# Patient Record
Sex: MALE | Race: BLACK OR AFRICAN AMERICAN | Employment: OTHER | URBAN - METROPOLITAN AREA
[De-identification: names, ages, dates, MRNs, and addresses within clinical notes are randomized per-mention and may not be internally consistent; named-entity substitution may affect disease eponyms.]

---

## 2017-01-12 ENCOUNTER — OFFICE VISIT - HEALTHEAST (OUTPATIENT)
Dept: CARDIOLOGY | Facility: CLINIC | Age: 67
End: 2017-01-12

## 2017-01-12 DIAGNOSIS — I50.22 CHRONIC SYSTOLIC CHF (CONGESTIVE HEART FAILURE), NYHA CLASS 2 (H): ICD-10-CM

## 2017-01-12 DIAGNOSIS — I10 ESSENTIAL HYPERTENSION WITH GOAL BLOOD PRESSURE LESS THAN 140/90: ICD-10-CM

## 2017-01-12 DIAGNOSIS — E78.5 DYSLIPIDEMIA, GOAL LDL BELOW 70: ICD-10-CM

## 2017-01-12 DIAGNOSIS — I25.810 ATHEROSCLEROSIS OF CORONARY ARTERY BYPASS GRAFT OF NATIVE HEART WITHOUT ANGINA PECTORIS: ICD-10-CM

## 2017-01-12 DIAGNOSIS — I25.5 ISCHEMIC CARDIOMYOPATHY: ICD-10-CM

## 2017-01-12 ASSESSMENT — MIFFLIN-ST. JEOR: SCORE: 1789.12

## 2017-01-19 ENCOUNTER — HOSPITAL ENCOUNTER (OUTPATIENT)
Dept: MRI IMAGING | Facility: HOSPITAL | Age: 67
Discharge: HOME | End: 2017-01-19
Attending: INTERNAL MEDICINE

## 2017-01-19 DIAGNOSIS — I25.5 ISCHEMIC CARDIOMYOPATHY: ICD-10-CM

## 2017-02-02 ENCOUNTER — OFFICE VISIT - HEALTHEAST (OUTPATIENT)
Dept: FAMILY MEDICINE | Facility: CLINIC | Age: 67
End: 2017-02-02

## 2017-02-02 DIAGNOSIS — I25.118 CORONARY ARTERY DISEASE OF NATIVE ARTERY OF NATIVE HEART WITH STABLE ANGINA PECTORIS (H): ICD-10-CM

## 2017-02-02 DIAGNOSIS — I25.5 ISCHEMIC CARDIOMYOPATHY: ICD-10-CM

## 2017-02-02 DIAGNOSIS — E66.3 OVERWEIGHT: ICD-10-CM

## 2017-02-02 DIAGNOSIS — E11.9 DIABETES (H): ICD-10-CM

## 2017-02-02 DIAGNOSIS — M25.562 LEFT KNEE PAIN: ICD-10-CM

## 2017-02-02 DIAGNOSIS — I15.0 RENOVASCULAR HYPERTENSION WITH GOAL BLOOD PRESSURE LESS THAN 130/85: ICD-10-CM

## 2017-02-02 DIAGNOSIS — I73.9 PERIPHERAL VASCULAR DISEASE, UNSPECIFIED (H): ICD-10-CM

## 2017-02-02 DIAGNOSIS — I25.10 CORONARY ATHEROSCLEROSIS: ICD-10-CM

## 2017-02-02 DIAGNOSIS — I10 ESSENTIAL HYPERTENSION: ICD-10-CM

## 2017-02-02 DIAGNOSIS — F17.200 TOBACCO DEPENDENCE: ICD-10-CM

## 2017-02-02 DIAGNOSIS — E78.5 HYPERLIPIDEMIA: ICD-10-CM

## 2017-02-13 ENCOUNTER — COMMUNICATION - HEALTHEAST (OUTPATIENT)
Dept: FAMILY MEDICINE | Facility: CLINIC | Age: 67
End: 2017-02-13

## 2017-02-17 ENCOUNTER — OFFICE VISIT - HEALTHEAST (OUTPATIENT)
Dept: FAMILY MEDICINE | Facility: CLINIC | Age: 67
End: 2017-02-17

## 2017-02-17 DIAGNOSIS — R05.9 COUGH: ICD-10-CM

## 2017-02-17 ASSESSMENT — MIFFLIN-ST. JEOR: SCORE: 1816.33

## 2017-02-24 ENCOUNTER — COMMUNICATION - HEALTHEAST (OUTPATIENT)
Dept: FAMILY MEDICINE | Facility: CLINIC | Age: 67
End: 2017-02-24

## 2017-02-28 ENCOUNTER — COMMUNICATION - HEALTHEAST (OUTPATIENT)
Dept: FAMILY MEDICINE | Facility: CLINIC | Age: 67
End: 2017-02-28

## 2017-03-03 ENCOUNTER — OFFICE VISIT - HEALTHEAST (OUTPATIENT)
Dept: FAMILY MEDICINE | Facility: CLINIC | Age: 67
End: 2017-03-03

## 2017-03-03 DIAGNOSIS — I25.5 ISCHEMIC CARDIOMYOPATHY: ICD-10-CM

## 2017-03-03 DIAGNOSIS — I50.9 ACUTE EXACERBATION OF CHF (CONGESTIVE HEART FAILURE) (H): ICD-10-CM

## 2017-03-03 DIAGNOSIS — I50.9 ACUTE ON CHRONIC CONGESTIVE HEART FAILURE, UNSPECIFIED CONGESTIVE HEART FAILURE TYPE: ICD-10-CM

## 2017-03-03 DIAGNOSIS — I10 ESSENTIAL HYPERTENSION: ICD-10-CM

## 2017-03-08 ENCOUNTER — AMBULATORY - HEALTHEAST (OUTPATIENT)
Dept: PULMONOLOGY | Facility: OTHER | Age: 67
End: 2017-03-08

## 2017-03-08 DIAGNOSIS — R06.02 SOB (SHORTNESS OF BREATH): ICD-10-CM

## 2017-03-13 ENCOUNTER — AMBULATORY - HEALTHEAST (OUTPATIENT)
Dept: CARDIOLOGY | Facility: CLINIC | Age: 67
End: 2017-03-13

## 2017-03-13 ENCOUNTER — OFFICE VISIT - HEALTHEAST (OUTPATIENT)
Dept: CARDIOLOGY | Facility: CLINIC | Age: 67
End: 2017-03-13

## 2017-03-13 DIAGNOSIS — I25.5 ISCHEMIC CARDIOMYOPATHY: ICD-10-CM

## 2017-03-13 ASSESSMENT — MIFFLIN-ST. JEOR: SCORE: 1780.04

## 2017-03-14 ENCOUNTER — OFFICE VISIT - HEALTHEAST (OUTPATIENT)
Dept: PULMONOLOGY | Facility: OTHER | Age: 67
End: 2017-03-14

## 2017-03-14 DIAGNOSIS — J20.8 ACUTE BRONCHITIS, VIRAL: ICD-10-CM

## 2017-03-14 ASSESSMENT — MIFFLIN-ST. JEOR: SCORE: 1770.97

## 2017-03-23 ENCOUNTER — COMMUNICATION - HEALTHEAST (OUTPATIENT)
Dept: FAMILY MEDICINE | Facility: CLINIC | Age: 67
End: 2017-03-23

## 2017-03-27 ENCOUNTER — AMBULATORY - HEALTHEAST (OUTPATIENT)
Dept: CARE COORDINATION | Facility: CLINIC | Age: 67
End: 2017-03-27

## 2017-03-29 ENCOUNTER — OFFICE VISIT - HEALTHEAST (OUTPATIENT)
Dept: FAMILY MEDICINE | Facility: CLINIC | Age: 67
End: 2017-03-29

## 2017-03-29 DIAGNOSIS — I25.10 CORONARY ATHEROSCLEROSIS: ICD-10-CM

## 2017-03-29 DIAGNOSIS — E78.5 DYSLIPIDEMIA, GOAL LDL BELOW 70: ICD-10-CM

## 2017-03-29 DIAGNOSIS — F17.200 TOBACCO DEPENDENCE: ICD-10-CM

## 2017-03-29 DIAGNOSIS — I10 ESSENTIAL HYPERTENSION: ICD-10-CM

## 2017-04-03 ENCOUNTER — OFFICE VISIT - HEALTHEAST (OUTPATIENT)
Dept: CARDIOLOGY | Facility: CLINIC | Age: 67
End: 2017-04-03

## 2017-04-03 DIAGNOSIS — I25.5 ISCHEMIC CARDIOMYOPATHY: ICD-10-CM

## 2017-04-03 DIAGNOSIS — I50.9 ACUTE ON CHRONIC CONGESTIVE HEART FAILURE, UNSPECIFIED CONGESTIVE HEART FAILURE TYPE: ICD-10-CM

## 2017-04-03 DIAGNOSIS — E78.5 HYPERLIPIDEMIA, UNSPECIFIED HYPERLIPIDEMIA TYPE: ICD-10-CM

## 2017-04-03 ASSESSMENT — MIFFLIN-ST. JEOR: SCORE: 1793.65

## 2017-05-01 ENCOUNTER — OFFICE VISIT - HEALTHEAST (OUTPATIENT)
Dept: CARDIOLOGY | Facility: CLINIC | Age: 67
End: 2017-05-01

## 2017-05-01 DIAGNOSIS — I25.5 ISCHEMIC CARDIOMYOPATHY: ICD-10-CM

## 2017-05-01 ASSESSMENT — MIFFLIN-ST. JEOR: SCORE: 1775.51

## 2017-05-30 ENCOUNTER — OFFICE VISIT - HEALTHEAST (OUTPATIENT)
Dept: CARDIOLOGY | Facility: CLINIC | Age: 67
End: 2017-05-30

## 2017-05-30 DIAGNOSIS — I10 ESSENTIAL HYPERTENSION WITH GOAL BLOOD PRESSURE LESS THAN 140/90: ICD-10-CM

## 2017-05-30 DIAGNOSIS — E78.5 HYPERLIPIDEMIA WITH TARGET LDL LESS THAN 70: ICD-10-CM

## 2017-05-30 DIAGNOSIS — I50.22 CHRONIC SYSTOLIC CHF (CONGESTIVE HEART FAILURE), NYHA CLASS 2 (H): ICD-10-CM

## 2017-05-30 DIAGNOSIS — I25.810 CORONARY ARTERY DISEASE INVOLVING CORONARY BYPASS GRAFT OF NATIVE HEART WITHOUT ANGINA PECTORIS: ICD-10-CM

## 2017-05-30 DIAGNOSIS — I25.5 ISCHEMIC CARDIOMYOPATHY: ICD-10-CM

## 2017-05-30 ASSESSMENT — MIFFLIN-ST. JEOR: SCORE: 1780.04

## 2017-06-12 ENCOUNTER — COMMUNICATION - HEALTHEAST (OUTPATIENT)
Dept: CARDIOLOGY | Facility: CLINIC | Age: 67
End: 2017-06-12

## 2017-06-12 DIAGNOSIS — I25.118 CORONARY ARTERY DISEASE OF NATIVE ARTERY OF NATIVE HEART WITH STABLE ANGINA PECTORIS (H): ICD-10-CM

## 2017-06-14 ENCOUNTER — COMMUNICATION - HEALTHEAST (OUTPATIENT)
Dept: FAMILY MEDICINE | Facility: CLINIC | Age: 67
End: 2017-06-14

## 2017-06-14 ENCOUNTER — AMBULATORY - HEALTHEAST (OUTPATIENT)
Dept: FAMILY MEDICINE | Facility: CLINIC | Age: 67
End: 2017-06-14

## 2017-06-14 DIAGNOSIS — E78.5 HYPERLIPIDEMIA WITH TARGET LDL LESS THAN 70: ICD-10-CM

## 2017-06-21 ENCOUNTER — COMMUNICATION - HEALTHEAST (OUTPATIENT)
Dept: ADMINISTRATIVE | Facility: CLINIC | Age: 67
End: 2017-06-21

## 2017-06-29 ENCOUNTER — RECORDS - HEALTHEAST (OUTPATIENT)
Dept: ADMINISTRATIVE | Facility: OTHER | Age: 67
End: 2017-06-29

## 2017-07-19 ENCOUNTER — COMMUNICATION - HEALTHEAST (OUTPATIENT)
Dept: ADMINISTRATIVE | Facility: CLINIC | Age: 67
End: 2017-07-19

## 2017-08-16 ENCOUNTER — OFFICE VISIT - HEALTHEAST (OUTPATIENT)
Dept: CARDIOLOGY | Facility: CLINIC | Age: 67
End: 2017-08-16

## 2017-08-16 DIAGNOSIS — E78.5 DYSLIPIDEMIA, GOAL LDL BELOW 70: ICD-10-CM

## 2017-08-16 DIAGNOSIS — I25.810 CORONARY ARTERY DISEASE INVOLVING CORONARY BYPASS GRAFT OF NATIVE HEART WITHOUT ANGINA PECTORIS: ICD-10-CM

## 2017-08-16 DIAGNOSIS — I50.22 CHRONIC SYSTOLIC CHF (CONGESTIVE HEART FAILURE), NYHA CLASS 2 (H): ICD-10-CM

## 2017-08-16 DIAGNOSIS — I10 ESSENTIAL HYPERTENSION WITH GOAL BLOOD PRESSURE LESS THAN 140/90: ICD-10-CM

## 2017-08-16 DIAGNOSIS — I25.5 ISCHEMIC CARDIOMYOPATHY: ICD-10-CM

## 2017-08-16 LAB
CHOLEST SERPL-MCNC: 103 MG/DL
FASTING STATUS PATIENT QL REPORTED: YES
HDLC SERPL-MCNC: 31 MG/DL
LDLC SERPL CALC-MCNC: 52 MG/DL
TRIGL SERPL-MCNC: 99 MG/DL

## 2017-08-16 ASSESSMENT — MIFFLIN-ST. JEOR: SCORE: 1789.12

## 2017-09-08 ENCOUNTER — COMMUNICATION - HEALTHEAST (OUTPATIENT)
Dept: FAMILY MEDICINE | Facility: CLINIC | Age: 67
End: 2017-09-08

## 2017-09-13 ENCOUNTER — OFFICE VISIT - HEALTHEAST (OUTPATIENT)
Dept: FAMILY MEDICINE | Facility: CLINIC | Age: 67
End: 2017-09-13

## 2017-09-13 DIAGNOSIS — B07.9 WART: ICD-10-CM

## 2017-09-13 DIAGNOSIS — E78.5 DYSLIPIDEMIA, GOAL LDL BELOW 70: ICD-10-CM

## 2017-09-13 DIAGNOSIS — Z23 ENCOUNTER TO VACCINATE PATIENT: ICD-10-CM

## 2017-09-13 DIAGNOSIS — I25.5 ISCHEMIC CARDIOMYOPATHY: ICD-10-CM

## 2017-09-13 ASSESSMENT — MIFFLIN-ST. JEOR: SCORE: 1789.39

## 2017-10-16 ENCOUNTER — COMMUNICATION - HEALTHEAST (OUTPATIENT)
Dept: FAMILY MEDICINE | Facility: CLINIC | Age: 67
End: 2017-10-16

## 2017-10-16 DIAGNOSIS — E11.9 DIABETES (H): ICD-10-CM

## 2017-11-13 ENCOUNTER — COMMUNICATION - HEALTHEAST (OUTPATIENT)
Dept: FAMILY MEDICINE | Facility: CLINIC | Age: 67
End: 2017-11-13

## 2017-11-13 DIAGNOSIS — E78.5 HYPERLIPIDEMIA WITH TARGET LDL LESS THAN 70: ICD-10-CM

## 2017-11-13 DIAGNOSIS — I25.810 ATHEROSCLEROSIS OF CORONARY ARTERY BYPASS GRAFT OF NATIVE HEART WITHOUT ANGINA PECTORIS: ICD-10-CM

## 2017-11-13 DIAGNOSIS — F17.200 TOBACCO DEPENDENCE: ICD-10-CM

## 2017-12-19 ENCOUNTER — COMMUNICATION - HEALTHEAST (OUTPATIENT)
Dept: FAMILY MEDICINE | Facility: CLINIC | Age: 67
End: 2017-12-19

## 2017-12-19 DIAGNOSIS — F17.200 NICOTINE DEPENDENCE: ICD-10-CM

## 2018-01-09 ENCOUNTER — RECORDS - HEALTHEAST (OUTPATIENT)
Dept: ADMINISTRATIVE | Facility: OTHER | Age: 68
End: 2018-01-09

## 2018-01-18 ENCOUNTER — OFFICE VISIT - HEALTHEAST (OUTPATIENT)
Dept: FAMILY MEDICINE | Facility: CLINIC | Age: 68
End: 2018-01-18

## 2018-01-18 DIAGNOSIS — R06.81 APNEA: ICD-10-CM

## 2018-01-18 DIAGNOSIS — H26.9 CATARACT: ICD-10-CM

## 2018-01-18 DIAGNOSIS — I10 ESSENTIAL HYPERTENSION: ICD-10-CM

## 2018-01-23 ENCOUNTER — RECORDS - HEALTHEAST (OUTPATIENT)
Dept: ADMINISTRATIVE | Facility: OTHER | Age: 68
End: 2018-01-23

## 2018-01-24 ENCOUNTER — RECORDS - HEALTHEAST (OUTPATIENT)
Dept: ADMINISTRATIVE | Facility: OTHER | Age: 68
End: 2018-01-24

## 2018-01-25 ENCOUNTER — COMMUNICATION - HEALTHEAST (OUTPATIENT)
Dept: ADMINISTRATIVE | Facility: CLINIC | Age: 68
End: 2018-01-25

## 2018-02-09 ENCOUNTER — COMMUNICATION - HEALTHEAST (OUTPATIENT)
Dept: FAMILY MEDICINE | Facility: CLINIC | Age: 68
End: 2018-02-09

## 2018-02-09 ENCOUNTER — COMMUNICATION - HEALTHEAST (OUTPATIENT)
Dept: CARDIOLOGY | Facility: CLINIC | Age: 68
End: 2018-02-09

## 2018-02-09 DIAGNOSIS — I25.5 ISCHEMIC CARDIOMYOPATHY: ICD-10-CM

## 2018-02-09 DIAGNOSIS — E78.5 HYPERLIPIDEMIA, UNSPECIFIED HYPERLIPIDEMIA TYPE: ICD-10-CM

## 2018-02-12 ENCOUNTER — COMMUNICATION - HEALTHEAST (OUTPATIENT)
Dept: FAMILY MEDICINE | Facility: CLINIC | Age: 68
End: 2018-02-12

## 2018-02-12 ENCOUNTER — COMMUNICATION - HEALTHEAST (OUTPATIENT)
Dept: CARDIOLOGY | Facility: CLINIC | Age: 68
End: 2018-02-12

## 2018-02-12 DIAGNOSIS — I25.810 ATHEROSCLEROSIS OF CORONARY ARTERY BYPASS GRAFT OF NATIVE HEART WITHOUT ANGINA PECTORIS: ICD-10-CM

## 2018-02-12 DIAGNOSIS — E11.9 DIABETES (H): ICD-10-CM

## 2018-02-12 DIAGNOSIS — F17.200 TOBACCO DEPENDENCE: ICD-10-CM

## 2018-02-12 DIAGNOSIS — I50.9 ACUTE ON CHRONIC CONGESTIVE HEART FAILURE, UNSPECIFIED CONGESTIVE HEART FAILURE TYPE: ICD-10-CM

## 2018-02-12 DIAGNOSIS — I15.0 RENOVASCULAR HYPERTENSION WITH GOAL BLOOD PRESSURE LESS THAN 130/85: ICD-10-CM

## 2018-02-12 DIAGNOSIS — E08.22 DIABETES MELLITUS DUE TO UNDERLYING CONDITION WITH DIABETIC CHRONIC KIDNEY DISEASE, UNSPECIFIED CKD STAGE, UNSPECIFIED LONG TERM INSULIN USE STATUS: ICD-10-CM

## 2018-02-13 ENCOUNTER — COMMUNICATION - HEALTHEAST (OUTPATIENT)
Dept: FAMILY MEDICINE | Facility: CLINIC | Age: 68
End: 2018-02-13

## 2018-02-13 DIAGNOSIS — E08.22 DIABETES MELLITUS DUE TO UNDERLYING CONDITION WITH DIABETIC CHRONIC KIDNEY DISEASE, UNSPECIFIED CKD STAGE, UNSPECIFIED LONG TERM INSULIN USE STATUS: ICD-10-CM

## 2018-02-16 ENCOUNTER — COMMUNICATION - HEALTHEAST (OUTPATIENT)
Dept: ADMINISTRATIVE | Facility: CLINIC | Age: 68
End: 2018-02-16

## 2018-02-27 ENCOUNTER — OFFICE VISIT - HEALTHEAST (OUTPATIENT)
Dept: FAMILY MEDICINE | Facility: CLINIC | Age: 68
End: 2018-02-27

## 2018-02-27 DIAGNOSIS — Z01.818 PREOP TESTING: ICD-10-CM

## 2018-02-27 DIAGNOSIS — H26.9 RIGHT CATARACT: ICD-10-CM

## 2018-02-27 DIAGNOSIS — I25.10 CORONARY ATHEROSCLEROSIS: ICD-10-CM

## 2018-02-27 DIAGNOSIS — E08.22 DIABETES MELLITUS DUE TO UNDERLYING CONDITION WITH DIABETIC CHRONIC KIDNEY DISEASE, UNSPECIFIED CKD STAGE, UNSPECIFIED LONG TERM INSULIN USE STATUS: ICD-10-CM

## 2018-02-27 LAB
ANION GAP SERPL CALCULATED.3IONS-SCNC: 12 MMOL/L (ref 5–18)
BASOPHILS # BLD AUTO: 0 THOU/UL (ref 0–0.2)
BASOPHILS NFR BLD AUTO: 0 % (ref 0–2)
BUN SERPL-MCNC: 28 MG/DL (ref 8–22)
CALCIUM SERPL-MCNC: 9.9 MG/DL (ref 8.5–10.5)
CHLORIDE BLD-SCNC: 109 MMOL/L (ref 98–107)
CO2 SERPL-SCNC: 21 MMOL/L (ref 22–31)
CREAT SERPL-MCNC: 1.32 MG/DL (ref 0.7–1.3)
EOSINOPHIL # BLD AUTO: 0.4 THOU/UL (ref 0–0.4)
EOSINOPHIL NFR BLD AUTO: 7 % (ref 0–6)
ERYTHROCYTE [DISTWIDTH] IN BLOOD BY AUTOMATED COUNT: 16.2 % (ref 11–14.5)
GFR SERPL CREATININE-BSD FRML MDRD: 54 ML/MIN/1.73M2
GLUCOSE BLD-MCNC: 145 MG/DL (ref 70–125)
HCT VFR BLD AUTO: 38.5 % (ref 40–54)
HGB BLD-MCNC: 12.8 G/DL (ref 14–18)
LYMPHOCYTES # BLD AUTO: 1.9 THOU/UL (ref 0.8–4.4)
LYMPHOCYTES NFR BLD AUTO: 34 % (ref 20–40)
MCH RBC QN AUTO: 27.1 PG (ref 27–34)
MCHC RBC AUTO-ENTMCNC: 33.2 G/DL (ref 32–36)
MCV RBC AUTO: 81 FL (ref 80–100)
MONOCYTES # BLD AUTO: 0.5 THOU/UL (ref 0–0.9)
MONOCYTES NFR BLD AUTO: 9 % (ref 2–10)
NEUTROPHILS # BLD AUTO: 2.8 THOU/UL (ref 2–7.7)
NEUTROPHILS NFR BLD AUTO: 50 % (ref 50–70)
PLATELET # BLD AUTO: 247 THOU/UL (ref 140–440)
PMV BLD AUTO: 7.4 FL (ref 7–10)
POTASSIUM BLD-SCNC: 4.2 MMOL/L (ref 3.5–5)
RBC # BLD AUTO: 4.72 MILL/UL (ref 4.4–6.2)
SODIUM SERPL-SCNC: 142 MMOL/L (ref 136–145)
WBC: 5.5 THOU/UL (ref 4–11)

## 2018-02-27 ASSESSMENT — MIFFLIN-ST. JEOR: SCORE: 1773.52

## 2018-03-06 ENCOUNTER — OFFICE VISIT - HEALTHEAST (OUTPATIENT)
Dept: SLEEP MEDICINE | Facility: CLINIC | Age: 68
End: 2018-03-06

## 2018-03-06 DIAGNOSIS — R06.83 SNORING: ICD-10-CM

## 2018-03-06 DIAGNOSIS — G47.8 SLEEP DYSFUNCTION WITH SLEEP STAGE DISTURBANCE: ICD-10-CM

## 2018-03-06 DIAGNOSIS — G47.10 HYPERSOMNIA: ICD-10-CM

## 2018-03-06 DIAGNOSIS — R29.818 SUSPECTED SLEEP APNEA: ICD-10-CM

## 2018-03-06 ASSESSMENT — MIFFLIN-ST. JEOR: SCORE: 1791.38

## 2018-03-08 ENCOUNTER — OFFICE VISIT - HEALTHEAST (OUTPATIENT)
Dept: CARDIOLOGY | Facility: CLINIC | Age: 68
End: 2018-03-08

## 2018-03-08 DIAGNOSIS — I25.82 CORONARY ARTERY CHRONIC TOTAL OCCLUSION: ICD-10-CM

## 2018-03-08 DIAGNOSIS — I25.5 ISCHEMIC CARDIOMYOPATHY: ICD-10-CM

## 2018-03-08 DIAGNOSIS — E78.5 DYSLIPIDEMIA, GOAL LDL BELOW 70: ICD-10-CM

## 2018-03-08 DIAGNOSIS — I10 ESSENTIAL HYPERTENSION: ICD-10-CM

## 2018-03-08 DIAGNOSIS — I50.22 CHRONIC SYSTOLIC CHF (CONGESTIVE HEART FAILURE), NYHA CLASS 2 (H): ICD-10-CM

## 2018-03-08 ASSESSMENT — MIFFLIN-ST. JEOR: SCORE: 1800.22

## 2018-03-25 ENCOUNTER — RECORDS - HEALTHEAST (OUTPATIENT)
Dept: SLEEP MEDICINE | Facility: CLINIC | Age: 68
End: 2018-03-25

## 2018-03-25 DIAGNOSIS — G47.8 OTHER SLEEP DISORDERS: ICD-10-CM

## 2018-03-25 DIAGNOSIS — R06.83 SNORING: ICD-10-CM

## 2018-03-25 DIAGNOSIS — G47.30 SLEEP APNEA, UNSPECIFIED: ICD-10-CM

## 2018-03-25 DIAGNOSIS — G47.10 HYPERSOMNIA, UNSPECIFIED: ICD-10-CM

## 2018-04-02 ENCOUNTER — COMMUNICATION - HEALTHEAST (OUTPATIENT)
Dept: SLEEP MEDICINE | Facility: CLINIC | Age: 68
End: 2018-04-02

## 2018-04-17 ENCOUNTER — COMMUNICATION - HEALTHEAST (OUTPATIENT)
Dept: FAMILY MEDICINE | Facility: CLINIC | Age: 68
End: 2018-04-17

## 2018-04-17 DIAGNOSIS — E11.9 DIABETES (H): ICD-10-CM

## 2018-04-19 ENCOUNTER — OFFICE VISIT - HEALTHEAST (OUTPATIENT)
Dept: SLEEP MEDICINE | Facility: CLINIC | Age: 68
End: 2018-04-19

## 2018-04-19 ENCOUNTER — AMBULATORY - HEALTHEAST (OUTPATIENT)
Dept: MULTI SPECIALTY CLINIC | Facility: CLINIC | Age: 68
End: 2018-04-19

## 2018-04-19 ENCOUNTER — AMBULATORY - HEALTHEAST (OUTPATIENT)
Dept: SLEEP MEDICINE | Facility: CLINIC | Age: 68
End: 2018-04-19

## 2018-04-19 DIAGNOSIS — G47.8 SLEEP DYSFUNCTION WITH SLEEP STAGE DISTURBANCE: ICD-10-CM

## 2018-04-19 DIAGNOSIS — G47.33 OBSTRUCTIVE SLEEP APNEA: ICD-10-CM

## 2018-04-19 DIAGNOSIS — G47.69 SLEEP-RELATED MOVEMENT DISORDER: ICD-10-CM

## 2018-04-19 ASSESSMENT — MIFFLIN-ST. JEOR: SCORE: 1810.2

## 2018-04-27 ENCOUNTER — AMBULATORY - HEALTHEAST (OUTPATIENT)
Dept: SLEEP MEDICINE | Facility: CLINIC | Age: 68
End: 2018-04-27

## 2018-05-14 ENCOUNTER — COMMUNICATION - HEALTHEAST (OUTPATIENT)
Dept: CARDIOLOGY | Facility: CLINIC | Age: 68
End: 2018-05-14

## 2018-05-14 DIAGNOSIS — I50.9 ACUTE ON CHRONIC CONGESTIVE HEART FAILURE, UNSPECIFIED CONGESTIVE HEART FAILURE TYPE: ICD-10-CM

## 2018-05-14 DIAGNOSIS — E78.5 HYPERLIPIDEMIA, UNSPECIFIED HYPERLIPIDEMIA TYPE: ICD-10-CM

## 2018-05-14 DIAGNOSIS — I25.5 ISCHEMIC CARDIOMYOPATHY: ICD-10-CM

## 2018-06-08 ENCOUNTER — COMMUNICATION - HEALTHEAST (OUTPATIENT)
Dept: SLEEP MEDICINE | Facility: CLINIC | Age: 68
End: 2018-06-08

## 2018-06-13 ENCOUNTER — OFFICE VISIT - HEALTHEAST (OUTPATIENT)
Dept: SLEEP MEDICINE | Facility: CLINIC | Age: 68
End: 2018-06-13

## 2018-06-13 DIAGNOSIS — G47.33 OSA ON CPAP: ICD-10-CM

## 2018-06-13 DIAGNOSIS — G47.8 SLEEP DYSFUNCTION WITH SLEEP STAGE DISTURBANCE: ICD-10-CM

## 2018-06-13 DIAGNOSIS — R03.0 ELEVATED BLOOD PRESSURE READING: ICD-10-CM

## 2018-06-13 ASSESSMENT — MIFFLIN-ST. JEOR: SCORE: 1797.5

## 2018-06-29 ENCOUNTER — COMMUNICATION - HEALTHEAST (OUTPATIENT)
Dept: ADMINISTRATIVE | Facility: CLINIC | Age: 68
End: 2018-06-29

## 2018-07-16 ENCOUNTER — COMMUNICATION - HEALTHEAST (OUTPATIENT)
Dept: FAMILY MEDICINE | Facility: CLINIC | Age: 68
End: 2018-07-16

## 2018-07-16 ENCOUNTER — COMMUNICATION - HEALTHEAST (OUTPATIENT)
Dept: CARDIOLOGY | Facility: CLINIC | Age: 68
End: 2018-07-16

## 2018-07-16 ENCOUNTER — AMBULATORY - HEALTHEAST (OUTPATIENT)
Dept: SLEEP MEDICINE | Facility: CLINIC | Age: 68
End: 2018-07-16

## 2018-07-16 DIAGNOSIS — I25.118 CORONARY ARTERY DISEASE OF NATIVE ARTERY OF NATIVE HEART WITH STABLE ANGINA PECTORIS (H): ICD-10-CM

## 2018-07-16 DIAGNOSIS — E11.9 DIABETES (H): ICD-10-CM

## 2018-08-13 ENCOUNTER — COMMUNICATION - HEALTHEAST (OUTPATIENT)
Dept: FAMILY MEDICINE | Facility: CLINIC | Age: 68
End: 2018-08-13

## 2018-08-13 DIAGNOSIS — I73.9 PERIPHERAL VASCULAR DISEASE (H): ICD-10-CM

## 2018-08-13 DIAGNOSIS — I25.10 CORONARY ATHEROSCLEROSIS: ICD-10-CM

## 2018-08-13 DIAGNOSIS — E78.5 HYPERLIPIDEMIA WITH TARGET LDL LESS THAN 70: ICD-10-CM

## 2018-08-28 ENCOUNTER — OFFICE VISIT - HEALTHEAST (OUTPATIENT)
Dept: FAMILY MEDICINE | Facility: CLINIC | Age: 68
End: 2018-08-28

## 2018-08-28 DIAGNOSIS — G47.33 OSA (OBSTRUCTIVE SLEEP APNEA): ICD-10-CM

## 2018-08-28 DIAGNOSIS — I25.709 CORONARY ARTERY DISEASE INVOLVING CORONARY BYPASS GRAFT OF NATIVE HEART WITH ANGINA PECTORIS (H): ICD-10-CM

## 2018-08-28 DIAGNOSIS — R20.2 PARESTHESIA OF BOTH FEET: ICD-10-CM

## 2018-08-28 DIAGNOSIS — E11.9 DIABETES MELLITUS (H): ICD-10-CM

## 2018-08-28 DIAGNOSIS — I10 ESSENTIAL HYPERTENSION WITH GOAL BLOOD PRESSURE LESS THAN 140/90: ICD-10-CM

## 2018-08-28 DIAGNOSIS — Z23 INFLUENZA VACCINATION GIVEN: ICD-10-CM

## 2018-08-28 LAB
ANION GAP SERPL CALCULATED.3IONS-SCNC: 10 MMOL/L (ref 5–18)
BUN SERPL-MCNC: 20 MG/DL (ref 8–22)
CALCIUM SERPL-MCNC: 10.5 MG/DL (ref 8.5–10.5)
CHLORIDE BLD-SCNC: 109 MMOL/L (ref 98–107)
CHOLEST SERPL-MCNC: 109 MG/DL
CO2 SERPL-SCNC: 26 MMOL/L (ref 22–31)
CREAT SERPL-MCNC: 1.2 MG/DL (ref 0.7–1.3)
FASTING STATUS PATIENT QL REPORTED: YES
GFR SERPL CREATININE-BSD FRML MDRD: 60 ML/MIN/1.73M2
GLUCOSE BLD-MCNC: 137 MG/DL (ref 70–125)
HBA1C MFR BLD: 7 % (ref 3.5–6)
HDLC SERPL-MCNC: 39 MG/DL
LDLC SERPL CALC-MCNC: 53 MG/DL
POTASSIUM BLD-SCNC: 4.3 MMOL/L (ref 3.5–5)
SODIUM SERPL-SCNC: 145 MMOL/L (ref 136–145)
TRIGL SERPL-MCNC: 87 MG/DL

## 2018-08-28 ASSESSMENT — MIFFLIN-ST. JEOR: SCORE: 1804.14

## 2018-09-13 ENCOUNTER — OFFICE VISIT - HEALTHEAST (OUTPATIENT)
Dept: CARDIOLOGY | Facility: CLINIC | Age: 68
End: 2018-09-13

## 2018-09-13 DIAGNOSIS — I25.5 ISCHEMIC CARDIOMYOPATHY: ICD-10-CM

## 2018-09-13 DIAGNOSIS — I10 ESSENTIAL HYPERTENSION WITH GOAL BLOOD PRESSURE LESS THAN 140/90: ICD-10-CM

## 2018-09-13 DIAGNOSIS — I25.810 CORONARY ARTERY DISEASE INVOLVING CORONARY BYPASS GRAFT OF NATIVE HEART WITHOUT ANGINA PECTORIS: ICD-10-CM

## 2018-09-13 DIAGNOSIS — E78.5 HYPERLIPIDEMIA WITH TARGET LDL LESS THAN 70: ICD-10-CM

## 2018-09-13 ASSESSMENT — MIFFLIN-ST. JEOR: SCORE: 1799.32

## 2018-09-17 ENCOUNTER — COMMUNICATION - HEALTHEAST (OUTPATIENT)
Dept: FAMILY MEDICINE | Facility: CLINIC | Age: 68
End: 2018-09-17

## 2018-09-17 DIAGNOSIS — I25.10 CORONARY ATHEROSCLEROSIS: ICD-10-CM

## 2018-09-17 DIAGNOSIS — I15.0 RENOVASCULAR HYPERTENSION WITH GOAL BLOOD PRESSURE LESS THAN 130/85: ICD-10-CM

## 2018-09-17 DIAGNOSIS — I25.810 ATHEROSCLEROSIS OF CORONARY ARTERY BYPASS GRAFT OF NATIVE HEART WITHOUT ANGINA PECTORIS: ICD-10-CM

## 2018-09-17 DIAGNOSIS — I73.9 PERIPHERAL VASCULAR DISEASE (H): ICD-10-CM

## 2018-09-17 DIAGNOSIS — E11.9 DIABETES (H): ICD-10-CM

## 2018-10-15 ENCOUNTER — COMMUNICATION - HEALTHEAST (OUTPATIENT)
Dept: FAMILY MEDICINE | Facility: CLINIC | Age: 68
End: 2018-10-15

## 2018-10-15 DIAGNOSIS — E11.9 DIABETES (H): ICD-10-CM

## 2018-11-11 ENCOUNTER — OFFICE VISIT - HEALTHEAST (OUTPATIENT)
Dept: FAMILY MEDICINE | Facility: CLINIC | Age: 68
End: 2018-11-11

## 2018-11-11 DIAGNOSIS — J18.9 PNEUMONIA OF RIGHT LOWER LOBE DUE TO INFECTIOUS ORGANISM: ICD-10-CM

## 2018-12-05 ENCOUNTER — OFFICE VISIT - HEALTHEAST (OUTPATIENT)
Dept: FAMILY MEDICINE | Facility: CLINIC | Age: 68
End: 2018-12-05

## 2018-12-05 DIAGNOSIS — J18.9 PNEUMONIA OF RIGHT MIDDLE LOBE DUE TO INFECTIOUS ORGANISM: ICD-10-CM

## 2018-12-05 DIAGNOSIS — R05.9 COUGH: ICD-10-CM

## 2018-12-05 ASSESSMENT — MIFFLIN-ST. JEOR: SCORE: 1766.55

## 2018-12-14 ENCOUNTER — COMMUNICATION - HEALTHEAST (OUTPATIENT)
Dept: FAMILY MEDICINE | Facility: CLINIC | Age: 68
End: 2018-12-14

## 2018-12-23 ENCOUNTER — COMMUNICATION - HEALTHEAST (OUTPATIENT)
Dept: SCHEDULING | Facility: CLINIC | Age: 68
End: 2018-12-23

## 2018-12-23 ENCOUNTER — OFFICE VISIT - HEALTHEAST (OUTPATIENT)
Dept: FAMILY MEDICINE | Facility: CLINIC | Age: 68
End: 2018-12-23

## 2018-12-23 DIAGNOSIS — R91.8 LUNG FIELD ABNORMAL FINDING ON EXAMINATION: ICD-10-CM

## 2018-12-23 DIAGNOSIS — M62.830 SPASM OF BACK MUSCLES: ICD-10-CM

## 2018-12-26 ENCOUNTER — OFFICE VISIT - HEALTHEAST (OUTPATIENT)
Dept: FAMILY MEDICINE | Facility: CLINIC | Age: 68
End: 2018-12-26

## 2018-12-26 DIAGNOSIS — I10 ESSENTIAL HYPERTENSION: ICD-10-CM

## 2018-12-26 DIAGNOSIS — F51.01 PRIMARY INSOMNIA: ICD-10-CM

## 2018-12-26 DIAGNOSIS — J40 BRONCHITIS: ICD-10-CM

## 2018-12-26 DIAGNOSIS — I25.5 ISCHEMIC CARDIOMYOPATHY: ICD-10-CM

## 2018-12-26 ASSESSMENT — MIFFLIN-ST. JEOR: SCORE: 1750.11

## 2019-01-01 ENCOUNTER — COMMUNICATION - HEALTHEAST (OUTPATIENT)
Dept: ANTICOAGULATION | Facility: CLINIC | Age: 69
End: 2019-01-01

## 2019-01-01 ENCOUNTER — RECORDS - HEALTHEAST (OUTPATIENT)
Dept: ADMINISTRATIVE | Facility: OTHER | Age: 69
End: 2019-01-01

## 2019-01-01 ENCOUNTER — AMBULATORY - HEALTHEAST (OUTPATIENT)
Dept: CARDIOLOGY | Facility: CLINIC | Age: 69
End: 2019-01-01

## 2019-01-01 ENCOUNTER — COMMUNICATION - HEALTHEAST (OUTPATIENT)
Dept: INTERNAL MEDICINE | Facility: CLINIC | Age: 69
End: 2019-01-01

## 2019-01-01 ENCOUNTER — COMMUNICATION - HEALTHEAST (OUTPATIENT)
Dept: FAMILY MEDICINE | Facility: CLINIC | Age: 69
End: 2019-01-01

## 2019-01-01 ENCOUNTER — OFFICE VISIT - HEALTHEAST (OUTPATIENT)
Dept: INTERNAL MEDICINE | Facility: CLINIC | Age: 69
End: 2019-01-01

## 2019-01-01 ENCOUNTER — COMMUNICATION - HEALTHEAST (OUTPATIENT)
Dept: CARDIOLOGY | Facility: CLINIC | Age: 69
End: 2019-01-01

## 2019-01-01 ENCOUNTER — ANESTHESIA - HEALTHEAST (OUTPATIENT)
Dept: SURGERY | Facility: CLINIC | Age: 69
End: 2019-01-01

## 2019-01-01 ENCOUNTER — AMBULATORY - HEALTHEAST (OUTPATIENT)
Dept: LAB | Facility: CLINIC | Age: 69
End: 2019-01-01

## 2019-01-01 ENCOUNTER — RECORDS - HEALTHEAST (OUTPATIENT)
Dept: SCHEDULING | Facility: CLINIC | Age: 69
End: 2019-01-01

## 2019-01-01 ENCOUNTER — SURGERY - HEALTHEAST (OUTPATIENT)
Dept: CARDIOLOGY | Facility: CLINIC | Age: 69
End: 2019-01-01

## 2019-01-01 ENCOUNTER — AMBULATORY - HEALTHEAST (OUTPATIENT)
Dept: PHARMACY | Facility: CLINIC | Age: 69
End: 2019-01-01

## 2019-01-01 ENCOUNTER — OFFICE VISIT - HEALTHEAST (OUTPATIENT)
Dept: CARDIOLOGY | Facility: CLINIC | Age: 69
End: 2019-01-01

## 2019-01-01 ENCOUNTER — SURGERY - HEALTHEAST (OUTPATIENT)
Dept: SURGERY | Facility: CLINIC | Age: 69
End: 2019-01-01

## 2019-01-01 ENCOUNTER — ANESTHESIA - HEALTHEAST (OUTPATIENT)
Dept: CARDIOLOGY | Facility: CLINIC | Age: 69
End: 2019-01-01

## 2019-01-01 DIAGNOSIS — I10 ESSENTIAL HYPERTENSION: ICD-10-CM

## 2019-01-01 DIAGNOSIS — I48.4 ATYPICAL ATRIAL FLUTTER (H): ICD-10-CM

## 2019-01-01 DIAGNOSIS — Z79.01 CHRONIC ANTICOAGULATION: ICD-10-CM

## 2019-01-01 DIAGNOSIS — L30.8 ASTEATOTIC ECZEMA: ICD-10-CM

## 2019-01-01 DIAGNOSIS — Z95.810 ICD (IMPLANTABLE CARDIOVERTER-DEFIBRILLATOR) IN PLACE: ICD-10-CM

## 2019-01-01 DIAGNOSIS — G47.9 SLEEP DISORDER: ICD-10-CM

## 2019-01-01 DIAGNOSIS — R00.1 BRADYCARDIA: ICD-10-CM

## 2019-01-01 DIAGNOSIS — I49.5 SINUS NODE DYSFUNCTION (H): ICD-10-CM

## 2019-01-01 DIAGNOSIS — I50.9 ACUTE ON CHRONIC CONGESTIVE HEART FAILURE (H): ICD-10-CM

## 2019-01-01 DIAGNOSIS — I50.20 HEART FAILURE WITH REDUCED EJECTION FRACTION, NYHA CLASS I (H): ICD-10-CM

## 2019-01-01 DIAGNOSIS — Z95.0 CARDIAC PACEMAKER IN SITU: ICD-10-CM

## 2019-01-01 DIAGNOSIS — E78.5 DYSLIPIDEMIA, GOAL LDL BELOW 70: ICD-10-CM

## 2019-01-01 DIAGNOSIS — I25.810 ATHEROSCLEROSIS OF CORONARY ARTERY BYPASS GRAFT OF NATIVE HEART WITHOUT ANGINA PECTORIS: ICD-10-CM

## 2019-01-01 DIAGNOSIS — E11.69 TYPE 2 DIABETES MELLITUS WITH OTHER SPECIFIED COMPLICATION, WITHOUT LONG-TERM CURRENT USE OF INSULIN (H): ICD-10-CM

## 2019-01-01 DIAGNOSIS — I73.9 PERIPHERAL VASCULAR DISEASE (H): ICD-10-CM

## 2019-01-01 DIAGNOSIS — I50.20 HEART FAILURE WITH REDUCED EJECTION FRACTION, NYHA CLASS II (H): ICD-10-CM

## 2019-01-01 DIAGNOSIS — I15.0 RENOVASCULAR HYPERTENSION WITH GOAL BLOOD PRESSURE LESS THAN 130/85: ICD-10-CM

## 2019-01-01 DIAGNOSIS — I25.5 ISCHEMIC CARDIOMYOPATHY: ICD-10-CM

## 2019-01-01 DIAGNOSIS — R42 DIZZINESS: ICD-10-CM

## 2019-01-01 DIAGNOSIS — N18.30 CHRONIC KIDNEY DISEASE, STAGE III (MODERATE) (H): ICD-10-CM

## 2019-01-01 DIAGNOSIS — I48.20 CHRONIC ATRIAL FIBRILLATION (H): ICD-10-CM

## 2019-01-01 DIAGNOSIS — Z01.818 PREOPERATIVE EXAMINATION: ICD-10-CM

## 2019-01-01 DIAGNOSIS — I73.9 PERIPHERAL VASCULAR DISEASE, UNSPECIFIED (H): ICD-10-CM

## 2019-01-01 DIAGNOSIS — N18.30 CHRONIC RENAL FAILURE, STAGE 3 (MODERATE) (H): ICD-10-CM

## 2019-01-01 DIAGNOSIS — J20.8 ACUTE BRONCHITIS DUE TO OTHER SPECIFIED ORGANISMS: ICD-10-CM

## 2019-01-01 DIAGNOSIS — J40 BRONCHITIS: ICD-10-CM

## 2019-01-01 DIAGNOSIS — I73.9 PVD (PERIPHERAL VASCULAR DISEASE) (H): ICD-10-CM

## 2019-01-01 DIAGNOSIS — I25.118 CORONARY ARTERY DISEASE OF NATIVE ARTERY OF NATIVE HEART WITH STABLE ANGINA PECTORIS (H): ICD-10-CM

## 2019-01-01 DIAGNOSIS — R06.02 SHORTNESS OF BREATH: ICD-10-CM

## 2019-01-01 LAB
ANION GAP SERPL CALCULATED.3IONS-SCNC: 10 MMOL/L (ref 5–18)
ANION GAP SERPL CALCULATED.3IONS-SCNC: 8 MMOL/L (ref 5–18)
ATRIAL RATE - MUSE: 60 BPM
ATRIAL RATE - MUSE: 72 BPM
BASOPHILS # BLD AUTO: 0 THOU/UL (ref 0–0.2)
BASOPHILS NFR BLD AUTO: 0 % (ref 0–2)
BUN SERPL-MCNC: 34 MG/DL (ref 8–22)
BUN SERPL-MCNC: 44 MG/DL (ref 8–22)
CALCIUM SERPL-MCNC: 10 MG/DL (ref 8.5–10.5)
CALCIUM SERPL-MCNC: 10.2 MG/DL (ref 8.5–10.5)
CHLORIDE BLD-SCNC: 107 MMOL/L (ref 98–107)
CHLORIDE BLD-SCNC: 108 MMOL/L (ref 98–107)
CO2 SERPL-SCNC: 26 MMOL/L (ref 22–31)
CO2 SERPL-SCNC: 28 MMOL/L (ref 22–31)
CREAT SERPL-MCNC: 1.8 MG/DL (ref 0.7–1.3)
CREAT SERPL-MCNC: 1.86 MG/DL (ref 0.7–1.3)
DIASTOLIC BLOOD PRESSURE - MUSE: NORMAL
DIASTOLIC BLOOD PRESSURE - MUSE: NORMAL
EOSINOPHIL # BLD AUTO: 0.4 THOU/UL (ref 0–0.4)
EOSINOPHIL NFR BLD AUTO: 6 % (ref 0–6)
ERYTHROCYTE [DISTWIDTH] IN BLOOD BY AUTOMATED COUNT: 16.9 % (ref 11–14.5)
GFR SERPL CREATININE-BSD FRML MDRD: 36 ML/MIN/1.73M2
GFR SERPL CREATININE-BSD FRML MDRD: 38 ML/MIN/1.73M2
GLUCOSE BLD-MCNC: 123 MG/DL (ref 70–125)
GLUCOSE BLD-MCNC: 98 MG/DL (ref 70–125)
HBA1C MFR BLD: 6.8 % (ref 3.5–6)
HCC DEVICE COMMENTS: NORMAL
HCC DEVICE IMPLANTING PROVIDER: NORMAL
HCC DEVICE MANUFACTURE: NORMAL
HCC DEVICE MODEL: NORMAL
HCC DEVICE SERIAL NUMBER: NORMAL
HCC DEVICE TYPE: NORMAL
HCT VFR BLD AUTO: 35.8 % (ref 40–54)
HGB BLD-MCNC: 11.4 G/DL (ref 14–18)
INR PPP: 1.6 (ref 0.9–1.1)
INR PPP: 2.4 (ref 0.9–1.1)
INR PPP: 2.5 (ref 0.9–1.1)
INR PPP: 2.8 (ref 0.9–1.1)
INR PPP: 3 (ref 0.9–1.1)
INR PPP: 3.4 (ref 0.9–1.1)
INR PPP: 3.7 (ref 0.9–1.1)
INR PPP: 4.3 (ref 0.9–1.1)
INTERPRETATION ECG - MUSE: NORMAL
INTERPRETATION ECG - MUSE: NORMAL
LYMPHOCYTES # BLD AUTO: 1.4 THOU/UL (ref 0.8–4.4)
LYMPHOCYTES NFR BLD AUTO: 20 % (ref 20–40)
MCH RBC QN AUTO: 25.1 PG (ref 27–34)
MCHC RBC AUTO-ENTMCNC: 32 G/DL (ref 32–36)
MCV RBC AUTO: 79 FL (ref 80–100)
MONOCYTES # BLD AUTO: 0.6 THOU/UL (ref 0–0.9)
MONOCYTES NFR BLD AUTO: 9 % (ref 2–10)
NEUTROPHILS # BLD AUTO: 4.5 THOU/UL (ref 2–7.7)
NEUTROPHILS NFR BLD AUTO: 65 % (ref 50–70)
P AXIS - MUSE: 22 DEGREES
P AXIS - MUSE: 28 DEGREES
PLATELET # BLD AUTO: 265 THOU/UL (ref 140–440)
PMV BLD AUTO: 8.3 FL (ref 7–10)
POTASSIUM BLD-SCNC: 3.8 MMOL/L (ref 3.5–5)
POTASSIUM BLD-SCNC: 4 MMOL/L (ref 3.5–5)
PR INTERVAL - MUSE: 256 MS
PR INTERVAL - MUSE: 288 MS
QRS DURATION - MUSE: 106 MS
QRS DURATION - MUSE: 110 MS
QT - MUSE: 464 MS
QT - MUSE: 482 MS
QTC - MUSE: 482 MS
QTC - MUSE: 508 MS
R AXIS - MUSE: 100 DEGREES
R AXIS - MUSE: 101 DEGREES
RBC # BLD AUTO: 4.55 MILL/UL (ref 4.4–6.2)
SODIUM SERPL-SCNC: 143 MMOL/L (ref 136–145)
SODIUM SERPL-SCNC: 144 MMOL/L (ref 136–145)
SYSTOLIC BLOOD PRESSURE - MUSE: NORMAL
SYSTOLIC BLOOD PRESSURE - MUSE: NORMAL
T AXIS - MUSE: 105 DEGREES
T AXIS - MUSE: 150 DEGREES
VENTRICULAR RATE- MUSE: 60 BPM
VENTRICULAR RATE- MUSE: 72 BPM
WBC: 6.9 THOU/UL (ref 4–11)

## 2019-01-01 ASSESSMENT — MIFFLIN-ST. JEOR
SCORE: 1743.76
SCORE: 1705.65
SCORE: 1730.15
SCORE: 1754.19
SCORE: 1722.49
SCORE: 1713.36
SCORE: 1729.69
SCORE: 1754.19
SCORE: 1743.44
SCORE: 1752.83
SCORE: 1761.9
SCORE: 1757.82
SCORE: 1730.15
SCORE: 1749.85
SCORE: 1723.34
SCORE: 1736.67

## 2019-01-14 ENCOUNTER — COMMUNICATION - HEALTHEAST (OUTPATIENT)
Dept: FAMILY MEDICINE | Facility: CLINIC | Age: 69
End: 2019-01-14

## 2019-01-14 DIAGNOSIS — E11.9 DIABETES (H): ICD-10-CM

## 2019-01-15 ENCOUNTER — OFFICE VISIT - HEALTHEAST (OUTPATIENT)
Dept: FAMILY MEDICINE | Facility: CLINIC | Age: 69
End: 2019-01-15

## 2019-01-15 ENCOUNTER — AMBULATORY - HEALTHEAST (OUTPATIENT)
Dept: PULMONOLOGY | Facility: OTHER | Age: 69
End: 2019-01-15

## 2019-01-15 ENCOUNTER — COMMUNICATION - HEALTHEAST (OUTPATIENT)
Dept: ADMINISTRATIVE | Facility: CLINIC | Age: 69
End: 2019-01-15

## 2019-01-15 ENCOUNTER — COMMUNICATION - HEALTHEAST (OUTPATIENT)
Dept: CARDIOLOGY | Facility: CLINIC | Age: 69
End: 2019-01-15

## 2019-01-15 DIAGNOSIS — R05.9 COUGH: ICD-10-CM

## 2019-01-15 DIAGNOSIS — I25.118 CORONARY ARTERY DISEASE OF NATIVE ARTERY OF NATIVE HEART WITH STABLE ANGINA PECTORIS (H): ICD-10-CM

## 2019-01-15 LAB
BASOPHILS # BLD AUTO: 0 THOU/UL (ref 0–0.2)
BASOPHILS NFR BLD AUTO: 0 % (ref 0–2)
EOSINOPHIL # BLD AUTO: 0.2 THOU/UL (ref 0–0.4)
EOSINOPHIL NFR BLD AUTO: 2 % (ref 0–6)
ERYTHROCYTE [DISTWIDTH] IN BLOOD BY AUTOMATED COUNT: 15.5 % (ref 11–14.5)
HCT VFR BLD AUTO: 32.8 % (ref 40–54)
HGB BLD-MCNC: 10.8 G/DL (ref 14–18)
LYMPHOCYTES # BLD AUTO: 1.5 THOU/UL (ref 0.8–4.4)
LYMPHOCYTES NFR BLD AUTO: 22 % (ref 20–40)
MCH RBC QN AUTO: 26.2 PG (ref 27–34)
MCHC RBC AUTO-ENTMCNC: 32.9 G/DL (ref 32–36)
MCV RBC AUTO: 80 FL (ref 80–100)
MONOCYTES # BLD AUTO: 0.8 THOU/UL (ref 0–0.9)
MONOCYTES NFR BLD AUTO: 11 % (ref 2–10)
NEUTROPHILS # BLD AUTO: 4.5 THOU/UL (ref 2–7.7)
NEUTROPHILS NFR BLD AUTO: 64 % (ref 50–70)
PLATELET # BLD AUTO: 243 THOU/UL (ref 140–440)
PMV BLD AUTO: 7.6 FL (ref 7–10)
RBC # BLD AUTO: 4.12 MILL/UL (ref 4.4–6.2)
WBC: 7 THOU/UL (ref 4–11)

## 2019-01-15 ASSESSMENT — MIFFLIN-ST. JEOR: SCORE: 1771.82

## 2019-01-22 ENCOUNTER — HOSPITAL ENCOUNTER (OUTPATIENT)
Dept: CT IMAGING | Facility: HOSPITAL | Age: 69
Discharge: HOME | End: 2019-01-22
Attending: FAMILY MEDICINE

## 2019-01-22 ENCOUNTER — RECORDS - HEALTHEAST (OUTPATIENT)
Dept: ADMINISTRATIVE | Facility: OTHER | Age: 69
End: 2019-01-22

## 2019-01-22 DIAGNOSIS — R05.9 COUGH: ICD-10-CM

## 2019-01-22 LAB
CREAT BLD-MCNC: 1.6 MG/DL
POC GFR AMER AF HE - HISTORICAL: 53 ML/MIN/1.73M2
POC GFR NON AMER AF HE - HISTORICAL: 43 ML/MIN/1.73M2

## 2019-01-30 ENCOUNTER — ANESTHESIA - HEALTHEAST (OUTPATIENT)
Dept: CARDIOLOGY | Facility: CLINIC | Age: 69
End: 2019-01-30

## 2019-02-01 ENCOUNTER — COMMUNICATION - HEALTHEAST (OUTPATIENT)
Dept: FAMILY MEDICINE | Facility: CLINIC | Age: 69
End: 2019-02-01

## 2019-02-04 ENCOUNTER — COMMUNICATION - HEALTHEAST (OUTPATIENT)
Dept: TELEHEALTH | Facility: CLINIC | Age: 69
End: 2019-02-04

## 2019-02-04 ENCOUNTER — COMMUNICATION - HEALTHEAST (OUTPATIENT)
Dept: CARE COORDINATION | Facility: CLINIC | Age: 69
End: 2019-02-04

## 2019-02-05 ENCOUNTER — OFFICE VISIT - HEALTHEAST (OUTPATIENT)
Dept: CARDIOLOGY | Facility: CLINIC | Age: 69
End: 2019-02-05

## 2019-02-05 ENCOUNTER — AMBULATORY - HEALTHEAST (OUTPATIENT)
Dept: CARDIOLOGY | Facility: CLINIC | Age: 69
End: 2019-02-05

## 2019-02-05 DIAGNOSIS — I50.23 ACUTE ON CHRONIC SYSTOLIC CONGESTIVE HEART FAILURE (H): ICD-10-CM

## 2019-02-05 DIAGNOSIS — I48.4 ATYPICAL ATRIAL FLUTTER (H): ICD-10-CM

## 2019-02-05 DIAGNOSIS — G47.33 OSA (OBSTRUCTIVE SLEEP APNEA): ICD-10-CM

## 2019-02-05 DIAGNOSIS — I50.9 CHF (CONGESTIVE HEART FAILURE) (H): ICD-10-CM

## 2019-02-05 DIAGNOSIS — I42.9 CARDIOMYOPATHY, UNSPECIFIED TYPE (H): ICD-10-CM

## 2019-02-05 LAB
ANION GAP SERPL CALCULATED.3IONS-SCNC: 8 MMOL/L (ref 5–18)
BUN SERPL-MCNC: 31 MG/DL (ref 8–22)
CALCIUM SERPL-MCNC: 8.9 MG/DL (ref 8.5–10.5)
CHLORIDE BLD-SCNC: 109 MMOL/L (ref 98–107)
CO2 SERPL-SCNC: 24 MMOL/L (ref 22–31)
CREAT SERPL-MCNC: 1.26 MG/DL (ref 0.7–1.3)
GFR SERPL CREATININE-BSD FRML MDRD: 57 ML/MIN/1.73M2
GLUCOSE BLD-MCNC: 261 MG/DL (ref 70–125)
POC INR - HE - HISTORICAL: 2.3 (ref 0.9–1.1)
POTASSIUM BLD-SCNC: 4.6 MMOL/L (ref 3.5–5)
SODIUM SERPL-SCNC: 141 MMOL/L (ref 136–145)

## 2019-02-05 ASSESSMENT — MIFFLIN-ST. JEOR: SCORE: 1770.97

## 2019-02-06 ENCOUNTER — AMBULATORY - HEALTHEAST (OUTPATIENT)
Dept: CARDIOLOGY | Facility: CLINIC | Age: 69
End: 2019-02-06

## 2019-02-06 ENCOUNTER — OFFICE VISIT - HEALTHEAST (OUTPATIENT)
Dept: FAMILY MEDICINE | Facility: CLINIC | Age: 69
End: 2019-02-06

## 2019-02-06 DIAGNOSIS — I48.4 ATYPICAL ATRIAL FLUTTER (H): ICD-10-CM

## 2019-02-06 DIAGNOSIS — I42.9 CARDIOMYOPATHY, UNSPECIFIED TYPE (H): ICD-10-CM

## 2019-02-06 DIAGNOSIS — J44.1 COPD EXACERBATION (H): ICD-10-CM

## 2019-02-06 DIAGNOSIS — E11.9 TYPE 2 DIABETES MELLITUS WITHOUT COMPLICATION, WITHOUT LONG-TERM CURRENT USE OF INSULIN (H): ICD-10-CM

## 2019-02-06 DIAGNOSIS — I50.23 ACUTE ON CHRONIC SYSTOLIC CONGESTIVE HEART FAILURE (H): ICD-10-CM

## 2019-02-06 DIAGNOSIS — I25.810 ATHEROSCLEROSIS OF CORONARY ARTERY BYPASS GRAFT OF NATIVE HEART WITHOUT ANGINA PECTORIS: ICD-10-CM

## 2019-02-06 DIAGNOSIS — N28.9 ACUTE RENAL INSUFFICIENCY: ICD-10-CM

## 2019-02-06 DIAGNOSIS — G47.01 INSOMNIA DUE TO MEDICAL CONDITION: ICD-10-CM

## 2019-02-06 DIAGNOSIS — I10 ESSENTIAL HYPERTENSION WITH GOAL BLOOD PRESSURE LESS THAN 140/90: ICD-10-CM

## 2019-02-06 ASSESSMENT — MIFFLIN-ST. JEOR: SCORE: 1785.71

## 2019-02-12 ENCOUNTER — COMMUNICATION - HEALTHEAST (OUTPATIENT)
Dept: FAMILY MEDICINE | Facility: CLINIC | Age: 69
End: 2019-02-12

## 2019-02-12 ENCOUNTER — COMMUNICATION - HEALTHEAST (OUTPATIENT)
Dept: CARDIOLOGY | Facility: CLINIC | Age: 69
End: 2019-02-12

## 2019-02-12 DIAGNOSIS — E78.5 HYPERLIPIDEMIA WITH TARGET LDL LESS THAN 70: ICD-10-CM

## 2019-02-12 DIAGNOSIS — I48.92 ATRIAL FLUTTER, UNSPECIFIED TYPE (H): ICD-10-CM

## 2019-02-13 ENCOUNTER — AMBULATORY - HEALTHEAST (OUTPATIENT)
Dept: FAMILY MEDICINE | Facility: CLINIC | Age: 69
End: 2019-02-13

## 2019-02-13 ENCOUNTER — OFFICE VISIT - HEALTHEAST (OUTPATIENT)
Dept: FAMILY MEDICINE | Facility: CLINIC | Age: 69
End: 2019-02-13

## 2019-02-13 DIAGNOSIS — I25.810 ATHEROSCLEROSIS OF CORONARY ARTERY BYPASS GRAFT OF NATIVE HEART WITHOUT ANGINA PECTORIS: ICD-10-CM

## 2019-02-13 DIAGNOSIS — I50.23 ACUTE ON CHRONIC SYSTOLIC CONGESTIVE HEART FAILURE (H): ICD-10-CM

## 2019-02-13 DIAGNOSIS — J41.0 SIMPLE CHRONIC BRONCHITIS (H): ICD-10-CM

## 2019-02-13 DIAGNOSIS — I10 ESSENTIAL HYPERTENSION WITH GOAL BLOOD PRESSURE LESS THAN 140/90: ICD-10-CM

## 2019-02-13 LAB
ANION GAP SERPL CALCULATED.3IONS-SCNC: 9 MMOL/L (ref 5–18)
BUN SERPL-MCNC: 21 MG/DL (ref 8–22)
CALCIUM SERPL-MCNC: 10 MG/DL (ref 8.5–10.5)
CHLORIDE BLD-SCNC: 105 MMOL/L (ref 98–107)
CO2 SERPL-SCNC: 30 MMOL/L (ref 22–31)
CREAT SERPL-MCNC: 1.4 MG/DL (ref 0.7–1.3)
GFR SERPL CREATININE-BSD FRML MDRD: 51 ML/MIN/1.73M2
GLUCOSE BLD-MCNC: 152 MG/DL (ref 70–125)
POTASSIUM BLD-SCNC: 4.2 MMOL/L (ref 3.5–5)
SODIUM SERPL-SCNC: 144 MMOL/L (ref 136–145)

## 2019-02-13 ASSESSMENT — MIFFLIN-ST. JEOR: SCORE: 1696.13

## 2019-02-15 ENCOUNTER — COMMUNICATION - HEALTHEAST (OUTPATIENT)
Dept: CARDIOLOGY | Facility: CLINIC | Age: 69
End: 2019-02-15

## 2019-02-15 ENCOUNTER — COMMUNICATION - HEALTHEAST (OUTPATIENT)
Dept: FAMILY MEDICINE | Facility: CLINIC | Age: 69
End: 2019-02-15

## 2019-02-15 DIAGNOSIS — E11.9 DIABETES (H): ICD-10-CM

## 2019-02-15 DIAGNOSIS — E78.5 HYPERLIPIDEMIA, UNSPECIFIED HYPERLIPIDEMIA TYPE: ICD-10-CM

## 2019-02-15 DIAGNOSIS — I25.5 ISCHEMIC CARDIOMYOPATHY: ICD-10-CM

## 2019-02-15 DIAGNOSIS — I50.9 ACUTE ON CHRONIC CONGESTIVE HEART FAILURE (H): ICD-10-CM

## 2019-02-18 ENCOUNTER — AMBULATORY - HEALTHEAST (OUTPATIENT)
Dept: CARDIOLOGY | Facility: CLINIC | Age: 69
End: 2019-02-18

## 2019-02-18 ENCOUNTER — COMMUNICATION - HEALTHEAST (OUTPATIENT)
Dept: CARDIOLOGY | Facility: CLINIC | Age: 69
End: 2019-02-18

## 2019-02-18 DIAGNOSIS — I48.4 ATYPICAL ATRIAL FLUTTER (H): ICD-10-CM

## 2019-02-18 DIAGNOSIS — I48.91 A-FIB (H): ICD-10-CM

## 2019-02-18 LAB — INTERNATIONAL NORMALIZATION RATIO, POC - HISTORICAL: 1.2

## 2019-02-20 ENCOUNTER — RECORDS - HEALTHEAST (OUTPATIENT)
Dept: ADMINISTRATIVE | Facility: OTHER | Age: 69
End: 2019-02-20

## 2019-02-20 ENCOUNTER — RECORDS - HEALTHEAST (OUTPATIENT)
Dept: PULMONOLOGY | Facility: OTHER | Age: 69
End: 2019-02-20

## 2019-02-20 ENCOUNTER — OFFICE VISIT - HEALTHEAST (OUTPATIENT)
Dept: PULMONOLOGY | Facility: OTHER | Age: 69
End: 2019-02-20

## 2019-02-20 DIAGNOSIS — R93.89 ABNORMAL CT OF THE CHEST: ICD-10-CM

## 2019-02-20 DIAGNOSIS — J98.4 RESTRICTIVE LUNG DISEASE: ICD-10-CM

## 2019-02-20 DIAGNOSIS — R05.9 COUGH: ICD-10-CM

## 2019-02-21 ENCOUNTER — OFFICE VISIT - HEALTHEAST (OUTPATIENT)
Dept: INTERNAL MEDICINE | Facility: CLINIC | Age: 69
End: 2019-02-21

## 2019-02-21 DIAGNOSIS — E08.22 DIABETES MELLITUS DUE TO UNDERLYING CONDITION WITH CHRONIC KIDNEY DISEASE, WITHOUT LONG-TERM CURRENT USE OF INSULIN, UNSPECIFIED CKD STAGE (H): ICD-10-CM

## 2019-02-21 DIAGNOSIS — I48.4 ATYPICAL ATRIAL FLUTTER (H): ICD-10-CM

## 2019-02-21 DIAGNOSIS — Z79.01 CHRONIC ANTICOAGULATION: ICD-10-CM

## 2019-02-21 DIAGNOSIS — I25.709 CORONARY ARTERY DISEASE INVOLVING CORONARY BYPASS GRAFT OF NATIVE HEART WITH ANGINA PECTORIS (H): ICD-10-CM

## 2019-02-21 ASSESSMENT — MIFFLIN-ST. JEOR: SCORE: 1705.2

## 2019-02-25 ENCOUNTER — AMBULATORY - HEALTHEAST (OUTPATIENT)
Dept: CARDIOLOGY | Facility: CLINIC | Age: 69
End: 2019-02-25

## 2019-02-25 DIAGNOSIS — I48.4 ATYPICAL ATRIAL FLUTTER (H): ICD-10-CM

## 2019-02-25 LAB — INTERNATIONAL NORMALIZATION RATIO, POC - HISTORICAL: 1.6

## 2019-03-05 ENCOUNTER — AMBULATORY - HEALTHEAST (OUTPATIENT)
Dept: CARDIOLOGY | Facility: CLINIC | Age: 69
End: 2019-03-05

## 2019-03-05 ENCOUNTER — OFFICE VISIT - HEALTHEAST (OUTPATIENT)
Dept: CARDIOLOGY | Facility: CLINIC | Age: 69
End: 2019-03-05

## 2019-03-05 DIAGNOSIS — Z79.01 LONG TERM CURRENT USE OF ANTICOAGULANT THERAPY: ICD-10-CM

## 2019-03-05 DIAGNOSIS — I42.9 CARDIOMYOPATHY, UNSPECIFIED TYPE (H): ICD-10-CM

## 2019-03-05 DIAGNOSIS — I48.4 ATYPICAL ATRIAL FLUTTER (H): ICD-10-CM

## 2019-03-05 DIAGNOSIS — I50.20 HEART FAILURE WITH REDUCED EJECTION FRACTION, NYHA CLASS I (H): ICD-10-CM

## 2019-03-05 LAB — POC INR - HE - HISTORICAL: 2.4 (ref 0.9–1.1)

## 2019-03-05 ASSESSMENT — MIFFLIN-ST. JEOR: SCORE: 1698.4

## 2019-03-18 ENCOUNTER — AMBULATORY - HEALTHEAST (OUTPATIENT)
Dept: CARDIOLOGY | Facility: CLINIC | Age: 69
End: 2019-03-18

## 2019-03-18 DIAGNOSIS — I48.4 ATYPICAL ATRIAL FLUTTER (H): ICD-10-CM

## 2019-03-18 LAB — INTERNATIONAL NORMALIZATION RATIO, POC - HISTORICAL: 2.7

## 2019-04-03 ENCOUNTER — OFFICE VISIT - HEALTHEAST (OUTPATIENT)
Dept: CARDIOLOGY | Facility: CLINIC | Age: 69
End: 2019-04-03

## 2019-04-03 ENCOUNTER — AMBULATORY - HEALTHEAST (OUTPATIENT)
Dept: CARDIOLOGY | Facility: CLINIC | Age: 69
End: 2019-04-03

## 2019-04-03 DIAGNOSIS — I25.810 CORONARY ARTERY DISEASE INVOLVING CORONARY BYPASS GRAFT OF NATIVE HEART WITHOUT ANGINA PECTORIS: ICD-10-CM

## 2019-04-03 DIAGNOSIS — E78.5 HYPERLIPIDEMIA WITH TARGET LDL LESS THAN 70: ICD-10-CM

## 2019-04-03 DIAGNOSIS — I48.4 ATYPICAL ATRIAL FLUTTER (H): ICD-10-CM

## 2019-04-03 DIAGNOSIS — I10 ESSENTIAL HYPERTENSION: ICD-10-CM

## 2019-04-03 DIAGNOSIS — I25.5 ISCHEMIC CARDIOMYOPATHY: ICD-10-CM

## 2019-04-03 DIAGNOSIS — I50.22 CHRONIC SYSTOLIC CHF (CONGESTIVE HEART FAILURE), NYHA CLASS 2 (H): ICD-10-CM

## 2019-04-03 LAB
ANION GAP SERPL CALCULATED.3IONS-SCNC: 11 MMOL/L (ref 5–18)
BUN SERPL-MCNC: 24 MG/DL (ref 8–22)
CALCIUM SERPL-MCNC: 10.7 MG/DL (ref 8.5–10.5)
CHLORIDE BLD-SCNC: 107 MMOL/L (ref 98–107)
CO2 SERPL-SCNC: 26 MMOL/L (ref 22–31)
CREAT SERPL-MCNC: 1.41 MG/DL (ref 0.7–1.3)
ERYTHROCYTE [DISTWIDTH] IN BLOOD BY AUTOMATED COUNT: 18.1 % (ref 11–14.5)
GFR SERPL CREATININE-BSD FRML MDRD: 50 ML/MIN/1.73M2
GLUCOSE BLD-MCNC: 125 MG/DL (ref 70–125)
HCT VFR BLD AUTO: 38.6 % (ref 40–54)
HGB BLD-MCNC: 11.7 G/DL (ref 14–18)
INTERNATIONAL NORMALIZATION RATIO, POC - HISTORICAL: 2.6
MCH RBC QN AUTO: 25.1 PG (ref 27–34)
MCHC RBC AUTO-ENTMCNC: 30.3 G/DL (ref 32–36)
MCV RBC AUTO: 83 FL (ref 80–100)
PLATELET # BLD AUTO: 273 THOU/UL (ref 140–440)
PMV BLD AUTO: 10.6 FL (ref 8.5–12.5)
POTASSIUM BLD-SCNC: 4.2 MMOL/L (ref 3.5–5)
RBC # BLD AUTO: 4.66 MILL/UL (ref 4.4–6.2)
SODIUM SERPL-SCNC: 144 MMOL/L (ref 136–145)
WBC: 6.1 THOU/UL (ref 4–11)

## 2019-04-03 ASSESSMENT — MIFFLIN-ST. JEOR: SCORE: 1721.08

## 2019-04-09 ENCOUNTER — COMMUNICATION - HEALTHEAST (OUTPATIENT)
Dept: FAMILY MEDICINE | Facility: CLINIC | Age: 69
End: 2019-04-09

## 2019-04-09 DIAGNOSIS — E11.9 DIABETES (H): ICD-10-CM

## 2019-04-12 ENCOUNTER — HOSPITAL ENCOUNTER (OUTPATIENT)
Dept: CT IMAGING | Facility: HOSPITAL | Age: 69
Discharge: HOME | End: 2019-04-12
Attending: INTERNAL MEDICINE

## 2019-04-12 DIAGNOSIS — R93.89 ABNORMAL CT OF THE CHEST: ICD-10-CM

## 2019-04-15 ENCOUNTER — RECORDS - HEALTHEAST (OUTPATIENT)
Dept: ADMINISTRATIVE | Facility: OTHER | Age: 69
End: 2019-04-15

## 2019-04-15 ENCOUNTER — RECORDS - HEALTHEAST (OUTPATIENT)
Dept: PULMONOLOGY | Facility: OTHER | Age: 69
End: 2019-04-15

## 2019-04-15 ENCOUNTER — OFFICE VISIT - HEALTHEAST (OUTPATIENT)
Dept: PULMONOLOGY | Facility: OTHER | Age: 69
End: 2019-04-15

## 2019-04-15 DIAGNOSIS — J98.4 RESTRICTIVE LUNG DISEASE: ICD-10-CM

## 2019-04-15 DIAGNOSIS — J98.4 OTHER DISORDERS OF LUNG: ICD-10-CM

## 2019-04-15 ASSESSMENT — MIFFLIN-ST. JEOR: SCORE: 1721.08

## 2019-05-06 ENCOUNTER — AMBULATORY - HEALTHEAST (OUTPATIENT)
Dept: CARDIOLOGY | Facility: CLINIC | Age: 69
End: 2019-05-06

## 2019-05-06 ENCOUNTER — HOSPITAL ENCOUNTER (OUTPATIENT)
Dept: CARDIOLOGY | Facility: CLINIC | Age: 69
Discharge: HOME | End: 2019-05-06
Attending: INTERNAL MEDICINE

## 2019-05-06 DIAGNOSIS — I25.5 ISCHEMIC CARDIOMYOPATHY: ICD-10-CM

## 2019-05-06 DIAGNOSIS — I48.4 ATYPICAL ATRIAL FLUTTER (H): ICD-10-CM

## 2019-05-06 DIAGNOSIS — I50.22 CHRONIC SYSTOLIC CHF (CONGESTIVE HEART FAILURE), NYHA CLASS 2 (H): ICD-10-CM

## 2019-05-06 LAB
AORTIC ROOT: 3.4 CM
AORTIC VALVE MEAN VELOCITY: 83.4 CM/S
ASCENDING AORTA: 3.5 CM
AV DIMENSIONLESS INDEX VTI: 0.7
AV MEAN GRADIENT: 3 MMHG
AV PEAK GRADIENT: 6.4 MMHG
AV VALVE AREA: 2.9 CM2
AV VELOCITY RATIO: 0.7
BSA FOR ECHO PROCEDURE: 2.17 M2
CV BLOOD PRESSURE: ABNORMAL MMHG
CV ECHO HEIGHT: 71 IN
CV ECHO WEIGHT: 207 LBS
DOP CALC AO PEAK VEL: 126 CM/S
DOP CALC AO VTI: 24.6 CM
DOP CALC LVOT AREA: 4.15 CM2
DOP CALC LVOT DIAMETER: 2.3 CM
DOP CALC LVOT PEAK VEL: 87.7 CM/S
DOP CALC LVOT STROKE VOLUME: 70.2 CM3
DOP CALCLVOT PEAK VEL VTI: 16.9 CM
EJECTION FRACTION: 33 % (ref 55–75)
FRACTIONAL SHORTENING: 27.1 % (ref 28–44)
INTERNATIONAL NORMALIZATION RATIO, POC - HISTORICAL: 2.3
INTERVENTRICULAR SEPTUM IN END DIASTOLE: 1.1 CM (ref 0.6–1)
IVS/PW RATIO: 1.2
LA AREA 1: 31.8 CM2
LA AREA 2: 33.8 CM2
LEFT ATRIUM LENGTH: 7.38 CM
LEFT ATRIUM SIZE: 5.4 CM
LEFT ATRIUM VOLUME INDEX: 57 ML/M2
LEFT ATRIUM VOLUME: 123.8 ML
LEFT VENTRICLE CARDIAC INDEX: 1.9 L/MIN/M2
LEFT VENTRICLE CARDIAC OUTPUT: 4.2 L/MIN
LEFT VENTRICLE DIASTOLIC VOLUME INDEX: 118 CM3/M2 (ref 34–74)
LEFT VENTRICLE DIASTOLIC VOLUME: 256 CM3 (ref 62–150)
LEFT VENTRICLE HEART RATE: 60 BPM
LEFT VENTRICLE MASS INDEX: 110.6 G/M2
LEFT VENTRICLE SYSTOLIC VOLUME INDEX: 79.3 CM3/M2 (ref 11–31)
LEFT VENTRICLE SYSTOLIC VOLUME: 172 CM3 (ref 21–61)
LEFT VENTRICULAR INTERNAL DIMENSION IN DIASTOLE: 5.9 CM (ref 4.2–5.8)
LEFT VENTRICULAR INTERNAL DIMENSION IN SYSTOLE: 4.3 CM (ref 2.5–4)
LEFT VENTRICULAR MASS: 239.9 G
LEFT VENTRICULAR OUTFLOW TRACT MEAN GRADIENT: 2 MMHG
LEFT VENTRICULAR OUTFLOW TRACT MEAN VELOCITY: 57.2 CM/S
LEFT VENTRICULAR OUTFLOW TRACT PEAK GRADIENT: 3 MMHG
LEFT VENTRICULAR POSTERIOR WALL IN END DIASTOLE: 0.9 CM (ref 0.6–1)
LV STROKE VOLUME INDEX: 32.3 ML/M2
MITRAL REGURGITANT VELOCITY TIME INTEGRAL: 135 CM
MR FLOW: 17 CM3
MR MEAN GRADIENT: 40 MMHG
MR MEAN VELOCITY: 299 CM/S
MR PEAK GRADIENT: 56.3 MMHG
MR PISA EROA: 0.1 CM2
MR PISA RADIUS: 0.5 CM
MR PISA VN NYQUIST: 34 CM/S
MV AVERAGE E/E' RATIO: 17.9 CM/S
MV DECELERATION TIME: 169 MS
MV E'TISSUE VEL-LAT: 7.9 CM/S
MV E'TISSUE VEL-MED: 6.73 CM/S
MV LATERAL E/E' RATIO: 16.6
MV MEDIAL E/E' RATIO: 19.5
MV PEAK E VELOCITY: 131 CM/S
MV REGURGITANT VOLUME: 19.2 CC
NUC REST DIASTOLIC VOLUME INDEX: 3312 LBS
NUC REST SYSTOLIC VOLUME INDEX: 71 IN
PISA MR PEAK VEL: 375 CM/S
PR MAX PG: 6 MMHG
PR PEAK VELOCITY: 118 CM/S
TRICUSPID REGURGITATION PEAK PRESSURE GRADIENT: 11.6 MMHG
TRICUSPID VALVE ANULAR PLANE SYSTOLIC EXCURSION: 2.3 CM
TRICUSPID VALVE PEAK REGURGITANT VELOCITY: 170 CM/S

## 2019-05-06 ASSESSMENT — MIFFLIN-ST. JEOR: SCORE: 1721.08

## 2019-05-07 ENCOUNTER — OFFICE VISIT - HEALTHEAST (OUTPATIENT)
Dept: INTERNAL MEDICINE | Facility: CLINIC | Age: 69
End: 2019-05-07

## 2019-05-07 ENCOUNTER — COMMUNICATION - HEALTHEAST (OUTPATIENT)
Dept: INTERNAL MEDICINE | Facility: CLINIC | Age: 69
End: 2019-05-07

## 2019-05-07 DIAGNOSIS — E11.22 TYPE 2 DIABETES MELLITUS WITH CHRONIC KIDNEY DISEASE, WITHOUT LONG-TERM CURRENT USE OF INSULIN, UNSPECIFIED CKD STAGE (H): ICD-10-CM

## 2019-05-07 DIAGNOSIS — I48.91 A-FIB (H): ICD-10-CM

## 2019-05-07 DIAGNOSIS — I73.9 PERIPHERAL VASCULAR DISEASE, UNSPECIFIED (H): ICD-10-CM

## 2019-05-07 DIAGNOSIS — I10 ESSENTIAL HYPERTENSION: ICD-10-CM

## 2019-05-07 DIAGNOSIS — G47.33 OSA (OBSTRUCTIVE SLEEP APNEA): ICD-10-CM

## 2019-05-07 DIAGNOSIS — Z12.5 SCREENING FOR PROSTATE CANCER: ICD-10-CM

## 2019-05-07 DIAGNOSIS — E78.5 HYPERLIPIDEMIA WITH TARGET LDL LESS THAN 70: ICD-10-CM

## 2019-05-07 DIAGNOSIS — Z12.11 SCREENING FOR COLON CANCER: ICD-10-CM

## 2019-05-07 DIAGNOSIS — Z51.81 MEDICATION MONITORING ENCOUNTER: ICD-10-CM

## 2019-05-07 LAB
ANION GAP SERPL CALCULATED.3IONS-SCNC: 10 MMOL/L (ref 5–18)
BUN SERPL-MCNC: 30 MG/DL (ref 8–22)
CALCIUM SERPL-MCNC: 10.6 MG/DL (ref 8.5–10.5)
CHLORIDE BLD-SCNC: 109 MMOL/L (ref 98–107)
CO2 SERPL-SCNC: 25 MMOL/L (ref 22–31)
CREAT SERPL-MCNC: 1.32 MG/DL (ref 0.7–1.3)
CREAT UR-MCNC: 25 MG/DL
GFR SERPL CREATININE-BSD FRML MDRD: 54 ML/MIN/1.73M2
GLUCOSE BLD-MCNC: 116 MG/DL (ref 70–125)
HBA1C MFR BLD: 6.6 % (ref 3.5–6)
MICROALBUMIN UR-MCNC: 1.14 MG/DL (ref 0–1.99)
MICROALBUMIN/CREAT UR: 45.6 MG/G
POTASSIUM BLD-SCNC: 4.1 MMOL/L (ref 3.5–5)
PSA SERPL-MCNC: 1.9 NG/ML (ref 0–4.5)
SODIUM SERPL-SCNC: 144 MMOL/L (ref 136–145)

## 2019-05-07 ASSESSMENT — MIFFLIN-ST. JEOR: SCORE: 1745.12

## 2019-05-21 ENCOUNTER — COMMUNICATION - HEALTHEAST (OUTPATIENT)
Dept: ANTICOAGULATION | Facility: CLINIC | Age: 69
End: 2019-05-21

## 2019-05-21 DIAGNOSIS — I48.4 ATYPICAL ATRIAL FLUTTER (H): ICD-10-CM

## 2019-06-03 ENCOUNTER — AMBULATORY - HEALTHEAST (OUTPATIENT)
Dept: LAB | Facility: CLINIC | Age: 69
End: 2019-06-03

## 2019-06-03 ENCOUNTER — COMMUNICATION - HEALTHEAST (OUTPATIENT)
Dept: ANTICOAGULATION | Facility: CLINIC | Age: 69
End: 2019-06-03

## 2019-06-03 DIAGNOSIS — I48.4 ATYPICAL ATRIAL FLUTTER (H): ICD-10-CM

## 2019-06-03 LAB — INR PPP: 2.7 (ref 0.9–1.1)

## 2019-07-01 ENCOUNTER — COMMUNICATION - HEALTHEAST (OUTPATIENT)
Dept: ANTICOAGULATION | Facility: CLINIC | Age: 69
End: 2019-07-01

## 2019-07-01 ENCOUNTER — AMBULATORY - HEALTHEAST (OUTPATIENT)
Dept: LAB | Facility: CLINIC | Age: 69
End: 2019-07-01

## 2019-07-01 DIAGNOSIS — I48.4 ATYPICAL ATRIAL FLUTTER (H): ICD-10-CM

## 2019-07-01 LAB — INR PPP: 2.9 (ref 0.9–1.1)

## 2019-07-10 ENCOUNTER — COMMUNICATION - HEALTHEAST (OUTPATIENT)
Dept: CARDIOLOGY | Facility: CLINIC | Age: 69
End: 2019-07-10

## 2019-07-10 DIAGNOSIS — I25.118 CORONARY ARTERY DISEASE OF NATIVE ARTERY OF NATIVE HEART WITH STABLE ANGINA PECTORIS (H): ICD-10-CM

## 2019-07-19 ENCOUNTER — RECORDS - HEALTHEAST (OUTPATIENT)
Dept: ADMINISTRATIVE | Facility: OTHER | Age: 69
End: 2019-07-19
Payer: MEDICARE

## 2019-07-29 ENCOUNTER — AMBULATORY - HEALTHEAST (OUTPATIENT)
Dept: CARDIOLOGY | Facility: CLINIC | Age: 69
End: 2019-07-29

## 2019-07-29 ENCOUNTER — OFFICE VISIT - HEALTHEAST (OUTPATIENT)
Dept: CARDIOLOGY | Facility: CLINIC | Age: 69
End: 2019-07-29

## 2019-07-29 ENCOUNTER — COMMUNICATION - HEALTHEAST (OUTPATIENT)
Dept: ANTICOAGULATION | Facility: CLINIC | Age: 69
End: 2019-07-29

## 2019-07-29 DIAGNOSIS — I25.810 CORONARY ARTERY DISEASE INVOLVING CORONARY BYPASS GRAFT OF NATIVE HEART WITHOUT ANGINA PECTORIS: ICD-10-CM

## 2019-07-29 DIAGNOSIS — I10 ESSENTIAL HYPERTENSION: ICD-10-CM

## 2019-07-29 DIAGNOSIS — I48.4 ATYPICAL ATRIAL FLUTTER (H): ICD-10-CM

## 2019-07-29 DIAGNOSIS — E78.5 HYPERLIPIDEMIA WITH TARGET LDL LESS THAN 70: ICD-10-CM

## 2019-07-29 DIAGNOSIS — I50.22 CHRONIC SYSTOLIC CHF (CONGESTIVE HEART FAILURE), NYHA CLASS 2 (H): ICD-10-CM

## 2019-07-29 LAB
ALBUMIN SERPL-MCNC: 3.7 G/DL (ref 3.5–5)
ALP SERPL-CCNC: 100 U/L (ref 45–120)
ALT SERPL W P-5'-P-CCNC: 19 U/L (ref 0–45)
ANION GAP SERPL CALCULATED.3IONS-SCNC: 9 MMOL/L (ref 5–18)
AST SERPL W P-5'-P-CCNC: 17 U/L (ref 0–40)
BILIRUB SERPL-MCNC: 0.8 MG/DL (ref 0–1)
BUN SERPL-MCNC: 38 MG/DL (ref 8–22)
CALCIUM SERPL-MCNC: 10.1 MG/DL (ref 8.5–10.5)
CHLORIDE BLD-SCNC: 110 MMOL/L (ref 98–107)
CHOLEST SERPL-MCNC: 99 MG/DL
CO2 SERPL-SCNC: 24 MMOL/L (ref 22–31)
CREAT SERPL-MCNC: 1.71 MG/DL (ref 0.7–1.3)
ERYTHROCYTE [DISTWIDTH] IN BLOOD BY AUTOMATED COUNT: 18.2 % (ref 11–14.5)
FASTING STATUS PATIENT QL REPORTED: YES
GFR SERPL CREATININE-BSD FRML MDRD: 40 ML/MIN/1.73M2
GLUCOSE BLD-MCNC: 108 MG/DL (ref 70–125)
HCT VFR BLD AUTO: 32.9 % (ref 40–54)
HDLC SERPL-MCNC: 33 MG/DL
HGB BLD-MCNC: 10 G/DL (ref 14–18)
LDLC SERPL CALC-MCNC: 49 MG/DL
MAGNESIUM SERPL-MCNC: 2 MG/DL (ref 1.8–2.6)
MCH RBC QN AUTO: 25.6 PG (ref 27–34)
MCHC RBC AUTO-ENTMCNC: 30.4 G/DL (ref 32–36)
MCV RBC AUTO: 84 FL (ref 80–100)
PLATELET # BLD AUTO: 213 THOU/UL (ref 140–440)
PMV BLD AUTO: 10.1 FL (ref 8.5–12.5)
POC INR - HE - HISTORICAL: 3.1 (ref 0.9–1.1)
POTASSIUM BLD-SCNC: 4 MMOL/L (ref 3.5–5)
PROT SERPL-MCNC: 6.4 G/DL (ref 6–8)
RBC # BLD AUTO: 3.9 MILL/UL (ref 4.4–6.2)
SODIUM SERPL-SCNC: 143 MMOL/L (ref 136–145)
TRIGL SERPL-MCNC: 87 MG/DL
WBC: 7.1 THOU/UL (ref 4–11)

## 2019-07-29 ASSESSMENT — MIFFLIN-ST. JEOR: SCORE: 1743.76

## 2019-07-30 ENCOUNTER — AMBULATORY - HEALTHEAST (OUTPATIENT)
Dept: CARDIOLOGY | Facility: CLINIC | Age: 69
End: 2019-07-30

## 2019-07-30 DIAGNOSIS — I50.20 HEART FAILURE WITH REDUCED EJECTION FRACTION, NYHA CLASS I (H): ICD-10-CM

## 2019-08-06 ENCOUNTER — COMMUNICATION - HEALTHEAST (OUTPATIENT)
Dept: ANTICOAGULATION | Facility: CLINIC | Age: 69
End: 2019-08-06

## 2019-08-06 ENCOUNTER — OFFICE VISIT - HEALTHEAST (OUTPATIENT)
Dept: INTERNAL MEDICINE | Facility: CLINIC | Age: 69
End: 2019-08-06

## 2019-08-06 DIAGNOSIS — Z23 NEED FOR PNEUMOCOCCAL VACCINE: ICD-10-CM

## 2019-08-06 DIAGNOSIS — I48.4 ATYPICAL ATRIAL FLUTTER (H): ICD-10-CM

## 2019-08-06 DIAGNOSIS — Z11.59 NEED FOR HEPATITIS C SCREENING TEST: ICD-10-CM

## 2019-08-06 DIAGNOSIS — I10 ESSENTIAL HYPERTENSION: ICD-10-CM

## 2019-08-06 DIAGNOSIS — I50.20 HEART FAILURE WITH REDUCED EJECTION FRACTION, NYHA CLASS I (H): ICD-10-CM

## 2019-08-06 DIAGNOSIS — G47.33 OSA (OBSTRUCTIVE SLEEP APNEA): ICD-10-CM

## 2019-08-06 DIAGNOSIS — I73.9 PERIPHERAL VASCULAR DISEASE (H): ICD-10-CM

## 2019-08-06 DIAGNOSIS — G47.9 SLEEP DISORDER: ICD-10-CM

## 2019-08-06 DIAGNOSIS — Z79.01 CHRONIC ANTICOAGULATION: ICD-10-CM

## 2019-08-06 DIAGNOSIS — E11.69 TYPE 2 DIABETES MELLITUS WITH OTHER SPECIFIED COMPLICATION, WITHOUT LONG-TERM CURRENT USE OF INSULIN (H): ICD-10-CM

## 2019-08-06 LAB — INR PPP: 3.4 (ref 0.9–1.1)

## 2019-08-06 ASSESSMENT — MIFFLIN-ST. JEOR: SCORE: 1750.84

## 2019-08-12 ENCOUNTER — COMMUNICATION - HEALTHEAST (OUTPATIENT)
Dept: CARDIOLOGY | Facility: CLINIC | Age: 69
End: 2019-08-12

## 2019-08-12 DIAGNOSIS — I48.91 A-FIB (H): ICD-10-CM

## 2019-08-16 ENCOUNTER — AMBULATORY - HEALTHEAST (OUTPATIENT)
Dept: LAB | Facility: CLINIC | Age: 69
End: 2019-08-16

## 2019-08-16 ENCOUNTER — COMMUNICATION - HEALTHEAST (OUTPATIENT)
Dept: ANTICOAGULATION | Facility: CLINIC | Age: 69
End: 2019-08-16

## 2019-08-16 DIAGNOSIS — I48.4 ATYPICAL ATRIAL FLUTTER (H): ICD-10-CM

## 2019-08-16 LAB — INR PPP: 2.4 (ref 0.9–1.1)

## 2019-08-22 ENCOUNTER — COMMUNICATION - HEALTHEAST (OUTPATIENT)
Dept: INTERNAL MEDICINE | Facility: CLINIC | Age: 69
End: 2019-08-22

## 2019-08-27 ENCOUNTER — RECORDS - HEALTHEAST (OUTPATIENT)
Dept: ADMINISTRATIVE | Facility: OTHER | Age: 69
End: 2019-08-27

## 2019-08-27 LAB — RETINOPATHY: NEGATIVE

## 2019-08-28 ENCOUNTER — HOSPITAL ENCOUNTER (OUTPATIENT)
Dept: ULTRASOUND IMAGING | Facility: HOSPITAL | Age: 69
Discharge: HOME | End: 2019-08-28
Attending: INTERNAL MEDICINE

## 2019-08-28 DIAGNOSIS — I73.9 PERIPHERAL VASCULAR DISEASE (H): ICD-10-CM

## 2019-09-04 ENCOUNTER — RECORDS - HEALTHEAST (OUTPATIENT)
Dept: ADMINISTRATIVE | Facility: OTHER | Age: 69
End: 2019-09-04

## 2019-09-04 ENCOUNTER — COMMUNICATION - HEALTHEAST (OUTPATIENT)
Dept: INTERNAL MEDICINE | Facility: CLINIC | Age: 69
End: 2019-09-04

## 2019-09-04 DIAGNOSIS — I48.4 ATYPICAL ATRIAL FLUTTER (H): ICD-10-CM

## 2019-09-05 ENCOUNTER — OFFICE VISIT - HEALTHEAST (OUTPATIENT)
Dept: INTERNAL MEDICINE | Facility: CLINIC | Age: 69
End: 2019-09-05

## 2019-09-05 DIAGNOSIS — G47.9 SLEEP DISORDER: ICD-10-CM

## 2019-09-05 DIAGNOSIS — Z23 FLU VACCINE NEED: ICD-10-CM

## 2019-09-05 DIAGNOSIS — I48.4 ATYPICAL ATRIAL FLUTTER (H): ICD-10-CM

## 2019-09-05 DIAGNOSIS — I73.9 PERIPHERAL VASCULAR DISEASE, UNSPECIFIED (H): ICD-10-CM

## 2019-09-05 DIAGNOSIS — E11.8 TYPE 2 DIABETES MELLITUS WITH COMPLICATION, WITHOUT LONG-TERM CURRENT USE OF INSULIN (H): ICD-10-CM

## 2019-09-05 LAB
ALBUMIN SERPL-MCNC: 3.8 G/DL (ref 3.5–5)
ALP SERPL-CCNC: 116 U/L (ref 45–120)
ALT SERPL W P-5'-P-CCNC: 25 U/L (ref 0–45)
ANION GAP SERPL CALCULATED.3IONS-SCNC: 11 MMOL/L (ref 5–18)
AST SERPL W P-5'-P-CCNC: 20 U/L (ref 0–40)
BILIRUB SERPL-MCNC: 1.1 MG/DL (ref 0–1)
BUN SERPL-MCNC: 38 MG/DL (ref 8–22)
CALCIUM SERPL-MCNC: 9.8 MG/DL (ref 8.5–10.5)
CHLORIDE BLD-SCNC: 108 MMOL/L (ref 98–107)
CO2 SERPL-SCNC: 24 MMOL/L (ref 22–31)
CREAT SERPL-MCNC: 1.55 MG/DL (ref 0.7–1.3)
GFR SERPL CREATININE-BSD FRML MDRD: 45 ML/MIN/1.73M2
GLUCOSE BLD-MCNC: 147 MG/DL (ref 70–125)
HBA1C MFR BLD: 6.4 % (ref 3.5–6)
HGB BLD-MCNC: 10.4 G/DL (ref 14–18)
INR PPP: 2.8 (ref 0.9–1.1)
POTASSIUM BLD-SCNC: 4.1 MMOL/L (ref 3.5–5)
PROT SERPL-MCNC: 6.7 G/DL (ref 6–8)
SODIUM SERPL-SCNC: 143 MMOL/L (ref 136–145)

## 2019-09-05 ASSESSMENT — MIFFLIN-ST. JEOR: SCORE: 1741.2

## 2019-09-10 ENCOUNTER — RECORDS - HEALTHEAST (OUTPATIENT)
Dept: HEALTH INFORMATION MANAGEMENT | Facility: CLINIC | Age: 69
End: 2019-09-10

## 2019-09-13 ENCOUNTER — COMMUNICATION - HEALTHEAST (OUTPATIENT)
Dept: ANTICOAGULATION | Facility: CLINIC | Age: 69
End: 2019-09-13

## 2019-09-13 ENCOUNTER — HOSPITAL ENCOUNTER (OUTPATIENT)
Dept: INTERVENTIONAL RADIOLOGY/VASCULAR | Facility: CLINIC | Age: 69
Discharge: HOME | End: 2019-09-13
Attending: SURGERY | Admitting: RADIOLOGY

## 2019-09-13 DIAGNOSIS — I48.4 ATYPICAL ATRIAL FLUTTER (H): ICD-10-CM

## 2019-09-13 DIAGNOSIS — I73.9 CLAUDICATION (H): ICD-10-CM

## 2019-09-13 LAB
CREAT SERPL-MCNC: 1.46 MG/DL (ref 0.7–1.3)
GFR SERPL CREATININE-BSD FRML MDRD: 48 ML/MIN/1.73M2
GLUCOSE BLDC GLUCOMTR-MCNC: 106 MG/DL (ref 70–139)
GLUCOSE BLDC GLUCOMTR-MCNC: 84 MG/DL (ref 70–139)
HGB BLD-MCNC: 10.4 G/DL (ref 14–18)
INR PPP: 1.56 (ref 0.9–1.1)
PLATELET # BLD AUTO: 249 THOU/UL (ref 140–440)

## 2019-09-13 ASSESSMENT — MIFFLIN-ST. JEOR: SCORE: 1700.95

## 2020-01-01 ENCOUNTER — AMBULATORY - HEALTHEAST (OUTPATIENT)
Dept: INTERNAL MEDICINE | Facility: CLINIC | Age: 70
End: 2020-01-01

## 2020-01-01 ENCOUNTER — COMMUNICATION - HEALTHEAST (OUTPATIENT)
Dept: CARDIOLOGY | Facility: CLINIC | Age: 70
End: 2020-01-01

## 2020-01-01 ENCOUNTER — HOSPITAL ENCOUNTER (OUTPATIENT)
Dept: CT IMAGING | Facility: CLINIC | Age: 70
Discharge: HOME | End: 2020-06-16
Attending: INTERNAL MEDICINE

## 2020-01-01 ENCOUNTER — SURGERY - HEALTHEAST (OUTPATIENT)
Dept: CARDIOLOGY | Facility: CLINIC | Age: 70
End: 2020-01-01

## 2020-01-01 ENCOUNTER — COMMUNICATION - HEALTHEAST (OUTPATIENT)
Dept: FAMILY MEDICINE | Facility: CLINIC | Age: 70
End: 2020-01-01

## 2020-01-01 ENCOUNTER — AMBULATORY - HEALTHEAST (OUTPATIENT)
Dept: CARDIOLOGY | Facility: CLINIC | Age: 70
End: 2020-01-01

## 2020-01-01 ENCOUNTER — COMMUNICATION - HEALTHEAST (OUTPATIENT)
Dept: SCHEDULING | Facility: CLINIC | Age: 70
End: 2020-01-01

## 2020-01-01 ENCOUNTER — COMMUNICATION - HEALTHEAST (OUTPATIENT)
Dept: INTERNAL MEDICINE | Facility: CLINIC | Age: 70
End: 2020-01-01

## 2020-01-01 ENCOUNTER — COMMUNICATION - HEALTHEAST (OUTPATIENT)
Dept: ANTICOAGULATION | Facility: CLINIC | Age: 70
End: 2020-01-01

## 2020-01-01 ENCOUNTER — APPOINTMENT (OUTPATIENT)
Dept: NEUROLOGY | Facility: CLINIC | Age: 70
DRG: 003 | End: 2020-01-01
Attending: STUDENT IN AN ORGANIZED HEALTH CARE EDUCATION/TRAINING PROGRAM
Payer: MEDICARE

## 2020-01-01 ENCOUNTER — ANCILLARY PROCEDURE (OUTPATIENT)
Dept: CARDIOLOGY | Facility: CLINIC | Age: 70
DRG: 003 | End: 2020-01-01
Attending: STUDENT IN AN ORGANIZED HEALTH CARE EDUCATION/TRAINING PROGRAM
Payer: MEDICARE

## 2020-01-01 ENCOUNTER — OFFICE VISIT - HEALTHEAST (OUTPATIENT)
Dept: INTERNAL MEDICINE | Facility: CLINIC | Age: 70
End: 2020-01-01

## 2020-01-01 ENCOUNTER — APPOINTMENT (OUTPATIENT)
Dept: CT IMAGING | Facility: CLINIC | Age: 70
DRG: 003 | End: 2020-01-01
Attending: STUDENT IN AN ORGANIZED HEALTH CARE EDUCATION/TRAINING PROGRAM
Payer: MEDICARE

## 2020-01-01 ENCOUNTER — SURGERY - HEALTHEAST (OUTPATIENT)
Dept: SURGERY | Facility: CLINIC | Age: 70
End: 2020-01-01

## 2020-01-01 ENCOUNTER — COMMUNICATION - HEALTHEAST (OUTPATIENT)
Dept: INFECTIOUS DISEASES | Facility: CLINIC | Age: 70
End: 2020-01-01

## 2020-01-01 ENCOUNTER — ANESTHESIA EVENT (OUTPATIENT)
Dept: INTENSIVE CARE | Facility: CLINIC | Age: 70
DRG: 003 | End: 2020-01-01
Payer: MEDICARE

## 2020-01-01 ENCOUNTER — OFFICE VISIT - HEALTHEAST (OUTPATIENT)
Dept: CARDIOLOGY | Facility: CLINIC | Age: 70
End: 2020-01-01

## 2020-01-01 ENCOUNTER — AMBULATORY - HEALTHEAST (OUTPATIENT)
Dept: FAMILY MEDICINE | Facility: CLINIC | Age: 70
End: 2020-01-01

## 2020-01-01 ENCOUNTER — AMBULATORY - HEALTHEAST (OUTPATIENT)
Dept: LAB | Facility: CLINIC | Age: 70
End: 2020-01-01

## 2020-01-01 ENCOUNTER — ANESTHESIA - HEALTHEAST (OUTPATIENT)
Dept: CARDIOLOGY | Facility: CLINIC | Age: 70
End: 2020-01-01

## 2020-01-01 ENCOUNTER — APPOINTMENT (OUTPATIENT)
Dept: GENERAL RADIOLOGY | Facility: CLINIC | Age: 70
DRG: 003 | End: 2020-01-01
Attending: STUDENT IN AN ORGANIZED HEALTH CARE EDUCATION/TRAINING PROGRAM
Payer: MEDICARE

## 2020-01-01 ENCOUNTER — RECORDS - HEALTHEAST (OUTPATIENT)
Dept: ADMINISTRATIVE | Facility: OTHER | Age: 70
End: 2020-01-01

## 2020-01-01 ENCOUNTER — ANESTHESIA - HEALTHEAST (OUTPATIENT)
Dept: SURGERY | Facility: CLINIC | Age: 70
End: 2020-01-01

## 2020-01-01 ENCOUNTER — HOSPITAL ENCOUNTER (OUTPATIENT)
Dept: INTERVENTIONAL RADIOLOGY/VASCULAR | Facility: CLINIC | Age: 70
Discharge: HOME | End: 2020-08-07
Attending: SURGERY

## 2020-01-01 ENCOUNTER — OFFICE VISIT - HEALTHEAST (OUTPATIENT)
Dept: PULMONOLOGY | Facility: OTHER | Age: 70
End: 2020-01-01

## 2020-01-01 ENCOUNTER — APPOINTMENT (OUTPATIENT)
Dept: ULTRASOUND IMAGING | Facility: CLINIC | Age: 70
DRG: 003 | End: 2020-01-01
Attending: STUDENT IN AN ORGANIZED HEALTH CARE EDUCATION/TRAINING PROGRAM
Payer: MEDICARE

## 2020-01-01 ENCOUNTER — HOSPITAL ENCOUNTER (OUTPATIENT)
Dept: RADIOLOGY | Facility: CLINIC | Age: 70
Discharge: HOME | End: 2020-07-01
Attending: INTERNAL MEDICINE

## 2020-01-01 ENCOUNTER — ANESTHESIA (OUTPATIENT)
Dept: INTENSIVE CARE | Facility: CLINIC | Age: 70
DRG: 003 | End: 2020-01-01
Payer: MEDICARE

## 2020-01-01 ENCOUNTER — RECORDS - HEALTHEAST (OUTPATIENT)
Dept: ANTICOAGULATION | Facility: CLINIC | Age: 70
End: 2020-01-01

## 2020-01-01 ENCOUNTER — HOSPITAL ENCOUNTER (INPATIENT)
Facility: CLINIC | Age: 70
LOS: 2 days | DRG: 003 | End: 2020-09-18
Attending: EMERGENCY MEDICINE | Admitting: INTERNAL MEDICINE
Payer: MEDICARE

## 2020-01-01 ENCOUNTER — AMBULATORY - HEALTHEAST (OUTPATIENT)
Dept: ANTICOAGULATION | Facility: CLINIC | Age: 70
End: 2020-01-01

## 2020-01-01 VITALS — BODY MASS INDEX: 29.97 KG/M2 | OXYGEN SATURATION: 100 % | HEIGHT: 71 IN | TEMPERATURE: 96.8 F | WEIGHT: 214.07 LBS

## 2020-01-01 DIAGNOSIS — I25.5 ISCHEMIC CARDIOMYOPATHY: ICD-10-CM

## 2020-01-01 DIAGNOSIS — I48.4 ATYPICAL ATRIAL FLUTTER (H): ICD-10-CM

## 2020-01-01 DIAGNOSIS — R05.3 CHRONIC COUGH: ICD-10-CM

## 2020-01-01 DIAGNOSIS — E08.22 DIABETES MELLITUS DUE TO UNDERLYING CONDITION WITH DIABETIC CHRONIC KIDNEY DISEASE (H): ICD-10-CM

## 2020-01-01 DIAGNOSIS — Z11.59 NEED FOR HEPATITIS C SCREENING TEST: ICD-10-CM

## 2020-01-01 DIAGNOSIS — Z79.01 LONG TERM (CURRENT) USE OF ANTICOAGULANTS: ICD-10-CM

## 2020-01-01 DIAGNOSIS — I48.0 PAROXYSMAL ATRIAL FIBRILLATION (H): ICD-10-CM

## 2020-01-01 DIAGNOSIS — R05.9 COUGH: ICD-10-CM

## 2020-01-01 DIAGNOSIS — I73.9 PERIPHERAL VASCULAR DISEASE (H): ICD-10-CM

## 2020-01-01 DIAGNOSIS — E11.69 TYPE 2 DIABETES MELLITUS WITH OTHER SPECIFIED COMPLICATION, WITHOUT LONG-TERM CURRENT USE OF INSULIN (H): ICD-10-CM

## 2020-01-01 DIAGNOSIS — G47.9 SLEEP DISORDER: ICD-10-CM

## 2020-01-01 DIAGNOSIS — I50.20 HEART FAILURE WITH REDUCED EJECTION FRACTION, NYHA CLASS II (H): ICD-10-CM

## 2020-01-01 DIAGNOSIS — J22 LOWER RESPIRATORY INFECTION: ICD-10-CM

## 2020-01-01 DIAGNOSIS — I50.9 ACUTE ON CHRONIC CONGESTIVE HEART FAILURE (H): ICD-10-CM

## 2020-01-01 DIAGNOSIS — I25.118 CORONARY ARTERY DISEASE OF NATIVE ARTERY OF NATIVE HEART WITH STABLE ANGINA PECTORIS (H): ICD-10-CM

## 2020-01-01 DIAGNOSIS — E11.9 DIABETES (H): ICD-10-CM

## 2020-01-01 DIAGNOSIS — I42.9 CARDIOMYOPATHY, UNSPECIFIED TYPE (H): ICD-10-CM

## 2020-01-01 DIAGNOSIS — Z95.810 PRESENCE OF AUTOMATIC CARDIOVERTER/DEFIBRILLATOR (AICD): ICD-10-CM

## 2020-01-01 DIAGNOSIS — I49.5 SINUS NODE DYSFUNCTION (H): ICD-10-CM

## 2020-01-01 DIAGNOSIS — J41.0 SIMPLE CHRONIC BRONCHITIS (H): ICD-10-CM

## 2020-01-01 DIAGNOSIS — N17.9 ACUTE KIDNEY INJURY SUPERIMPOSED ON CHRONIC KIDNEY DISEASE (H): ICD-10-CM

## 2020-01-01 DIAGNOSIS — R06.02 SOB (SHORTNESS OF BREATH): ICD-10-CM

## 2020-01-01 DIAGNOSIS — N18.30 CHRONIC KIDNEY DISEASE, STAGE III (MODERATE) (H): ICD-10-CM

## 2020-01-01 DIAGNOSIS — E78.5 HYPERLIPIDEMIA WITH TARGET LDL LESS THAN 70: ICD-10-CM

## 2020-01-01 DIAGNOSIS — Z95.810 ICD (IMPLANTABLE CARDIOVERTER-DEFIBRILLATOR) IN PLACE: ICD-10-CM

## 2020-01-01 DIAGNOSIS — M79.662 PAIN OF LEFT LOWER LEG: ICD-10-CM

## 2020-01-01 DIAGNOSIS — N17.0 ACUTE RENAL FAILURE WITH ACUTE TUBULAR NECROSIS SUPERIMPOSED ON STAGE 4 CHRONIC KIDNEY DISEASE (H): ICD-10-CM

## 2020-01-01 DIAGNOSIS — I48.91 A-FIB (H): ICD-10-CM

## 2020-01-01 DIAGNOSIS — E78.5 DYSLIPIDEMIA, GOAL LDL BELOW 70: ICD-10-CM

## 2020-01-01 DIAGNOSIS — N18.4 ACUTE RENAL FAILURE WITH ACUTE TUBULAR NECROSIS SUPERIMPOSED ON STAGE 4 CHRONIC KIDNEY DISEASE (H): ICD-10-CM

## 2020-01-01 DIAGNOSIS — I50.23 ACUTE ON CHRONIC SYSTOLIC CONGESTIVE HEART FAILURE (H): ICD-10-CM

## 2020-01-01 DIAGNOSIS — Z12.2 ENCOUNTER FOR SCREENING FOR LUNG CANCER: ICD-10-CM

## 2020-01-01 DIAGNOSIS — J20.8 ACUTE BRONCHITIS DUE TO OTHER SPECIFIED ORGANISMS: ICD-10-CM

## 2020-01-01 DIAGNOSIS — I15.0 RENOVASCULAR HYPERTENSION WITH GOAL BLOOD PRESSURE LESS THAN 130/85: ICD-10-CM

## 2020-01-01 DIAGNOSIS — N18.30 CHRONIC RENAL FAILURE, STAGE 3 (MODERATE) (H): ICD-10-CM

## 2020-01-01 DIAGNOSIS — J44.1 CHRONIC OBSTRUCTIVE PULMONARY DISEASE WITH ACUTE EXACERBATION (H): ICD-10-CM

## 2020-01-01 DIAGNOSIS — I25.810 ATHEROSCLEROSIS OF CORONARY ARTERY BYPASS GRAFT OF NATIVE HEART WITHOUT ANGINA PECTORIS: ICD-10-CM

## 2020-01-01 DIAGNOSIS — I10 HYPERTENSION, UNSPECIFIED TYPE: ICD-10-CM

## 2020-01-01 DIAGNOSIS — R05.8 RESPIRATORY TRACT CONGESTION WITH COUGH: ICD-10-CM

## 2020-01-01 DIAGNOSIS — J18.9 PNEUMONIA OF RIGHT LOWER LOBE DUE TO INFECTIOUS ORGANISM: ICD-10-CM

## 2020-01-01 DIAGNOSIS — N18.9 ACUTE KIDNEY INJURY SUPERIMPOSED ON CHRONIC KIDNEY DISEASE (H): ICD-10-CM

## 2020-01-01 DIAGNOSIS — Z87.891 PERSONAL HISTORY OF NICOTINE DEPENDENCE: ICD-10-CM

## 2020-01-01 DIAGNOSIS — I46.9 CARDIAC ARREST (H): ICD-10-CM

## 2020-01-01 DIAGNOSIS — G47.33 OSA (OBSTRUCTIVE SLEEP APNEA): ICD-10-CM

## 2020-01-01 LAB
ABO + RH BLD: NORMAL
ABO + RH BLD: NORMAL
ALBUMIN SERPL-MCNC: 1.9 G/DL (ref 3.4–5)
ALBUMIN SERPL-MCNC: 2 G/DL (ref 3.4–5)
ALBUMIN SERPL-MCNC: 2.2 G/DL (ref 3.4–5)
ALBUMIN SERPL-MCNC: 2.2 G/DL (ref 3.4–5)
ALBUMIN SERPL-MCNC: 3.7 G/DL (ref 3.5–5)
ALBUMIN UR-MCNC: 300 MG/DL
ALP SERPL-CCNC: 142 U/L (ref 40–150)
ALP SERPL-CCNC: 143 U/L (ref 45–120)
ALP SERPL-CCNC: 87 U/L (ref 40–150)
ALP SERPL-CCNC: 90 U/L (ref 40–150)
ALP SERPL-CCNC: 92 U/L (ref 40–150)
ALP SERPL-CCNC: 93 U/L (ref 40–150)
ALP SERPL-CCNC: 98 U/L (ref 40–150)
ALT SERPL W P-5'-P-CCNC: 16 U/L (ref 0–45)
ALT SERPL W P-5'-P-CCNC: 21 U/L (ref 0–70)
ALT SERPL W P-5'-P-CCNC: 29 U/L (ref 0–70)
ALT SERPL W P-5'-P-CCNC: 33 U/L (ref 0–70)
ALT SERPL W P-5'-P-CCNC: 34 U/L (ref 0–70)
ALT SERPL W P-5'-P-CCNC: 36 U/L (ref 0–70)
ALT SERPL W P-5'-P-CCNC: 36 U/L (ref 0–70)
AMPHETAMINES UR QL SCN: NEGATIVE
ANGLE RATE OF CLOT STRENGTH: 55.4 DEGREES (ref 53–72)
ANGLE RATE OF CLOT STRENGTH: 69.1 DEGREES (ref 53–72)
ANGLE RATE OF CLOT STRENGTH: 71.9 DEGREES (ref 53–72)
ANION GAP SERPL CALCULATED.3IONS-SCNC: 10 MMOL/L (ref 5–18)
ANION GAP SERPL CALCULATED.3IONS-SCNC: 12 MMOL/L (ref 3–14)
ANION GAP SERPL CALCULATED.3IONS-SCNC: 16 MMOL/L (ref 3–14)
ANION GAP SERPL CALCULATED.3IONS-SCNC: 19 MMOL/L (ref 3–14)
ANION GAP SERPL CALCULATED.3IONS-SCNC: 6 MMOL/L (ref 3–14)
ANION GAP SERPL CALCULATED.3IONS-SCNC: 7 MMOL/L (ref 3–14)
ANION GAP SERPL CALCULATED.3IONS-SCNC: 7 MMOL/L (ref 3–14)
APPEARANCE UR: ABNORMAL
APTT PPP: 36 SEC (ref 22–37)
APTT PPP: 38 SEC (ref 22–37)
APTT PPP: 38 SEC (ref 22–37)
APTT PPP: 40 SEC (ref 22–37)
APTT PPP: 70 SEC (ref 22–37)
AST SERPL W P-5'-P-CCNC: 17 U/L (ref 0–40)
AST SERPL W P-5'-P-CCNC: 186 U/L (ref 0–45)
AST SERPL W P-5'-P-CCNC: 253 U/L (ref 0–45)
AST SERPL W P-5'-P-CCNC: 293 U/L (ref 0–45)
AST SERPL W P-5'-P-CCNC: 305 U/L (ref 0–45)
AST SERPL W P-5'-P-CCNC: 319 U/L (ref 0–45)
AST SERPL W P-5'-P-CCNC: 328 U/L (ref 0–45)
AT III ACT/NOR PPP CHRO: 65 % (ref 85–135)
AT III ACT/NOR PPP CHRO: 72 % (ref 85–135)
B-HCG FREE SERPL-ACNC: 2.6 [IU]/L (ref 0.86–1.14)
BARBITURATES UR QL: NEGATIVE
BASE DEFICIT BLDA-SCNC: 0.6 MMOL/L
BASE DEFICIT BLDA-SCNC: 1.7 MMOL/L
BASE DEFICIT BLDA-SCNC: 12 MMOL/L
BASE DEFICIT BLDA-SCNC: 12.4 MMOL/L
BASE DEFICIT BLDA-SCNC: 12.5 MMOL/L
BASE DEFICIT BLDA-SCNC: 13.4 MMOL/L
BASE DEFICIT BLDA-SCNC: 13.5 MMOL/L
BASE DEFICIT BLDA-SCNC: 14.9 MMOL/L
BASE DEFICIT BLDA-SCNC: 15 MMOL/L
BASE DEFICIT BLDA-SCNC: 17.2 MMOL/L
BASE DEFICIT BLDA-SCNC: 3.2 MMOL/L
BASE DEFICIT BLDA-SCNC: 4 MMOL/L
BASE DEFICIT BLDA-SCNC: 4.2 MMOL/L
BASE DEFICIT BLDA-SCNC: 4.2 MMOL/L
BASE DEFICIT BLDA-SCNC: 7.8 MMOL/L
BASE DEFICIT BLDA-SCNC: 8.3 MMOL/L
BASE DEFICIT BLDA-SCNC: 8.6 MMOL/L
BASE DEFICIT BLDA-SCNC: 9.2 MMOL/L
BASE DEFICIT BLDA-SCNC: 9.5 MMOL/L
BASE DEFICIT BLDV-SCNC: 3.7 MMOL/L
BASE DEFICIT BLDV-SCNC: 8.3 MMOL/L
BASE EXCESS BLDA CALC-SCNC: 0.4 MMOL/L
BASE EXCESS BLDA CALC-SCNC: 0.4 MMOL/L
BASE EXCESS BLDA CALC-SCNC: 0.7 MMOL/L
BASE EXCESS BLDA CALC-SCNC: 0.8 MMOL/L
BASE EXCESS BLDA CALC-SCNC: 0.8 MMOL/L
BASE EXCESS BLDA CALC-SCNC: 0.9 MMOL/L
BASE EXCESS BLDA CALC-SCNC: 1 MMOL/L
BASE EXCESS BLDA CALC-SCNC: 1 MMOL/L
BASE EXCESS BLDV CALC-SCNC: 0.6 MMOL/L
BASOPHILS # BLD AUTO: 0 10E9/L (ref 0–0.2)
BASOPHILS # BLD AUTO: 0 THOU/UL (ref 0–0.2)
BASOPHILS NFR BLD AUTO: 0.2 %
BASOPHILS NFR BLD AUTO: 1 % (ref 0–2)
BENZODIAZ UR QL: POSITIVE
BILIRUB SERPL-MCNC: 1.1 MG/DL (ref 0–1)
BILIRUB SERPL-MCNC: 3.3 MG/DL (ref 0.2–1.3)
BILIRUB SERPL-MCNC: 4.1 MG/DL (ref 0.2–1.3)
BILIRUB SERPL-MCNC: 4.4 MG/DL (ref 0.2–1.3)
BILIRUB SERPL-MCNC: 5.2 MG/DL (ref 0.2–1.3)
BILIRUB SERPL-MCNC: 5.3 MG/DL (ref 0.2–1.3)
BILIRUB SERPL-MCNC: 5.6 MG/DL (ref 0.2–1.3)
BILIRUB UR QL STRIP: ABNORMAL
BLD GP AB SCN SERPL QL: NORMAL
BLD PROD TYP BPU: NORMAL
BLD UNIT ID BPU: 0
BLOOD BANK CMNT PATIENT-IMP: NORMAL
BLOOD PRODUCT CODE: NORMAL
BPU ID: NORMAL
BUN SERPL-MCNC: 39 MG/DL (ref 8–22)
BUN SERPL-MCNC: 48 MG/DL (ref 7–30)
BUN SERPL-MCNC: 50 MG/DL (ref 7–30)
BUN SERPL-MCNC: 50 MG/DL (ref 8–22)
BUN SERPL-MCNC: 51 MG/DL (ref 7–30)
BUN SERPL-MCNC: 51 MG/DL (ref 8–22)
BUN SERPL-MCNC: 52 MG/DL (ref 7–30)
BUN SERPL-MCNC: 53 MG/DL (ref 7–30)
BUN SERPL-MCNC: 55 MG/DL (ref 7–30)
BUN SERPL-MCNC: 58 MG/DL (ref 8–22)
CA-I BLD-MCNC: 4.2 MG/DL (ref 4.4–5.2)
CA-I BLD-MCNC: 4.4 MG/DL (ref 4.4–5.2)
CA-I BLD-MCNC: 4.5 MG/DL (ref 4.4–5.2)
CA-I BLD-MCNC: 4.5 MG/DL (ref 4.4–5.2)
CA-I BLD-MCNC: 4.6 MG/DL (ref 4.4–5.2)
CA-I BLD-MCNC: 4.6 MG/DL (ref 4.4–5.2)
CA-I BLD-MCNC: 4.7 MG/DL (ref 4.4–5.2)
CA-I BLD-MCNC: 4.7 MG/DL (ref 4.4–5.2)
CA-I BLD-MCNC: 4.8 MG/DL (ref 4.4–5.2)
CA-I BLD-MCNC: 4.8 MG/DL (ref 4.4–5.2)
CA-I BLD-MCNC: 4.9 MG/DL (ref 4.4–5.2)
CA-I BLD-MCNC: 5.2 MG/DL (ref 4.4–5.2)
CA-I BLD-SCNC: 5.4 MG/DL (ref 4.4–5.2)
CALCIUM SERPL-MCNC: 7.6 MG/DL (ref 8.5–10.1)
CALCIUM SERPL-MCNC: 7.9 MG/DL (ref 8.5–10.1)
CALCIUM SERPL-MCNC: 8 MG/DL (ref 8.5–10.1)
CALCIUM SERPL-MCNC: 8.3 MG/DL (ref 8.5–10.1)
CALCIUM SERPL-MCNC: 8.4 MG/DL (ref 8.5–10.1)
CALCIUM SERPL-MCNC: 8.5 MG/DL (ref 8.5–10.1)
CALCIUM SERPL-MCNC: 9.4 MG/DL (ref 8.5–10.5)
CALCIUM SERPL-MCNC: 9.6 MG/DL (ref 8.5–10.5)
CALCIUM SERPL-MCNC: 9.8 MG/DL (ref 8.5–10.5)
CALCIUM SERPL-MCNC: 9.9 MG/DL (ref 8.5–10.5)
CANNABINOIDS UR QL SCN: NEGATIVE
CHLORIDE BLD-SCNC: 106 MMOL/L (ref 98–107)
CHLORIDE BLD-SCNC: 109 MMOL/L (ref 98–107)
CHLORIDE BLD-SCNC: 110 MMOL/L (ref 98–107)
CHLORIDE BLD-SCNC: 110 MMOL/L (ref 98–107)
CHLORIDE SERPL-SCNC: 108 MMOL/L (ref 94–109)
CHLORIDE SERPL-SCNC: 111 MMOL/L (ref 94–109)
CHLORIDE SERPL-SCNC: 111 MMOL/L (ref 94–109)
CHLORIDE SERPL-SCNC: 112 MMOL/L (ref 94–109)
CHLORIDE SERPL-SCNC: 115 MMOL/L (ref 94–109)
CHLORIDE SERPL-SCNC: 122 MMOL/L (ref 94–109)
CI HYPERCOAGULATION INDEX: 0 RATIO (ref 0–3)
CI HYPOCOAGULATION INDEX: 0.9 RATIO (ref 0–3)
CI HYPOCOAGULATION INDEX: 5.7 RATIO (ref 0–3)
CO2 BLDCOV-SCNC: 20 MMOL/L (ref 21–28)
CO2 SERPL-SCNC: 13 MMOL/L (ref 20–32)
CO2 SERPL-SCNC: 15 MMOL/L (ref 20–32)
CO2 SERPL-SCNC: 21 MMOL/L (ref 20–32)
CO2 SERPL-SCNC: 22 MMOL/L (ref 22–31)
CO2 SERPL-SCNC: 24 MMOL/L (ref 20–32)
CO2 SERPL-SCNC: 25 MMOL/L (ref 20–32)
CO2 SERPL-SCNC: 25 MMOL/L (ref 20–32)
CO2 SERPL-SCNC: 26 MMOL/L (ref 22–31)
CO2 SERPL-SCNC: 27 MMOL/L (ref 22–31)
CO2 SERPL-SCNC: 29 MMOL/L (ref 22–31)
COCAINE UR QL: NEGATIVE
COLOR UR AUTO: ABNORMAL
CORTIS SERPL-MCNC: 21.9 UG/DL (ref 4–22)
CREAT BLD-MCNC: 2.7 MG/DL (ref 0.66–1.25)
CREAT SERPL-MCNC: 1.92 MG/DL (ref 0.7–1.3)
CREAT SERPL-MCNC: 2.15 MG/DL (ref 0.7–1.3)
CREAT SERPL-MCNC: 2.22 MG/DL (ref 0.7–1.3)
CREAT SERPL-MCNC: 2.24 MG/DL (ref 0.7–1.3)
CREAT SERPL-MCNC: 2.59 MG/DL (ref 0.66–1.25)
CREAT SERPL-MCNC: 2.83 MG/DL (ref 0.66–1.25)
CREAT SERPL-MCNC: 2.84 MG/DL (ref 0.66–1.25)
CREAT SERPL-MCNC: 2.92 MG/DL (ref 0.66–1.25)
CREAT SERPL-MCNC: 2.93 MG/DL (ref 0.66–1.25)
CREAT SERPL-MCNC: 3.02 MG/DL (ref 0.66–1.25)
CRP SERPL-MCNC: 130 MG/L (ref 0–8)
CRP SERPL-MCNC: 57 MG/L (ref 0–8)
D DIMER PPP FEU-MCNC: 10.5 UG/ML FEU (ref 0–0.5)
D DIMER PPP FEU-MCNC: 12 UG/ML FEU (ref 0–0.5)
D DIMER PPP FEU-MCNC: 13.9 UG/ML FEU (ref 0–0.5)
DIFFERENTIAL METHOD BLD: ABNORMAL
EOSINOPHIL # BLD AUTO: 0 10E9/L (ref 0–0.7)
EOSINOPHIL # BLD AUTO: 0.3 THOU/UL (ref 0–0.4)
EOSINOPHIL NFR BLD AUTO: 0.1 %
EOSINOPHIL NFR BLD AUTO: 3 % (ref 0–6)
ERYTHROCYTE [DISTWIDTH] IN BLOOD BY AUTOMATED COUNT: 18 % (ref 10–15)
ERYTHROCYTE [DISTWIDTH] IN BLOOD BY AUTOMATED COUNT: 18.1 % (ref 10–15)
ERYTHROCYTE [DISTWIDTH] IN BLOOD BY AUTOMATED COUNT: 18.3 % (ref 10–15)
ERYTHROCYTE [DISTWIDTH] IN BLOOD BY AUTOMATED COUNT: 18.6 % (ref 10–15)
ERYTHROCYTE [DISTWIDTH] IN BLOOD BY AUTOMATED COUNT: 18.9 % (ref 11–14.5)
ERYTHROCYTE [DISTWIDTH] IN BLOOD BY AUTOMATED COUNT: 19 % (ref 10–15)
ERYTHROCYTE [DISTWIDTH] IN BLOOD BY AUTOMATED COUNT: 21.3 % (ref 10–15)
ERYTHROCYTE [DISTWIDTH] IN BLOOD BY AUTOMATED COUNT: 21.8 % (ref 10–15)
ERYTHROCYTE [SEDIMENTATION RATE] IN BLOOD BY WESTERGREN METHOD: 16 MM/H (ref 0–20)
ERYTHROCYTE [SEDIMENTATION RATE] IN BLOOD BY WESTERGREN METHOD: 31 MM/H (ref 0–20)
ETHANOL UR QL SCN: NEGATIVE
FIBRINOGEN PPP-MCNC: 251 MG/DL (ref 200–420)
FIBRINOGEN PPP-MCNC: 279 MG/DL (ref 200–420)
FIBRINOGEN PPP-MCNC: 324 MG/DL (ref 200–420)
FLUAV AG SPEC QL IA: NORMAL
FLUBV AG SPEC QL IA: NORMAL
G ACTUAL CLOT STRENGTH: 5.9 KD/SC (ref 4.5–11)
G ACTUAL CLOT STRENGTH: 6 KD/SC (ref 4.5–11)
G ACTUAL CLOT STRENGTH: 8.5 KD/SC (ref 4.5–11)
GFR SERPL CREATININE-BSD FRML MDRD: 20 ML/MIN/{1.73_M2}
GFR SERPL CREATININE-BSD FRML MDRD: 21 ML/MIN/{1.73_M2}
GFR SERPL CREATININE-BSD FRML MDRD: 21 ML/MIN/{1.73_M2}
GFR SERPL CREATININE-BSD FRML MDRD: 22 ML/MIN/{1.73_M2}
GFR SERPL CREATININE-BSD FRML MDRD: 22 ML/MIN/{1.73_M2}
GFR SERPL CREATININE-BSD FRML MDRD: 24 ML/MIN/{1.73_M2}
GFR SERPL CREATININE-BSD FRML MDRD: 24 ML/MIN/{1.73_M2}
GFR SERPL CREATININE-BSD FRML MDRD: 29 ML/MIN/1.73M2
GFR SERPL CREATININE-BSD FRML MDRD: 30 ML/MIN/1.73M2
GFR SERPL CREATININE-BSD FRML MDRD: 31 ML/MIN/1.73M2
GFR SERPL CREATININE-BSD FRML MDRD: 35 ML/MIN/1.73M2
GLUCOSE BLD-MCNC: 111 MG/DL (ref 70–125)
GLUCOSE BLD-MCNC: 130 MG/DL (ref 70–99)
GLUCOSE BLD-MCNC: 134 MG/DL (ref 70–125)
GLUCOSE BLD-MCNC: 135 MG/DL (ref 70–99)
GLUCOSE BLD-MCNC: 152 MG/DL (ref 70–99)
GLUCOSE BLD-MCNC: 153 MG/DL (ref 70–125)
GLUCOSE BLD-MCNC: 153 MG/DL (ref 70–99)
GLUCOSE BLD-MCNC: 155 MG/DL (ref 70–99)
GLUCOSE BLD-MCNC: 162 MG/DL (ref 70–99)
GLUCOSE BLD-MCNC: 162 MG/DL (ref 70–99)
GLUCOSE BLD-MCNC: 164 MG/DL (ref 70–125)
GLUCOSE BLD-MCNC: 167 MG/DL (ref 70–99)
GLUCOSE BLD-MCNC: 168 MG/DL (ref 70–99)
GLUCOSE BLD-MCNC: 181 MG/DL (ref 70–99)
GLUCOSE BLD-MCNC: 185 MG/DL (ref 70–99)
GLUCOSE BLD-MCNC: 201 MG/DL (ref 70–99)
GLUCOSE BLDC GLUCOMTR-MCNC: 123 MG/DL (ref 70–99)
GLUCOSE BLDC GLUCOMTR-MCNC: 129 MG/DL (ref 70–99)
GLUCOSE BLDC GLUCOMTR-MCNC: 135 MG/DL (ref 70–99)
GLUCOSE BLDC GLUCOMTR-MCNC: 144 MG/DL (ref 70–99)
GLUCOSE BLDC GLUCOMTR-MCNC: 146 MG/DL (ref 70–99)
GLUCOSE BLDC GLUCOMTR-MCNC: 159 MG/DL (ref 70–99)
GLUCOSE BLDC GLUCOMTR-MCNC: 162 MG/DL (ref 70–99)
GLUCOSE SERPL-MCNC: 129 MG/DL (ref 70–99)
GLUCOSE SERPL-MCNC: 135 MG/DL (ref 70–99)
GLUCOSE SERPL-MCNC: 149 MG/DL (ref 70–99)
GLUCOSE SERPL-MCNC: 156 MG/DL (ref 70–99)
GLUCOSE SERPL-MCNC: 156 MG/DL (ref 70–99)
GLUCOSE SERPL-MCNC: 175 MG/DL (ref 70–99)
GLUCOSE UR STRIP-MCNC: NEGATIVE MG/DL
GRAM STN SPEC: NORMAL
GRAM STN SPEC: NORMAL
HBA1C MFR BLD: 6.5 % (ref 3.5–6)
HCC DEVICE COMMENTS: NORMAL
HCC DEVICE IMPLANTING PROVIDER: NORMAL
HCC DEVICE MANUFACTURE: NORMAL
HCC DEVICE MODEL: NORMAL
HCC DEVICE SERIAL NUMBER: NORMAL
HCC DEVICE TYPE: NORMAL
HCO3 BLD-SCNC: 12 MMOL/L (ref 21–28)
HCO3 BLD-SCNC: 13 MMOL/L (ref 21–28)
HCO3 BLD-SCNC: 14 MMOL/L (ref 21–28)
HCO3 BLD-SCNC: 15 MMOL/L (ref 21–28)
HCO3 BLD-SCNC: 15 MMOL/L (ref 21–28)
HCO3 BLD-SCNC: 16 MMOL/L (ref 21–28)
HCO3 BLD-SCNC: 16 MMOL/L (ref 21–28)
HCO3 BLD-SCNC: 17 MMOL/L (ref 21–28)
HCO3 BLD-SCNC: 17 MMOL/L (ref 21–28)
HCO3 BLD-SCNC: 20 MMOL/L (ref 21–28)
HCO3 BLD-SCNC: 22 MMOL/L (ref 21–28)
HCO3 BLD-SCNC: 23 MMOL/L (ref 21–28)
HCO3 BLD-SCNC: 24 MMOL/L (ref 21–28)
HCO3 BLD-SCNC: 25 MMOL/L (ref 21–28)
HCO3 BLD-SCNC: 26 MMOL/L (ref 21–28)
HCO3 BLDA-SCNC: 18 MMOL/L (ref 21–28)
HCO3 BLDA-SCNC: 23 MMOL/L (ref 21–28)
HCO3 BLDA-SCNC: 27 MMOL/L (ref 21–28)
HCO3 BLDV-SCNC: 19 MMOL/L (ref 21–28)
HCO3 BLDV-SCNC: 25 MMOL/L (ref 21–28)
HCO3 BLDV-SCNC: 27 MMOL/L (ref 21–28)
HCT VFR BLD AUTO: 24 % (ref 40–53)
HCT VFR BLD AUTO: 24.2 % (ref 40–53)
HCT VFR BLD AUTO: 24.4 % (ref 40–53)
HCT VFR BLD AUTO: 24.7 % (ref 40–53)
HCT VFR BLD AUTO: 25.3 % (ref 40–53)
HCT VFR BLD AUTO: 25.8 % (ref 40–53)
HCT VFR BLD AUTO: 26.3 % (ref 40–53)
HCT VFR BLD AUTO: 30.1 % (ref 40–54)
HCT VFR BLD CALC: 32 %PCV (ref 40–53)
HGB BLD CALC-MCNC: 10.9 G/DL (ref 13.3–17.7)
HGB BLD-MCNC: 6.8 G/DL (ref 13.3–17.7)
HGB BLD-MCNC: 7.2 G/DL (ref 13.3–17.7)
HGB BLD-MCNC: 7.3 G/DL (ref 13.3–17.7)
HGB BLD-MCNC: 7.3 G/DL (ref 13.3–17.7)
HGB BLD-MCNC: 7.7 G/DL (ref 13.3–17.7)
HGB BLD-MCNC: 7.8 G/DL (ref 13.3–17.7)
HGB BLD-MCNC: 7.9 G/DL (ref 13.3–17.7)
HGB BLD-MCNC: 8.1 G/DL (ref 13.3–17.7)
HGB BLD-MCNC: 8.1 G/DL (ref 13.3–17.7)
HGB BLD-MCNC: 8.4 G/DL (ref 13.3–17.7)
HGB BLD-MCNC: 8.4 G/DL (ref 13.3–17.7)
HGB BLD-MCNC: 8.5 G/DL (ref 13.3–17.7)
HGB BLD-MCNC: 8.7 G/DL (ref 13.3–17.7)
HGB BLD-MCNC: 9.7 G/DL (ref 14–18)
HGB FREE PLAS-MCNC: 40 MG/DL
HGB FREE PLAS-MCNC: 50 MG/DL
HGB UR QL STRIP: ABNORMAL
IMM GRANULOCYTES # BLD: 0.1 10E9/L (ref 0–0.4)
IMM GRANULOCYTES NFR BLD: 0.8 %
INR PPP: 1.5 (ref 0.9–1.1)
INR PPP: 1.8 (ref 0.9–1.1)
INR PPP: 2 (ref 0.9–1.1)
INR PPP: 2 (ref 0.9–1.1)
INR PPP: 2.1 (ref 0.9–1.1)
INR PPP: 2.2 (ref 0.9–1.1)
INR PPP: 2.3 (ref 0.9–1.1)
INR PPP: 2.3 (ref 0.9–1.1)
INR PPP: 2.32 (ref 0.86–1.14)
INR PPP: 2.34 (ref 0.86–1.14)
INR PPP: 2.35 (ref 0.86–1.14)
INR PPP: 2.4 (ref 0.9–1.1)
INR PPP: 2.44 (ref 0.86–1.14)
INR PPP: 2.5 (ref 0.9–1.1)
INR PPP: 2.6 (ref 0.9–1.1)
INR PPP: 2.65 (ref 0.86–1.14)
INR PPP: 4.62 (ref 0.86–1.14)
INTERPRETATION ECG - MUSE: NORMAL
INTERPRETATION ECG - MUSE: NORMAL
K TIME TO SPEC CLOT STRENGTH: 1.2 MINUTE (ref 1–3)
K TIME TO SPEC CLOT STRENGTH: 1.3 MINUTE (ref 1–3)
K TIME TO SPEC CLOT STRENGTH: 2.7 MINUTE (ref 1–3)
KCT BLD-ACNC: 145 SEC (ref 75–150)
KCT BLD-ACNC: 150 SEC (ref 75–150)
KCT BLD-ACNC: 154 SEC (ref 75–150)
KCT BLD-ACNC: 154 SEC (ref 75–150)
KCT BLD-ACNC: 158 SEC (ref 75–150)
KCT BLD-ACNC: 162 SEC (ref 75–150)
KCT BLD-ACNC: 162 SEC (ref 75–150)
KCT BLD-ACNC: 179 SEC (ref 75–150)
KCT BLD-ACNC: 205 SEC (ref 75–150)
KCT BLD-ACNC: 209 SEC (ref 75–150)
KCT BLD-ACNC: 217 SEC (ref 75–150)
KCT BLD-ACNC: 255 SEC (ref 75–150)
KCT BLD-ACNC: 264 SEC (ref 75–150)
KCT BLD-ACNC: 268 SEC (ref 75–150)
KCT BLD-ACNC: 285 SEC (ref 75–150)
KCT BLD-ACNC: 302 SEC (ref 75–150)
KETONES UR STRIP-MCNC: NEGATIVE MG/DL
LABORATORY COMMENT REPORT: NORMAL
LACTATE BLD-SCNC: 1.3 MMOL/L (ref 0.7–2)
LACTATE BLD-SCNC: 1.5 MMOL/L (ref 0.7–2)
LACTATE BLD-SCNC: 10.7 MMOL/L (ref 0.7–2)
LACTATE BLD-SCNC: 11.3 MMOL/L (ref 0.7–2)
LACTATE BLD-SCNC: 11.7 MMOL/L (ref 0.7–2)
LACTATE BLD-SCNC: 2.2 MMOL/L (ref 0.7–2)
LACTATE BLD-SCNC: 5.1 MMOL/L (ref 0.7–2)
LACTATE BLD-SCNC: 6.9 MMOL/L (ref 0.7–2)
LACTATE BLD-SCNC: 7 MMOL/L (ref 0.7–2)
LACTATE BLD-SCNC: 7.2 MMOL/L (ref 0.7–2)
LACTATE BLD-SCNC: 8.5 MMOL/L (ref 0.7–2)
LACTATE BLD-SCNC: 9 MMOL/L (ref 0.7–2)
LACTATE BLD-SCNC: 9.2 MMOL/L (ref 0.7–2)
LACTATE BLD-SCNC: 9.6 MMOL/L (ref 0.7–2)
LACTATE BLD-SCNC: 9.7 MMOL/L (ref 0.7–2)
LACTATE BLD-SCNC: 9.9 MMOL/L (ref 0.7–2)
LDH SERPL L TO P-CCNC: 499 U/L (ref 85–227)
LDH SERPL L TO P-CCNC: 824 U/L (ref 85–227)
LEUKOCYTE ESTERASE UR QL STRIP: NEGATIVE
LMWH PPP CHRO-ACNC: <0.1 IU/ML
LY30 LYSIS AT 30 MINUTES: 0 % (ref 0–8)
LY30 LYSIS AT 30 MINUTES: 0.4 % (ref 0–8)
LY30 LYSIS AT 30 MINUTES: 1 % (ref 0–8)
LY60 LYSIS AT 60 MINUTES: 0 % (ref 0–15)
LY60 LYSIS AT 60 MINUTES: 2.6 % (ref 0–15)
LY60 LYSIS AT 60 MINUTES: 5.2 % (ref 0–15)
LYMPHOCYTES # BLD AUTO: 0.3 10E9/L (ref 0.8–5.3)
LYMPHOCYTES # BLD AUTO: 1.3 THOU/UL (ref 0.8–4.4)
LYMPHOCYTES NFR BLD AUTO: 1.8 %
LYMPHOCYTES NFR BLD AUTO: 16 % (ref 20–40)
Lab: NORMAL
MA MAXIMUM CLOT STRENGTH: 54.3 MM (ref 50–70)
MA MAXIMUM CLOT STRENGTH: 54.4 MM (ref 50–70)
MA MAXIMUM CLOT STRENGTH: 63 MM (ref 50–70)
MAGNESIUM SERPL-MCNC: 2.8 MG/DL (ref 1.6–2.3)
MAGNESIUM SERPL-MCNC: 2.9 MG/DL (ref 1.6–2.3)
MAGNESIUM SERPL-MCNC: 2.9 MG/DL (ref 1.6–2.3)
MAGNESIUM SERPL-MCNC: 3 MG/DL (ref 1.6–2.3)
MCH RBC QN AUTO: 24.5 PG (ref 27–34)
MCH RBC QN AUTO: 24.9 PG (ref 26.5–33)
MCH RBC QN AUTO: 27.2 PG (ref 26.5–33)
MCH RBC QN AUTO: 27.3 PG (ref 26.5–33)
MCH RBC QN AUTO: 27.3 PG (ref 26.5–33)
MCH RBC QN AUTO: 27.9 PG (ref 26.5–33)
MCH RBC QN AUTO: 28 PG (ref 26.5–33)
MCH RBC QN AUTO: 28.3 PG (ref 26.5–33)
MCHC RBC AUTO-ENTMCNC: 29.9 G/DL (ref 31.5–36.5)
MCHC RBC AUTO-ENTMCNC: 30 G/DL (ref 31.5–36.5)
MCHC RBC AUTO-ENTMCNC: 30.2 G/DL (ref 31.5–36.5)
MCHC RBC AUTO-ENTMCNC: 31.8 G/DL (ref 31.5–36.5)
MCHC RBC AUTO-ENTMCNC: 31.9 G/DL (ref 31.5–36.5)
MCHC RBC AUTO-ENTMCNC: 32.1 G/DL (ref 32–36)
MCHC RBC AUTO-ENTMCNC: 32.8 G/DL (ref 31.5–36.5)
MCHC RBC AUTO-ENTMCNC: 33.2 G/DL (ref 31.5–36.5)
MCV RBC AUTO: 76 FL (ref 80–100)
MCV RBC AUTO: 83 FL (ref 78–100)
MCV RBC AUTO: 85 FL (ref 78–100)
MCV RBC AUTO: 85 FL (ref 78–100)
MCV RBC AUTO: 86 FL (ref 78–100)
MCV RBC AUTO: 88 FL (ref 78–100)
MCV RBC AUTO: 90 FL (ref 78–100)
MCV RBC AUTO: 91 FL (ref 78–100)
MDC_IDC_LEAD_IMPLANT_DT: NORMAL
MDC_IDC_LEAD_IMPLANT_DT: NORMAL
MDC_IDC_LEAD_LOCATION: NORMAL
MDC_IDC_LEAD_LOCATION: NORMAL
MDC_IDC_LEAD_LOCATION_DETAIL_1: NORMAL
MDC_IDC_LEAD_LOCATION_DETAIL_1: NORMAL
MDC_IDC_LEAD_MFG: NORMAL
MDC_IDC_LEAD_MFG: NORMAL
MDC_IDC_LEAD_MODEL: NORMAL
MDC_IDC_LEAD_MODEL: NORMAL
MDC_IDC_LEAD_POLARITY_TYPE: NORMAL
MDC_IDC_LEAD_POLARITY_TYPE: NORMAL
MDC_IDC_LEAD_SERIAL: NORMAL
MDC_IDC_LEAD_SERIAL: NORMAL
MDC_IDC_MSMT_BATTERY_STATUS: NORMAL
MDC_IDC_MSMT_CAP_CHARGE_DTM: NORMAL
MDC_IDC_MSMT_CAP_CHARGE_ENERGY: 41 J
MDC_IDC_MSMT_CAP_CHARGE_TIME: 7.06 S
MDC_IDC_MSMT_CAP_CHARGE_TIME: 9.57 S
MDC_IDC_MSMT_CAP_CHARGE_TYPE: NORMAL
MDC_IDC_MSMT_CAP_CHARGE_TYPE: NORMAL
MDC_IDC_MSMT_LEADCHNL_RA_IMPEDANCE_VALUE: 582 OHM
MDC_IDC_MSMT_LEADCHNL_RA_PACING_THRESHOLD_AMPLITUDE: 0.7 V
MDC_IDC_MSMT_LEADCHNL_RA_PACING_THRESHOLD_PULSEWIDTH: 0.4 MS
MDC_IDC_MSMT_LEADCHNL_RA_SENSING_INTR_AMPL: 2.4 MV
MDC_IDC_MSMT_LEADCHNL_RV_IMPEDANCE_VALUE: 317 OHM
MDC_IDC_MSMT_LEADCHNL_RV_PACING_THRESHOLD_AMPLITUDE: 0.7 V
MDC_IDC_MSMT_LEADCHNL_RV_PACING_THRESHOLD_PULSEWIDTH: 0.4 MS
MDC_IDC_MSMT_LEADCHNL_RV_SENSING_INTR_AMPL: 10.2 MV
MDC_IDC_PG_IMPLANT_DTM: NORMAL
MDC_IDC_PG_MFG: NORMAL
MDC_IDC_PG_MODEL: NORMAL
MDC_IDC_PG_SERIAL: NORMAL
MDC_IDC_PG_TYPE: NORMAL
MDC_IDC_SESS_CLINIC_NAME: NORMAL
MDC_IDC_SESS_DTM: NORMAL
MDC_IDC_SESS_TYPE: NORMAL
MDC_IDC_SET_BRADY_AT_MODE_SWITCH_MODE: NORMAL
MDC_IDC_SET_BRADY_AT_MODE_SWITCH_RATE: 150 {BEATS}/MIN
MDC_IDC_SET_BRADY_LOWRATE: 60 {BEATS}/MIN
MDC_IDC_SET_BRADY_MAX_SENSOR_RATE: 130 {BEATS}/MIN
MDC_IDC_SET_BRADY_MAX_TRACKING_RATE: 120 {BEATS}/MIN
MDC_IDC_SET_BRADY_MODE: NORMAL
MDC_IDC_SET_BRADY_PAV_DELAY_HIGH: 200 MS
MDC_IDC_SET_BRADY_PAV_DELAY_LOW: 300 MS
MDC_IDC_SET_BRADY_SAV_DELAY_HIGH: 135 MS
MDC_IDC_SET_BRADY_SAV_DELAY_LOW: 200 MS
MDC_IDC_SET_LEADCHNL_RA_PACING_AMPLITUDE: 1.5 V
MDC_IDC_SET_LEADCHNL_RA_PACING_CAPTURE_MODE: NORMAL
MDC_IDC_SET_LEADCHNL_RA_PACING_POLARITY: NORMAL
MDC_IDC_SET_LEADCHNL_RA_PACING_PULSEWIDTH: 0.4 MS
MDC_IDC_SET_LEADCHNL_RA_SENSING_ADAPTATION_MODE: NORMAL
MDC_IDC_SET_LEADCHNL_RA_SENSING_POLARITY: NORMAL
MDC_IDC_SET_LEADCHNL_RA_SENSING_SENSITIVITY: 0.5 MV
MDC_IDC_SET_LEADCHNL_RV_PACING_AMPLITUDE: 1.5 V
MDC_IDC_SET_LEADCHNL_RV_PACING_CAPTURE_MODE: NORMAL
MDC_IDC_SET_LEADCHNL_RV_PACING_POLARITY: NORMAL
MDC_IDC_SET_LEADCHNL_RV_PACING_PULSEWIDTH: 0.4 MS
MDC_IDC_SET_LEADCHNL_RV_SENSING_ADAPTATION_MODE: NORMAL
MDC_IDC_SET_LEADCHNL_RV_SENSING_POLARITY: NORMAL
MDC_IDC_SET_LEADCHNL_RV_SENSING_SENSITIVITY: 0.6 MV
MDC_IDC_SET_ZONE_DETECTION_INTERVAL: 250 MS
MDC_IDC_SET_ZONE_DETECTION_INTERVAL: 324 MS
MDC_IDC_SET_ZONE_TYPE: NORMAL
MDC_IDC_SET_ZONE_VENDOR_TYPE: NORMAL
MDC_IDC_STAT_EPISODE_RECENT_COUNT: 13
MDC_IDC_STAT_EPISODE_RECENT_COUNT: 17
MDC_IDC_STAT_EPISODE_RECENT_COUNT: 46
MDC_IDC_STAT_EPISODE_RECENT_COUNT_DTM_END: NORMAL
MDC_IDC_STAT_EPISODE_RECENT_COUNT_DTM_START: NORMAL
MDC_IDC_STAT_EPISODE_TOTAL_COUNT: 13
MDC_IDC_STAT_EPISODE_TOTAL_COUNT: 17
MDC_IDC_STAT_EPISODE_TOTAL_COUNT: 47
MDC_IDC_STAT_EPISODE_TOTAL_COUNT_DTM_END: NORMAL
MDC_IDC_STAT_EPISODE_TYPE: NORMAL
MDC_IDC_STAT_EPISODE_VENDOR_TYPE: NORMAL
MDC_IDC_STAT_TACHYTHERAPY_ATP_DELIVERED_RECENT: 49
MDC_IDC_STAT_TACHYTHERAPY_ATP_DELIVERED_TOTAL: 51
MDC_IDC_STAT_TACHYTHERAPY_RECENT_DTM_END: NORMAL
MDC_IDC_STAT_TACHYTHERAPY_RECENT_DTM_START: NORMAL
MDC_IDC_STAT_TACHYTHERAPY_SHOCKS_ABORTED_RECENT: 9
MDC_IDC_STAT_TACHYTHERAPY_SHOCKS_ABORTED_TOTAL: 9
MDC_IDC_STAT_TACHYTHERAPY_SHOCKS_DELIVERED_RECENT: 29
MDC_IDC_STAT_TACHYTHERAPY_SHOCKS_DELIVERED_TOTAL: 30
MDC_IDC_STAT_TACHYTHERAPY_TOTAL_DTM_END: NORMAL
MISCELLANEOUS TEST: NORMAL
MONOCYTES # BLD AUTO: 0.6 THOU/UL (ref 0–0.9)
MONOCYTES # BLD AUTO: 1.2 10E9/L (ref 0–1.3)
MONOCYTES NFR BLD AUTO: 7.3 %
MONOCYTES NFR BLD AUTO: 8 % (ref 2–10)
MUCOUS THREADS #/AREA URNS LPF: PRESENT /LPF
NEUTROPHILS # BLD AUTO: 14.6 10E9/L (ref 1.6–8.3)
NEUTROPHILS # BLD AUTO: 5.7 THOU/UL (ref 2–7.7)
NEUTROPHILS NFR BLD AUTO: 72 % (ref 50–70)
NEUTROPHILS NFR BLD AUTO: 89.8 %
NITRATE UR QL: NEGATIVE
NRBC # BLD AUTO: 0.1 10*3/UL
NRBC BLD AUTO-RTO: 0 /100
NUM BPU REQUESTED: 11
NUM BPU REQUESTED: 2
NUM BPU REQUESTED: 5
O2/TOTAL GAS SETTING VFR VENT: 100 %
O2/TOTAL GAS SETTING VFR VENT: 40 %
O2/TOTAL GAS SETTING VFR VENT: 50 %
OPIATES UR QL SCN: POSITIVE
OXYHGB MFR BLD: 73 % (ref 92–100)
OXYHGB MFR BLD: 96 % (ref 92–100)
OXYHGB MFR BLD: 97 % (ref 92–100)
OXYHGB MFR BLD: 98 % (ref 92–100)
OXYHGB MFR BLD: 99 % (ref 92–100)
OXYHGB MFR BLD: 99 % (ref 92–100)
OXYHGB MFR BLDA: 97 % (ref 75–100)
OXYHGB MFR BLDA: 97 % (ref 75–100)
OXYHGB MFR BLDA: 98 % (ref 75–100)
OXYHGB MFR BLDV: 71 %
OXYHGB MFR BLDV: 75 %
OXYHGB MFR BLDV: 80 %
PCO2 BLD: 21 MM HG (ref 35–45)
PCO2 BLD: 24 MM HG (ref 35–45)
PCO2 BLD: 25 MM HG (ref 35–45)
PCO2 BLD: 28 MM HG (ref 35–45)
PCO2 BLD: 29 MM HG (ref 35–45)
PCO2 BLD: 29 MM HG (ref 35–45)
PCO2 BLD: 31 MM HG (ref 35–45)
PCO2 BLD: 32 MM HG (ref 35–45)
PCO2 BLD: 33 MM HG (ref 35–45)
PCO2 BLD: 34 MM HG (ref 35–45)
PCO2 BLD: 36 MM HG (ref 35–45)
PCO2 BLD: 40 MM HG (ref 35–45)
PCO2 BLD: 40 MM HG (ref 35–45)
PCO2 BLD: 41 MM HG (ref 35–45)
PCO2 BLD: 41 MM HG (ref 35–45)
PCO2 BLD: 42 MM HG (ref 35–45)
PCO2 BLD: 43 MM HG (ref 35–45)
PCO2 BLD: 44 MM HG (ref 35–45)
PCO2 BLD: 44 MM HG (ref 35–45)
PCO2 BLD: 83 MM HG (ref 35–45)
PCO2 BLD: 89 MM HG (ref 35–45)
PCO2 BLDA: 42 MM HG (ref 35–45)
PCO2 BLDA: 48 MM HG (ref 35–45)
PCO2 BLDA: 50 MM HG (ref 35–45)
PCO2 BLDV: 44 MM HG (ref 40–50)
PCO2 BLDV: 49 MM HG (ref 40–50)
PCO2 BLDV: 69 MM HG (ref 40–50)
PCO2 BLDV: 82 MM HG (ref 40–50)
PH BLD: 6.88 PH (ref 7.35–7.45)
PH BLD: 6.9 PH (ref 7.35–7.45)
PH BLD: 7.16 PH (ref 7.35–7.45)
PH BLD: 7.2 PH (ref 7.35–7.45)
PH BLD: 7.24 PH (ref 7.35–7.45)
PH BLD: 7.25 PH (ref 7.35–7.45)
PH BLD: 7.28 PH (ref 7.35–7.45)
PH BLD: 7.32 PH (ref 7.35–7.45)
PH BLD: 7.32 PH (ref 7.35–7.45)
PH BLD: 7.33 PH (ref 7.35–7.45)
PH BLD: 7.34 PH (ref 7.35–7.45)
PH BLD: 7.35 PH (ref 7.35–7.45)
PH BLD: 7.36 PH (ref 7.35–7.45)
PH BLD: 7.37 PH (ref 7.35–7.45)
PH BLD: 7.4 PH (ref 7.35–7.45)
PH BLD: 7.41 PH (ref 7.35–7.45)
PH BLD: 7.41 PH (ref 7.35–7.45)
PH BLD: 7.42 PH (ref 7.35–7.45)
PH BLD: 7.43 PH (ref 7.35–7.45)
PH BLDA: 7.23 PH (ref 7.35–7.45)
PH BLDA: 7.27 PH (ref 7.35–7.45)
PH BLDA: 7.35 PH (ref 7.35–7.45)
PH BLDV: 6.99 PH (ref 7.32–7.43)
PH BLDV: 7.17 PH (ref 7.32–7.43)
PH BLDV: 7.23 PH (ref 7.32–7.43)
PH BLDV: 7.35 PH (ref 7.32–7.43)
PH UR STRIP: 6 PH (ref 5–7)
PHOSPHATE SERPL-MCNC: 5.1 MG/DL (ref 2.5–4.5)
PHOSPHATE SERPL-MCNC: 5.9 MG/DL (ref 2.5–4.5)
PLATELET # BLD AUTO: 102 10E9/L (ref 150–450)
PLATELET # BLD AUTO: 116 10E9/L (ref 150–450)
PLATELET # BLD AUTO: 171 10E9/L (ref 150–450)
PLATELET # BLD AUTO: 210 10E9/L (ref 150–450)
PLATELET # BLD AUTO: 260 THOU/UL (ref 140–440)
PLATELET # BLD AUTO: 85 10E9/L (ref 150–450)
PLATELET # BLD AUTO: 90 10E9/L (ref 150–450)
PLATELET # BLD AUTO: 93 10E9/L (ref 150–450)
PLATELET INHIBITION WITH AA: 99 %
PLATELET INHIBITION WITH ADP: 93 %
PMV BLD AUTO: 9 FL (ref 7–10)
PO2 BLD: 102 MM HG (ref 80–105)
PO2 BLD: 106 MM HG (ref 80–105)
PO2 BLD: 112 MM HG (ref 80–105)
PO2 BLD: 115 MM HG (ref 80–105)
PO2 BLD: 116 MM HG (ref 80–105)
PO2 BLD: 116 MM HG (ref 80–105)
PO2 BLD: 117 MM HG (ref 80–105)
PO2 BLD: 117 MM HG (ref 80–105)
PO2 BLD: 129 MM HG (ref 80–105)
PO2 BLD: 137 MM HG (ref 80–105)
PO2 BLD: 151 MM HG (ref 80–105)
PO2 BLD: 156 MM HG (ref 80–105)
PO2 BLD: 162 MM HG (ref 80–105)
PO2 BLD: 176 MM HG (ref 80–105)
PO2 BLD: 177 MM HG (ref 80–105)
PO2 BLD: 194 MM HG (ref 80–105)
PO2 BLD: 199 MM HG (ref 80–105)
PO2 BLD: 201 MM HG (ref 80–105)
PO2 BLD: 211 MM HG (ref 80–105)
PO2 BLD: 230 MM HG (ref 80–105)
PO2 BLD: 230 MM HG (ref 80–105)
PO2 BLD: 359 MM HG (ref 80–105)
PO2 BLD: 73 MM HG (ref 80–105)
PO2 BLD: 99 MM HG (ref 80–105)
PO2 BLDA: 150 MM HG (ref 80–105)
PO2 BLDA: 172 MM HG (ref 80–105)
PO2 BLDA: 458 MM HG (ref 80–105)
PO2 BLDV: 21 MM HG (ref 25–47)
PO2 BLDV: 47 MM HG (ref 25–47)
PO2 BLDV: 48 MM HG (ref 25–47)
PO2 BLDV: 49 MM HG (ref 25–47)
POC INR - HE - HISTORICAL: 2.7 (ref 0.9–1.1)
POTASSIUM BLD-SCNC: 3.1 MMOL/L (ref 3.4–5.3)
POTASSIUM BLD-SCNC: 3.2 MMOL/L (ref 3.4–5.3)
POTASSIUM BLD-SCNC: 3.8 MMOL/L (ref 3.4–5.3)
POTASSIUM BLD-SCNC: 4 MMOL/L (ref 3.5–5)
POTASSIUM BLD-SCNC: 4.1 MMOL/L (ref 3.4–5.3)
POTASSIUM BLD-SCNC: 4.1 MMOL/L (ref 3.5–5)
POTASSIUM BLD-SCNC: 4.2 MMOL/L (ref 3.4–5.3)
POTASSIUM BLD-SCNC: 4.2 MMOL/L (ref 3.4–5.3)
POTASSIUM BLD-SCNC: 4.2 MMOL/L (ref 3.5–5)
POTASSIUM BLD-SCNC: 4.3 MMOL/L (ref 3.4–5.3)
POTASSIUM BLD-SCNC: 4.3 MMOL/L (ref 3.5–5)
POTASSIUM SERPL-SCNC: 3.9 MMOL/L (ref 3.4–5.3)
POTASSIUM SERPL-SCNC: 4.1 MMOL/L (ref 3.4–5.3)
POTASSIUM SERPL-SCNC: 4.7 MMOL/L (ref 3.4–5.3)
POTASSIUM SERPL-SCNC: 4.8 MMOL/L (ref 3.4–5.3)
POTASSIUM SERPL-SCNC: 4.9 MMOL/L (ref 3.4–5.3)
POTASSIUM SERPL-SCNC: 5.4 MMOL/L (ref 3.4–5.3)
PROCALCITONIN SERPL-MCNC: 6.4 NG/ML
PROT SERPL-MCNC: 4.6 G/DL (ref 6.8–8.8)
PROT SERPL-MCNC: 4.7 G/DL (ref 6.8–8.8)
PROT SERPL-MCNC: 4.8 G/DL (ref 6.8–8.8)
PROT SERPL-MCNC: 5 G/DL (ref 6.8–8.8)
PROT SERPL-MCNC: 5 G/DL (ref 6.8–8.8)
PROT SERPL-MCNC: 5.4 G/DL (ref 6.8–8.8)
PROT SERPL-MCNC: 6.8 G/DL (ref 6–8)
R TIME UNTIL CLOT FORMS: 11.9 MINUTE (ref 5–10)
R TIME UNTIL CLOT FORMS: 6.9 MINUTE (ref 5–10)
R TIME UNTIL CLOT FORMS: 7.6 MINUTE (ref 5–10)
RADIOLOGIST FLAGS: ABNORMAL
RBC # BLD AUTO: 2.68 10E12/L (ref 4.4–5.9)
RBC # BLD AUTO: 2.82 10E12/L (ref 4.4–5.9)
RBC # BLD AUTO: 2.86 10E12/L (ref 4.4–5.9)
RBC # BLD AUTO: 2.89 10E12/L (ref 4.4–5.9)
RBC # BLD AUTO: 2.9 10E12/L (ref 4.4–5.9)
RBC # BLD AUTO: 2.97 10E12/L (ref 4.4–5.9)
RBC # BLD AUTO: 3 10E12/L (ref 4.4–5.9)
RBC # BLD AUTO: 3.95 MILL/UL (ref 4.4–6.2)
RBC #/AREA URNS AUTO: >182 /HPF (ref 0–2)
SAO2 % BLDV FROM PO2: 17 %
SARS-COV-2 PCR COMMENT: NORMAL
SARS-COV-2 RNA SPEC QL NAA+PROBE: NEGATIVE
SARS-COV-2 RNA SPEC QL NAA+PROBE: NEGATIVE
SARS-COV-2 RNA SPEC QL NAA+PROBE: NORMAL
SARS-COV-2 VIRUS SPECIMEN SOURCE: NORMAL
SODIUM BLD-SCNC: 136 MMOL/L (ref 133–144)
SODIUM BLD-SCNC: 136 MMOL/L (ref 133–144)
SODIUM BLD-SCNC: 139 MMOL/L (ref 133–144)
SODIUM BLD-SCNC: 139 MMOL/L (ref 133–144)
SODIUM BLD-SCNC: 141 MMOL/L (ref 133–144)
SODIUM BLD-SCNC: 142 MMOL/L (ref 133–144)
SODIUM BLD-SCNC: 143 MMOL/L (ref 133–144)
SODIUM BLD-SCNC: 151 MMOL/L (ref 133–144)
SODIUM BLD-SCNC: 156 MMOL/L (ref 133–144)
SODIUM SERPL-SCNC: 139 MMOL/L (ref 133–144)
SODIUM SERPL-SCNC: 142 MMOL/L (ref 133–144)
SODIUM SERPL-SCNC: 142 MMOL/L (ref 136–145)
SODIUM SERPL-SCNC: 143 MMOL/L (ref 133–144)
SODIUM SERPL-SCNC: 144 MMOL/L (ref 133–144)
SODIUM SERPL-SCNC: 145 MMOL/L (ref 133–144)
SODIUM SERPL-SCNC: 145 MMOL/L (ref 136–145)
SODIUM SERPL-SCNC: 146 MMOL/L (ref 136–145)
SODIUM SERPL-SCNC: 146 MMOL/L (ref 136–145)
SODIUM SERPL-SCNC: 147 MMOL/L (ref 133–144)
SODIUM SERPL-SCNC: 154 MMOL/L (ref 133–144)
SOURCE: ABNORMAL
SP GR UR STRIP: 1.01 (ref 1–1.03)
SPECIMEN EXP DATE BLD: NORMAL
SPECIMEN SOURCE: NORMAL
SQUAMOUS #/AREA URNS AUTO: 1 /HPF (ref 0–1)
TRANS CELLS #/AREA URNS HPF: 2 /HPF (ref 0–1)
TRANSFUSION STATUS PATIENT QL: NORMAL
TROPONIN I SERPL-MCNC: 194.78 UG/L (ref 0–0.04)
TROPONIN I SERPL-MCNC: 54.33 UG/L (ref 0–0.04)
TROPONIN I SERPL-MCNC: >200 UG/L (ref 0–0.04)
UROBILINOGEN UR STRIP-MCNC: NORMAL MG/DL (ref 0–2)
WBC # BLD AUTO: 13.8 10E9/L (ref 4–11)
WBC # BLD AUTO: 14.4 10E9/L (ref 4–11)
WBC # BLD AUTO: 16.3 10E9/L (ref 4–11)
WBC # BLD AUTO: 16.6 10E9/L (ref 4–11)
WBC # BLD AUTO: 16.7 10E9/L (ref 4–11)
WBC # BLD AUTO: 17.1 10E9/L (ref 4–11)
WBC # BLD AUTO: 18.3 10E9/L (ref 4–11)
WBC #/AREA URNS AUTO: ABNORMAL /HPF (ref 0–5)
WBC: 7.9 THOU/UL (ref 4–11)

## 2020-01-01 PROCEDURE — 93005 ELECTROCARDIOGRAM TRACING: CPT

## 2020-01-01 PROCEDURE — 33947 ECMO/ECLS INITIATION ARTERY: CPT

## 2020-01-01 PROCEDURE — 93283 PRGRMG EVAL IMPLANTABLE DFB: CPT

## 2020-01-01 PROCEDURE — 00000146 ZZHCL STATISTIC GLUCOSE BY METER IP

## 2020-01-01 PROCEDURE — C9460 INJECTION, CANGRELOR: HCPCS | Performed by: INTERNAL MEDICINE

## 2020-01-01 PROCEDURE — 82810 BLOOD GASES O2 SAT ONLY: CPT

## 2020-01-01 PROCEDURE — 33949 ECMO/ECLS DAILY MGMT ARTERY: CPT | Performed by: INTERNAL MEDICINE

## 2020-01-01 PROCEDURE — 25000128 H RX IP 250 OP 636: Performed by: THORACIC SURGERY (CARDIOTHORACIC VASCULAR SURGERY)

## 2020-01-01 PROCEDURE — 82565 ASSAY OF CREATININE: CPT

## 2020-01-01 PROCEDURE — 93010 ELECTROCARDIOGRAM REPORT: CPT | Mod: 59 | Performed by: INTERNAL MEDICINE

## 2020-01-01 PROCEDURE — 25800030 ZZH RX IP 258 OP 636: Performed by: INTERNAL MEDICINE

## 2020-01-01 PROCEDURE — 70450 CT HEAD/BRAIN W/O DYE: CPT

## 2020-01-01 PROCEDURE — 87040 BLOOD CULTURE FOR BACTERIA: CPT | Performed by: STUDENT IN AN ORGANIZED HEALTH CARE EDUCATION/TRAINING PROGRAM

## 2020-01-01 PROCEDURE — 25000125 ZZHC RX 250: Performed by: STUDENT IN AN ORGANIZED HEALTH CARE EDUCATION/TRAINING PROGRAM

## 2020-01-01 PROCEDURE — 99285 EMERGENCY DEPT VISIT HI MDM: CPT | Mod: 25 | Performed by: EMERGENCY MEDICINE

## 2020-01-01 PROCEDURE — 33952 ECMO/ECLS INSJ PRPH CANNULA: CPT | Mod: GC | Performed by: INTERNAL MEDICINE

## 2020-01-01 PROCEDURE — 5A1945Z RESPIRATORY VENTILATION, 24-96 CONSECUTIVE HOURS: ICD-10-PCS | Performed by: EMERGENCY MEDICINE

## 2020-01-01 PROCEDURE — 85730 THROMBOPLASTIN TIME PARTIAL: CPT | Performed by: INTERNAL MEDICINE

## 2020-01-01 PROCEDURE — 85520 HEPARIN ASSAY: CPT | Performed by: INTERNAL MEDICINE

## 2020-01-01 PROCEDURE — 83051 HEMOGLOBIN PLASMA: CPT | Performed by: STUDENT IN AN ORGANIZED HEALTH CARE EDUCATION/TRAINING PROGRAM

## 2020-01-01 PROCEDURE — 83605 ASSAY OF LACTIC ACID: CPT | Performed by: STUDENT IN AN ORGANIZED HEALTH CARE EDUCATION/TRAINING PROGRAM

## 2020-01-01 PROCEDURE — 85347 COAGULATION TIME ACTIVATED: CPT

## 2020-01-01 PROCEDURE — 85027 COMPLETE CBC AUTOMATED: CPT | Performed by: INTERNAL MEDICINE

## 2020-01-01 PROCEDURE — 84100 ASSAY OF PHOSPHORUS: CPT | Performed by: STUDENT IN AN ORGANIZED HEALTH CARE EDUCATION/TRAINING PROGRAM

## 2020-01-01 PROCEDURE — 36556 INSERT NON-TUNNEL CV CATH: CPT | Performed by: INTERNAL MEDICINE

## 2020-01-01 PROCEDURE — 33947 ECMO/ECLS INITIATION ARTERY: CPT | Mod: GC | Performed by: INTERNAL MEDICINE

## 2020-01-01 PROCEDURE — 25000128 H RX IP 250 OP 636: Performed by: STUDENT IN AN ORGANIZED HEALTH CARE EDUCATION/TRAINING PROGRAM

## 2020-01-01 PROCEDURE — 85379 FIBRIN DEGRADATION QUANT: CPT | Performed by: STUDENT IN AN ORGANIZED HEALTH CARE EDUCATION/TRAINING PROGRAM

## 2020-01-01 PROCEDURE — 84484 ASSAY OF TROPONIN QUANT: CPT | Performed by: STUDENT IN AN ORGANIZED HEALTH CARE EDUCATION/TRAINING PROGRAM

## 2020-01-01 PROCEDURE — 82330 ASSAY OF CALCIUM: CPT | Performed by: STUDENT IN AN ORGANIZED HEALTH CARE EDUCATION/TRAINING PROGRAM

## 2020-01-01 PROCEDURE — 82805 BLOOD GASES W/O2 SATURATION: CPT | Performed by: INTERNAL MEDICINE

## 2020-01-01 PROCEDURE — 20000004 ZZH R&B ICU UMMC

## 2020-01-01 PROCEDURE — C1894 INTRO/SHEATH, NON-LASER: HCPCS | Performed by: INTERNAL MEDICINE

## 2020-01-01 PROCEDURE — 27211402 ZZH SENSOR NIRS OXIMETER, ADULT

## 2020-01-01 PROCEDURE — 84295 ASSAY OF SERUM SODIUM: CPT | Performed by: STUDENT IN AN ORGANIZED HEALTH CARE EDUCATION/TRAINING PROGRAM

## 2020-01-01 PROCEDURE — 33949 ECMO/ECLS DAILY MGMT ARTERY: CPT

## 2020-01-01 PROCEDURE — 25800030 ZZH RX IP 258 OP 636: Performed by: THORACIC SURGERY (CARDIOTHORACIC VASCULAR SURGERY)

## 2020-01-01 PROCEDURE — 75605 CONTRAST EXAM THORACIC AORTA: CPT | Performed by: INTERNAL MEDICINE

## 2020-01-01 PROCEDURE — 92960 CARDIOVERSION ELECTRIC EXT: CPT | Performed by: INTERNAL MEDICINE

## 2020-01-01 PROCEDURE — 92928 PRQ TCAT PLMT NTRAC ST 1 LES: CPT | Mod: LD | Performed by: INTERNAL MEDICINE

## 2020-01-01 PROCEDURE — 25000132 ZZH RX MED GY IP 250 OP 250 PS 637: Mod: GY | Performed by: INTERNAL MEDICINE

## 2020-01-01 PROCEDURE — 027234Z DILATION OF CORONARY ARTERY, THREE ARTERIES WITH DRUG-ELUTING INTRALUMINAL DEVICE, PERCUTANEOUS APPROACH: ICD-10-PCS | Performed by: INTERNAL MEDICINE

## 2020-01-01 PROCEDURE — 80347 BENZODIAZEPINES 13 OR MORE: CPT | Performed by: STUDENT IN AN ORGANIZED HEALTH CARE EDUCATION/TRAINING PROGRAM

## 2020-01-01 PROCEDURE — 99291 CRITICAL CARE FIRST HOUR: CPT | Mod: 25 | Performed by: INTERNAL MEDICINE

## 2020-01-01 PROCEDURE — 80361 OPIATES 1 OR MORE: CPT | Performed by: STUDENT IN AN ORGANIZED HEALTH CARE EDUCATION/TRAINING PROGRAM

## 2020-01-01 PROCEDURE — 85384 FIBRINOGEN ACTIVITY: CPT | Performed by: INTERNAL MEDICINE

## 2020-01-01 PROCEDURE — 85610 PROTHROMBIN TIME: CPT | Performed by: INTERNAL MEDICINE

## 2020-01-01 PROCEDURE — 87040 BLOOD CULTURE FOR BACTERIA: CPT | Performed by: INTERNAL MEDICINE

## 2020-01-01 PROCEDURE — C1769 GUIDE WIRE: HCPCS | Performed by: INTERNAL MEDICINE

## 2020-01-01 PROCEDURE — 84132 ASSAY OF SERUM POTASSIUM: CPT

## 2020-01-01 PROCEDURE — 85652 RBC SED RATE AUTOMATED: CPT | Performed by: STUDENT IN AN ORGANIZED HEALTH CARE EDUCATION/TRAINING PROGRAM

## 2020-01-01 PROCEDURE — 95714 VEEG EA 12-26 HR UNMNTR: CPT

## 2020-01-01 PROCEDURE — 94003 VENT MGMT INPAT SUBQ DAY: CPT

## 2020-01-01 PROCEDURE — P9059 PLASMA, FRZ BETWEEN 8-24HOUR: HCPCS | Performed by: STUDENT IN AN ORGANIZED HEALTH CARE EDUCATION/TRAINING PROGRAM

## 2020-01-01 PROCEDURE — 85384 FIBRINOGEN ACTIVITY: CPT | Performed by: STUDENT IN AN ORGANIZED HEALTH CARE EDUCATION/TRAINING PROGRAM

## 2020-01-01 PROCEDURE — 40000014 ZZH STATISTIC ARTERIAL MONITORING DAILY

## 2020-01-01 PROCEDURE — 40000344 ZZHCL STATISTIC THAWING COMPONENT: Performed by: NURSE PRACTITIONER

## 2020-01-01 PROCEDURE — G0463 HOSPITAL OUTPT CLINIC VISIT: HCPCS

## 2020-01-01 PROCEDURE — 85520 HEPARIN ASSAY: CPT | Performed by: STUDENT IN AN ORGANIZED HEALTH CARE EDUCATION/TRAINING PROGRAM

## 2020-01-01 PROCEDURE — 83735 ASSAY OF MAGNESIUM: CPT | Performed by: INTERNAL MEDICINE

## 2020-01-01 PROCEDURE — 87070 CULTURE OTHR SPECIMN AEROBIC: CPT | Performed by: STUDENT IN AN ORGANIZED HEALTH CARE EDUCATION/TRAINING PROGRAM

## 2020-01-01 PROCEDURE — 25000132 ZZH RX MED GY IP 250 OP 250 PS 637: Mod: GY | Performed by: STUDENT IN AN ORGANIZED HEALTH CARE EDUCATION/TRAINING PROGRAM

## 2020-01-01 PROCEDURE — P9016 RBC LEUKOCYTES REDUCED: HCPCS | Performed by: INTERNAL MEDICINE

## 2020-01-01 PROCEDURE — 95705 EEG W/O VID 2-12 HR UNMNTR: CPT

## 2020-01-01 PROCEDURE — 25000132 ZZH RX MED GY IP 250 OP 250 PS 637: Mod: GY

## 2020-01-01 PROCEDURE — A7035 POS AIRWAY PRESS HEADGEAR: HCPCS

## 2020-01-01 PROCEDURE — 87205 SMEAR GRAM STAIN: CPT | Performed by: STUDENT IN AN ORGANIZED HEALTH CARE EDUCATION/TRAINING PROGRAM

## 2020-01-01 PROCEDURE — 27211184 ZZH CARDIOHELP CIRCUIT

## 2020-01-01 PROCEDURE — 86923 COMPATIBILITY TEST ELECTRIC: CPT | Performed by: INTERNAL MEDICINE

## 2020-01-01 PROCEDURE — 71045 X-RAY EXAM CHEST 1 VIEW: CPT

## 2020-01-01 PROCEDURE — 99292 CRITICAL CARE ADDL 30 MIN: CPT | Mod: 25 | Performed by: INTERNAL MEDICINE

## 2020-01-01 PROCEDURE — 83615 LACTATE (LD) (LDH) ENZYME: CPT | Performed by: STUDENT IN AN ORGANIZED HEALTH CARE EDUCATION/TRAINING PROGRAM

## 2020-01-01 PROCEDURE — 31500 INSERT EMERGENCY AIRWAY: CPT | Mod: Z6 | Performed by: EMERGENCY MEDICINE

## 2020-01-01 PROCEDURE — 25000128 H RX IP 250 OP 636: Performed by: INTERNAL MEDICINE

## 2020-01-01 PROCEDURE — 93306 TTE W/DOPPLER COMPLETE: CPT

## 2020-01-01 PROCEDURE — 93283 PRGRMG EVAL IMPLANTABLE DFB: CPT | Mod: 26 | Performed by: INTERNAL MEDICINE

## 2020-01-01 PROCEDURE — 25000125 ZZHC RX 250

## 2020-01-01 PROCEDURE — 40000502 ZZHCL STATISTIC GLUCOSE ED POCT

## 2020-01-01 PROCEDURE — 82947 ASSAY GLUCOSE BLOOD QUANT: CPT | Performed by: STUDENT IN AN ORGANIZED HEALTH CARE EDUCATION/TRAINING PROGRAM

## 2020-01-01 PROCEDURE — 85610 PROTHROMBIN TIME: CPT

## 2020-01-01 PROCEDURE — 99152 MOD SED SAME PHYS/QHP 5/>YRS: CPT | Performed by: INTERNAL MEDICINE

## 2020-01-01 PROCEDURE — 86901 BLOOD TYPING SEROLOGIC RH(D): CPT | Performed by: INTERNAL MEDICINE

## 2020-01-01 PROCEDURE — 80053 COMPREHEN METABOLIC PANEL: CPT | Performed by: INTERNAL MEDICINE

## 2020-01-01 PROCEDURE — 93925 LOWER EXTREMITY STUDY: CPT

## 2020-01-01 PROCEDURE — 36415 COLL VENOUS BLD VENIPUNCTURE: CPT | Performed by: INTERNAL MEDICINE

## 2020-01-01 PROCEDURE — 84484 ASSAY OF TROPONIN QUANT: CPT | Performed by: INTERNAL MEDICINE

## 2020-01-01 PROCEDURE — 82330 ASSAY OF CALCIUM: CPT

## 2020-01-01 PROCEDURE — 85396 CLOTTING ASSAY WHOLE BLOOD: CPT | Performed by: STUDENT IN AN ORGANIZED HEALTH CARE EDUCATION/TRAINING PROGRAM

## 2020-01-01 PROCEDURE — 25000128 H RX IP 250 OP 636

## 2020-01-01 PROCEDURE — C9113 INJ PANTOPRAZOLE SODIUM, VIA: HCPCS | Performed by: STUDENT IN AN ORGANIZED HEALTH CARE EDUCATION/TRAINING PROGRAM

## 2020-01-01 PROCEDURE — 99153 MOD SED SAME PHYS/QHP EA: CPT | Performed by: INTERNAL MEDICINE

## 2020-01-01 PROCEDURE — 82533 TOTAL CORTISOL: CPT | Performed by: STUDENT IN AN ORGANIZED HEALTH CARE EDUCATION/TRAINING PROGRAM

## 2020-01-01 PROCEDURE — 85300 ANTITHROMBIN III ACTIVITY: CPT | Performed by: STUDENT IN AN ORGANIZED HEALTH CARE EDUCATION/TRAINING PROGRAM

## 2020-01-01 PROCEDURE — 93455 CORONARY ART/GRFT ANGIO S&I: CPT | Mod: 26 | Performed by: INTERNAL MEDICINE

## 2020-01-01 PROCEDURE — 40000498 ZZHCL STATISTIC POTASSIUM ED POCT

## 2020-01-01 PROCEDURE — 40000275 ZZH STATISTIC RCP TIME EA 10 MIN

## 2020-01-01 PROCEDURE — 80053 COMPREHEN METABOLIC PANEL: CPT | Performed by: STUDENT IN AN ORGANIZED HEALTH CARE EDUCATION/TRAINING PROGRAM

## 2020-01-01 PROCEDURE — C9600 PERC DRUG-EL COR STENT SING: HCPCS | Performed by: INTERNAL MEDICINE

## 2020-01-01 PROCEDURE — 81001 URINALYSIS AUTO W/SCOPE: CPT | Performed by: STUDENT IN AN ORGANIZED HEALTH CARE EDUCATION/TRAINING PROGRAM

## 2020-01-01 PROCEDURE — 84295 ASSAY OF SERUM SODIUM: CPT

## 2020-01-01 PROCEDURE — 85379 FIBRIN DEGRADATION QUANT: CPT | Performed by: INTERNAL MEDICINE

## 2020-01-01 PROCEDURE — 83735 ASSAY OF MAGNESIUM: CPT | Performed by: STUDENT IN AN ORGANIZED HEALTH CARE EDUCATION/TRAINING PROGRAM

## 2020-01-01 PROCEDURE — C1725 CATH, TRANSLUMIN NON-LASER: HCPCS | Performed by: INTERNAL MEDICINE

## 2020-01-01 PROCEDURE — 85025 COMPLETE CBC W/AUTO DIFF WBC: CPT | Performed by: STUDENT IN AN ORGANIZED HEALTH CARE EDUCATION/TRAINING PROGRAM

## 2020-01-01 PROCEDURE — 33952 ECMO/ECLS INSJ PRPH CANNULA: CPT | Performed by: INTERNAL MEDICINE

## 2020-01-01 PROCEDURE — 87186 SC STD MICRODIL/AGAR DIL: CPT | Performed by: STUDENT IN AN ORGANIZED HEALTH CARE EDUCATION/TRAINING PROGRAM

## 2020-01-01 PROCEDURE — 93455 CORONARY ART/GRFT ANGIO S&I: CPT | Performed by: INTERNAL MEDICINE

## 2020-01-01 PROCEDURE — 80307 DRUG TEST PRSMV CHEM ANLYZR: CPT | Performed by: STUDENT IN AN ORGANIZED HEALTH CARE EDUCATION/TRAINING PROGRAM

## 2020-01-01 PROCEDURE — 85730 THROMBOPLASTIN TIME PARTIAL: CPT | Performed by: STUDENT IN AN ORGANIZED HEALTH CARE EDUCATION/TRAINING PROGRAM

## 2020-01-01 PROCEDURE — 84295 ASSAY OF SERUM SODIUM: CPT | Performed by: INTERNAL MEDICINE

## 2020-01-01 PROCEDURE — 86900 BLOOD TYPING SEROLOGIC ABO: CPT | Performed by: INTERNAL MEDICINE

## 2020-01-01 PROCEDURE — 25000125 ZZHC RX 250: Performed by: INTERNAL MEDICINE

## 2020-01-01 PROCEDURE — 70450 CT HEAD/BRAIN W/O DYE: CPT | Mod: 76

## 2020-01-01 PROCEDURE — 85610 PROTHROMBIN TIME: CPT | Performed by: STUDENT IN AN ORGANIZED HEALTH CARE EDUCATION/TRAINING PROGRAM

## 2020-01-01 PROCEDURE — 87077 CULTURE AEROBIC IDENTIFY: CPT | Performed by: STUDENT IN AN ORGANIZED HEALTH CARE EDUCATION/TRAINING PROGRAM

## 2020-01-01 PROCEDURE — 84145 PROCALCITONIN (PCT): CPT | Performed by: STUDENT IN AN ORGANIZED HEALTH CARE EDUCATION/TRAINING PROGRAM

## 2020-01-01 PROCEDURE — 92950 HEART/LUNG RESUSCITATION CPR: CPT | Performed by: EMERGENCY MEDICINE

## 2020-01-01 PROCEDURE — 5A1522G EXTRACORPOREAL OXYGENATION, MEMBRANE, PERIPHERAL VENO-ARTERIAL: ICD-10-PCS | Performed by: INTERNAL MEDICINE

## 2020-01-01 PROCEDURE — 86316 IMMUNOASSAY TUMOR OTHER: CPT | Performed by: STUDENT IN AN ORGANIZED HEALTH CARE EDUCATION/TRAINING PROGRAM

## 2020-01-01 PROCEDURE — 82947 ASSAY GLUCOSE BLOOD QUANT: CPT

## 2020-01-01 PROCEDURE — U0003 INFECTIOUS AGENT DETECTION BY NUCLEIC ACID (DNA OR RNA); SEVERE ACUTE RESPIRATORY SYNDROME CORONAVIRUS 2 (SARS-COV-2) (CORONAVIRUS DISEASE [COVID-19]), AMPLIFIED PROBE TECHNIQUE, MAKING USE OF HIGH THROUGHPUT TECHNOLOGIES AS DESCRIBED BY CMS-2020-01-R: HCPCS | Performed by: STUDENT IN AN ORGANIZED HEALTH CARE EDUCATION/TRAINING PROGRAM

## 2020-01-01 PROCEDURE — 80354 DRUG SCREENING FENTANYL: CPT | Performed by: STUDENT IN AN ORGANIZED HEALTH CARE EDUCATION/TRAINING PROGRAM

## 2020-01-01 PROCEDURE — 83605 ASSAY OF LACTIC ACID: CPT

## 2020-01-01 PROCEDURE — 86140 C-REACTIVE PROTEIN: CPT | Performed by: STUDENT IN AN ORGANIZED HEALTH CARE EDUCATION/TRAINING PROGRAM

## 2020-01-01 PROCEDURE — 86850 RBC ANTIBODY SCREEN: CPT | Performed by: INTERNAL MEDICINE

## 2020-01-01 PROCEDURE — 82805 BLOOD GASES W/O2 SATURATION: CPT | Performed by: STUDENT IN AN ORGANIZED HEALTH CARE EDUCATION/TRAINING PROGRAM

## 2020-01-01 PROCEDURE — 40000344 ZZHCL STATISTIC THAWING COMPONENT: Performed by: STUDENT IN AN ORGANIZED HEALTH CARE EDUCATION/TRAINING PROGRAM

## 2020-01-01 PROCEDURE — 93306 TTE W/DOPPLER COMPLETE: CPT | Mod: 26 | Performed by: INTERNAL MEDICINE

## 2020-01-01 PROCEDURE — 82803 BLOOD GASES ANY COMBINATION: CPT

## 2020-01-01 PROCEDURE — 92950 HEART/LUNG RESUSCITATION CPR: CPT | Mod: Z6 | Performed by: EMERGENCY MEDICINE

## 2020-01-01 PROCEDURE — 40000281 ZZH STATISTIC TRANSPORT TIME EA 15 MIN

## 2020-01-01 PROCEDURE — 25800030 ZZH RX IP 258 OP 636: Performed by: STUDENT IN AN ORGANIZED HEALTH CARE EDUCATION/TRAINING PROGRAM

## 2020-01-01 PROCEDURE — 3E033XZ INTRODUCTION OF VASOPRESSOR INTO PERIPHERAL VEIN, PERCUTANEOUS APPROACH: ICD-10-PCS | Performed by: EMERGENCY MEDICINE

## 2020-01-01 PROCEDURE — 85027 COMPLETE CBC AUTOMATED: CPT | Performed by: STUDENT IN AN ORGANIZED HEALTH CARE EDUCATION/TRAINING PROGRAM

## 2020-01-01 PROCEDURE — 27210794 ZZH OR GENERAL SUPPLY STERILE: Performed by: INTERNAL MEDICINE

## 2020-01-01 PROCEDURE — 31500 INSERT EMERGENCY AIRWAY: CPT | Performed by: EMERGENCY MEDICINE

## 2020-01-01 PROCEDURE — 40000497 ZZHCL STATISTIC SODIUM ED POCT

## 2020-01-01 PROCEDURE — 80320 DRUG SCREEN QUANTALCOHOLS: CPT | Performed by: STUDENT IN AN ORGANIZED HEALTH CARE EDUCATION/TRAINING PROGRAM

## 2020-01-01 PROCEDURE — 80365 DRUG SCREENING OXYCODONE: CPT | Performed by: STUDENT IN AN ORGANIZED HEALTH CARE EDUCATION/TRAINING PROGRAM

## 2020-01-01 PROCEDURE — C1874 STENT, COATED/COV W/DEL SYS: HCPCS | Performed by: INTERNAL MEDICINE

## 2020-01-01 PROCEDURE — 94002 VENT MGMT INPAT INIT DAY: CPT

## 2020-01-01 PROCEDURE — C1887 CATHETER, GUIDING: HCPCS | Performed by: INTERNAL MEDICINE

## 2020-01-01 PROCEDURE — 74176 CT ABD & PELVIS W/O CONTRAST: CPT

## 2020-01-01 PROCEDURE — 40000501 ZZHCL STATISTIC HEMATOCRIT ED POCT

## 2020-01-01 PROCEDURE — 87106 FUNGI IDENTIFICATION YEAST: CPT | Performed by: STUDENT IN AN ORGANIZED HEALTH CARE EDUCATION/TRAINING PROGRAM

## 2020-01-01 PROCEDURE — 99291 CRITICAL CARE FIRST HOUR: CPT | Mod: 25 | Performed by: EMERGENCY MEDICINE

## 2020-01-01 PROCEDURE — P9012 CRYOPRECIPITATE EACH UNIT: HCPCS | Performed by: NURSE PRACTITIONER

## 2020-01-01 PROCEDURE — 99152 MOD SED SAME PHYS/QHP 5/>YRS: CPT | Mod: 59 | Performed by: INTERNAL MEDICINE

## 2020-01-01 DEVICE — IMPLANTABLE DEVICE: Type: IMPLANTABLE DEVICE | Status: FUNCTIONAL

## 2020-01-01 DEVICE — STENT SYNERGY DRUG ELUTING 4.00X16MM  H7493926016400: Type: IMPLANTABLE DEVICE | Status: FUNCTIONAL

## 2020-01-01 DEVICE — STENT ORSIRO DES 2.50X22 401754: Type: IMPLANTABLE DEVICE | Status: FUNCTIONAL

## 2020-01-01 RX ORDER — MIDAZOLAM (PF) 1 MG/ML IN 0.9 % SODIUM CHLORIDE INTRAVENOUS SOLUTION
1-8 CONTINUOUS
Status: DISCONTINUED | OUTPATIENT
Start: 2020-01-01 | End: 2020-01-01 | Stop reason: HOSPADM

## 2020-01-01 RX ORDER — NALOXONE HYDROCHLORIDE 0.4 MG/ML
.1-.4 INJECTION, SOLUTION INTRAMUSCULAR; INTRAVENOUS; SUBCUTANEOUS
Status: DISCONTINUED | OUTPATIENT
Start: 2020-01-01 | End: 2020-01-01 | Stop reason: HOSPADM

## 2020-01-01 RX ORDER — CEFAZOLIN SODIUM 1 G/50ML
1250 SOLUTION INTRAVENOUS
COMMUNITY

## 2020-01-01 RX ORDER — MAGNESIUM SULFATE HEPTAHYDRATE 40 MG/ML
4 INJECTION, SOLUTION INTRAVENOUS EVERY 4 HOURS PRN
Status: DISCONTINUED | OUTPATIENT
Start: 2020-01-01 | End: 2020-01-01 | Stop reason: HOSPADM

## 2020-01-01 RX ORDER — ISOSORBIDE MONONITRATE 30 MG/1
30 TABLET, EXTENDED RELEASE ORAL DAILY
COMMUNITY

## 2020-01-01 RX ORDER — RIFAMPIN 300 MG/1
300 CAPSULE ORAL 2 TIMES DAILY
COMMUNITY
Start: 2020-01-01 | End: 2020-10-08

## 2020-01-01 RX ORDER — POLYETHYLENE GLYCOL 3350 17 G/17G
1 POWDER, FOR SOLUTION ORAL DAILY
COMMUNITY

## 2020-01-01 RX ORDER — NOREPINEPHRINE BITARTRATE 0.06 MG/ML
.03-.4 INJECTION, SOLUTION INTRAVENOUS CONTINUOUS
Status: DISCONTINUED | OUTPATIENT
Start: 2020-01-01 | End: 2020-01-01 | Stop reason: HOSPADM

## 2020-01-01 RX ORDER — HEPARIN SODIUM 1000 [USP'U]/ML
INJECTION, SOLUTION INTRAVENOUS; SUBCUTANEOUS
Status: DISCONTINUED | OUTPATIENT
Start: 2020-01-01 | End: 2020-01-01 | Stop reason: HOSPADM

## 2020-01-01 RX ORDER — SODIUM CHLORIDE 234 MG/ML
30 SOLUTION, CONCENTRATE INTRAVENOUS ONCE
Status: COMPLETED | OUTPATIENT
Start: 2020-01-01 | End: 2020-01-01

## 2020-01-01 RX ORDER — ASPIRIN 81 MG/1
81 TABLET, CHEWABLE ORAL DAILY
Status: DISCONTINUED | OUTPATIENT
Start: 2020-09-19 | End: 2020-01-01 | Stop reason: HOSPADM

## 2020-01-01 RX ORDER — HEPARIN SODIUM 10000 [USP'U]/100ML
10-50 INJECTION, SOLUTION INTRAVENOUS CONTINUOUS
Status: DISCONTINUED | OUTPATIENT
Start: 2020-01-01 | End: 2020-01-01

## 2020-01-01 RX ORDER — ATORVASTATIN CALCIUM 40 MG/1
40 TABLET, FILM COATED ORAL AT BEDTIME
COMMUNITY
Start: 2020-01-01

## 2020-01-01 RX ORDER — LIDOCAINE 40 MG/G
CREAM TOPICAL
Status: DISCONTINUED | OUTPATIENT
Start: 2020-01-01 | End: 2020-01-01 | Stop reason: HOSPADM

## 2020-01-01 RX ORDER — ACETAMINOPHEN 500 MG
1000 TABLET ORAL EVERY 6 HOURS PRN
COMMUNITY

## 2020-01-01 RX ORDER — PROPOFOL 10 MG/ML
5-75 INJECTION, EMULSION INTRAVENOUS CONTINUOUS
Status: DISCONTINUED | OUTPATIENT
Start: 2020-01-01 | End: 2020-01-01 | Stop reason: HOSPADM

## 2020-01-01 RX ORDER — METOPROLOL SUCCINATE 100 MG/1
100 TABLET, EXTENDED RELEASE ORAL DAILY
COMMUNITY
Start: 2020-01-01

## 2020-01-01 RX ORDER — EPINEPHRINE IN SOD CHLOR,ISO 1 MG/10 ML
0.1 SYRINGE (ML) INTRAVENOUS ONCE
Status: COMPLETED | OUTPATIENT
Start: 2020-01-01 | End: 2020-01-01

## 2020-01-01 RX ORDER — EPINEPHRINE IN SOD CHLOR,ISO 1 MG/10 ML
SYRINGE (ML) INTRAVENOUS
Status: COMPLETED
Start: 2020-01-01 | End: 2020-01-01

## 2020-01-01 RX ORDER — PIPERACILLIN SODIUM, TAZOBACTAM SODIUM 2; .25 G/10ML; G/10ML
2.25 INJECTION, POWDER, LYOPHILIZED, FOR SOLUTION INTRAVENOUS EVERY 6 HOURS
Status: DISCONTINUED | OUTPATIENT
Start: 2020-01-01 | End: 2020-01-01 | Stop reason: HOSPADM

## 2020-01-01 RX ORDER — ASPIRIN 81 MG/1
81 TABLET ORAL DAILY
Status: DISCONTINUED | OUTPATIENT
Start: 2020-01-01 | End: 2020-01-01

## 2020-01-01 RX ORDER — NOREPINEPHRINE BITARTRATE 0.06 MG/ML
INJECTION, SOLUTION INTRAVENOUS CONTINUOUS PRN
Status: COMPLETED | OUTPATIENT
Start: 2020-01-01 | End: 2020-01-01

## 2020-01-01 RX ORDER — FENTANYL CITRATE 50 UG/ML
INJECTION, SOLUTION INTRAMUSCULAR; INTRAVENOUS
Status: DISCONTINUED | OUTPATIENT
Start: 2020-01-01 | End: 2020-01-01 | Stop reason: HOSPADM

## 2020-01-01 RX ORDER — AMOXICILLIN 250 MG
1 CAPSULE ORAL 2 TIMES DAILY
COMMUNITY

## 2020-01-01 RX ORDER — MAGNESIUM SULFATE HEPTAHYDRATE 40 MG/ML
2 INJECTION, SOLUTION INTRAVENOUS DAILY PRN
Status: DISCONTINUED | OUTPATIENT
Start: 2020-01-01 | End: 2020-01-01 | Stop reason: HOSPADM

## 2020-01-01 RX ORDER — AMIODARONE HYDROCHLORIDE 200 MG/1
200 TABLET ORAL DAILY
COMMUNITY

## 2020-01-01 RX ORDER — RIFAMPIN 300 MG/1
300 CAPSULE ORAL 2 TIMES DAILY
Status: ON HOLD | COMMUNITY
End: 2020-01-01

## 2020-01-01 RX ORDER — HEPARIN SODIUM 200 [USP'U]/100ML
3 INJECTION, SOLUTION INTRAVENOUS CONTINUOUS
Status: DISCONTINUED | OUTPATIENT
Start: 2020-01-01 | End: 2020-01-01 | Stop reason: HOSPADM

## 2020-01-01 RX ORDER — VASOPRESSIN 20 U/ML
INJECTION PARENTERAL CONTINUOUS PRN
Status: DISCONTINUED | OUTPATIENT
Start: 2020-01-01 | End: 2020-01-01 | Stop reason: HOSPADM

## 2020-01-01 RX ORDER — OXYCODONE AND ACETAMINOPHEN 5; 325 MG/1; MG/1
1 TABLET ORAL EVERY 6 HOURS PRN
COMMUNITY
Start: 2020-01-01

## 2020-01-01 RX ORDER — ASPIRIN 81 MG/1
81 TABLET, CHEWABLE ORAL DAILY
Status: DISCONTINUED | OUTPATIENT
Start: 2020-01-01 | End: 2020-01-01

## 2020-01-01 RX ORDER — HEPARIN SODIUM (PORCINE) LOCK FLUSH IV SOLN 100 UNIT/ML 100 UNIT/ML
5-10 SOLUTION INTRAVENOUS EVERY 30 MIN PRN
Status: DISCONTINUED | OUTPATIENT
Start: 2020-01-01 | End: 2020-01-01 | Stop reason: HOSPADM

## 2020-01-01 RX ORDER — WARFARIN SODIUM 1 MG/1
1 TABLET ORAL DAILY
COMMUNITY
Start: 2020-01-01

## 2020-01-01 RX ORDER — 3% SODIUM CHLORIDE 3 G/100ML
INJECTION, SOLUTION INTRAVENOUS CONTINUOUS
Status: DISCONTINUED | OUTPATIENT
Start: 2020-01-01 | End: 2020-01-01 | Stop reason: HOSPADM

## 2020-01-01 RX ORDER — POTASSIUM CHLORIDE 29.8 MG/ML
20 INJECTION INTRAVENOUS
Status: DISCONTINUED | OUTPATIENT
Start: 2020-01-01 | End: 2020-01-01 | Stop reason: HOSPADM

## 2020-01-01 RX ORDER — DOXYCYCLINE HYCLATE 100 MG
100 TABLET ORAL 2 TIMES DAILY
COMMUNITY

## 2020-01-01 RX ORDER — NOREPINEPHRINE BITARTRATE 0.06 MG/ML
0.03-0.4 INJECTION, SOLUTION INTRAVENOUS CONTINUOUS
Status: DISCONTINUED | OUTPATIENT
Start: 2020-01-01 | End: 2020-01-01

## 2020-01-01 RX ORDER — HEPARIN SODIUM 10000 [USP'U]/100ML
10-50 INJECTION, SOLUTION INTRAVENOUS CONTINUOUS
Status: DISCONTINUED | OUTPATIENT
Start: 2020-01-01 | End: 2020-01-01 | Stop reason: HOSPADM

## 2020-01-01 RX ORDER — PANTOPRAZOLE SODIUM 40 MG/1
40 TABLET, DELAYED RELEASE ORAL AT BEDTIME
COMMUNITY
Start: 2020-01-01

## 2020-01-01 RX ORDER — POTASSIUM CHLORIDE 29.8 MG/ML
INJECTION INTRAVENOUS CONTINUOUS PRN
Status: COMPLETED | OUTPATIENT
Start: 2020-01-01 | End: 2020-01-01

## 2020-01-01 RX ORDER — ALBUTEROL SULFATE 90 UG/1
2 AEROSOL, METERED RESPIRATORY (INHALATION) EVERY 4 HOURS PRN
COMMUNITY
Start: 2020-01-01

## 2020-01-01 RX ORDER — MULTIPLE VITAMINS W/ MINERALS TAB 9MG-400MCG
1 TAB ORAL DAILY
COMMUNITY

## 2020-01-01 RX ORDER — NITROGLYCERIN 0.4 MG/1
0.4 TABLET SUBLINGUAL EVERY 5 MIN PRN
COMMUNITY

## 2020-01-01 RX ORDER — IOPAMIDOL 755 MG/ML
INJECTION, SOLUTION INTRAVASCULAR
Status: DISCONTINUED | OUTPATIENT
Start: 2020-01-01 | End: 2020-01-01 | Stop reason: HOSPADM

## 2020-01-01 RX ORDER — MAGNESIUM SULFATE HEPTAHYDRATE 500 MG/ML
INJECTION, SOLUTION INTRAMUSCULAR; INTRAVENOUS CONTINUOUS PRN
Status: COMPLETED | OUTPATIENT
Start: 2020-01-01 | End: 2020-01-01

## 2020-01-01 RX ORDER — MIDAZOLAM (PF) 1 MG/ML IN 0.9 % SODIUM CHLORIDE INTRAVENOUS SOLUTION
CONTINUOUS PRN
Status: COMPLETED | OUTPATIENT
Start: 2020-01-01 | End: 2020-01-01

## 2020-01-01 RX ORDER — ASPIRIN 81 MG/1
TABLET, CHEWABLE ORAL
Status: DISCONTINUED | OUTPATIENT
Start: 2020-01-01 | End: 2020-01-01 | Stop reason: HOSPADM

## 2020-01-01 RX ORDER — HEPARIN SODIUM (PORCINE) LOCK FLUSH IV SOLN 100 UNIT/ML 100 UNIT/ML
5-10 SOLUTION INTRAVENOUS EVERY 30 MIN PRN
Status: DISCONTINUED | OUTPATIENT
Start: 2020-01-01 | End: 2020-01-01

## 2020-01-01 RX ORDER — PIPERACILLIN SODIUM, TAZOBACTAM SODIUM 3; .375 G/15ML; G/15ML
3.38 INJECTION, POWDER, LYOPHILIZED, FOR SOLUTION INTRAVENOUS EVERY 6 HOURS
Status: DISCONTINUED | OUTPATIENT
Start: 2020-01-01 | End: 2020-01-01

## 2020-01-01 RX ORDER — HYDRALAZINE HYDROCHLORIDE 25 MG/1
25 TABLET, FILM COATED ORAL 2 TIMES DAILY
COMMUNITY

## 2020-01-01 RX ADMIN — RIFAMPIN 300 MG: 300 CAPSULE ORAL at 22:02

## 2020-01-01 RX ADMIN — EPINEPHRINE 0.1 MG: 0.1 INJECTION, SOLUTION ENDOTRACHEAL; INTRACARDIAC; INTRAVENOUS at 09:00

## 2020-01-01 RX ADMIN — SODIUM CHLORIDE: 3 INJECTION, SOLUTION INTRAVENOUS at 07:41

## 2020-01-01 RX ADMIN — PIPERACILLIN AND TAZOBACTAM 2.25 G: 2; .25 INJECTION, POWDER, FOR SOLUTION INTRAVENOUS at 10:19

## 2020-01-01 RX ADMIN — PROPOFOL 20 MCG/KG/MIN: 10 INJECTION, EMULSION INTRAVENOUS at 04:40

## 2020-01-01 RX ADMIN — Medication: at 03:43

## 2020-01-01 RX ADMIN — CANGRELOR 4 MCG/KG/MIN: 50 INJECTION, POWDER, LYOPHILIZED, FOR SOLUTION INTRAVENOUS at 00:37

## 2020-01-01 RX ADMIN — TICAGRELOR 90 MG: 90 TABLET ORAL at 13:09

## 2020-01-01 RX ADMIN — PIPERACILLIN AND TAZOBACTAM 2.25 G: 2; .25 INJECTION, POWDER, FOR SOLUTION INTRAVENOUS at 16:23

## 2020-01-01 RX ADMIN — VANCOMYCIN HYDROCHLORIDE 1750 MG: 10 INJECTION, POWDER, LYOPHILIZED, FOR SOLUTION INTRAVENOUS at 06:01

## 2020-01-01 RX ADMIN — Medication: at 02:09

## 2020-01-01 RX ADMIN — SODIUM CHLORIDE: 3 INJECTION, SOLUTION INTRAVENOUS at 00:20

## 2020-01-01 RX ADMIN — PIPERACILLIN SODIUM AND TAZOBACTAM SODIUM 3.38 G: 3; .375 INJECTION, POWDER, LYOPHILIZED, FOR SOLUTION INTRAVENOUS at 10:34

## 2020-01-01 RX ADMIN — Medication 8 MG/HR: at 04:39

## 2020-01-01 RX ADMIN — SODIUM CHLORIDE 30 ML: 234 INJECTION INTRAMUSCULAR; INTRAVENOUS; SUBCUTANEOUS at 21:10

## 2020-01-01 RX ADMIN — PROPOFOL 20 MCG/KG/MIN: 10 INJECTION, EMULSION INTRAVENOUS at 04:12

## 2020-01-01 RX ADMIN — Medication 100 MCG/HR: at 04:37

## 2020-01-01 RX ADMIN — PIPERACILLIN AND TAZOBACTAM 2.25 G: 2; .25 INJECTION, POWDER, FOR SOLUTION INTRAVENOUS at 22:12

## 2020-01-01 RX ADMIN — TICAGRELOR 90 MG: 90 TABLET ORAL at 01:20

## 2020-01-01 RX ADMIN — Medication 0.12 MCG/KG/MIN: at 11:40

## 2020-01-01 RX ADMIN — CALCIUM CHLORIDE 1 G: 100 INJECTION, SOLUTION INTRAVENOUS at 12:08

## 2020-01-01 RX ADMIN — PANTOPRAZOLE SODIUM 40 MG: 40 INJECTION, POWDER, FOR SOLUTION INTRAVENOUS at 07:42

## 2020-01-01 RX ADMIN — Medication 30 ML: at 22:56

## 2020-01-01 RX ADMIN — ASPIRIN 81 MG CHEWABLE TABLET 81 MG: 81 TABLET CHEWABLE at 07:42

## 2020-01-01 RX ADMIN — RIFAMPIN 300 MG: 300 CAPSULE ORAL at 07:43

## 2020-01-01 RX ADMIN — Medication 0.1 MG: at 09:00

## 2020-01-01 RX ADMIN — PHENYLEPHRINE HYDROCHLORIDE 0.5 MCG/KG/MIN: 10 INJECTION INTRAVENOUS at 09:11

## 2020-01-01 RX ADMIN — Medication: at 12:18

## 2020-01-01 RX ADMIN — SODIUM CHLORIDE: 3 INJECTION, SOLUTION INTRAVENOUS at 13:00

## 2020-01-01 RX ADMIN — Medication: at 10:18

## 2020-01-01 RX ADMIN — Medication 0.1 MCG/KG/MIN: at 04:39

## 2020-01-01 RX ADMIN — Medication 8 MG/HR: at 06:50

## 2020-01-01 RX ADMIN — Medication 8 MG/HR: at 18:35

## 2020-01-01 RX ADMIN — SODIUM BICARBONATE 50 MEQ/HR: 84 INJECTION, SOLUTION INTRAVENOUS at 13:40

## 2020-01-01 RX ADMIN — PANTOPRAZOLE SODIUM 40 MG: 40 INJECTION, POWDER, FOR SOLUTION INTRAVENOUS at 08:48

## 2020-01-01 RX ADMIN — HEPARIN SODIUM 3 ML/HR: 200 INJECTION, SOLUTION INTRAVENOUS at 09:21

## 2020-01-01 RX ADMIN — PROPOFOL 30 MCG/KG/MIN: 10 INJECTION, EMULSION INTRAVENOUS at 17:51

## 2020-01-01 RX ADMIN — VASOPRESSIN 2 UNITS/HR: 20 INJECTION INTRAVENOUS at 17:08

## 2020-01-01 RX ADMIN — TICAGRELOR 90 MG: 90 TABLET ORAL at 10:19

## 2020-01-01 RX ADMIN — Medication: at 17:54

## 2020-01-01 RX ADMIN — PIPERACILLIN SODIUM AND TAZOBACTAM SODIUM 3.38 G: 3; .375 INJECTION, POWDER, LYOPHILIZED, FOR SOLUTION INTRAVENOUS at 04:41

## 2020-01-01 RX ADMIN — PROPOFOL 40 MCG/KG/MIN: 10 INJECTION, EMULSION INTRAVENOUS at 13:05

## 2020-01-01 RX ADMIN — PIPERACILLIN AND TAZOBACTAM 2.25 G: 2; .25 INJECTION, POWDER, FOR SOLUTION INTRAVENOUS at 03:49

## 2020-01-01 RX ADMIN — PHYTONADIONE 10 MG: 10 INJECTION, EMULSION INTRAMUSCULAR; INTRAVENOUS; SUBCUTANEOUS at 11:05

## 2020-01-01 RX ADMIN — VASOPRESSIN 4 UNITS/HR: 20 INJECTION INTRAVENOUS at 08:55

## 2020-01-01 ASSESSMENT — MIFFLIN-ST. JEOR
SCORE: 1803.86
SCORE: 1745.17
SCORE: 1756.46
SCORE: 1749.43
SCORE: 1681.16
SCORE: 1721.13
SCORE: 1746.48
SCORE: 1760.54
SCORE: 1705.48
SCORE: 1736.04
SCORE: 1701.12
SCORE: 1744.21
SCORE: 1677.53
SCORE: 1735.59
SCORE: 1681.16
SCORE: 1675.72
SCORE: 1705.48
SCORE: 1770.97
SCORE: 1755.09
SCORE: 1730.6
SCORE: 1800.46
SCORE: 1731.51
SCORE: 1743.76
SCORE: 1679.8
SCORE: 1686.13
SCORE: 1730.15
SCORE: 1748.29
SCORE: 1767.34
SCORE: 1757.36
SCORE: 1758.13
SCORE: 1750.84

## 2020-01-01 ASSESSMENT — ACTIVITIES OF DAILY LIVING (ADL)
ADLS_ACUITY_SCORE: 23
ADLS_ACUITY_SCORE: 21
ADLS_ACUITY_SCORE: 23
ADLS_ACUITY_SCORE: 16
ADLS_ACUITY_SCORE: 21
ADLS_ACUITY_SCORE: 23
ADLS_ACUITY_SCORE: 23

## 2020-09-16 NOTE — Clinical Note
The first balloon was inserted into the circumflex and middle circumflex.Max pressure = 8 cristina. Total duration = 5 seconds.

## 2020-09-16 NOTE — Clinical Note
Stent deployed in the middle left anterior descending. Max pressure = 12 cristina. Total duration = 5 seconds.

## 2020-09-16 NOTE — Clinical Note
The first balloon was inserted into the left main.Max pressure = 20 cristina. Total duration = 5 seconds.

## 2020-09-16 NOTE — Clinical Note
Potential access sites were evaluated for patency using ultrasound.   The right internal jugular vein was selected. Access was obtained under with Sonosite and Fluoroscopic guidance using a standard 18 guage needle with direct visualization of needle entry.

## 2020-09-16 NOTE — Clinical Note
Arrhythmia Type: polymorphic VT.   Method of Cardioversion: defibrillated.   The arrhythmia was not terminated. Energy shock delivered: 200 joules.   Time shock delivered: 00:26.

## 2020-09-16 NOTE — Clinical Note
The first balloon was inserted into the left anterior descending and ostium left anterior descending.Max pressure = 12 cristina. Total duration = 6 seconds.     Max pressure = 14 cristina. Total duration = 5 seconds.    Balloon reinflated a second time: Max pressure = 14 cristina. Total duration = 5 seconds.  Balloon reinflated a third time: Max pressure = 16 cristina. Total duration = 5 seconds.

## 2020-09-16 NOTE — Clinical Note
The first balloon was inserted into the left anterior descending and proximal left anterior descending.Max pressure = 22 cristina. Total duration = 5 seconds.

## 2020-09-16 NOTE — Clinical Note
The first balloon was inserted into the left anterior descending and ostium left anterior descending.Max pressure = 12 cristina. Total duration = 5 seconds.

## 2020-09-16 NOTE — Clinical Note
The first balloon was inserted into the left anterior descending and proximal left anterior descending.Max pressure = 12 cristina. Total duration = 6 seconds.

## 2020-09-16 NOTE — Clinical Note
The first balloon was inserted into the left main.Max pressure = 12 cristina. Total duration = 5 seconds.

## 2020-09-16 NOTE — Clinical Note
Stent deployed in the proximal left anterior descending. Max pressure = 12 cristina. Total duration = 5 seconds.

## 2020-09-16 NOTE — Clinical Note
The first balloon was inserted into the circumflex and ostium circumflex.Max pressure = 12 cristina. Total duration = 5 seconds.

## 2020-09-17 PROBLEM — I46.9 CARDIAC ARREST (H): Status: ACTIVE | Noted: 2020-01-01

## 2020-09-17 NOTE — CONSULTS
[facility] PATIENT CONSULTATION NOTE  Consulted by: [facility]  Reason for consultation: Bleeding R femoral ECMO cannula sites    HPI:  Keanu Spence is a 70 year old year old male who has a PMH significant for 9/16/2020 s/p cardiac V fib arrest. 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 Had ROSC in cath lab.  Patient was cannulated for VA ECMO on the right femoral artery and vein today and subsequently developed intractable bleeding through which a FemoStop was placed.  Of note the patient has other sites of bleeding including right neck bleeding around the right IJ central catheter.  CT chest/abdomen/pelvis showed large neck hematoma around R internal jugular CVC, moderate hemoperitoneum. The patient has been worried receiving blood products including 7 units PRBCs, 5 of FFP, 1 pack platelets.  He is intubated on the ventilator on 3 vasopressors; levo, vaso, phenylephrine.  Currently in normal sinus rhythm.      Of note, the patient has a history of history of a L femoral endarterectomy performed at [facility] by Dr. Paez on 8/7/20 and developed subsequent wound infection requiring debridement and wound VAC placement on 8/13/2020.  He was found to have MRSA bacteremia for which he has been on vancomycin.  He was discharged on 9/15/20 with wound VAC on the L groin site.  He also has a history of a L fem-pop bypass graft in 2019.     Review Of Systems:   Unable to obtain secondary to patient's clinical status    PAST MEDICAL HISTORY:  History reviewed. No pertinent past medical history.    PAST SURGICAL HISTORY:  History reviewed. No pertinent surgical history.    FAMILY HISTORY:  History reviewed. No pertinent family history.    SOCIAL HISTORY:   Social History     Tobacco Use     Smoking status: Not on file   Substance Use Topics     Alcohol use: Not on file       FUNCTIONAL CAPACITY:      HOME MEDICATIONS:  Prior to  "Admission medications    Medication Sig Start Date End Date Taking? Authorizing Provider   amLODIPine (NORVASC) 10 MG tablet Take 10 mg by mouth daily.    Reported, Patient   ammonium lactate (AMLACTIN) 12 % cream Apply  topically 2 times daily. APPLY 2 TIMES DAILY OVER DRY SKIN    Reported, Patient   aspirin 81 MG tablet Take 1 tablet by mouth daily.    Reported, Patient   Cholecalciferol (VITAMIN D) 1000 UNITS capsule Take 1 capsule by mouth daily.    Reported, Patient   clopidogrel (PLAVIX) 75 MG tablet Take 1 tablet by mouth daily.    Reported, Patient   glipiZIDE (GLUCOTROL XL) 5 MG 24 hr tablet Take 5 mg by mouth daily.    Reported, Patient   glucose blood VI test strips strip by In Vitro route daily. USE AS DIRECTED    Reported, Patient   glucose blood VI test strips strip by In Vitro route 2 times daily. TEST TWICE DAILY    Reported, Patient   hydrochlorothiazide (HYDRODIURIL) 25 MG tablet Take 25 mg by mouth daily.    Reported, Patient   LANCETS MISC. USE AS DIRECTED    Reported, Patient   lisinopril (PRINIVIL,ZESTRIL) 20 MG tablet Take 40 mg by mouth daily.    Reported, Patient   metFORMIN (GLUCOPHAGE) 1000 MG tablet Take 1,000 mg by mouth 2 times daily (with meals).    Reported, Patient   metoprolol (LOPRESSOR) 100 MG tablet Take 100 mg by mouth 2 times daily. REFILL DENIED; NEEDS OFFICE VISIT FOR FURTHER REFILLS    Reported, Patient   simvastatin (ZOCOR) 20 MG tablet Take 1 tablet by mouth At Bedtime.    Reported, Patient   tadalafil (CIALIS) 10 MG tablet Take 1 tablet by mouth daily as needed. BEFORE ACTIVITY    Reported, Patient       VITAL SIGNS:  Pulse 76   Temp 94.6  F (34.8  C)   Resp 16   Ht 1.803 m (5' 11\")   Wt 89.9 kg (198 lb 3.1 oz)   SpO2 100%   BMI 27.64 kg/m      Intake/Output Summary (Last 24 hours) at 9/17/2020 0955  Last data filed at 9/17/2020 0830  Gross per 24 hour   Intake 4194.88 ml   Output 311 ml   Net 3883.88 ml       Labs:  ROUTINE IP LABS (Last four results)  BMP  Recent " Labs   Lab 09/17/20  0259 09/17/20 0200 09/17/20 0052 09/16/20  2357    139 136 139   POTASSIUM 3.9 4.2 4.3 3.8   CHLORIDE 108  --   --   --    ROMAN 7.9*  --   --   --    CO2 15*  --   --   --    BUN 53*  --   --   --    CR 2.59*  --   --   --    * 181* 167* 162*     CBC  Recent Labs   Lab 09/17/20  0557 09/17/20 0259 09/17/20 0200 09/17/20 0052   WBC 18.3* 16.3*  --   --    RBC 2.68* 2.89*  --   --    HGB 7.3* 7.2* 8.1* 8.7*   HCT 24.4* 24.0*  --   --    MCV 91 83  --   --    MCH 27.2 24.9*  --   --    MCHC 29.9* 30.0*  --   --    RDW 21.3* 21.8*  --   --     210  --   --      INR  Recent Labs       Lab 09/17/20 0259   INR 4.62*       PHYSICAL EXAM:  Constitutional: intubated, sedated  Cardiovascular: normal sinus rhythm  Respiratory: intubated  Psychiatric: mentation appears normal and affect normal/bright  Neck: no asymmetry  GI/Abdomen: +BS, abdomen soft, non-tender. No masses, no CVAT  MSK: able to move all extremities without weakness or ataxia  Extremities: no open lesions, bialteral lower extremities warm, R groin V-A ECMO sites with femo-stop and arterial oozing around fem stop, no signals, L groin w/ gauze inside L endartrectomy incision site (removed later and there appears to be fibrinous tissue below, L PT signal  Hematology: no bruising on visible skin            IMAGING:  CT chest/abd/pelvis:  IMPRESSION:   1. Post surgical changes of ECMO cannulation. Bilateral groin  hematomas and moderate volume hemoperitoneum.  2. Large right neck hematoma which extends into the superior  mediastinum.  3. Hyperdense appearing distal loops of small bowel are nonspecific  and may represent gastrointestinal blood or submucosal hemorrhage from  ischemia. However, this may be normally dense appearing intraluminal  contents.  4. Extensive groundglass opacities with peripheral sparing is  suggestive of pulmonary edema. There is superimposed ground glass  nodularity throughout the lungs which may  represent superimposed  aspiration as bronchial debris is present.  Alternatively an  underlying Coivd 19 infection is in the differential and can produce  rounded ground glass opacities.    5. Soft tissue defect within the left groin overlying and extending  nearly to the level of the left common femoral artery likely related  to patient's known recent left sided endarterectomy.  6. Minimally displaced bilateral rib fractures, likely sequela of CPR.  7. Cardiomegaly.    Patient Active Problem List   Diagnosis     Anemia     Essential hypertension, benign     Coronary atherosclerosis     Pure hypercholesterolemia     Other affections of shoulder region, not elsewhere classified     Peripheral vascular disease (H)     Impotence of organic origin     Tobacco use disorder     Diabetes mellitus, type 2 (H)     Perirectal abscess     Health Care Home     Cardiac arrest (H)       ASSESSMENT:  70 year old year old male who has a PMH significant for 9/16/2020 s/p cardiac V fib arrest. 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 Had ROSC in cath lab.  Patient was cannulated for VA ECMO on the right femoral artery and vein today and subsequently developed intractable bleeding around cannula sites which a FemoStop was placed.     History of a L femoral endarterectomy performed at [facility] by Dr. Paez on 8/7/20 and developed subsequent wound infection requiring debridement and wound VAC placement on 8/13/2020.  He was found to have MRSA bacteremia for which he has been on vancomycin.  He was discharged on 9/15/20 with wound VAC on the L groin site.     PLAN:  - [facility] surgery evaluation for central ECMO and decannulation of R VA ECMO sites  - Correct coagulopathy, resuscitation   - Wound vac management of L groin   - Continue Vanc for MRSA bacteremia from L groin  - Goals of care discussion    Discussed w/ Dr. Sinclair.     Alissa Brown  Vascular  Surgery Fellow  Pager: [phone]

## 2020-09-17 NOTE — PROGRESS NOTES
CVTS Brief Progress Note    Keanu Spence is a 70 year old year old male who has a PMH significant for 9/16/2020 s/p cardiac V fib arrest. 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 Had ROSC in cath lab.  Patient was cannulated for VA ECMO on the right femoral artery and vein today and subsequently developed intractable bleeding  After which a FemoStop was placed.      Called to bedside to evaluate with Dr. Cantrell. FemoStop removed. Bleeding appeared to be coming from distal perfusion catheter. Once the distal perfusion stop cock was turned off, bleeding was significantly reduced. There was also large hematoma in the right thigh. There did not appear to be active bleeding from the femoral arterial cannula and there was no hematoma in the right groin. The patient's right leg maintained stable saturations (48-53) after distal cannula turned off. Discussed with Dr. Lang and decision made to remove distal perfusion catheter. Two FemoStops applied, no further bleeding noted. Plan to wean FemoStops over the next hour, obtain US once removed.       Darlene Enamorado PA-C  Cardiothoracic Surgery  Pager [phone]

## 2020-09-17 NOTE — H&P
CARDIOLOGY-CSI HISTORY AND PHYSICAL NOTE  Keanu Spence MRN:[id number] YOB: 1950  Date of Admission:9/16/2020          Assessment and Plan:     Keanu Spence is a 70 year old male who was admitted on 9/16/2020 s/p cardiac V fib arrest. 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 Had ROSC in cath lab. CT chest/abdomen/pelvis showed large neck hematoma, moderate hemoperitoneum.     Neurology: Metabolic encephalopathy   Concern for hypoxic brain injury  Concern for seizure  Intubated, sedated   Cooled to 35 degrees - plan for 24 hours. Will cool with ECMO , no thermoguard  - continue versed 8, fentanyl 100 ,propofol 20   - RASS goal -4 to -5   - CTH on 9/17 pending results   - VEEG  - neurocritical care consult  - palliiative care consult       Cardiovascular / Hemodynamics: Refractory VF arrest.  SIRI to LM to LCx and LAD.  Peripheral V-A ECMO inserted for cardiac arrest. LA 11.7  VA ECMO ; flow , sweep,   Acute on chronic systolic heart failure  Cardiogenic shock  Atrial fibrillation   TTE: pending   EKG: pending   --wean pressors/inotropes as able, now on norepi 0.1   --continue ASA 81mg and ticagrelor 90mg BID  --hold temp at 35  --ACT goal 180-200  --hold lipitor for now given likely hepatic injury during arrest  --hold ACE/ARB for now given likely reduced renal fxn after arrest  --holding beta blocker given shock  - Received Mg, amio 450 mg    Pulmonary: Acute hypoxic respiratory failure   Vent Settings: 40%/12/10/300  ETT in the .  Now weaning vent requirements.  CXR: Lines in stable position.  --wean vent as able  --daily CXR  Chest CT  --Q2h ABGs for now  --consider scheduled duonebs if signs of lung dz, currently PRN    - COVID test sent    GI and Nutrition: No known medical hx.   --monitor BID LFTs  --NPO while cooled - nutrition consult pending feeding tube placement once he is warmed   --bowel regimen - on  hold for now due to hypothermia  --GI Prophylaxis: PPI    Renal, Fluid and Electrolytes: Acute on chronic kidney injury   Mixed metabolic and respiratory acidosis   Cr 2.7. UOP  --monitor urine output  --maintain K>3 and Mg>2    Infectious Disease: L femoral abscess s/p debridement   MRSA bacteremia   - Vancomycin will be needed for at least 6 weeks per OSH   --Blood cultures collected.   --vancomycin/zosyn x5 days for ECMO  --daily blood cultures   Hematology and Oncology: Anemia - acute blood loss anemia , hemoperitoneum and groin hematomas  Coagulopathy   TEG with R - FFP to be given , TEG not consistent with DIC   Received 2 U pRBCs  Reiving heparin for ECMO and ASA/ticagrelor for SIRI.   --cryo PRN fibrinogen < 200; FFP for INR >2  --Transfuse for Hgb<10  --heparin gtt for ECMO with ACT goal 140-160  --US LE w/ arterial duplex per ECMO protocol   --DVT PPX: Heparin as above   Endocrinology: Diabetes II  BG elevated.  --insulin gtt  --f/u HgbA1c    Lines: R femoral arterial and venous ECMO cannulae September 16, 2020  L femoral arterial line September 16, 2020  R radial arterial line September 16, 2020  ETT September 16, 2020  Fontaine catheter September 16, 2020  OG tube September 16, 2020  Restraint: needed    Current lines are required for patient management       Family update by me today: Yes     Code Status: FULL    The pt was discussed and evaluated with Dr. Vega. No att. providers found, attending physician, who agrees with the assessment and plan above.     Bonnie Alves MD, MD  [facility] Heart  Cardiology         HISTORY OF PRESENT ILLNESS:  Keanu Spence is a 70 year old with PMH of CAD s/p CAB (LIMA to LAD, SVG to OM, SVG to D, SVG to RVA) and known occluded venous grafts in 2016 , multiple PCI last one in 2016 with PCI of LM to Lcx and PCI of OM1, ICM last EF 20% in 9/2020, PVD with Left lower extremity bypass in 2002 and 2019 also with endarterectomy (8/7/20) c/w  potential MRSA infection s/p I&D and wound vac placement , NIKKI, COPD, chronic syst HF, dyslipidemia, CKD3, ICD, and history of SSS with parox Afib/bradycardia and recent pacer placement presents as a cardiac arrest.    Recent hospital stay for L artery endarterectomy and then complication with infection , abscess , s/p debridement, MRSA grew from blood. COLTEN negative for bacteremia. Plan was for IV vancomycin for 6 weeks: Tentative end date was 10/15/2020; Along with oral rifampin. He also had acute on chronic HF exacerbation, afib with RVR s/p DCCV and DANITA (cr 2.99).     His wife (Shefali ) heard the pt screaming this evening at home , she checked on him and found him agonally breathing. Wife called EMS and attempted CPR. EMS on scene at 9:30 (call placed at 9:15). Initial rhythm was VF. Patient had ROSC after 1 shock however he returned back to VT/Vfib , he received epi x3 , amio 450    Shocked externally with 200 J in cath lab 10:57 pm, received multiple shocks from ICD.     Had resumption of NSR in cath lab     PAST MEDICAL HISTORY:    Type 2 diabetes mellitus, without long-term current use of insulin (H)     Essential hypertension     Coronary Artery Disease     Peripheral vascular disease (H)     Acute on chronic systolic congestive heart failure (H)     NIKKI (obstructive sleep apnea)     COPD (chronic obstructive pulmonary disease) (H)     Atypical atrial flutter (H)     Chronic anticoagulation     Heart failure with reduced ejection fraction, NYHA class II (H)     Sleep disorder     Anemia     Sinus node dysfunction (H)     Dyslipidemia, goal LDL below 70     Ischemic cardiomyopathy     Chronic kidney disease, stage III (moderate) (H)     ICD (implantable cardioverter-defibrillator) in place, dual chamber     Rest pain of left lower extremity concurrent with and due to atherosclerosis of bypass graft (H)     Physical deconditioning     Paroxysmal atrial fibrillation with RVR (H)     Ischemia of  left lower extremity     Abscess after procedure     Abscess     Postprocedural seroma of skin and subcutaneous tissue following other procedure     Abscess of left groin     Unstable angina (H)     MRSA bacteremia   Past Medical History:   Diagnosis Date     Anemia     Atypical atrial flutter (H)     Cardiac pacemaker in situ 9/25/2019     Chronic anticoagulation 2/21/2019     Chronic renal failure, stage 3 (moderate) (H)     Chronic systolic heart failure (H)     COPD (chronic obstructive pulmonary disease) (H)     Coronary artery disease     Diabetes mellitus (H)     Dyslipidemia, goal LDL below 70     Essential hypertension     ICD (implantable cardioverter-defibrillator), dual, in situ     Ischemic cardiomyopathy     Peripheral vascular disease (H)     Screening for prostate cancer 5/7/2019     Sleep apnea   no cpap       MEDICATIONS:  PTA Meds  Prior to Admission medications    Medication Sig Last Dose Taking? Auth Provider   amLODIPine (NORVASC) 10 MG tablet Take 10 mg by mouth daily.   Reported, Patient   ammonium lactate (AMLACTIN) 12 % cream Apply  topically 2 times daily. APPLY 2 TIMES DAILY OVER DRY SKIN   Reported, Patient   aspirin 81 MG tablet Take 1 tablet by mouth daily.   Reported, Patient   Cholecalciferol (VITAMIN D) 1000 UNITS capsule Take 1 capsule by mouth daily.   Reported, Patient   clopidogrel (PLAVIX) 75 MG tablet Take 1 tablet by mouth daily.   Reported, Patient   glipiZIDE (GLUCOTROL XL) 5 MG 24 hr tablet Take 5 mg by mouth daily.   Reported, Patient   glucose blood VI test strips strip by In Vitro route daily. USE AS DIRECTED   Reported, Patient   glucose blood VI test strips strip by In Vitro route 2 times daily. TEST TWICE DAILY   Reported, Patient   hydrochlorothiazide (HYDRODIURIL) 25 MG tablet Take 25 mg by mouth daily.   Reported, Patient   LANCETS MISC. USE AS DIRECTED   Reported, Patient   lisinopril (PRINIVIL,ZESTRIL) 20 MG tablet Take 40 mg by mouth daily.   Reported, Patient    metFORMIN (GLUCOPHAGE) 1000 MG tablet Take 1,000 mg by mouth 2 times daily (with meals).   Reported, Patient   metoprolol (LOPRESSOR) 100 MG tablet Take 100 mg by mouth 2 times daily. REFILL DENIED; NEEDS OFFICE VISIT FOR FURTHER REFILLS   Reported, Patient   simvastatin (ZOCOR) 20 MG tablet Take 1 tablet by mouth At Bedtime.   Reported, Patient   tadalafil (CIALIS) 10 MG tablet Take 1 tablet by mouth daily as needed. BEFORE ACTIVITY   Reported, Patient      Current Meds    Infusion Meds    EPINEPHrine IV infusion ADULT 0.1 mcg/kg/min (09/16/20 9403)       ALLERGIES:    No Known Allergies    REVIEW OF SYSTEMS:  A 10 point review of systems was negative except as noted above.    SOCIAL HISTORY:   Social History     Socioeconomic History     Marital status: Not on file     Spouse name: Not on file     Number of children: Not on file     Years of education: Not on file     Highest education level: Not on file   Occupational History     Not on file   Social Needs     Financial resource strain: Not on file     Food insecurity     Worry: Not on file     Inability: Not on file     Transportation needs     Medical: Not on file     Non-medical: Not on file   Tobacco Use     Smoking status: Not on file   Substance and Sexual Activity     Alcohol use: Not on file     Drug use: Not on file     Sexual activity: Not on file   Lifestyle     Physical activity     Days per week: Not on file     Minutes per session: Not on file     Stress: Not on file   Relationships     Social connections     Talks on phone: Not on file     Gets together: Not on file     Attends [employer] service: Not on file     Active member of club or organization: Not on file     Attends meetings of clubs or organizations: Not on file     Relationship status: Not on file     Intimate partner violence     Fear of current or ex partner: Not on file     Emotionally abused: Not on file     Physically abused: Not on file     Forced sexual activity: Not on file    Other Topics Concern     Not on file   Social History Narrative     Not on file         FAMILY MEDICAL HISTORY:   History reviewed. No pertinent family history.      PHYSICAL EXAM:   No data recorded      Pulse  Av  Min: 179  Max: 179 SpO2  Av %  Min: 81 %  Max: 81 %       Pulse 179   SpO2 (!) 81%       GENERAL APPEARANCE: intubated, sedated   Head: NC/AT  EYES: PERRL, pupils equal  HENT: Mouth without ulcers or lesions  NECK: Supple, no goiter  Pulmonary: mechanical sounds   CV: Regular rhythm, normal rate, no rub.  GI: Soft, nontender, normal bowel sounds, no HSM   MS: No evidence of inflammation in joints, no muscle tenderness  SKIN: No rash, warm, dry, R femoral ECMO cannulae   NEURO: Mentation intact and speech normal.     LABS:   CMP  Recent Labs   Lab 20    142   POTASSIUM 4.2 4.1   * 201*   GFRESTIMATED  --  24*   GFRESTBLACK  --  28*     CBC  Recent Labs   Lab 20   HGB 8.5* 10.9*     INRNo lab results found in last 7 days.  ABG  Recent Labs   Lab 20   PH 6.88*   PCO2 83*   PO2 73*   HCO3 16*   O2PER 100.0        IMAGING:    MRI stress 2016    1.  Lexiscan stress cardiac MRI is positive for inducible myocardial   ischemia diffusely indicating multiple vessel disease.   2.  Lexiscan stress ECG is negative for inducible myocardial ischemia.  3.  Previous nontransmural myocardial infarction in anterior wall, distal   anteroseptal wall, apex, lateral wall from mid to distal segment and   inferior and inferolateral walls. Endomyocardial scar is about 40%-50% of   wall thickness indicating moderately reduced viability.  4.  Moderately enlarged left ventricular size with several hypokinesis in   anterior wall and inferior/inferolateral and lateral walls. The calculated   left ventricular ejection fraction is 28%.  5.  Normal right ventricular size and function.  6.  Mild mitral and tricuspid valve regurgitation.    Coronary  angiogram 2016    Recent cath showing:   LM 80% distal stenosis   LAD occluded mid; patent LIMA to LAD   LCx 70-80% prox OM1, occluded mid OM1 with  and filling of   distal OM1 via collaterals   RCA known to be occluded     Occluded SVGs to OM, Diag and RCA   Patent ALLRED to LAD     Underwent successful PCI of OM1  with antegrade wire   escalation, followed by laser atherectomy, PTCA and 2.5   Synergy SIRI, post dilated to 2.75mm at high cristina.   Prox OM1 lesion treated successfully with 2.75 Synergy SIRI   Also underwent successful PCI of distal LM into LCx with 3.5   Synergy SIRI, post dilated to 4mm     Lesion seen in distal OM after PCI of prox ; attempted to   stent this lesion but unable to cross mid OM stents; infact   the stent to be delivered sheared off the balloon at the LM;   successfully retrieved with a Goose neck snare; followed by   PTCA of distal OM with 2.5 balloon.     0% residuals, JAKOB 3 flow post, 0-1 pre     Recommendations   DAPT x 1 yr   Risk factor control     Signatures     Electronically signed by TANNER CHILEL MD   (Interventional Physician) on 10/11/2016 at 01:01 PM    Angiographic Findings     Diagnostic Findings     Cardiac Arteries and Lesion Findings    LMCA:      Lesion on LMCA: Distal subsection.80% [id number] mm length . Pre    procedure JAKOB III flow was noted. A good run off was present.The lesion    was diagnosed as a high risk lesion.Bifurcation lesion.      Devices used      - [employer] Synergy SIRI 3.5x24mm. 1 inflation(s) to a max    pressure of: 15 cristina.      - [employer] Emerge 4.0x 12mm. 6 inflation(s) to a max pressure    of: 15 cristina.    LCx:      Lesion on Mid CX:      Devices used      - [employer] Emerge 3.0 x 12mm. 2 inflation(s) to a max pressure    of: 16 cristina.      Lesion on Dist CX:      Devices used      - [employer] Emerge 3.0 x 12mm. 1 inflation(s) to a max pressure    of: 5 cristina.      - [employer] Synergy SIRI 2.5x28mm. 1  inflation(s) to a max    pressure of: 13 cristina.      Lesion on 1st Ob Ofe:      Devices used      - [employer] Emerge 2.5 x 15mm. 3 inflation(s) to a max pressure    of: 17 cristnia.      - [employer] NC Emerge 2.75 x 15mm. 1 inflation(s) to a max    pressure of: 21 cristina.      - [employer] NC Emerge 2.75 x 15mm. 1 inflation(s) to a max    pressure of: 21 cristina.      - [employer] NC Emerge 2.75 x 15mm. 1 inflation(s) to a max    pressure of: 15 cristina.      - [employer] Synergy SIRI 2.44j69rg. 3 inflation(s) to a max    pressure of: 15 cristina.      Lesion on 1st Ob Ofe: Proximal subsection.100% cllzsnbc31 mm length .    Pre procedure JAKOB 0 flow was noted. A good run off was present.The    lesion was diagnosed as a high risk lesion.    Interventional procedure  Cardiac lesions  LMCA:      Lesion on LMCA: Distal subsection.80% stenosis 13 mm length reduced to    0%. Pre procedure JAKOB III flow was noted. Post Procedure JAKOB III flow    was present. A good run off was present.The lesion was diagnosed as a    high risk lesion.The guidewire cross was successful.Bifurcation lesion.      Devices used      - [employer] Synergy SIRI 3.5x24mm. 1 inflation(s) to a max    pressure of: 15 cristina.      - [employer] Emerge 4.0x 12mm. 6 inflation(s) to a max pressure    of: 15 cristian.    LCx:      Lesion on Mid CX:      Devices used      - [employer] Emerge 3.0 x 12mm. 2 inflation(s) to a max pressure    of: 16 cristina.      Lesion on Dist CX:      Devices used      - [employer] Emerge 3.0 x 12mm. 1 inflation(s) to a max pressure    of: 5 cristina.      - [employer] Synergy SIRI 2.5x28mm. 1 inflation(s) to a max    pressure of: 13 cristina.      Lesion on 1st Ob Ofe:      Devices used      - [employer] Emerge 2.5 x 15mm. 3 inflation(s) to a max pressure    of: 17 cristina.      - [employer] NC Emerge 2.75 x 15mm. 1 inflation(s) to a max    pressure of: 21 cristina.      - [employer] NC  Emerge 2.75 x 15mm. 1 inflation(s) to a max    pressure of: 21 cristina.      - [employer] NC Emerge 2.75 x 15mm. 1 inflation(s) to a max    pressure of: 15 cristina.      - [employer] Synergy SIRI 2.16k63dl. 3 inflation(s) to a max    pressure of: 15 cristina.      Lesion on 1st Ob Ofe: Proximal subsection.100% stenosis 29 mm length    reduced to 0%. Pre procedure JAKOB 0 flow was noted. Post Procedure JAKOB    III flow was present. A good run off was present.The lesion was diagnosed    as a high risk lesion.The guidewire cross was successful.    Procedure Data  Procedure Date  Date: 10/11/2016Start: 07:44 AMEnd: 10:19 AM    The procedure was explained in detail to the patient. Risks, complications  and alternative treatments were reviewed. Written consent was obtained.    Interventional Cath Status: Elective    Indications: CAD.    Entry Locations    - Percutaneous access was performed through the Right Femoral artery. A 8      Fr sheath was inserted. Hemostasis was successfully obtained using      Angio-Seal ([employer]). Entry Comments: Long sheath exchanged for      the short sheath.      - Percutaneous access was performed through the Right Femoral vein. A 6 Fr      sheath was inserted. Hemostasis was successfully obtained using      Angio-Seal ([employer]).    Procedure Medications    - Fentanyl I.V. /per verbal M.D. order 25 mcg.      - Midazolam (Versed) I.V. /per verbal M.D. order 0.5 mg.      - Fentanyl I.V. /per verbal M.D. order 25 mcg.      - Midazolam (Versed) I.V. /per verbal M.D. order 0.5 mg.      - Oxygen NC /per verbal M.D. order 3 liters/min.      - Fentanyl I.V. /per verbal M.D. order 25 mcg.      - Heparin I.V. /per verbal M.D. order 7500 units.      - Midazolam (Versed) I.V. /per verbal M.D. order 0.5 mg.      - Nitroglycerin  mcg.      - Fentanyl I.V. /per verbal M.D. order 25 mcg.      - Heparin I.V. /per verbal M.D. order 2000 units.      - Midazolam (Versed) I.V. /per  verbal M.D. order 0.5 mg.      - Nitroglycerin  mcg.      - Clopidogrel Bisulfate (Plavix) P.O. 300 mg.    Diagnostic Catheters    - [employer] G-8fr EBU3.5was used for:Left coronary angiography.      - [employer]onic G-8fr EBU3.5was used for:Left coronary angiography.      - [employer]onic G-8fr EBU 3.5 90cwas used for:Left coronary angiography.    Contrast Material    - Visipaque 320 mgl/ml170 ml    Fluoroscopy Time: Diagnostic: 35:48 minutes. Total: 35:48 minutes.    Medical History    Allergies    - No known allergies.    Admission Data  Admission Date: 10/11/2016 Admission Time: 06:06 AM    Coronary Tree     Dominance: Right    Hemodynamics     Condition: Rest     O2 Consumption: Estimated: 264.30Heart Rate: 79 bpm    Pressures (mmHg)  +-----+--------------------------------------------------------------------+  !Site !Pressure                                                            !  +-----+--------------------------------------------------------------------+  !AO   !114/56 (76)                                                         !  +-----+--------------------------------------------------------------------+    Shunts    Oxygen Values     O2 Capacity 174.08 O2 Consumption 264.3    Discharge Data    Hospital Status: Outpatient   Other Result Information   Interface, Rad Results In - 10/11/2016  1:01 PM CDT  Cardiac Interventional Report     Demographics     Patient Name CLARISA ANGUIANO   Referring Physician    TANNER CHILEL MD     MRN #        [id number]          Diagnostic Physician   TANNER CHILEL MD     Account #    [id number]           Interventional         TANNER CHILEL MD                                   Physician     [email or web address]        1950         Report Status:     Date of      10/11/2016 07:44   Study        AM    Procedure     Procedure Type     PCI procedure:Drug Eluting Coronary Stent , ACT, Closure   Device, Chronic Total Occlusion, Laser     Conclusions     Procedure Summary   Known  CAD s/p multiple prior PCIs as well as CABG, presenting   with increasing SOB.     Recent cath showing:     LM 80% distal stenosis   LAD occluded mid; patent LIMA to LAD   LCx 70-80% prox OM1, occluded mid OM1 with  and filling of   distal OM1 via collaterals   RCA known to be occluded     Occluded SVGs to OM, Diag and RCA   Patent ALLRED to LAD     Underwent successful PCI of OM1  with antegrade wire   escalation, followed by laser atherectomy, PTCA and 2.5   Synergy SIRI, post dilated to 2.75mm at high cristina.   Prox OM1 lesion treated successfully with 2.75 Synergy SIRI   Also underwent successful PCI of distal LM into LCx with 3.5   Synergy SIRI, post dilated to 4mm     Lesion seen in distal OM after PCI of prox ; attempted to   stent this lesion but unable to cross mid OM stents; infact   the stent to be delivered sheared off the balloon at the LM;   successfully retrieved with a Goose neck snare; followed by   PTCA of distal OM with 2.5 balloon.     0% residuals, JAKOB 3 flow post, 0-1 pre     Recommendations   DAPT x 1 yr   Risk factor control     Signatures     Electronically signed by TANNER CHILEL MD   (Interventional Physician) on 10/11/2016 at 01:01 PM    Angiographic Findings     Diagnostic Findings     Cardiac Arteries and Lesion Findings    LMCA:      Lesion on LMCA: Distal subsection.80% [id number] mm length . Pre    procedure JAKOB III flow was noted. A good run off was present.The lesion    was diagnosed as a high risk lesion.Bifurcation lesion.      Devices used      - [employer] Synergy SIRI 3.5x24mm. 1 inflation(s) to a max    pressure of: 15 cristina.      - [employer] Emerge 4.0x 12mm. 6 inflation(s) to a max pressure    of: 15 cristina.    LCx:      Lesion on Mid CX:      Devices used      - [employer] Emerge 3.0 x 12mm. 2 inflation(s) to a max pressure    of: 16 cristina.      Lesion on Dist CX:      Devices used      - [employer] Emerge 3.0 x 12mm. 1 inflation(s) to a max  pressure    of: 5 cristina.      - [employer] Synergy SIRI 2.5x28mm. 1 inflation(s) to a max    pressure of: 13 cristina.      Lesion on 1st Ob Ofe:      Devices used      - [employer] Emerge 2.5 x 15mm. 3 inflation(s) to a max pressure    of: 17 cristina.      - [employer] NC Emerge 2.75 x 15mm. 1 inflation(s) to a max    pressure of: 21 cristina.      - [employer] NC Emerge 2.75 x 15mm. 1 inflation(s) to a max    pressure of: 21 cristina.      - [employer] NC Emerge 2.75 x 15mm. 1 inflation(s) to a max    pressure of: 15 cristina.      - [employer] Synergy SIRI 2.45y46hp. 3 inflation(s) to a max    pressure of: 15 cristina.      Lesion on 1st Ob Ofe: Proximal subsection.100% [id number] mm length .    Pre procedure JAKOB 0 flow was noted. A good run off was present.The    lesion was diagnosed as a high risk lesion.    Interventional procedure  Cardiac lesions  LMCA:      Lesion on LMCA: Distal subsection.80% stenosis 13 mm length reduced to    0%. Pre procedure JAKOB III flow was noted. Post Procedure JAKOB III flow    was present. A good run off was present.The lesion was diagnosed as a    high risk lesion.The guidewire cross was successful.Bifurcation lesion.      Devices used      - [employer] Synergy SIRI 3.5x24mm. 1 inflation(s) to a max    pressure of: 15 cristina.      - [employer] Emerge 4.0x 12mm. 6 inflation(s) to a max pressure    of: 15 cristina.    LCx:      Lesion on Mid CX:      Devices used      - [employer] Emerge 3.0 x 12mm. 2 inflation(s) to a max pressure    of: 16 cristina.      Lesion on Dist CX:      Devices used      - [employer] Emerge 3.0 x 12mm. 1 inflation(s) to a max pressure    of: 5 cristina.      - [employer] Synergy SIRI 2.5x28mm. 1 inflation(s) to a max    pressure of: 13 cristina.      Lesion on 1st Ob Ofe:      Devices used      - [employer] Emerge 2.5 x 15mm. 3 inflation(s) to a max pressure    of: 17 cristina.      - [employer] NC Emerge 2.75 x 15mm. 1  inflation(s) to a max    pressure of: 21 cristina.      - [employer] [employer] NC Emerge 2.75 x 15mm. 1 inflation(s) to a max    pressure of: 21 cristina.      - [employer] NC Emerge 2.75 x 15mm. 1 inflation(s) to a max    pressure of: 15 cristina.      - [employer] Synergy SIRI 2.87i01po. 3 inflation(s) to a max    pressure of: 15 cristina.      Lesion on 1st Ob Ofe: Proximal subsection.100% stenosis 29 mm length    reduced to 0%. Pre procedure JAKOB 0 flow was noted. Post Procedure JAKOB    III flow was present. A good run off was present.The lesion was diagnosed    as a high risk lesion.The guidewire cross was successful.    Procedure Data  Procedure Date  Date: 10/11/2016Start: 07:44 AMEnd: 10:19 AM    The procedure was explained in detail to the patient. Risks, complications  and alternative treatments were reviewed. Written consent was obtained.    Interventional Cath Status: Elective    Indications: CAD.    Entry Locations    - Percutaneous access was performed through the Right Femoral artery. A 8      Fr sheath was inserted. Hemostasis was successfully obtained using      Angio-Seal ([employer]). Entry Comments: Long sheath exchanged for      the short sheath.      - Percutaneous access was performed through the Right Femoral vein. A 6 Fr      sheath was inserted. Hemostasis was successfully obtained using      Angio-Seal ([employer]).    Procedure Medications    - Fentanyl I.V. /per verbal M.D. order 25 mcg.      - Midazolam (Versed) I.V. /per verbal M.D. order 0.5 mg.      - Fentanyl I.V. /per verbal M.D. order 25 mcg.      - Midazolam (Versed) I.V. /per verbal M.D. order 0.5 mg.      - Oxygen NC /per verbal M.D. order 3 liters/min.      - Fentanyl I.V. /per verbal M.D. order 25 mcg.      - Heparin I.V. /per verbal M.D. order 7500 units.      - Midazolam (Versed) I.V. /per verbal M.D. order 0.5 mg.      - Nitroglycerin  mcg.      - Fentanyl I.V. /per verbal M.D. order 25 mcg.      - Heparin I.V.  /per verbal M.D. order 2000 units.      - Midazolam (Versed) I.V. /per verbal M.D. order 0.5 mg.      - Nitroglycerin  mcg.      - Clopidogrel Bisulfate (Plavix) P.O. 300 mg.    Diagnostic Catheters    - AMedtronic G-8fr EBU3.5was used for:Left coronary angiography.      - AMedtronic G-8fr EBU3.5was used for:Left coronary angiography.      - AMedtronic G-8fr EBU 3.5 90cwas used for:Left coronary angiography.    Contrast Material    - Visipaque 320 mgl/ml170 ml    Fluoroscopy Time: Diagnostic: 35:48 minutes. Total: 35:48 minutes.    Medical History    Allergies    - No known allergies.    Admission Data  Admission Date: 10/11/2016 Admission Time: 06:06 AM    Coronary Tree     Dominance: Right    Hemodynamics     Condition: Rest     O2 Consumption: Estimated: 264.30Heart Rate: 79 bpm    Pressures (mmHg)  +-----+--------------------------------------------------------------------+  !Site !Pressure                                                            !  +-----+--------------------------------------------------------------------+  !AO   !114/56 (76)                                                         !  +-----+--------------------------------------------------------------------+    Shunts    Oxygen Values     O2 Capacity 174.08 O2 Consumption 264.3    Discharge Data    Hospital Status: Outpatient   Status Results Details     Encounter Summary       Coronary angiogram 9/17/2020      Prox RCA to Mid RCA lesion is 100% stenosed.    Origin to Prox Graft lesion is 100% stenosed.    1st Mrg lesion is 100% stenosed.    Mid LM to Dist LM lesion is 50% stenosed.    Ost Cx to Prox Cx lesion is 80% stenosed.    Mid Cx to Dist Cx lesion is 80% stenosed.    Ost LAD to Prox LAD lesion is 70% stenosed.    Mid LAD lesion is 100% stenosed.    1st Diag lesion is 80% stenosed.    Origin to Prox Graft lesion is 100% stenosed.    Origin to Prox Graft lesion is 100% stenosed.

## 2020-09-17 NOTE — PROGRESS NOTES
"CLINICAL NUTRITION SERVICES - ASSESSMENT NOTE     Nutrition Prescription    RECOMMENDATIONS FOR MDs/PROVIDERS TO ORDER:  If pt continues to be intubated, RD to recommend EN via OGT. PLease consult RD with \"RD to assess and order tube feeds\"    Malnutrition Status:    None     Recommendations already ordered by Registered Dietitian (RD):  None at this time    Future/Additional Recommendations:  Monitor need for enteral nutrition, RD to recommend: TwoCal HN @ 50 mL/hr (1200 mL/day) to provide 2400 kcals, 101 g PRO, 840 mL H2O, 263 g CHO and 6 g Fiber daily.  + 4 pkts Prosource: 2560 kals (28 kcal/kg/day), 145 g pro (1.6 g/kg/day)    Monitor propofol use      REASON FOR ASSESSMENT  Keanu Spence is a/an 70 year old male assessed by the dietitian for NPO/Clear Liquids    NUTRITION HISTORY  Unable to obtain, pt intubated/ sedated    CURRENT NUTRITION ORDERS  Diet: NPO    LABS  Labs reviewed, Troponin: > 200, lactic acid: 9 (H), BUN: 48 (H), Cr: 2.84 (H), GRF: 22 (L)    MEDICATIONS  Medications reviewed, norepinephrine, vasopressin, propofol    ANTHROPOMETRICS  Height: 180.3 cm (5' 11\")  Most Recent Weight: 89.9 kg (198 lb 3.1 oz)    IBW: 78.2 kg  BMI: Overweight BMI 25-29.9  Weight History:   Wt Readings from Last 10 Encounters:   09/17/20 89.9 kg (198 lb 3.1 oz)     Dosing Weight: 90 kg (actual)    ASSESSED NUTRITION NEEDS  Estimated Energy Needs: 8239-0168 kcals/day (25 - 30 kcals/kg)  Justification: Increased needs and Maintenance  Estimated Protein Needs: 135-180 grams protein/day (1.5 - 2 grams of pro/kg)  Justification: Hypercatabolism with critical illness and Increased needs  Estimated Fluid Needs: 1 mL/kcal/day   Justification: Maintenance and Per provider pending fluid status    PHYSICAL FINDINGS  See malnutrition section below.   OGT  ETT  VA ECMO    MALNUTRITION  % Intake: Unable to assess  % Weight Loss: Unable to assess  Subcutaneous Fat Loss: None observed  Muscle Loss: None observed  Fluid " Accumulation/Edema: None noted  Malnutrition Diagnosis: Patient does not meet two of the established criteria necessary for diagnosing malnutrition but is at risk for malnutrition    NUTRITION DIAGNOSIS  Inadequate oral intake related to intubation inhibiting PO intakes as evidenced by need for En to provide 100% of estimated needs.      INTERVENTIONS  Implementation  Nutrition Education: Not appropriate at this time due to patient condition   Enteral Nutrition - recs     Goals  Diet adv v nutrition support within 2-3 days.  Total avg nutritional intake to meet a minimum of 25 kcal/kg and 1.5 g PRO/kg daily (per dosing wt 90 kg).     Monitoring/Evaluation  Progress toward goals will be monitored and evaluated per protocol.      Marya Wilson, RD, MS, LD  SICU: [phone]

## 2020-09-17 NOTE — CONSULTS
[facility], [facility] Nurse Inpatient Adult Pressure Injury Prevention Assessment: ECMO  Initial     Positioning Tolerance: Fair  Date of ECMO cannulation: 9/16 right groin VA ECMO   Presence of Ischemia: No  Location of ischemia: none    Pressure Injury Prevention Interventions In Place:  Optifoam Dressing under ECMO Cannula, Z flow Positioner under head, Pillows for repositioning, TAPs Wedge Positioners in use, Heel off-loading boots, Pillows under calves for heel suspension, Mepilex Sacral Dressing and Dressing to sacrum for prevention   Current support surface: Standard  Low air loss mattress       Pressure Injury Prevention Interventions Added:  None        Plan of Care for Positioning and Pressure Injury Prevention  Reposition patient every 1-2 hours using TAP Wedges  Position head on Z flow positioner, mold indentation at areas of pressure points.  Pad ECMO IJ cannula with Optifoam (#[id number]) along face and scalp between skin and cannula and under Coban head wraps    Pad ECMO groin and chest cannula under rigid connectors with Optifoam or Soft cloth  Heel off-loading Boots at all times  Sacral Mepilex for Prevention, change every 5 days and prn  Low Air loss mattress    Patient History:   According to medical record:Keanu Spence is a 70 year old male who was admitted on 9/16/2020 s/p cardiac V fib arrest. 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 Had ROSC in cath lab. CT chest/abdomen/pelvis showed large neck hematoma, moderate hemoperitoneum.     Current Diet / Nutrition:     Orders Placed This Encounter      NPO for Medical/Clinical Reasons Except for: No Exceptions    Output:    I/O last 3 completed shifts:  In: 3523.88 [I.V.:168.88]  Out: 311 [Urine:61; Emesis/NG output:250]  Containment: of urine/stool: Urinary Catheter    Risk Assessment:   Sensory Perception: 1-->completely  limited  Moisture: 4-->rarely moist  Activity: 1-->bedfast  Mobility: 1-->completely immobile  Nutrition: 2-->probably inadequate  Friction and Shear: 1-->problem  Lee Score: 10    Labs:    Recent Labs   Lab 09/17/20  0557 09/17/20  0259   ALBUMIN  --  2.2*   HGB 7.3* 7.2*   INR  --  4.62*   WBC 18.3* 16.3*   CRP  --  57.0*       Focused Assessment: right Right groin ECMO cannula, Face and Feet    Pressure Injury Present::No    Wound VAC to left groin follow by Vascular.     Education provided to: nurse  Discussed importance of:their role in pressure injury prevention  Discussed plan of care with Nurse  WOC Nurse follow-up plan:weekly    Magdalene Velez RN CWOCN

## 2020-09-17 NOTE — PROGRESS NOTES
ECMO Shift Summary: [id number]      ECMO components include:  Oxygenator Lot Number: [id number]  Circuit Lot Number: [id number]  Console Serial Number: [id number]      Patient remains on VA ECMO, all equipment is functioning and alarms are appropriately set. RPM's 3800 with flow range 4.4-5 L/min. Sweep gas is at 0.5 LPM and FiO2 50%. Circuit remains free of air, clot and fibrin. Cannulas are secure with no bleeding from site. Extremities are perfused. Suctioned ETT for scant secretions.    Significant Shift Events: Dr. Cazares pulled reperfusion cannula due to significant bleeding.  Femstop placed with success and ECMO arterial cannula capped.      Vent settings:  Ventilation Mode: (S) CMV/AC  (Continuous Mandatory Ventilation/ Assist Control)  FiO2 (%): 40 %  Rate Set (breaths/minute): 10 breaths/min  Tidal Volume Set (mL): 450 mL  PEEP (cm H2O): 7 cmH2O  Oxygen Concentration (%): 40 %  Peak Inspiratory Pressure (cm H2O) (Drager Verenice): 20  Resp: 10  .    Heparin is not running, ACT range 150-258.    Blood loss was significant. Product given included 5 PRBC, 2 cryo, 3 FFP.      Intake/Output Summary (Last 24 hours) at 9/17/2020 1702  Last data filed at 9/17/2020 1700  Gross per 24 hour   Intake 6506.45 ml   Output 1136 ml   Net 5370.45 ml       ECHO:  No results found for this or any previous visit.No results found for this or any previous visit.    CXR:  Recent Results (from the past 24 hour(s))   Cardiac Catheterization    Narrative    - Successful right femoral 17Fr arterial and 25Fr venous cannulation for   VA ECMO. Antegrade perfusion sheath positioned in the right SFA.  - Successful defibrillation and restoration of sinus rhythm.  - Severe coronary artery disease   - 100% occluded mLAD with patent LIMA-mLAD   - 100% occluded vein grafts to RCA, diagonal, and OM1.   - 100% occluded native RCA.   - 100% occluded OM1.   - 80% distal LM into circumflex stenosis; 80% calcified mid-circumflex   stenosis.   - 70%  ostial LAD stenosis; 70% mid LAD stenosis.   - Successful angioplasty and stents:   - 4.0x16mm Synergy SIRI to LM into proximal circumflex   - 3.5x13mm Orsiro SIRI to ostial LAD in a T-stent fashion   - 2.42c51lm and 2.5x22mm Orsiro SIRI to mid LAD in overlapping fashion  - Unsuccessful attempts:   - OM1 - chronic occlusion, unable to wire despite appropriate escalation   - mid circumflex - successful wiring, unable to deliver 1.5mm predilation   balloon (Takeru)   - RCA - chronic occlusion, unable to wire despite appropriate escalation  - Right radial arterial line inserted and secured  - Right internal jugular venous triple lumen catheter inserted and secured    Complications:  - Right thigh hematoma and right neck hematoma in the setting of   therapeutic anticoagulation, oral antiplatelets, and cangrelor   administration.   CT Head w/o Contrast    Narrative    CT HEAD W/O CONTRAST 9/17/2020 2:56 AM    History: ecmo    Comparison: None.    Technique: Using multidetector thin collimation helical acquisition  technique, axial, coronal and sagittal CT images from the skull base  to the vertex were obtained without intravenous contrast.     Findings: Retained contrast after coronary angiography, likely related  to renal dysfunction. Abnormal enhancement within the basal ganglia  bilaterally caudate nuclei greater than putamina. Notably, all of this  hyperdensity after angiography is not present on the virtual  noncontrast images suggesting all represents contrast and there is no  definite hemorrhage.  No intracranial hemorrhage, mass effect, or midline shift. The  ventricles are proportionate to the cerebral sulci. The gray to white  matter differentiation of the cerebral hemispheres is preserved. The  basal cisterns are patent.    The visualized paranasal sinuses are clear. The mastoid air cells are  clear. Partially visualized endotracheal tube.       Impression    Impression: Abnormal bilateral basal ganglia  enhancement which can be  seen with hypoxic ischemic injury.    I have personally reviewed the examination and initial interpretation  and I agree with the findings.    SHIREEN WEST MD   CT Chest Abdomen Pelvis w/o Contrast   Result Value    Radiologist flags See impression (Urgent)    Narrative    EXAMINATION: CT CHEST ABDOMEN PELVIS W/O CONTRAST, 9/17/2020 3:01 AM    TECHNIQUE:  Helical CT images from the thoracic inlet through the  symphysis pubis were obtained  without contrast.    COMPARISON: None    HISTORY: ecmo    DLP: 2648 mGy*cm    FINDINGS:    Lines and tubes: Left chest wall implantable cardiac defibrillator.  Right IJ central venous catheter tip terminates in the high right  atrium. Endotracheal tube terminates in the midthoracic trachea.  Gastric tube terminates in the stomach. Right groin approach ECMO  arterial cannula within the right common iliac artery and venous  cannula within the low superior vena cava. Right upper extremity PICC  terminates in the low SVC.    Chest: Median sternotomy wires. Large partially visualized soft tissue  hematoma within the low neck extending down into the superior  mediastinum and right chest. The thyroid is unremarkable. Enlarged  heart. No pericardial effusion. Dense coronary artery atherosclerotic  calcifications. Coronary stents. Mildly dilated main pulmonary artery  measuring 3.3 cm. The thoracic aorta diameter is within normal limits.  Normal three-vessel branching pattern. No mediastinal or hilar  lymphadenopathy. The esophagus is within normal limits.    Debris is present within the distal right mainstem bronchus and  bronchus intermedius. Nonspecific diffuse groundglass opacities with  peripheral sparing. Extensive groundglass centrilobular nodularity  seen throughout all the lobes, but does demonstrate an upper lobe  predominance. Scattered areas of atelectasis. No pneumothorax or  pleural effusion. Few paraseptal and centrilobular areas of  emphysema  within the lung apices.    Abdomen and pelvis: No obvious focal liver abnormality. No  intrahepatic biliary dilation. Prominent common bile duct.  Cholecystectomy clips. The pancreas and spleen are unremarkable. Mild  thickening of the adrenal glands. Delayed nephrogram is seen with both  kidneys due to recent contrast administration in the Cath Lab.  Multiple bilateral simple renal cysts. No hydronephrosis. The ureters  are unremarkable. The urinary bladder is decompressed with a Fontaine  catheter in place. No dilated loops of bowel. Prominent appearance of  the duodenal bowel wall is likely related to hemoperitoneum and  mesenteric edema. No significant bowel wall thickening is appreciated.  Distal loops of small bowel demonstrate hyper dense material following  the course of small bowel (series 9, image [id number]). Moderate clonic  stool burden. Few scattered colonic diverticula. Extensive  hemoperitoneum prominently seen within the pelvis and perihepatic  regions. Mesenteric edema. No abdominal or pelvic lymphadenopathy.  Normal caliber of the abdominal aorta. Extensive vascular  calcifications of the aorta, mesenteric vessels, and iliac vessels.  Bilateral groin hematomas and scattered foci of subcutaneous air. Soft  tissue defect overlying and extending nearly to the left common  femoral artery. No focal fluid collection is seen within this area.    Bones and soft tissues: No axillary or inguinal lymphadenopathy.  Anasarca. Minimally displaced right third, fourth, fifth, sixth rib  fractures left fourth, fifth, sixth rib fractures. Multilevel  degenerative changes of the thoracic and lumbar spine. Degenerative  changes of the hips. Sclerosis and subchondral cystic changes of the  sacroiliac joints.      Impression    IMPRESSION:   1. Post surgical changes of ECMO cannulation. Bilateral groin  hematomas and moderate volume hemoperitoneum.  2. Large right neck hematoma which extends into the  superior  mediastinum.  3. Hyperdense appearing distal loops of small bowel are nonspecific  and may represent gastrointestinal blood or submucosal hemorrhage from  ischemia. However, this may be normally dense appearing intraluminal  contents.  4. Extensive groundglass opacities with peripheral sparing is  suggestive of pulmonary edema. There is superimposed ground glass  nodularity throughout the lungs which may represent superimposed  aspiration as bronchial debris is present.  Alternatively an  underlying Coivd 19 infection is in the differential and can produce  rounded ground glass opacities.    5. Soft tissue defect within the left groin overlying and extending  nearly to the level of the left common femoral artery likely related  to patient's known recent left sided endarterectomy.  6. Minimally displaced bilateral rib fractures, likely sequela of CPR.  7. Cardiomegaly.    [Urgent Result: See impression    Finding was identified on 9/17/2020 3:41 AM.     Dr. Alves was contacted by Dr. Geronimo at 9/17/2020 3:44 AM  and verbalized understanding of the urgent finding.     I have personally reviewed the examination and initial interpretation  and I agree with the findings.    HARPAL BAKER MD   XR Chest Port 1 View    Narrative    EXAM: Chest x-ray  9/17/2020 4:14 AM     HISTORY:  Check endotracheal tube placement and ECLS cannula  placement.       COMPARISON: CT 9/17/2020    FINDINGS: Supine frontal radiograph of the chest. Left chest wall  implantable cardiac defibrillator. The endotracheal tube is 3.3 cm  above the lara. Right IJ central venous catheter tip projects over  the cavoatrial junction. Lower approach ECMO cannula projects over the  low SVC. Right upper extremity PICC with the tip projecting over the  low SVC. Gastric tube courses below the level of diaphragm with the  tip below the field-of-view. The trachea is midline. The  cardiomediastinal silhouette is within normal limits. No  pleural  effusion. No focal consolidation. The pulmonary vessels are distinct.  No pneumothorax. The groundglass opacity seen on CT cannot be  visualized on this exam. The upper abdomen is unremarkable.      Impression    IMPRESSION:   1. The endotracheal tube is 3.3 cm above the lara. Other support  devices are appropriately positioned.  2. The groundglass opacities and nodularity seen on CT cannot be  visualized on this exam.    I have personally reviewed the examination and initial interpretation  and I agree with the findings.    HARPAL BAKER MD   Echocardiogram Complete    Narrative    [id number]  [id number]  [id number]  [id number]^CRYS^JANET           [facility],[place]  Echocardiography [facility]  [address] [place] [address]     Name: ALESHA MCKENNA  MRN: [id number]  : 1950  Study Date: 2020 01:35 PM  Age: 70 yrs  Gender: Male  Patient Location: [facility]  Reason For Study: Cardiac Arrest  Ordering Physician: JANET SPANGLER  Performed By: Jose Calvo RDCS     BSA: 2.1 m2  Height: 71 in  Weight: 198 lb  HR: 77  _____________________________________________________________________________  __        Procedure  Complete Portable Echo Adult.  _____________________________________________________________________________  __        Interpretation Summary  Patient is on VA-ECMO support at 5LPM at the time of the study. Arrhythmia  noted.     Left ventricular wall thickness is normal. LVIDD is 6.0 cm. LVIDs is 5.9cm The  Ejection Fraction is estimated at 5-10%.  The right ventricle is normal size.  Global right ventricular function is moderately to severely reduced.  The aortic valve opens partially with each cardiac cycle  The inferior vena cava is normal. Venous ECMO cannula noted in the IVC. No  pericardial effusion is present.     There is no prior study for direct  comparison.  _____________________________________________________________________________  __        Left Ventricle  Left ventricular wall thickness is normal. LVIDD is 6.0 cm. LVIDs is 5.9cm.  The Ejection Fraction is estimated at 5-10%. Left ventricular diastolic  function is not assessable. Severe diffuse hypokinesis is present.     Right Ventricle  The right ventricle is normal size. Global right ventricular function is  moderately to severely reduced. A pacemaker lead is noted in the right  ventricle.     Atria  The atria cannot be assessed.        Mitral Valve  The mitral valve is normal. Trace mitral insufficiency is present.     Aortic Valve  Aortic valve is normal in structure and function. The aortic valve opens  partially with each cardiac cycle.     Tricuspid Valve  The tricuspid valve is normal.     Pulmonic Valve  The pulmonic valve is normal.     Vessels  The inferior vena cava is normal. Venoous ECMO cannula noted in the IVC.     Pericardium  No pericardial effusion is present.        Compared to Previous Study  There is no prior study for direct comparison.  _____________________________________________________________________________  __     MMode/2D Measurements & Calculations  IVSd: 1.4 cm  LVIDd: 6.0 cm  LVIDs: 5.9 cm  LVPWd: 1.1 cm  FS: 1.8 %  LV mass(C)d: 338.6 grams  LV mass(C)dI: 161.2 grams/m2  asc Aorta Diam: 3.3 cm  RWT: 0.36  TAPSE: 0.67 cm        Doppler Measurements & Calculations  PA acc time: 0.09 sec        _____________________________________________________________________________  __        Report approved by: Usha SHARMA 09/17/2020 02:46 PM      US Lower Extremity Arterial Duplex Bilateral   Result Value    Radiologist flags Right SFA occlusion (Urgent)    Narrative    Exam: US LOWER EXTREMITY ARTERIAL DUPLEX BILATERAL, 9/17/2020 3:06 PM    Indication: Baseline to assess blood flow to Lower Extremities (VA  ECMO)    Comparison: None    Technique: Grayscale (B-mode),  color Doppler, and duplex spectral  Doppler ultrasound of the lower extremity arteries. Velocity  measurements obtained with angle correction of 60 degrees or less.    Findings:     Examination is limited by ECMO cannula in the right groin and left  groin wound VAC.    Right lower extremity:  The proximal vessels are obscured by ECMO cannulae. The mid SFA is  patent, with evidence of atherosclerotic narrowing. The SFA is  occluded in the mid to distal thigh, reconstituting at the level of  the popliteal artery. The posterior tibial artery is patent to the  ankle. The anterior tibial artery appears occluded.    Left lower extremity:  There is a left femoral to popliteal bypass graft. The proximal  anastomosis is obscured by overlying wound VAC. Visualized portions of  the graft appear patent. The graft anastomosis in the popliteal fossa  is patent. The popliteal artery is patent. The posterior and anterior  tibial arteries appear patent.    Waveforms cannot be analyzed due to presence of ECMO.      Impression    Impression:   1. Limited examination secondary to right groin ECMO access and left  groin wound VAC.  2. Occlusion of the mid to distal right SFA, with reconstitution at  the level of the popliteal artery. The anterior tibial artery appears  occluded.  3. Visualized arteries of the left lower extremity appear patent,  including the visualized portion of the left femoral-popliteal bypass  graft.    [Urgent Result: Right SFA occlusion]    Finding was identified on 9/17/2020 3:10 PM.     Dr. Saba was contacted by Dr. Deleon at 9/17/2020 3:19 PM and  verbalized understanding of the urgent finding.     I have personally reviewed the examination and initial interpretation  and I agree with the findings.    TAN NICHOLAS MD       Labs:  Recent Labs   Lab 09/17/20  1550 09/17/20  1443 09/17/20  1327 09/17/20  1206   PH 7.32* 7.43 7.34* 7.40   PCO2 42 29* 32* 25*   PO2 176* 201* 230* 211*   HCO3 22 20* 17* 15*    O2PER 40 40 40 40       Lab Results   Component Value Date    HGB 8.4 (L) 09/17/2020    PHGB 50 (H) 09/17/2020    PLT 93 (L) 09/17/2020    FIBR 251 09/17/2020    INR 2.35 (H) 09/17/2020    PTT 40 (H) 09/17/2020    DD 13.9 (H) 09/17/2020    AXA <0.10 09/17/2020    ANTCH 72 (L) 09/17/2020         Plan is to continue VA ECMO.      Edinson Valentine  ECMO Specialist  9/17/2020 5:02 PM

## 2020-09-17 NOTE — CONSULTS
"[facility]: [place]  NeuroCritical Care Consult    Patient Name:  Keanu Spence  MRN:  [id number]    :  1950  Date of Service:  2020  Primary care provider:  No primary care provider on file.      Reason for Consult: Asked by CSI to see Keanu Spence for Cardiac arrest     History of Present Illness:   Keanu Spence is a 70 year old male admitted on 2020 with a PMH of CAD s/p CABG, DM, a-flutter s/p ICD on warfarin, CHF, and recent left femorl endartectomy who presents with cardiac arrest. Now s/p SIRI to LM to Lcx, LAD and ECMO.     V-fib arrest  Down 15 minutes  3 rounds of epi  Rosc in cath lab  ECMO  Cooled-> being rewarmed for coagulopathy     CT concerning for anoxic brain injury.    ROS: A 10-point ROS was performed as per HPI.    NEURO RECS  - Recommend vEEG while on sedation  - Avoid hypotonic solutions as they may worsen cerebral edema  - Avoid \"nitroprusside\",\"nitroglycerin\" as they may also worsen cerbral edema.  - Advise slow correction of any high Sodiums if needed  - When safe to do so, limitation of CNS acting medications will permit a more accurate neurological assessment  - We will continue to follow and monitor her EEG and neurologic exam, please call #[phone] with any questions    Thank you for this consultation.    PMH:  History reviewed. No pertinent past medical history.  History reviewed. No pertinent surgical history.    Allergies:  No Known Allergies    Medications:      Current Facility-Administered Medications:      artificial tears ophthalmic ointment, , Both Eyes, Q8H, Bonnie Alves MD     [START ON 2020] aspirin (ASA) chewable tablet 81 mg, 81 mg, Oral, Daily, Filemon Vega MD     calcium chloride in  mL intermittent infusion 1 g, 1 g, Intravenous, Q6H PRN, Bonine Alves MD, 1 g at 20 1208     cisatracurium (NIMBEX) 200 mg in D5W 100 mL infusion, 3-10 mcg/kg/min (Dosing Weight), " Intravenous, Continuous **AND** [DISCONTINUED] artificial tears ophthalmic ointment, , Both Eyes, Q8H, Bonnie Alves MD     EPINEPHrine (ADRENALIN) 5 mg in sodium chloride 0.9 % 250 mL infusion, 0.03-0.3 mcg/kg/min (Dosing Weight), Intravenous, Continuous, Bonnie Alves MD     fentaNYL (SUBLIMAZE) infusion,  mcg/hr, Intravenous, Continuous, Bonnie Alves MD, Last Rate: 2 mL/hr at 09/17/20 0700, 100 mcg/hr at 09/17/20 0700     heparin 100 UNIT/ML injection 380-750 Units, 5-10 Units/kg (Ideal), Other, Q30 Min PRN, Filemon Vega MD     heparin 2 unit/mL in 0.9% NaCl (for REPERFUSION CATHETER), 3 mL/hr, Intravenous, Continuous, Bonnie Alves MD, Last Rate: 3 mL/hr at 09/17/20 0921, 3 mL/hr at 09/17/20 0921     heparin 25,000 units in 0.45% NaCl 250 mL ANTICOAGULANT  infusion, 10-50 Units/kg/hr (Ideal), Other, Continuous, Filemon Vega MD, Stopped at 09/17/20 0442     lidocaine (LMX4) cream, , Topical, Q1H PRN, Bonnie Alves MD     lidocaine 1 % 0.1-1 mL, 0.1-1 mL, Other, Q1H PRN, Bonnie Alves MD     magnesium sulfate 2 g in water intermittent infusion, 2 g, Intravenous, Daily PRN, Bonnie Alves MD     magnesium sulfate 4 g in 100 mL sterile water (premade), 4 g, Intravenous, Q4H PRN, Bonnie Alves MD     Medication Considerations - To maintain platelet inhibition after discontinuation of cangrelor (KENGREAL) [see admin instructions], , Does not apply, Continuous PRN, Filemon Vega MD     midazolam (VERSED) drip - ADULT 100 mg/100 mL in NS PRE-MIX, 1-8 mg/hr, Intravenous, Continuous, Bonnie Alves MD, Last Rate: 8 mL/hr at 09/17/20 0800, 8 mg/hr at 09/17/20 0800     midazolam (VERSED) injection 1-3 mg, 1-3 mg, Intravenous, Q1H PRN, Bonnie Alves MD     naloxone (NARCAN) injection 0.1-0.4 mg, 0.1-0.4 mg, Intravenous, Q2 Min PRN, Filemon Vega MD     norepinephrine (LEVOPHED) 16 mg in  mL infusion,  0.03-0.4 mcg/kg/min (Dosing Weight), Intravenous, Continuous, Bonnie Alves MD, Last Rate: 10.1 mL/hr at 09/17/20 1140, 0.12 mcg/kg/min at 09/17/20 1140     pantoprazole (PROTONIX) 40 mg IV push injection, 40 mg, Intravenous, Daily, Bonnie Alves MD, 40 mg at 09/17/20 0848     phenylephrine (JUNI-SYNEPHRINE) 50 mg in sodium chloride 0.9 % 250 mL infusion, 0.5-6 mcg/kg/min (Dosing Weight), Intravenous, Continuous, Bonnie Alves MD, Last Rate: 13.5 mL/hr at 09/17/20 0911, 0.5 mcg/kg/min at 09/17/20 0911     piperacillin-tazobactam (ZOSYN) 3.375 g vial to attach to  mL bag, 3.375 g, Intravenous, Q6H, Bonnie Alves MD, 3.375 g at 09/17/20 1034     potassium chloride 20 mEq in 50 mL intermittent infusion, 20 mEq, Intravenous, Q1H PRN, Bonnie Alves MD     propofol (DIPRIVAN) infusion, 5-75 mcg/kg/min (Dosing Weight), Intravenous, Continuous, Bonnie Alves MD, Last Rate: 27 mL/hr at 09/17/20 0800, 50 mcg/kg/min at 09/17/20 0800     sodium bicarbonate (BICARB) 1 mEq/mL drip, 50 mEq/hr, Intravenous, Continuous, Cary Saba MD     sodium chloride (PF) 0.9% PF flush 3 mL, 3 mL, Intracatheter, q1 min prn, Bonnie Alves MD     sodium chloride (PF) 0.9% PF flush 3 mL, 3 mL, Intracatheter, Q8H, Bonnie Alves MD     sodium phosphate 10 mmol in D5W intermittent infusion, 10 mmol, Intravenous, Daily PRN, Bonnie Alves MD     sodium phosphate 15 mmol in D5W intermittent infusion, 15 mmol, Intravenous, Daily PRN, Bonnie Alves MD     sodium phosphate 20 mmol in D5W intermittent infusion, 20 mmol, Intravenous, Q6H PRN, Bonnie Alves MD     sodium phosphate 25 mmol in D5W intermittent infusion, 25 mmol, Intravenous, Q8H PRN, Bonnie Alves MD     ticagrelor (BRILINTA) tablet 90 mg, 90 mg, Oral, BID, Bonnie Alves MD     vancomycin (VANCOCIN) 1,750 mg in sodium chloride 0.9 % 500 mL intermittent  "infusion, 1,750 mg, Intravenous, Q24H, Filemon Vega MD, 1,750 mg at 09/17/20 0601     vasopressin 40 units in NS 40 mL (PITRESSIN) infusion, 0.5-4 Units/hr, Intravenous, Continuous, Agdamag, Cary Riley MD, Last Rate: 4 mL/hr at 09/17/20 0855, 4 Units/hr at 09/17/20 0855    Social History:  Social History     Tobacco Use     Smoking status: Not on file   Substance Use Topics     Alcohol use: Not on file       Family History:    History reviewed. No pertinent family history.    Physical Examination:   Pulse 81   Temp 94.5  F (34.7  C)   Resp 13   Ht 1.803 m (5' 11\")   Wt 89.9 kg (198 lb 3.1 oz)   SpO2 100%   BMI 27.64 kg/m    General: Adult patient, lying in bed, NAD  HEENT: ETT  Cardiac: ECMO on TELE  Pulm: Vent  Abdomen: Soft, bowel sounds present  Extremities: Warm, no edema  Skin: No rash or lesion   Psych: Mood pleasant, affect congruent  Neuro:    Pt on sedation and not able to participate in exam. Pupils 2mm and fixed, flickers in all extremities, does not follow commands, did not perform ETT suction.    I have personally reviewed all labs and imaging for this patient.      Patient discussed with attending neurologist, Dr. Kevin Richard, DNP  Ascom: *[phone]   "

## 2020-09-17 NOTE — PROGRESS NOTES
RT at bedside approx 5 hours per cath lab request. 2 therapists at bedside awaiting IABP insertion, after 4 hours it was determined they were unable to insert IABP due to recent procedure to the area. Then cath lab requested and RT stay with patient to manage ventilator.

## 2020-09-17 NOTE — ED TRIAGE NOTES
Pt brought in by EMS c/o Cardiac Arrest. Pt heard and found down by S/O. Pt has been in V-Fib. Pt internal V-fib has shocked over 10 times.

## 2020-09-17 NOTE — PROGRESS NOTES
EEG CLINICAL NEUROPHYSIOLOGY PRELIMINARY REPORT    [facility] recordings between 4 AM and 9 AM reviewed. Hypothermic. Fentanyl 100 micrograms/hr, propofol 20-40 micrograms/hour. Recordings are very limited due to artefact and limitations associated with this technology. ProbableEKG artfact and respirator artefac; cannot be certain without video. Cerebral activity appears significantly suppressed; I cannot unequivocally state that electrocerebral activity is present. No clear seizures.    EEG consistent with significant suppression of electrocerebral activity; which would be consistent with clinical situation. Only ten channels recording from fronto temporal regions were utilized so no comments can be made regarding electrocerebral activity emerging from other areas of the brain.  Within these limitations no epileptiform discharges or seizures were noted. Longer recording from standard complement of electrodes with video should be pursued if clinically indicated. Please be aware that due to portal access issues, EEG from [facility] is not continuously monitored.    Owen Banuelos MD  Pager [phone]

## 2020-09-17 NOTE — PLAN OF CARE
D: ECMO, norepinephrine. Rewarmed to 35 C overnight due to bleeding. Sedated with versed/fentanyl/propofol. Not waking or following commands. This am RR 20s, CO2 low (see results). Paralytic ordered.  I/A: Increased propofol, RR decreased, ABGs improving. Paralytic not started. FFP started per Dr. Saba for INR >4. Noted right groin bleeding increased. Vasopressin and phenylephrine started for hypotension with bleeding. Dr. Saba at bedside and holding pressure. Total given 3 RBCs, 3 FFP, 1 cryo and 10mg vit K. CV surgery at bedside, distal perfusion catheter removed and femstops applied. Large hematoma in right thigh but bleeding under control. Able to decrease vasopressors, see flowsheets.   R: Will continue to monitor/assess and update MD as needed.

## 2020-09-17 NOTE — PROGRESS NOTES
ECMO Shift Summary:      ECMO components include:  Oxygenator Lot Number: [id number]  Circuit Lot Number: [id number]  Console Serial Number: [id number]      Patient remains on [facility] ECMO, all equipment is functioning and alarms are appropriately set. RPM's 3540 with flow at 4.5 L/min. Sweep gas is at 0.5 LPM and FiO2 50%. Circuit remains free of air, clot and fibrin. Cannulas are secure with a moderate amount of bleeding from site.  There is also oozing from Zuri and CVP as well.     Significant Shift Events:  Patient came up from CCL/CT scan at ~0300 with soft MAP.  Continuous infusion of blood product needed to maintain MAP in low 70s.    Vent settings:  Ventilation Mode: PCV Plus assist  (Pressure Control Ventilation/ Assist Control)  FiO2 (%): 40 %  Rate Set (breaths/minute): 12 breaths/min  PEEP (cm H2O): 10 cmH2O  Oxygen Concentration (%): 40 %  Peak Inspiratory Pressure (cm H2O) (Drager Verenice): 20  Resp: 22  .    Heparin has not been started. Last ACT range 109.    Product given included;  PRBC: 3 units  FFP: 2 units      Intake/Output Summary (Last 24 hours) at 9/17/2020 0621  Last data filed at 9/17/2020 0600  Gross per 24 hour   Intake 2271.83 ml   Output 58 ml   Net 2213.83 ml       ECHO:  No results found for this or any previous visit.No results found for this or any previous visit.    CXR:  Recent Results (from the past 24 hour(s))   Cardiac Catheterization    Narrative    - Successful right femoral 17Fr arterial and 25Fr venous cannulation for   VA ECMO. Antegrade perfusion sheath positioned in the right SFA.  - Successful defibrillation and restoration of sinus rhythm.  - Severe coronary artery disease   - 100% occluded mLAD with patent LIMA-mLAD   - 100% occluded vein grafts to RCA, diagonal, and OM1.   - 100% occluded native RCA.   - 100% occluded OM1.   - 80% distal LM into circumflex stenosis; 80% calcified mid-circumflex   stenosis.   - 70% ostial LAD stenosis; 70% mid LAD stenosis.   - Successful  angioplasty and stents:   - 4.0x16mm Synergy SIRI to LM into proximal circumflex   - 3.5x13mm Orsiro SIRI to ostial LAD in a T-stent fashion   - 2.98o97fh and 2.5x22mm Orsiro SIRI to mid LAD in overlapping fashion  - Unsuccessful attempts:   - OM1 - chronic occlusion, unable to wire despite appropriate escalation   - mid circumflex - successful wiring, unable to deliver 1.5mm predilation   balloon (Takeru)   - RCA - chronic occlusion, unable to wire despite appropriate escalation  - Right radial arterial line inserted and secured  - Right internal jugular venous triple lumen catheter inserted and secured    Complications:  - Right thigh hematoma and right neck hematoma in the setting of   therapeutic anticoagulation, oral antiplatelets, and cangrelor   administration.   CT Chest Abdomen Pelvis w/o Contrast   Result Value    Radiologist flags (Urgent)     Hemoperitoneum, bilateral groin hematomas, right neck    Impression    IMPRESSION:   1. Post surgical changes of ECMO cannulation. Bilateral groin  hematomas and moderate volume hemoperitoneum.  2. Large right neck hematoma which extends into the superior  mediastinum.  3. Hyperdense appearing distal loops of small bowel are nonspecific  and may represent gastrointestinal blood or submucosal hemorrhage from  ischemia. However, this may be normal dense appearing intraluminal  contents.  4. Extensive groundglass opacities with peripheral sparing is  suggestive of pulmonary edema. There is superimposed ground glass  nodularity throughout the lungs which may represent superimposed  aspiration as bronchial debris is present.  5.Minimally displaced bilateral rib fractures, likely sequela of CPR.  6. Cardiomegaly.    [Urgent Result: Hemoperitoneum, bilateral groin hematomas, right neck  hematoma]    Finding was identified on 9/17/2020 3:41 AM.     Dr. Alves was contacted by Dr. Geronimo at 9/17/2020 3:44 AM  and verbalized understanding of the urgent finding.         Labs:  Recent Labs   Lab 09/17/20  0557 09/17/20  0259 09/17/20  0200 09/17/20  0052   PH 7.25* 7.41 7.32* 7.37   PCO2 29* 24* 31* 21*   PO2 137* 199* 194* 359*   HCO3 13* 15* 16* 12*   O2PER 40.0 50.0 40.0 100.0       Lab Results   Component Value Date    HGB 7.3 (L) 09/17/2020    PHGB 50 (H) 09/17/2020     09/17/2020    FIBR 279 09/17/2020    INR 4.62 (H) 09/17/2020    DD 13.9 (H) 09/17/2020         Plan is to remain on [facility] ECMO support.      Obi Escalante, RT  ECMO Specialist  9/17/2020 6:21 AM

## 2020-09-17 NOTE — PHARMACY-VANCOMYCIN DOSING SERVICE
Pharmacy Vancomycin Initial Note  Date of Service 2020  Patient's  1950  70 year old, male    Indication: Aspiration Pneumonia and Post Cardia Arrest     Current estimated CrCl = Estimated Creatinine Clearance: 34.2 mL/min (A) (based on SCr of 2.59 mg/dL (H)).    Creatinine for last 3 days  2020:  2:59 AM Creatinine 2.59 mg/dL    Recent Vancomycin Level(s) for last 3 days  No results found for requested labs within last 72 hours.      Vancomycin IV Administrations (past 72 hours)      No vancomycin orders with administrations in past 72 hours.                Nephrotoxins and other renal medications (From now, onward)    Start     Dose/Rate Route Frequency Ordered Stop    20 0500  vancomycin (VANCOCIN) 1,750 mg in sodium chloride 0.9 % 500 mL intermittent infusion      1,750 mg  over 2 Hours Intravenous EVERY 24 HOURS 20 0303 20 0459    20 0400  piperacillin-tazobactam (ZOSYN) 3.375 g vial to attach to  mL bag      3.375 g  over 30 Minutes Intravenous EVERY 6 HOURS 20 0217 20 0359    20 0300  norepinephrine (LEVOPHED) 16 mg in  mL infusion      0.03-0.4 mcg/kg/min × 89.9 kg (Dosing Weight)  2.5-33.7 mL/hr  Intravenous CONTINUOUS 20 0217      20 0300  phenylephrine (JUNI-SYNEPHRINE) 50 mg in sodium chloride 0.9 % 250 mL infusion      0.5-6 mcg/kg/min × 89.9 kg (Dosing Weight)  13.5-161.8 mL/hr  Intravenous CONTINUOUS 20 0217            Contrast Orders - past 72 hours (72h ago, onward)    Start     Dose/Rate Route Frequency Ordered Stop    20 0144  iopamidol (ISOVUE-370) solution  Status:  Discontinued        ONCE PRN 20 0144 20 0231        Plan:  1.  Start vancomycin 1750 mg IV q24h x 5 days   2.  Goal Trough Level: 15-20 mg/L   3.  Pharmacy will check trough levels as appropriate in 1-3 Days.    4. Serum creatinine levels will be ordered daily for the first week of therapy and at least twice weekly for  subsequent weeks.    5. Harris method utilized to dose vancomycin therapy: Method 1    María Elena Rosenberg, PharmD, BCPS

## 2020-09-17 NOTE — ED PROVIDER NOTES
[place] EMERGENCY DEPARTMENT ([facility])  September 16, 2020 ED 3 10:23 PM   History     Chief Complaint   Patient presents with     Cardiac Arrest     HPI  Keanu Spence is a 70 year old male with history of type II diabetes, atypical atrial flutter status post ICD placement on warfarin, CHF LVEF 27%, left femoral endarterectomy on 8/4/2020 complicated by and recent who presents with cardiac arrest. Patient was at home with his significant other.  Significant other heard him yell and make strange noises.  She found him unresponsive and started CPR.  She called 911 and paramedics responded.  Significant other was doing chest compressions when they arrived.  Family did CPR for 15 minutes. He was in a V. fib rhythm and had multiple shocks by ICD.  Paramedics placed him in a Elvin device, put an IO in his right leg and infuse 3 rounds of epinephrine, 450 mg of amiodarone.  His end-tidal was in the high 30s-mid 40s. A supraglottic airway was placed in the field.  Medics report that he recently had surgery on his left leg and has a wound VAC in place.      PAST MEDICAL HISTORY: No past medical history on file.    PAST SURGICAL HISTORY: No past surgical history on file.    Past medical history, past surgical history, medications, and allergies were reviewed with the patient. Additional pertinent items: None    FAMILY HISTORY: No family history on file.    SOCIAL HISTORY:   Social History     Tobacco Use     Smoking status: Not on file   Substance Use Topics     Alcohol use: Not on file           Patient's Medications   New Prescriptions    No medications on file   Previous Medications    AMLODIPINE (NORVASC) 10 MG TABLET    Take 10 mg by mouth daily.    AMMONIUM LACTATE (AMLACTIN) 12 % CREAM    Apply  topically 2 times daily. APPLY 2 TIMES DAILY OVER DRY SKIN    ASPIRIN 81 MG TABLET    Take 1 tablet by mouth daily.    CHOLECALCIFEROL (VITAMIN D) 1000 UNITS CAPSULE    Take 1 capsule by mouth daily.     CLOPIDOGREL (PLAVIX) 75 MG TABLET    Take 1 tablet by mouth daily.    GLIPIZIDE (GLUCOTROL XL) 5 MG 24 HR TABLET    Take 5 mg by mouth daily.    GLUCOSE BLOOD VI TEST STRIPS STRIP    by In Vitro route daily. USE AS DIRECTED    GLUCOSE BLOOD VI TEST STRIPS STRIP    by In Vitro route 2 times daily. TEST TWICE DAILY    HYDROCHLOROTHIAZIDE (HYDRODIURIL) 25 MG TABLET    Take 25 mg by mouth daily.    LANCETS MISC.    USE AS DIRECTED    LISINOPRIL (PRINIVIL,ZESTRIL) 20 MG TABLET    Take 40 mg by mouth daily.    METFORMIN (GLUCOPHAGE) 1000 MG TABLET    Take 1,000 mg by mouth 2 times daily (with meals).    METOPROLOL (LOPRESSOR) 100 MG TABLET    Take 100 mg by mouth 2 times daily. REFILL DENIED; NEEDS OFFICE VISIT FOR FURTHER REFILLS    SIMVASTATIN (ZOCOR) 20 MG TABLET    Take 1 tablet by mouth At Bedtime.    TADALAFIL (CIALIS) 10 MG TABLET    Take 1 tablet by mouth daily as needed. BEFORE ACTIVITY   Modified Medications    No medications on file   Discontinued Medications    No medications on file        No Known Allergies     Review of Systems   Unable to perform ROS: Patient unresponsive         Physical Exam          Physical Exam  Constitutional:       Comments: Unresponsive, CPR in progress   Cardiovascular:      Comments: Elvin in place. R tibial IO.  Pulmonary:      Comments: Supraglottic airway in place.   Musculoskeletal:      Comments: Wound vac on L upper thigh         ED Course   10:20 PM: Patient presented to the emergency department and was placed in stabilization room 3  10:27 PM: First attempt at intubation using CMAC, need bigger blade.  Patient was bagged afterwards  10:29 PM 2nd attempt with larger blade. 7.5 cm ETT, 26cm at lips.       [facility], [place]    -Intubation    Date/Time: 9/17/2020 1:32 AM  Performed by: Anibal Wiley DO  Authorized by: Anibal Wiley DO       PRE-PROCEDURE DETAILS     Patient status:  Unresponsive    Pretreatment medications:   None    Paralytics:  None      PROCEDURE DETAILS     Preoxygenation:  ELDER/LMA    CPR in progress: yes      Intubation method:  Oral    Oral intubation technique:  Video-assisted    Laryngoscope blade:  Mac 3 and Mac 4    Tube size (mm):  7.5    Tube type:  Cuffed    Number of attempts:  2    Cricoid pressure: yes      Tube visualized through cords: yes      PLACEMENT ASSESSMENT     ETT to lip:  25    Tube secured with:  ETT vela    Breath sounds:  Equal and absent over the epigastrium    Placement verification: chest rise, condensation, direct visualization, equal breath sounds, ETCO2 detector and tube exhalation      CXR findings:  ETT in proper place             Critical Care time was 30 minutes for this patient excluding procedures.                No results found for this or any previous visit (from the past 24 hour(s)).  Medications - No data to display          Assessments & Plan (with Medical Decision Making)   This is a 70-year-old male who presents in cardiac arrest.  Was heard by family members and CPR was in progress when EMS arrived.  Patient was placed on the Elvin.  Patient had a shockable rhythm.  Patient's AICD fired approximately 10 times.  Patient was given 3 doses of epinephrine and 450 mg of amiodarone.  Upon arrival CPR was in progress with a Elvin.  An LMA was in place.  The Cath Lab was notified before patient arrival.  In the emergency department the LMA was removed and patient was intubated.  He was then transferred to the Cath Lab.       I have reviewed the nursing notes.    I have reviewed the findings, diagnosis, plan and need for follow up with the patient.    New Prescriptions    No medications on file       Final diagnoses:   None     IGayle, am serving as a trained medical scribe to document services personally performed by Anibal Wiley DO based on the provider's statements to me on September 16, 2020.  This document has been checked and approved by the attending  provider.    I, Farhana Whelan DO, was physically present and have reviewed and verified the accuracy of this note documented by Gayle Sharma, medical scribe.       FARHANA WHELAN DO      9/16/2020   [facility], [place], EMERGENCY DEPARTMENT     Farhana Whelan,   09/17/20 0307

## 2020-09-17 NOTE — PROGRESS NOTES
ECLS Cannulation Note:    Date on: 9/16/2020  Time on: 2244  Location: [facility] Cath Lab  Surgeon: Silvia    Venous Cannula: 25 Fr. In the Right Femoral Vein    Arterial Cannula: 17 Fr. In the Right Femoral Artery    Distal Reperfusion Cannula: 8 Fr. In the Right Superficial Femoral Artery      ECMO components include:  Oxygenator Lot Number: [id number]  Circuit Lot Number: [id number]  Console Serial Number: [id number]    Cannulation was performed in the Cath Lab, placement was verified by fluoroscopy, cannulas are in good position. Transport from [facility] to CT to ICU uneventful; yellow cap on for all moves, removed upon arrival to the ICU. Report given to receiving bedside specialist. ISTAT is at bedside, cooler pending. Patient's blood type is A, positive.    Marcie Toledo, RRT-ACCS / ECMO Specialist  9/17/2020 3:37 AM

## 2020-09-17 NOTE — PHARMACY-VANCOMYCIN DOSING SERVICE
Pharmacy Empiric Dose Change Per Policy  Original Dose Ordered:vancomycin IV 1750mg q24h  Dose Changed To: vancomycin IV 1250mg IV q48h    This dose change was based on the pharmacist's assessment of this patient's clearance and discovered vancomycin regimen from prior to admission/outside hospital records. Taking vancomycin prior to admission and was recently discharged on 1250mg IV q48h    Creatinine Clearance=     Estimated Creatinine Clearance: 31.2 mL/min (A) (based on SCr of 2.84 mg/dL (H)).  Will continue to follow and modify dosage according to levels, organ function and clinical condition    Susi Salcido, [facility]

## 2020-09-17 NOTE — PROGRESS NOTES
Cardiology Progress Note  Keanu Spence MRN: [id number]  Age: 70 year old, : 1950          Assessment and Plan:     Keanu Spence is a 70 year old male who was admitted on 2020 s/p cardiac V fib arrest. 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 Had ROSC in cath lab. CT chest/abdomen/pelvis showed large neck hematoma, moderate hemoperitoneum.      Neurology: Metabolic encephalopathy   Concern for hypoxic brain injury  Concern for seizure  Intubated, sedated   Increase temp to 36 degrees given bleeding. Will cool with ECMO , no thermoguard  - versed, propofol, fentanyl as needed   - RASS goal -4 to -5   - CTH on  hypoxic injury   - VEEG  - neurocritical care consult  - palliiative care consult       Cardiovascular / Hemodynamics: Refractory VF arrest.  SIRI to LM to LCx and LAD. Peripheral V-A ECMO inserted for cardiac arrest. LA 11.7 17Fr in RFA, 25Fr in RFV  Acute on chronic systolic heart failure  Cardiogenic shock  Atrial fibrillation   Peripheral vascular disease, R thigh hematoma   TTE: pending   --wean pressors/inotropes as able   --continue ASA 81mg and ticagrelor 90mg BID  --ACT goal 140-160  --hold lipitor for now given likely hepatic injury during arrest  --hold ACE/ARB for now given likely reduced renal fxn after arrest  --holding beta blocker given shock  - Received Mg, amio 450 mg   --follow-up LE U/S    Pulmonary: Acute hypoxic respiratory failure   Vent Settings: PC 12/10/  CXR: Lines in stable position.  --wean vent as able  --daily CXR  --Q2h ABGs for now  --consider scheduled duonebs if signs of lung dz, currently PRN    - COVID test sent    GI and Nutrition: No known medical hx.   --monitor BID LFTs  --NPO while cooled - nutrition consult pending feeding tube placement once he is warmed   --bowel regimen  --GI Prophylaxis: PPI    Renal, Fluid and Electrolytes:  "Acute on chronic kidney injury   Mixed metabolic and respiratory acidosis   --monitor urine output  --maintain K>3 and Mg>2   --trend lactic acid   --start sodium bicarb    Infectious Disease: L femoral abscess s/p debridement   MRSA bacteremia   - Vancomycin will be needed for at least 6 weeks per OSH EOT 10/15/2020  --zosyn x5 days for ECMO  --daily blood cultures  --Vascular following for L femoral wound    Hematology and Oncology: Anemia - acute blood loss anemia , hemoperitoneum and groin hematomas  Coagulopathy   TEG with R - FFP to be given , TEG not consistent with DIC   Reiving heparin for ECMO and ASA/ticagrelor for SIRI.   --cryo PRN fibrinogen < 200; FFP for INR >2  --Transfuse for Hgb<10  --heparin gtt for ECMO with ACT goal 140-160  --US LE w/ arterial duplex per ECMO protocol   --DVT PPX: Heparin as above   Endocrinology: Diabetes II  BG elevated.  --insulin gtt  --f/u HgbA1c    Lines: R femoral arterial and venous ECMO cannulae September 16, 2020  R radial arterial line September 16, 2020  ETT September 16, 2020  Fontaine catheter September 16, 2020  OG tube September 16, 2020  Restraint: needed    Current lines are required for patient management               Subjective/Interval Events     Continues to bleed from groin site           Objective     Pulse 81   Temp 94.5  F (34.7  C)   Resp 13   Ht 1.803 m (5' 11\")   Wt 89.9 kg (198 lb 3.1 oz)   SpO2 100%   BMI 27.64 kg/m    Temp:  [93.2  F (34  C)-94.8  F (34.9  C)] 94.5  F (34.7  C)  Pulse:  [] 81  Resp:  [13-27] 13  MAP:  [39 mmHg-80 mmHg] 66 mmHg  Arterial Line BP: (41-93)/(39-77) 74/63  FiO2 (%):  [40 %] 40 %  SpO2:  [81 %-100 %] 100 %  Wt Readings from Last 2 Encounters:   09/17/20 89.9 kg (198 lb 3.1 oz)     I/O last 3 completed shifts:  In: 3523.88 [I.V.:168.88]  Out: 311 [Urine:61; Emesis/NG output:250]    GENERAL APPEARANCE: intubated, sedated   Head: NC/AT  EYES: pupils 3mm fixed  HENT: Mouth without ulcers or lesions  NECK: R neck " hematoma   Pulmonary: mechanical sounds   CV: tachycardic   GI: Soft, nontender, normal bowel sounds, no HSM   MS: +wound in L femoral region. Site of prior endarterectomy   SKIN: R femoral ECMO arterial and venous cannula   NEURO: intubated, sedated           Data:     Vent Settings:  Resp: 13 SpO2: 100 % O2 Device: Mechanical Ventilator      Arterial Blood Gas:   Recent Labs   Lab 09/17/20  0948 09/17/20  0830 09/17/20  0557 09/17/20  0259   PH 7.28* 6.90* 7.25* 7.41   PCO2 28* 89* 29* 24*   PO2 230* 177* 137* 199*   HCO3 13* 17* 13* 15*   O2PER 40 40 40.0 50.0       Vitals:    09/17/20 0210   Weight: 89.9 kg (198 lb 3.1 oz)   I/O last 3 completed shifts:  In: 3523.88 [I.V.:168.88]  Out: 311 [Urine:61; Emesis/NG output:250]  Recent Labs   Lab 09/17/20  0948 09/17/20  0259    139   POTASSIUM 4.1 3.9   CHLORIDE 111* 108   CO2 13* 15*   ANIONGAP 19* 16*   *  130* 175*   BUN 48* 53*   CR 2.84* 2.59*   ROMAN 7.6* 7.9*     No components found for: URINE   Recent Labs   Lab 09/17/20  0948 09/17/20  0259   * 186*   ALT 29 21   BILITOTAL 3.3* 4.1*   ALBUMIN 2.0* 2.2*   PROTTOTAL 4.6* 5.4*   ALKPHOS 98 142     Temp: 94.5  F (34.7  C)  Temp  Min: 93.2  F (34  C)  Max: 94.8  F (34.9  C)   Recent Labs   Lab 09/17/20  0948 09/17/20  0557 09/17/20  0259 09/17/20  0200 09/17/20  0052   WBC 13.8* 18.3* 16.3*  --   --    HGB 7.8* 7.3* 7.2* 8.1* 8.7*   HCT 25.8* 24.4* 24.0*  --   --    MCV 90 91 83  --   --    RDW 18.3* 21.3* 21.8*  --   --    * 171 210  --   --      Recent Labs   Lab 09/17/20  0948 09/17/20  0259   INR 2.44* 4.62*   PTT 70*  --      Recent Labs   Lab 09/17/20  0948 09/17/20  0259 09/17/20  0200 09/17/20  0052 09/16/20  2357   *  130* 175* 181* 167* 162*       All imaging personally reviewed:  Recent Results (from the past 24 hour(s))   Cardiac Catheterization    Narrative    - Successful right femoral 17Fr arterial and 25Fr venous cannulation for   VA ECMO. Antegrade perfusion  sheath positioned in the right SFA.  - Successful defibrillation and restoration of sinus rhythm.  - Severe coronary artery disease   - 100% occluded mLAD with patent LIMA-mLAD   - 100% occluded vein grafts to RCA, diagonal, and OM1.   - 100% occluded native RCA.   - 100% occluded OM1.   - 80% distal LM into circumflex stenosis; 80% calcified mid-circumflex   stenosis.   - 70% ostial LAD stenosis; 70% mid LAD stenosis.   - Successful angioplasty and stents:   - 4.0x16mm Synergy SIRI to LM into proximal circumflex   - 3.5x13mm Orsiro SIRI to ostial LAD in a T-stent fashion   - 2.44m94sw and 2.5x22mm Orsiro SIRI to mid LAD in overlapping fashion  - Unsuccessful attempts:   - OM1 - chronic occlusion, unable to wire despite appropriate escalation   - mid circumflex - successful wiring, unable to deliver 1.5mm predilation   balloon (Takeru)   - RCA - chronic occlusion, unable to wire despite appropriate escalation  - Right radial arterial line inserted and secured  - Right internal jugular venous triple lumen catheter inserted and secured    Complications:  - Right thigh hematoma and right neck hematoma in the setting of   therapeutic anticoagulation, oral antiplatelets, and cangrelor   administration.   CT Head w/o Contrast    Narrative    CT HEAD W/O CONTRAST 9/17/2020 2:56 AM    History: ecmo    Comparison: None.    Technique: Using multidetector thin collimation helical acquisition  technique, axial, coronal and sagittal CT images from the skull base  to the vertex were obtained without intravenous contrast.     Findings: Retained contrast after coronary angiography, likely related  to renal dysfunction. Abnormal enhancement within the basal ganglia  bilaterally caudate nuclei greater than putamina. Notably, all of this  hyperdensity after angiography is not present on the virtual  noncontrast images suggesting all represents contrast and there is no  definite hemorrhage.  No intracranial hemorrhage, mass effect, or  midline shift. The  ventricles are proportionate to the cerebral sulci. The gray to white  matter differentiation of the cerebral hemispheres is preserved. The  basal cisterns are patent.    The visualized paranasal sinuses are clear. The mastoid air cells are  clear. Partially visualized endotracheal tube.       Impression    Impression: Abnormal bilateral basal ganglia enhancement which can be  seen with hypoxic ischemic injury.    I have personally reviewed the examination and initial interpretation  and I agree with the findings.    SHIREEN WEST MD   CT Chest Abdomen Pelvis w/o Contrast   Result Value    Radiologist flags See impression (Urgent)    Narrative    EXAMINATION: CT CHEST ABDOMEN PELVIS W/O CONTRAST, 9/17/2020 3:01 AM    TECHNIQUE:  Helical CT images from the thoracic inlet through the  symphysis pubis were obtained  without contrast.    COMPARISON: None    HISTORY: ecmo    DLP: 2648 mGy*cm    FINDINGS:    Lines and tubes: Left chest wall implantable cardiac defibrillator.  Right IJ central venous catheter tip terminates in the high right  atrium. Endotracheal tube terminates in the midthoracic trachea.  Gastric tube terminates in the stomach. Right groin approach ECMO  arterial cannula within the right common iliac artery and venous  cannula within the low superior vena cava. Right upper extremity PICC  terminates in the low SVC.    Chest: Median sternotomy wires. Large partially visualized soft tissue  hematoma within the low neck extending down into the superior  mediastinum and right chest. The thyroid is unremarkable. Enlarged  heart. No pericardial effusion. Dense coronary artery atherosclerotic  calcifications. Coronary stents. Mildly dilated main pulmonary artery  measuring 3.3 cm. The thoracic aorta diameter is within normal limits.  Normal three-vessel branching pattern. No mediastinal or hilar  lymphadenopathy. The esophagus is within normal limits.    Debris is present within the distal  right mainstem bronchus and  bronchus intermedius. Nonspecific diffuse groundglass opacities with  peripheral sparing. Extensive groundglass centrilobular nodularity  seen throughout all the lobes, but does demonstrate an upper lobe  predominance. Scattered areas of atelectasis. No pneumothorax or  pleural effusion. Few paraseptal and centrilobular areas of emphysema  within the lung apices.    Abdomen and pelvis: No obvious focal liver abnormality. No  intrahepatic biliary dilation. Prominent common bile duct.  Cholecystectomy clips. The pancreas and spleen are unremarkable. Mild  thickening of the adrenal glands. Delayed nephrogram is seen with both  kidneys due to recent contrast administration in the Cath Lab.  Multiple bilateral simple renal cysts. No hydronephrosis. The ureters  are unremarkable. The urinary bladder is decompressed with a Fontaine  catheter in place. No dilated loops of bowel. Prominent appearance of  the duodenal bowel wall is likely related to hemoperitoneum and  mesenteric edema. No significant bowel wall thickening is appreciated.  Distal loops of small bowel demonstrate hyper dense material following  the course of small bowel (series 9, image [id number]). Moderate clonic  stool burden. Few scattered colonic diverticula. Extensive  hemoperitoneum prominently seen within the pelvis and perihepatic  regions. Mesenteric edema. No abdominal or pelvic lymphadenopathy.  Normal caliber of the abdominal aorta. Extensive vascular  calcifications of the aorta, mesenteric vessels, and iliac vessels.  Bilateral groin hematomas and scattered foci of subcutaneous air. Soft  tissue defect overlying and extending nearly to the left common  femoral artery. No focal fluid collection is seen within this area.    Bones and soft tissues: No axillary or inguinal lymphadenopathy.  Anasarca. Minimally displaced right third, fourth, fifth, sixth rib  fractures left fourth, fifth, sixth rib fractures.  Multilevel  degenerative changes of the thoracic and lumbar spine. Degenerative  changes of the hips. Sclerosis and subchondral cystic changes of the  sacroiliac joints.      Impression    IMPRESSION:   1. Post surgical changes of ECMO cannulation. Bilateral groin  hematomas and moderate volume hemoperitoneum.  2. Large right neck hematoma which extends into the superior  mediastinum.  3. Hyperdense appearing distal loops of small bowel are nonspecific  and may represent gastrointestinal blood or submucosal hemorrhage from  ischemia. However, this may be normally dense appearing intraluminal  contents.  4. Extensive groundglass opacities with peripheral sparing is  suggestive of pulmonary edema. There is superimposed ground glass  nodularity throughout the lungs which may represent superimposed  aspiration as bronchial debris is present.  Alternatively an  underlying Coivd 19 infection is in the differential and can produce  rounded ground glass opacities.    5. Soft tissue defect within the left groin overlying and extending  nearly to the level of the left common femoral artery likely related  to patient's known recent left sided endarterectomy.  6. Minimally displaced bilateral rib fractures, likely sequela of CPR.  7. Cardiomegaly.    [Urgent Result: See impression    Finding was identified on 9/17/2020 3:41 AM.     Dr. Alves was contacted by Dr. Geronimo at 9/17/2020 3:44 AM  and verbalized understanding of the urgent finding.     I have personally reviewed the examination and initial interpretation  and I agree with the findings.    HARPAL BAKER MD   XR Chest Port 1 View    Narrative    EXAM: Chest x-ray  9/17/2020 4:14 AM     HISTORY:  Check endotracheal tube placement and ECLS cannula  placement.       COMPARISON: CT 9/17/2020    FINDINGS: Supine frontal radiograph of the chest. Left chest wall  implantable cardiac defibrillator. The endotracheal tube is 3.3 cm  above the lara. Right IJ  central venous catheter tip projects over  the cavoatrial junction. Lower approach ECMO cannula projects over the  low SVC. Right upper extremity PICC with the tip projecting over the  low SVC. Gastric tube courses below the level of diaphragm with the  tip below the field-of-view. The trachea is midline. The  cardiomediastinal silhouette is within normal limits. No pleural  effusion. No focal consolidation. The pulmonary vessels are distinct.  No pneumothorax. The groundglass opacity seen on CT cannot be  visualized on this exam. The upper abdomen is unremarkable.      Impression    IMPRESSION:   1. The endotracheal tube is 3.3 cm above the lara. Other support  devices are appropriately positioned.  2. The groundglass opacities and nodularity seen on CT cannot be  visualized on this exam.    I have personally reviewed the examination and initial interpretation  and I agree with the findings.    HARPAL BAKER MD             Medications     Current Facility-Administered Medications   Medication     artificial tears ophthalmic ointment     [START ON 9/18/2020] aspirin (ASA) chewable tablet 81 mg     calcium chloride in  mL intermittent infusion 1 g     cisatracurium (NIMBEX) 200 mg in D5W 100 mL infusion     EPINEPHrine (ADRENALIN) 5 mg in sodium chloride 0.9 % 250 mL infusion     fentaNYL (SUBLIMAZE) infusion     heparin 100 UNIT/ML injection 380-750 Units     heparin 2 unit/mL in 0.9% NaCl (for REPERFUSION CATHETER)     heparin 25,000 units in 0.45% NaCl 250 mL ANTICOAGULANT  infusion     lidocaine (LMX4) cream     lidocaine 1 % 0.1-1 mL     magnesium sulfate 2 g in water intermittent infusion     magnesium sulfate 4 g in 100 mL sterile water (premade)     Medication Considerations - To maintain platelet inhibition after discontinuation of cangrelor (KENGREAL) [see admin instructions]     midazolam (VERSED) drip - ADULT 100 mg/100 mL in NS PRE-MIX     midazolam (VERSED) injection 1-3 mg     naloxone  (NARCAN) injection 0.1-0.4 mg     norepinephrine (LEVOPHED) 16 mg in  mL infusion     pantoprazole (PROTONIX) 40 mg IV push injection     phenylephrine (JUNI-SYNEPHRINE) 50 mg in sodium chloride 0.9 % 250 mL infusion     piperacillin-tazobactam (ZOSYN) 3.375 g vial to attach to  mL bag     potassium chloride 20 mEq in 50 mL intermittent infusion     propofol (DIPRIVAN) infusion     sodium bicarbonate (BICARB) 1 mEq/mL drip     sodium chloride (PF) 0.9% PF flush 3 mL     sodium chloride (PF) 0.9% PF flush 3 mL     sodium phosphate 10 mmol in D5W intermittent infusion     sodium phosphate 15 mmol in D5W intermittent infusion     sodium phosphate 20 mmol in D5W intermittent infusion     sodium phosphate 25 mmol in D5W intermittent infusion     ticagrelor (BRILINTA) tablet 90 mg     vancomycin (VANCOCIN) 1,750 mg in sodium chloride 0.9 % 500 mL intermittent infusion     vasopressin 40 units in NS 40 mL (PITRESSIN) infusion

## 2020-09-18 NOTE — PROGRESS NOTES
Pt arrived to  at 0245 on VA-ECMO, report received from Cath Lab RN. Pt's pupils are 2mm and fixed, slight contractions in all 4 limbs, no cough on ET tube. Versed gtt at 8, Propofol gtt @ 20, Fentanyl gtt @ 100. Cardiac monitor reading sinus rhythm/sinus tach , occasional PVCs. MAP goal of 65 maintained on Levo gtt. Pulses absent in RLE (cannulation leg), MD aware. Temp set at 35 degrees celsius. ECMO flows at 4.75, pt actively bleeding from cannulation site/central lines, 3 units of PRBCs and 2 units of FFP given. Pt overbreathing the vent 25-28 breaths per minute, actively working on appropriate sedation. Absent bowel sounds, OG to LIS, UOP < 75 mL total over 6 hours. Nimbex gtt ordered for vent synchrony.

## 2020-09-18 NOTE — PROGRESS NOTES
EEG CLINICAL NEUROPHYSIOLOGY PRELIMINARY REPORT    Last five hours of video-EEG reviewed. Continues hypothermic on fentanyl 100, propofol 20 to 40 and midazolam 8. EEG is severely suppressed. No clear electrocerebral activity for long periods of recording at high sensitivities. No reactivity. No electrographic seizures. Findings may in part be related to anesthetic drips and hypothermia. Hypothermia, midazolam and propofol are potent anticonvulsants so seizures could potentially emerge as these treatments are tapered.    Will continue video-EEG monitoring. Full report to follow.    Owen Banuelos MD  Pager [phone]

## 2020-09-18 NOTE — PROGRESS NOTES
Major Shift Events:       VA ECMO w/out problems. IV pressors x2 to maintain MAP >65.     Remains unresponsive, no EEG activity, w/ large fixed pupils. Examined by Neuro and cardiology MDs.      MD talked to wife re: head CT results from last pm and expected course, as is an unrecoverable event.       Wife agreed to stop supportive care and met w/ .      Extubated, ECMO off/clamped and all IV meds stopped.@ 15:02. No resp, B/P or heartrate @ 15:10. MD notified and pronunced pt's death.       Plan:      Wife given Security phone # and will call when  home chosen.  For vital signs and complete assessments, please see documentation flowsheets.

## 2020-09-18 NOTE — PROGRESS NOTES
SPIRITUAL HEALTH SERVICES  SPIRITUAL ASSESSMENT Progress Note  [facility] ([place]) 4E     REFERRAL SOURCE: I offered spiritual support to pt and spouse, per consult order request, before and after pt's death.    Supportive conversation, life review of pt with spouse, and end-of-life ritual per spouse request were provided. Bedside nurse and I both answered questions spouse had about how to proceed with contacting a  home.    PLAN: no further needs indicated.     Isael Benjamin M.Div (Bill)., [facility]  Staff   Pager [phone]

## 2020-09-18 NOTE — PROGRESS NOTES
EEG BRIEF PROGRESS NOTE:    EEG was reviewed through 0537 this morning.  Background shows a severe attenuation with no cerebral activity about 10  V.  No epileptiform discharges or electrographic seizures were seen on this day recording.  Full report to follow.    Obi Trujillo MD  Epilepsy Fellow

## 2020-09-18 NOTE — PHARMACY-ADMISSION MEDICATION HISTORY
Admission medication history interview status for the 9/16/2020 admission is complete. See Epic admission navigator for allergy information, pharmacy, prior to admission medications and immunization status.     Medication history interview sources:  [facility] hospital discharge summary (from 9/15 hospitalization discharge)    Changes made to PTA medication list (reason)  Added: amiodarone, doxycycline (FUTURE-starting 10/16/2020), multivitamin+iron, pantoprazole, percocet, Imdur, hydralazine, Cepacol, NTG PRN, Miralax, rifampin (through 10/8), senna-docusate, IV Vancomycin, WARFARIN, acetaminophen, albuterol, atorvastatin  Deleted: amlactin, amlodipine, vitamin D, plavix, cialis, simvastatin, metformin, lisinopril, HCTZ, glipizide (glipizide stopped at OSH)  Changed: metoprolol tartrate->metoprolol succinate daily    Additional medication history information (including reliability of information, actions taken by pharmacist):  Furosemide & diltiazem were not on our medication list but patient was taking upon admission to outside hospital. These were stopped upon discharge.     Did not discuss list with patient.    Warfarin - Atrial fibrillation, goal INR=2-3. Would have to review outside hospital records for most recent dosing.   Antibiotics - patient was being followed by ID at discharge from outside hospital, currently on rifampin and vancomycin for MRSA bacteremia, then was to switch to doxycycline on 10/16 per chart review.     Prior to Admission medications    Medication Sig Last Dose Taking? Auth Provider   acetaminophen (TYLENOL) 500 MG tablet Take 1,000 mg by mouth every 6 hours as needed for pain  Yes Unknown, Entered By History   albuterol (PROAIR HFA/PROVENTIL HFA/VENTOLIN HFA) 108 (90 Base) MCG/ACT inhaler Inhale 2 puffs into the lungs every 4 hours as needed for shortness of breath / dyspnea  Yes Unknown, Entered By History   amiodarone (PACERONE) 200 MG tablet Take 200 mg by mouth daily  Yes Unknown,  Entered By History   aspirin (ASA) 81 MG EC tablet Take 81 mg by mouth daily  Yes Unknown, Entered By History   atorvastatin (LIPITOR) 40 MG tablet Take 40 mg by mouth At Bedtime  Yes Unknown, Entered By History   benzocaine-menthol (CEPACOL) 15-3.6 MG lozenge Place 1 lozenge inside cheek every hour as needed for moderate pain  Yes Unknown, Entered By History   doxycycline hyclate (VIBRA-TABS) 100 MG tablet Take 100 mg by mouth 2 times daily To start 10/16/2020, Suppressive antibiotic therapy for MRSA graft infection  Yes Unknown, Entered By History   hydrALAZINE (APRESOLINE) 25 MG tablet Take 25 mg by mouth 2 times daily  Yes Unknown, Entered By History   isosorbide mononitrate (IMDUR) 30 MG 24 hr tablet Take 30 mg by mouth daily  Yes Unknown, Entered By History   metoprolol succinate ER (TOPROL-XL) 100 MG 24 hr tablet Take 100 mg by mouth daily  Yes Unknown, Entered By History   multivitamin w/minerals (THERA-VIT-M) tablet Take 1 tablet by mouth daily  Yes Unknown, Entered By History   nitroGLYcerin (NITROSTAT) 0.4 MG sublingual tablet Place 0.4 mg under the tongue every 5 minutes as needed for chest pain For chest pain place 1 tablet under the tongue every 5 minutes for 3 doses. If symptoms persist 5 minutes after 1st dose call 911.  Yes Unknown, Entered By History   oxyCODONE-acetaminophen (PERCOCET) 5-325 MG tablet Take 1 tablet by mouth every 6 hours as needed for pain  Yes Unknown, Entered By History   pantoprazole (PROTONIX) 40 MG EC tablet Take 40 mg by mouth At Bedtime  Yes Unknown, Entered By History   polyethylene glycol (MIRALAX) 17 g packet Take 1 packet by mouth daily  Yes Unknown, Entered By History   rifampin (RIFADIN) 300 MG capsule Take 300 mg by mouth 2 times daily (9/8-10/8)  Yes Unknown, Entered By History   senna-docusate (SENOKOT-S/PERICOLACE) 8.6-50 MG tablet Take 1 tablet by mouth 2 times daily  Yes Unknown, Entered By History   vancomycin 1,250 mg Inject 1,250 mg into the vein every 48  hours  Yes Unknown, Entered By History   warfarin ANTICOAGULANT (COUMADIN) 1 MG tablet Take 1 mg by mouth daily As directed (AFIB, goal INR=2-3)  Yes Unknown, Entered By History   Cholecalciferol (VITAMIN D) 1000 UNITS capsule Take 1 capsule by mouth daily.   Reported, Patient   glucose blood VI test strips strip by In Vitro route 2 times daily. TEST TWICE DAILY   Reported, Patient   LANCETS MISC. USE AS DIRECTED   Reported, Patient         Medication history completed by: Susi Salcido RPH

## 2020-09-18 NOTE — PROGRESS NOTES
ECMO Shift Summary:    ECMO Equipment:  Console serial number: [id number]  Circuit Lot number: [id number]  Oxygenator Lot number: [id number]    Patient remains on VA ECMO, all equipment is functioning and alarms are appropriately set. RPM's 3800 with flow range 4.6-4.7 L/min. Sweep gas is at 1 LPM and FiO2 50%. Circuit remains free of air, clot and fibrin. Cannulas are secure with no bleeding from the site. Right leg is cool to touch; other extremities are slightly warm to touch. Suctioned ETT for a scant amount of secretions.    Significant Shift Events: Pupils fixed and dilated around 9 PM. Patient went to an emergent head CT; scan showed diffuse brain edema and effacement of the lateral ventricle/basal cisterns. Family and [employer] both contacted. Continued normal plan of care overnight.    Vent settings:  Ventilation Mode: CMV/AC  (Continuous Mandatory Ventilation/ Assist Control)  FiO2 (%): 40 %  Rate Set (breaths/minute): 10 breaths/min  Tidal Volume Set (mL): 450 mL  PEEP (cm H2O): 7 cmH2O  Oxygen Concentration (%): 40 %  Peak Inspiratory Pressure (cm H2O) (Drager Verenice): 20  Resp: 10  .    Heparin is remains off, ACT range 154-158.    Urine output is adequate, no blood was lost. No product was given overnight.      Intake/Output Summary (Last 24 hours) at 2020 0618  Last data filed at 2020 0600  Gross per 24 hour   Intake 4419.3 ml   Output 2500 ml   Net 1919.3 ml       ECHO:  No results found for this or any previous visit.No results found for this or any previous visit.    CXR:  Recent Results (from the past 24 hour(s))   Echocardiogram Complete    Narrative    [id number]  [id number]  [id number]  [id number]^CRYS^JANET           [facility],[place]  Echocardiography Laboratory  [address] [place] [id number]     Name: ALESHA MCKENNA  MRN: [id number]  : 1950  Study Date: 2020 01:35 PM  Age: 70 yrs  Gender: Male  Patient Location: [facility]  Reason  For Study: Cardiac Arrest  Ordering Physician: JANET SPANGLER  Performed By: Jose Calvo RDCS     BSA: 2.1 m2  Height: 71 in  Weight: 198 lb  HR: 77  _____________________________________________________________________________  __        Procedure  Complete Portable Echo Adult.  _____________________________________________________________________________  __        Interpretation Summary  Patient is on VA-ECMO support at 5LPM at the time of the study. Arrhythmia  noted.     Left ventricular wall thickness is normal. LVIDD is 6.0 cm. LVIDs is 5.9cm The  Ejection Fraction is estimated at 5-10%.  The right ventricle is normal size.  Global right ventricular function is moderately to severely reduced.  The aortic valve opens partially with each cardiac cycle  The inferior vena cava is normal. Venous ECMO cannula noted in the IVC. No  pericardial effusion is present.     There is no prior study for direct comparison.  _____________________________________________________________________________  __        Left Ventricle  Left ventricular wall thickness is normal. LVIDD is 6.0 cm. LVIDs is 5.9cm.  The Ejection Fraction is estimated at 5-10%. Left ventricular diastolic  function is not assessable. Severe diffuse hypokinesis is present.     Right Ventricle  The right ventricle is normal size. Global right ventricular function is  moderately to severely reduced. A pacemaker lead is noted in the right  ventricle.     Atria  The atria cannot be assessed.        Mitral Valve  The mitral valve is normal. Trace mitral insufficiency is present.     Aortic Valve  Aortic valve is normal in structure and function. The aortic valve opens  partially with each cardiac cycle.     Tricuspid Valve  The tricuspid valve is normal.     Pulmonic Valve  The pulmonic valve is normal.     Vessels  The inferior vena cava is normal. Venoous ECMO cannula noted in the IVC.     Pericardium  No pericardial effusion is present.         Compared to Previous Study  There is no prior study for direct comparison.  _____________________________________________________________________________  __     MMode/2D Measurements & Calculations  IVSd: 1.4 cm  LVIDd: 6.0 cm  LVIDs: 5.9 cm  LVPWd: 1.1 cm  FS: 1.8 %  LV mass(C)d: 338.6 grams  LV mass(C)dI: 161.2 grams/m2  asc Aorta Diam: 3.3 cm  RWT: 0.36  TAPSE: 0.67 cm        Doppler Measurements & Calculations  PA acc time: 0.09 sec        _____________________________________________________________________________  __        Report approved by: Usha SHARMA 09/17/2020 02:46 PM      US Lower Extremity Arterial Duplex Bilateral   Result Value    Radiologist flags Right SFA occlusion (Urgent)    Narrative    Exam: US LOWER EXTREMITY ARTERIAL DUPLEX BILATERAL, 9/17/2020 3:06 PM    Indication: Baseline to assess blood flow to Lower Extremities (VA  ECMO)    Comparison: None    Technique: Grayscale (B-mode), color Doppler, and duplex spectral  Doppler ultrasound of the lower extremity arteries. Velocity  measurements obtained with angle correction of 60 degrees or less.    Findings:     Examination is limited by ECMO cannula in the right groin and left  groin wound VAC.    Right lower extremity:  The proximal vessels are obscured by ECMO cannulae. The mid SFA is  patent, with evidence of atherosclerotic narrowing. The SFA is  occluded in the mid to distal thigh, reconstituting at the level of  the popliteal artery. The posterior tibial artery is patent to the  ankle. The anterior tibial artery appears occluded.    Left lower extremity:  There is a left femoral to popliteal bypass graft. The proximal  anastomosis is obscured by overlying wound VAC. Visualized portions of  the graft appear patent. The graft anastomosis in the popliteal fossa  is patent. The popliteal artery is patent. The posterior and anterior  tibial arteries appear patent.    Waveforms cannot be analyzed due to presence of ECMO.       Impression    Impression:   1. Limited examination secondary to right groin ECMO access and left  groin wound VAC.  2. Occlusion of the mid to distal right SFA, with reconstitution at  the level of the popliteal artery. The anterior tibial artery appears  occluded.  3. Visualized arteries of the left lower extremity appear patent,  including the visualized portion of the left femoral-popliteal bypass  graft.    [Urgent Result: Right SFA occlusion]    Finding was identified on 9/17/2020 3:10 PM.     Dr. Saba was contacted by Dr. Deleon at 9/17/2020 3:19 PM and  verbalized understanding of the urgent finding.     I have personally reviewed the examination and initial interpretation  and I agree with the findings.    TAN NICHOLAS MD   Cardiac Device Check - Inpatient   Result Value    Date Time Interrogation Session [id number]    Implantable Pulse Generator  [employer]    Implantable Pulse Generator Model D233 VIGILANT EL ICD    Implantable Pulse Generator Serial Number [id number]    Type Interrogation Session In Clinic    Clinic Name [facility] [place]    Implantable Pulse Generator Type Defibrillator    Implantable Pulse Generator Implant Date 20190920    Implantable Lead  [employer]    Implantable Lead Model 0676 Reliance 4-Front    Implantable Lead Serial Number [id number]    Implantable Lead Implant Date 20190920    Implantable Lead Polarity Type Tripolar Lead    Implantable Lead Location Detail 1 UNKNOWN    Implantable Lead Location Right Ventricle    Implantable Lead  [employer]    Implantable Lead Model 7741 Ingevity MRI    Implantable Lead Serial Number [id number]    Implantable Lead Implant Date 20190920    Implantable Lead Polarity Type Bipolar Lead    Implantable Lead Location Detail 1 UNKNOWN    Implantable Lead Location Right Atrium    Igor Setting Mode (NBG Code) DDDR    Igor Setting Lower Rate Limit 60    Igor Setting Maximum  Tracking Rate 120    Igor Setting Maximum Sensor Rate 130    Igor Setting CYRIL Delay Low 200.0    Igor Setting PAV Delay Low 300.0    Igor Setting PAV Delay High 200.0    Igor Setting CYRIL Delay High 135.0    Igor Setting AT Mode Switch Rate 150    Igor Setting AT Mode Switch Mode VDIR    Lead Channel Setting Sensing Polarity Bipolar    Lead Channel Setting Sensing Sensitivity 0.5    Lead Channel Setting Sensing Adaptation Mode Adaptive    Lead Channel Setting Sensing Polarity Bipolar    Lead Channel Setting Sensing Sensitivity 0.6    Lead Channel Setting Sensing Adaptation Mode Adaptive    Lead Channel Setting Pacing Polarity Bipolar    Lead Channel Setting Pacing Pulse Width 0.4    Lead Channel Setting Pacing Amplitude 1.5    Lead Channel Setting Pacing Capture Mode Monitor    Lead Channel Setting Pacing Polarity Bipolar    Lead Channel Setting Pacing Pulse Width 0.4    Lead Channel Setting Pacing Amplitude 1.5    Lead Channel Setting Pacing Capture Mode Monitor    Zone Setting Type Category VF    Zone Setting Vendor Type Category VF    Zone Setting Detection Interval 250.0    Zone Setting Type Category VT    Zone Setting Vendor Type Category VT    Zone Setting Detection Interval 324.0    Zone Setting Type Category VT    Zone Setting Vendor Type Category VT-1    Lead Channel Impedance Value 582.0    Lead Channel Sensing Intrinsic Amplitude 2.4    Lead Channel Pacing Threshold Amplitude 0.7000    Lead Channel Pacing Threshold Pulse Width 0.4    Lead Channel Impedance Value 317.0    Lead Channel Sensing Intrinsic Amplitude 10.2    Lead Channel Pacing Threshold Amplitude 0.7000    Lead Channel Pacing Threshold Pulse Width 0.4    Battery Status Beginning of Service    Capacitor Charge Type Reformation    Capacitor Last Charge Date Time [id number]    Capacitor Charge Time 9.57    Capacitor Charge Type Shock    Capacitor Charge Time 7.06    Capacitor Charge Energy 41.0    Therapy Statistic Total Shocks  Delivered 30    Therapy Statistic Total Shocks Aborted 9    Therapy Statistic Total ATP Delivered 51    Therapy Statistic Total  Date Time End 20200917000000    Therapy Statistic Recent Shocks Delivered 29    Therapy Statistic Recent Shocks Aborted 9    Therapy Statistic Recent ATP Delivered 49    Therapy Statistic Recent Date Time Start 20200721112218    Therapy Statistic Recent Date Time End 20200917000000    Episode Statistic Total Count 17    Episode Statistic Type Category VT    Episode Statistic Vendor Type Category NSVT    Episode Statistic Total Count 47    Episode Statistic Type Category VF    Episode Statistic Vendor Type Category VF    Episode Statistic Total Count 13    Episode Statistic Type Category VF_VT_MONITOR    Episode Statistic Total Date Time End 20200917000000    Episode Statistic Total Date Time End 20200917000000    Episode Statistic Total Date Time End 20200917000000    Episode Statistic Recent Count 17    Episode Statistic Type Category VT    Episode Statistic Vendor Type Category NSVT    Episode Statistic Recent Count 46    Episode Statistic Type Category VF    Episode Statistic Vendor Type Category VF    Episode Statistic Recent Count 13    Episode Statistic Type Category VF_VT_MONITOR    Episode Statistic Recent Date Time Start 20200721112218    Episode Statistic Recent Date Time End 20200917000000    Episode Statistic Recent Date Time Start 20200721112218    Episode Statistic Recent Date Time End 20200917000000    Episode Statistic Recent Date Time Start 20200721112218    Episode Statistic Recent Date Time End 20200917000000    Narrative    Pt seen on 4A for evaluation and iterative programming of a [employer], dual lead ICD, per MD orders. Today his intrinsic rhythm is SR 80 with frequent PVCs. Normal ICD function. 29 shocks delivered during cardiac arrest on 9/16/20. AP= 47% and = 3%. Lead trends appear stable. No programming changes made. Battery estimates 12 years to GILMA.   VERENA Reynolds.    Dual lead ICD    I have reviewed and interpreted the device interrogation, settings, programming and nurse's summary. The device is functioning within normal device parameters. I agree with the current findings, assessment and plan.   CT Head w/o Contrast   Result Value    Radiologist flags Diffuse brain edema and effacement of the lateral (Urgent)    Narrative    CT HEAD W/O CONTRAST 9/17/2020 9:40 PM    History: assess for cerebral edema     Comparison: 9/17/2020    Technique: Using multidetector thin collimation helical acquisition  technique, axial, coronal and sagittal CT images from the skull base  to the vertex were obtained without intravenous contrast.    Findings: Note that patient had's catheter angiography to the prior.  Even though exam is obtained without contrast, there is retained  contrast in the vascular structures.    Compared to most recent CT earlier the same day at 2: 42, there is  worsening diffuse brain edema involving all cortical structures. Due  to mass effect, the ventricles are near completely effaced. Extensive  diffuse contrast enhancement in the entire cortical structures and  gray matter likely due to disruption of the blood brain barrier.  Complete effacement of the basal cisterns compared to prior. The  cerebellum is also edematous and fourth ventricle is more effaced  compared to prior CT. Gray-white matter differentiation is lost in the  ventral temporal lobes.    Calvarial structures are normal in appearance. Disconjugate gaze.  Clear paranasal sinuses/mastoid air cells.    Diffuse anasarca-type edema partially visualized.      Impression    IMPRESSION: Worsening presumed diffuse anoxic brain injury, now with  diffuse brain edema resulting in slitlike ventricles/complete  effacement of the basal cisterns. Diffuse enhancement of the cortical  structures is likely from retained contrast in the circulation and  leakage from blood-brain barrier  disruption.      [Urgent Result: Diffuse brain edema and effacement of the lateral  ventricle/basal cisterns]    Finding was identified on 9/17/2020 9:43 PM.     Dr Alves was contacted by Dr. Mateo Hwang at 9/17/2020 10:50 PM  and verbalized understanding of the urgent finding.          I have personally reviewed the examination and initial interpretation  and I agree with the findings.    MATEO HWANG MD       Labs:  Recent Labs   Lab 09/18/20  0558 09/18/20  0344 09/18/20  0209 09/18/20  0014   PH 7.41 7.42 7.40 7.40   PCO2 40 40 42 41   PO2 112* 117* 106* 99   HCO3 26 26 25 25   O2PER 40 40 40 40       Lab Results   Component Value Date    HGB 8.1 (L) 09/18/2020    PHGB 40 (H) 09/18/2020    PLT 90 (L) 09/18/2020    FIBR 324 09/18/2020    INR 2.34 (H) 09/18/2020    PTT 38 (H) 09/18/2020    DD 12.0 (H) 09/18/2020    AXA <0.10 09/18/2020    ANTCH 72 (L) 09/17/2020         Plan is to continue VA ECMO support at this time. No definitive plans made at this time for withdrawal of care or brain scans, future neurological work to come.      Hai Castellon, RT  ECMO Specialist  9/18/2020 6:31 AM

## 2020-09-18 NOTE — PROGRESS NOTES
[facility]: [place]  NeuroCritical Care Progress Note    Patient Name:  Keanu Spence  MRN:  [id number]    :  1950  Date of Service:  2020  Primary care provider:  Ken Keen      Reason for Consult: Asked by CSI to see Keanu Spence for Cardiac arrest    History of Present Illness:   Keanu Spence is a 70 year old male admitted on 2020 with a PMH of CAD s/p CABG, DM, a-flutter s/p ICD on warfarin, CHF, and recent left femorl endartectomy who presents with cardiac arrest. Now s/p SIRI to LM to Lcx, LAD and ECMO.      V-fib arrest  Down 15 minutes  3 rounds of epi  Rosc in cath lab  ECMO  Cooled-> being rewarmed for coagulopathy       NEURO RECS  Decision made to withdrawal care on pt. We support families decision based on pts poor neurological exam and CT concerning for anoxic injury.     We will sign off at this time.  Thank you for this consultation.    PMH:  History reviewed. No pertinent past medical history.  Past Surgical History:   Procedure Laterality Date     CV CORONARY ANGIOGRAM N/A 2020    Procedure: Coronary Angiogram;  Surgeon: Filemon Vega MD;  Location: [facility] CARDIAC CATH LAB     CV EXTRACORPERAL MEMBRANE OXYGENATION N/A 2020    Procedure: Extracorporeal Membrance Oxygenation;  Surgeon: Filemon Vega MD;  Location: [facility] CARDIAC CATH LAB     CV PCI STENT DRUG ELUTING N/A 2020    Procedure: Percutaneous Coronary Intervention Stent Drug Eluting;  Surgeon: Filemon Vega MD;  Location: [facility] CARDIAC CATH LAB       Allergies:  No Known Allergies    Medications:      Current Facility-Administered Medications:      artificial tears ophthalmic ointment, , Both Eyes, Q8H, Bonnie Alves MD     [START ON 2020] aspirin (ASA) chewable tablet 81 mg, 81 mg, Oral or Feeding Tube, Daily, Filemon Vega MD     calcium chloride in  mL intermittent infusion 1 g, 1 g, Intravenous, Q6H  PRN, Bonnie Alves MD, 1 g at 09/17/20 1208     cisatracurium (NIMBEX) 200 mg in D5W 100 mL infusion, 3-10 mcg/kg/min (Dosing Weight), Intravenous, Continuous **AND** [DISCONTINUED] artificial tears ophthalmic ointment, , Both Eyes, Q8H, Bonnie Alves MD     EPINEPHrine (ADRENALIN) 5 mg in sodium chloride 0.9 % 250 mL infusion, 0.03-0.3 mcg/kg/min (Dosing Weight), Intravenous, Continuous, Bonnie Alves MD     fentaNYL (SUBLIMAZE) infusion,  mcg/hr, Intravenous, Continuous, Bonnie Alves MD, Stopped at 09/17/20 2245     heparin 100 UNIT/ML injection 380-750 Units, 5-10 Units/kg (Ideal), Other, Q30 Min PRN, Filemon Vega MD     heparin 2 unit/mL in 0.9% NaCl (for REPERFUSION CATHETER), 3 mL/hr, Intravenous, Continuous, Bonnie Alves MD, Stopped at 09/17/20 1000     heparin 25,000 units in 0.45% NaCl 250 mL ANTICOAGULANT  infusion, 10-50 Units/kg/hr (Ideal), Other, Continuous, Filemon Vega MD, Stopped at 09/17/20 0442     lidocaine (LMX4) cream, , Topical, Q1H PRN, Bonnei Alves MD     lidocaine 1 % 0.1-1 mL, 0.1-1 mL, Other, Q1H PRN, Bonnie Alves MD     magnesium sulfate 2 g in water intermittent infusion, 2 g, Intravenous, Daily PRN, Bonnie Alves MD     magnesium sulfate 4 g in 100 mL sterile water (premade), 4 g, Intravenous, Q4H PRN, Bonnie Alves MD     Medication Considerations - To maintain platelet inhibition after discontinuation of cangrelor (KENGREAL) [see admin instructions], , Does not apply, Continuous PRN, Filemon Vega MD     midazolam (VERSED) drip - ADULT 100 mg/100 mL in NS PRE-MIX, 1-8 mg/hr, Intravenous, Continuous, Bonnie Alves MD, Stopped at 09/17/20 4074     midazolam (VERSED) injection 1-3 mg, 1-3 mg, Intravenous, Q1H PRN, Bonnie Alves MD     naloxone (NARCAN) injection 0.1-0.4 mg, 0.1-0.4 mg, Intravenous, Q2 Min PRN, Filemon Vega MD     norepinephrine  (LEVOPHED) 16 mg in  mL infusion, 0.03-0.4 mcg/kg/min (Dosing Weight), Intravenous, Continuous, Bonnie Alves MD, Last Rate: 6.7 mL/hr at 09/18/20 1400, 0.08 mcg/kg/min at 09/18/20 1400     pantoprazole (PROTONIX) 40 mg IV push injection, 40 mg, Intravenous, Daily, Bonnie Alves MD, 40 mg at 09/18/20 0742     phenylephrine (JUNI-SYNEPHRINE) 50 mg in sodium chloride 0.9 % 250 mL infusion, 0.5-6 mcg/kg/min (Dosing Weight), Intravenous, Continuous, Bonnie Alves MD, Stopped at 09/17/20 1105     piperacillin-tazobactam (ZOSYN) 2.25 g vial to attach to  ml bag, 2.25 g, Intravenous, Q6H, Filemon Vega MD, 2.25 g at 09/18/20 1019     potassium chloride 20 mEq in 50 mL intermittent infusion, 20 mEq, Intravenous, Q1H PRN, Bonnie Alves MD     propofol (DIPRIVAN) infusion, 5-75 mcg/kg/min (Dosing Weight), Intravenous, Continuous, Bonnie Alves MD, Stopped at 09/17/20 2245     rifampin (REIFADEN) suspension 300 mg, 300 mg, Oral or Feeding Tube, BID, Susi Loomis MD, 300 mg at 09/18/20 0743     sodium chloride (PF) 0.9% PF flush 3 mL, 3 mL, Intracatheter, q1 min prn, Bonnie Alves MD     sodium chloride (PF) 0.9% PF flush 3 mL, 3 mL, Intracatheter, Q8H, Bonnie Alves MD, 3 mL at 09/18/20 0351     sodium chloride 3% infusion, , Intravenous, Continuous, Mariza Lugo MD, Last Rate: 100 mL/hr at 09/18/20 1400     sodium phosphate 10 mmol in D5W intermittent infusion, 10 mmol, Intravenous, Daily PRN, Bonnie Alves MD     sodium phosphate 15 mmol in D5W intermittent infusion, 15 mmol, Intravenous, Daily PRN, Bonnie Alves MD     sodium phosphate 20 mmol in D5W intermittent infusion, 20 mmol, Intravenous, Q6H PRN, Bonnie Alves MD     sodium phosphate 25 mmol in D5W intermittent infusion, 25 mmol, Intravenous, Q8H PRN, Bonnie Alves MD     ticagrelor (BRILINTA) tablet 90 mg, 90 mg, Oral or Feeding  "Tube, BID, Filemon Vega MD, 90 mg at 09/18/20 1019     [START ON 9/19/2020] vancomycin 1250 mg in 0.9% NaCl 250 mL intermittent infusion 1,250 mg, 1,250 mg, Intravenous, Q48H, Susi Salcido, RPH     vasopressin 40 units in NS 40 mL (PITRESSIN) infusion, 0.5-4 Units/hr, Intravenous, Continuous, [place], Cary Riley MD, Last Rate: 0.5 mL/hr at 09/18/20 1400, 0.5 Units/hr at 09/18/20 1400    Social History:  Social History     Tobacco Use     Smoking status: Not on file   Substance Use Topics     Alcohol use: Not on file       Family History:    History reviewed. No pertinent family history.    Physical Examination:   Pulse 99   Temp 96.8  F (36  C)   Resp 10   Ht 1.803 m (5' 11\")   Wt 97.1 kg (214 lb 1.1 oz)   SpO2 100%   BMI 29.86 kg/m    General: Adult male patient, lying in bed, NAD  HEENT: ETT  Cardiac: ECMO  Pulm: vent  Abdomen: Soft, bowel sounds present  Extremities: Warm, no edema  Skin: ECMO catheters   Psych: Mood pleasant, affect congruent  Neuro:  Pt does not open eyes to voice or pain, pupils nonreactive, dolls eyes negative, no cough or gag. No movement in 4/4 extremities.    I have personally reviewed all labs and imaging for this patient.      Patient discussed with attending neurologist, Dr. Kevin Richard, DNP  Ascom: *[phone]   "

## 2020-09-18 NOTE — PROGRESS NOTES
ECMO Shift Summary:      ECMO Equipment:  Console serial number: [id number]  Circuit Lot number: [id number]  Oxygenator Lot number: [id number]    Patient remains on VA ECMO, all equipment is functioning and alarms are appropriately set. RPM's 3800 with flow range 4.70-4.75 L/min. Sweep gas is at 1 LPM and FiO2 50%. Circuit remains free of air, clot and fibrin. Cannulas are secure with no bleeding from site. Extremities are warm.    Significant Shift Events: none    Vent settings:  Ventilation Mode: CMV/AC  (Continuous Mandatory Ventilation/ Assist Control)  FiO2 (%): 40 %  Rate Set (breaths/minute): 10 breaths/min  Tidal Volume Set (mL): 450 mL  PEEP (cm H2O): 7 cmH2O  Oxygen Concentration (%): 40 %  Peak Inspiratory Pressure (cm H2O) (Drager Verenice): 20  Resp: 10  .    Heparin is OFF, ACT range 145.    Urine output is good, blood loss was none. Product given included none.      Intake/Output Summary (Last 24 hours) at 9/18/2020 1432  Last data filed at 9/18/2020 1400  Gross per 24 hour   Intake 2556.63 ml   Output 2875 ml   Net -318.37 ml       ECHO:  No results found for this or any previous visit.No results found for this or any previous visit.    CXR:  Recent Results (from the past 24 hour(s))   US Lower Extremity Arterial Duplex Bilateral   Result Value    Radiologist flags Right SFA occlusion (Urgent)    Narrative    Exam: US LOWER EXTREMITY ARTERIAL DUPLEX BILATERAL, 9/17/2020 3:06 PM    Indication: Baseline to assess blood flow to Lower Extremities (VA  ECMO)    Comparison: None    Technique: Grayscale (B-mode), color Doppler, and duplex spectral  Doppler ultrasound of the lower extremity arteries. Velocity  measurements obtained with angle correction of 60 degrees or less.    Findings:     Examination is limited by ECMO cannula in the right groin and left  groin wound VAC.    Right lower extremity:  The proximal vessels are obscured by ECMO cannulae. The mid SFA is  patent, with evidence of atherosclerotic  narrowing. The SFA is  occluded in the mid to distal thigh, reconstituting at the level of  the popliteal artery. The posterior tibial artery is patent to the  ankle. The anterior tibial artery appears occluded.    Left lower extremity:  There is a left femoral to popliteal bypass graft. The proximal  anastomosis is obscured by overlying wound VAC. Visualized portions of  the graft appear patent. The graft anastomosis in the popliteal fossa  is patent. The popliteal artery is patent. The posterior and anterior  tibial arteries appear patent.    Waveforms cannot be analyzed due to presence of ECMO.      Impression    Impression:   1. Limited examination secondary to right groin ECMO access and left  groin wound VAC.  2. Occlusion of the mid to distal right SFA, with reconstitution at  the level of the popliteal artery. The anterior tibial artery appears  occluded.  3. Visualized arteries of the left lower extremity appear patent,  including the visualized portion of the left femoral-popliteal bypass  graft.    [Urgent Result: Right SFA occlusion]    Finding was identified on 9/17/2020 3:10 PM.     Dr. Saba was contacted by Dr. Deleon at 9/17/2020 3:19 PM and  verbalized understanding of the urgent finding.     I have personally reviewed the examination and initial interpretation  and I agree with the findings.    TAN NICHOLAS MD   Cardiac Device Check - Inpatient   Result Value    Date Time Interrogation Session [id number]    Implantable Pulse Generator  [employer]    Implantable Pulse Generator Model D233 ZHENG  ICD    Implantable Pulse Generator Serial Number [id number]    Type Interrogation Session In Clinic    Clinic Name [facility]    Implantable Pulse Generator Type Defibrillator    Implantable Pulse Generator Implant Date 20190920    Implantable Lead  [employer]    Implantable Lead Model 0676 Reliance 4-Front    Implantable Lead Serial  Number [id number]    Implantable Lead Implant Date 20190920    Implantable Lead Polarity Type Tripolar Lead    Implantable Lead Location Detail 1 UNKNOWN    Implantable Lead Location Right Ventricle    Implantable Lead  [employer]    Implantable Lead Model 7741 Ingevity MRI    Implantable Lead Serial Number [id number]    Implantable Lead Implant Date 20190920    Implantable Lead Polarity Type Bipolar Lead    Implantable Lead Location Detail 1 UNKNOWN    Implantable Lead Location Right Atrium    Igor Setting Mode (NBG Code) DDDR    Igor Setting Lower Rate Limit 60    Igor Setting Maximum Tracking Rate 120    Igor Setting Maximum Sensor Rate 130    Igor Setting CYRIL Delay Low 200.0    Igor Setting PAV Delay Low 300.0    Igor Setting PAV Delay High 200.0    Igor Setting CYRIL Delay High 135.0    Igor Setting AT Mode Switch Rate 150    Igor Setting AT Mode Switch Mode VDIR    Lead Channel Setting Sensing Polarity Bipolar    Lead Channel Setting Sensing Sensitivity 0.5    Lead Channel Setting Sensing Adaptation Mode Adaptive    Lead Channel Setting Sensing Polarity Bipolar    Lead Channel Setting Sensing Sensitivity 0.6    Lead Channel Setting Sensing Adaptation Mode Adaptive    Lead Channel Setting Pacing Polarity Bipolar    Lead Channel Setting Pacing Pulse Width 0.4    Lead Channel Setting Pacing Amplitude 1.5    Lead Channel Setting Pacing Capture Mode Monitor    Lead Channel Setting Pacing Polarity Bipolar    Lead Channel Setting Pacing Pulse Width 0.4    Lead Channel Setting Pacing Amplitude 1.5    Lead Channel Setting Pacing Capture Mode Monitor    Zone Setting Type Category VF    Zone Setting Vendor Type Category VF    Zone Setting Detection Interval 250.0    Zone Setting Type Category VT    Zone Setting Vendor Type Category VT    Zone Setting Detection Interval 324.0    Zone Setting Type Category VT    Zone Setting Vendor Type Category VT-1    Lead Channel Impedance Value 582.0    Lead Channel  Sensing Intrinsic Amplitude 2.4    Lead Channel Pacing Threshold Amplitude 0.7000    Lead Channel Pacing Threshold Pulse Width 0.4    Lead Channel Impedance Value 317.0    Lead Channel Sensing Intrinsic Amplitude 10.2    Lead Channel Pacing Threshold Amplitude 0.7000    Lead Channel Pacing Threshold Pulse Width 0.4    Battery Status Beginning of Service    Capacitor Charge Type Reformation    Capacitor Last Charge Date Time [id number]    Capacitor Charge Time 9.57    Capacitor Charge Type Shock    Capacitor Charge Time 7.06    Capacitor Charge Energy 41.0    Therapy Statistic Total Shocks Delivered 30    Therapy Statistic Total Shocks Aborted 9    Therapy Statistic Total ATP Delivered 51    Therapy Statistic Total  Date Time End 20200917000000    Therapy Statistic Recent Shocks Delivered 29    Therapy Statistic Recent Shocks Aborted 9    Therapy Statistic Recent ATP Delivered 49    Therapy Statistic Recent Date Time Start 20200721112218    Therapy Statistic Recent Date Time End 20200917000000    Episode Statistic Total Count 17    Episode Statistic Type Category VT    Episode Statistic Vendor Type Category NSVT    Episode Statistic Total Count 47    Episode Statistic Type Category VF    Episode Statistic Vendor Type Category VF    Episode Statistic Total Count 13    Episode Statistic Type Category VF_VT_MONITOR    Episode Statistic Total Date Time End 20200917000000    Episode Statistic Total Date Time End 20200917000000    Episode Statistic Total Date Time End 20200917000000    Episode Statistic Recent Count 17    Episode Statistic Type Category VT    Episode Statistic Vendor Type Category NSVT    Episode Statistic Recent Count 46    Episode Statistic Type Category VF    Episode Statistic Vendor Type Category VF    Episode Statistic Recent Count 13    Episode Statistic Type Category VF_VT_MONITOR    Episode Statistic Recent Date Time Start 20200721112218    Episode Statistic Recent Date Time End 20200917000000     Episode Statistic Recent Date Time Start 12024002178540    Episode Statistic Recent Date Time End [id number]    Episode Statistic Recent Date Time Start [id number]    Episode Statistic Recent Date Time End [id number]    Narrative    Pt seen on 4A for evaluation and iterative programming of a [place]   [employer], dual lead ICD, per MD orders. Today his intrinsic rhythm is SR   80 with frequent PVCs. Normal ICD function. 29 shocks delivered during   cardiac arrest on 9/16/20. AP= 47% and = 3%. Lead trends appear stable.   No programming changes made. Battery estimates 12 years to GABRIEL Reynolds RN.    Dual lead ICD    I have reviewed and interpreted the device interrogation, settings,   programming and nurse's summary. The device is functioning within normal   device parameters. I agree with the current findings, assessment and plan.   CT Head w/o Contrast   Result Value    Radiologist flags Diffuse brain edema and effacement of the lateral (Urgent)    Narrative    CT HEAD W/O CONTRAST 9/17/2020 9:40 PM    History: assess for cerebral edema     Comparison: 9/17/2020    Technique: Using multidetector thin collimation helical acquisition  technique, axial, coronal and sagittal CT images from the skull base  to the vertex were obtained without intravenous contrast.    Findings: Note that patient had's catheter angiography to the prior.  Even though exam is obtained without contrast, there is retained  contrast in the vascular structures.    Compared to most recent CT earlier the same day at 2: 42, there is  worsening diffuse brain edema involving all cortical structures. Due  to mass effect, the ventricles are near completely effaced. Extensive  diffuse contrast enhancement in the entire cortical structures and  gray matter likely due to disruption of the blood brain barrier.  Complete effacement of the basal cisterns compared to prior. The  cerebellum is also edematous and fourth ventricle is more  effaced  compared to prior CT. Gray-white matter differentiation is lost in the  ventral temporal lobes.    Calvarial structures are normal in appearance. Disconjugate gaze.  Clear paranasal sinuses/mastoid air cells.    Diffuse anasarca-type edema partially visualized.      Impression    IMPRESSION: Worsening presumed diffuse anoxic brain injury, now with  diffuse brain edema resulting in slitlike ventricles/complete  effacement of the basal cisterns. Diffuse enhancement of the cortical  structures is likely from retained contrast in the circulation and  leakage from blood-brain barrier disruption.      [Urgent Result: Diffuse brain edema and effacement of the lateral  ventricle/basal cisterns]    Finding was identified on 9/17/2020 9:43 PM.     Dr Alves was contacted by Dr. Mateo Hwang at 9/17/2020 10:50 PM  and verbalized understanding of the urgent finding.          I have personally reviewed the examination and initial interpretation  and I agree with the findings.    MATEO HWANG MD   XR Chest Port 1 View    Narrative    Exam:  Chest X-ray 9/18/2020 1:40 AM    History: Check endotracheal tube placement and ECLS cannula placement.  DO NOT log-roll patient.  Place film under patient using patient  safety handling process.    Comparison: 9/17/2020    Findings: Supine frontal radiograph of the chest. Unchanged lines and  tubes. Unchanged left chest wall implantable cardiac defibrillator.  The trachea is midline. Stable appearance of the cardial mediastinal  silhouette. No pleural effusion. Increased bilateral interstitial  pulmonary opacities. No pneumothorax. The upper abdomen is  unremarkable. No acute bone findings.      Impression    Impression:   1. Mildly increased interstitial opacities, may represent either early  pulmonary edema or infection.  2. Unchanged support devices.    I have personally reviewed the examination and initial interpretation  and I agree with the findings.    TANNER LONGORIA MD        Labs:  Recent Labs   Lab 09/18/20  1258 09/18/20  1000 09/18/20  0834 09/18/20  0558   PH 7.40 7.40 7.40 7.41   PCO2 42 42 41 40   PO2 116* 116* 117* 112*   HCO3 26 26 26 26   O2PER 40 40 40 40       Lab Results   Component Value Date    HGB 7.7 (L) 09/18/2020    PHGB 40 (H) 09/18/2020    PLT 85 (L) 09/18/2020    FIBR 324 09/18/2020    INR 2.65 (H) 09/18/2020    PTT 38 (H) 09/18/2020    DD 12.0 (H) 09/18/2020    AXA <0.10 09/18/2020    ANTCH 65 (L) 09/18/2020         Plan: Patient's wife has made the decision to withdraw support.      Christa Albright, RT  ECMO Specialist  9/18/2020 2:32 PM

## 2020-09-18 NOTE — PROVIDER NOTIFICATION
2000: Pupils are 2mm and fixed, left eye deviated downward. No contractions in all 4 limbs with stimuli, no cough on ET tube. Unchanged from day shift assessment.    2100: Pupils are 6mm and fixed bilaterally, left eye remains deviated downward, no contractions in all 4 limbs, no cough on ET tube, EEG monitor showing flat lines. MD called to bedside for exam.    2120: STAT head CT.    2230: Neuro resident to bedside for assessment. Versed, Fentanyl, and Propofol gtt turned off per instruction.    2300: Wife updated by CSI team via phone. 23% gtt given x 2. 3% gtt initiated. Na goal of 155-160. Plan to continue as full code until day shift where the team and spouse will re-assess the plan moving forward. [facility] has been called.    Reference #: [id number]

## 2020-09-18 NOTE — DISCHARGE SUMMARY
[facility] Discharge Summary    Keanu Spence MRN# [id number]   Age: 70 year old YOB: 1950     Date of Admission:  9/16/2020  Date of Discharge::  9/18/20  Admitting Physician:  Filemon Vega MD  Discharge Physician:  Raffi Lang MD         Admission Diagnoses:   Cardiac arrest (H) [I46.9]          Discharge Diagnosis:   Active Problems:    Cardiac arrest (H)          Procedures:   - Successful right femoral 17Fr arterial and 25Fr venous cannulation for VA ECMO. Antegrade perfusion sheath positioned in the right SFA.  - Successful defibrillation and restoration of sinus rhythm.  - Severe coronary artery disease                - 100% occluded mLAD with patent LIMA-mLAD                - 100% occluded vein grafts to RCA, diagonal, and OM1.                - 100% occluded native RCA.                - 100% occluded OM1.                - 80% distal LM into circumflex stenosis; 80% calcified mid-circumflex stenosis.                - 70% ostial LAD stenosis; 70% mid LAD stenosis.   - Successful angioplasty and stents:                - 4.0x16mm Synergy SIRI to LM into proximal circumflex                - 3.5x13mm Orsiro SIRI to ostial LAD in a T-stent fashion                - 2.73y32xe and 2.5x22mm Orsiro SIRI to mid LAD in overlapping fashion  - Unsuccessful attempts:                - OM1 - chronic occlusion, unable to wire despite appropriate escalation                - mid circumflex - successful wiring, unable to deliver 1.5mm predilation balloon (Takeru)                - RCA - chronic occlusion, unable to wire despite appropriate escalation  - Right radial arterial line inserted and secured  - Right internal jugular venous triple lumen catheter inserted and secured     Complications:  - Right thigh hematoma and right neck hematoma in the setting of therapeutic anticoagulation, oral antiplatelets, and cangrelor administration.          Medications Prior to Admission:     Medications Prior to  Admission   Medication Sig Dispense Refill Last Dose     acetaminophen (TYLENOL) 500 MG tablet Take 1,000 mg by mouth every 6 hours as needed for pain        albuterol (PROAIR HFA/PROVENTIL HFA/VENTOLIN HFA) 108 (90 Base) MCG/ACT inhaler Inhale 2 puffs into the lungs every 4 hours as needed for shortness of breath / dyspnea        amiodarone (PACERONE) 200 MG tablet Take 200 mg by mouth daily        aspirin (ASA) 81 MG EC tablet Take 81 mg by mouth daily        atorvastatin (LIPITOR) 40 MG tablet Take 40 mg by mouth At Bedtime        benzocaine-menthol (CEPACOL) 15-3.6 MG lozenge Place 1 lozenge inside cheek every hour as needed for moderate pain        doxycycline hyclate (VIBRA-TABS) 100 MG tablet Take 100 mg by mouth 2 times daily To start 10/16/2020, Suppressive antibiotic therapy for MRSA graft infection        hydrALAZINE (APRESOLINE) 25 MG tablet Take 25 mg by mouth 2 times daily        isosorbide mononitrate (IMDUR) 30 MG 24 hr tablet Take 30 mg by mouth daily        metoprolol succinate ER (TOPROL-XL) 100 MG 24 hr tablet Take 100 mg by mouth daily        multivitamin w/minerals (THERA-VIT-M) tablet Take 1 tablet by mouth daily        nitroGLYcerin (NITROSTAT) 0.4 MG sublingual tablet Place 0.4 mg under the tongue every 5 minutes as needed for chest pain For chest pain place 1 tablet under the tongue every 5 minutes for 3 doses. If symptoms persist 5 minutes after 1st dose call 911.        oxyCODONE-acetaminophen (PERCOCET) 5-325 MG tablet Take 1 tablet by mouth every 6 hours as needed for pain        pantoprazole (PROTONIX) 40 MG EC tablet Take 40 mg by mouth At Bedtime        polyethylene glycol (MIRALAX) 17 g packet Take 1 packet by mouth daily        rifampin (RIFADIN) 300 MG capsule Take 300 mg by mouth 2 times daily (9/8-10/8)        senna-docusate (SENOKOT-S/PERICOLACE) 8.6-50 MG tablet Take 1 tablet by mouth 2 times daily        vancomycin 1,250 mg Inject 1,250 mg into the vein every 48 hours         warfarin ANTICOAGULANT (COUMADIN) 1 MG tablet Take 1 mg by mouth daily As directed (AFIB, goal INR=2-3)        Cholecalciferol (VITAMIN D) 1000 UNITS capsule Take 1 capsule by mouth daily.        glucose blood VI test strips strip by In Vitro route 2 times daily. TEST TWICE DAILY        LANCETS MISC. USE AS DIRECTED                Consultations:   Neuro Crit Care          Brief History of Illness:   Keanu Spence is a 70 year old with PMH of CAD s/p CAB (LIMA to LAD, SVG to OM, SVG to D, SVG to RVA) and known occluded venous grafts in 2016 , multiple PCI last one in 2016 with PCI of LM to Lcx and PCI of OM1, ICM last EF 20% in 9/2020, PVD with Left lower extremity bypass in 2002 and 2019 also with endarterectomy (8/7/20) c/w potential MRSA infection s/p I&D and wound vac placement , NIKKI, COPD, chronic syst HF, dyslipidemia, CKD3, ICD, and history of SSS with parox Afib/bradycardia and recent pacer placement presents as a cardiac arrest on 9/16.     Recent hospital stay for L artery endarterectomy and then complication with infection , abscess , s/p debridement, MRSA grew from blood. COLTEN negative for bacteremia. Plan was for IV vancomycin for 6 weeks: Tentative end date was 10/15/2020; Along with oral rifampin. He also had acute on chronic HF exacerbation, afib with RVR s/p DCCV and DANITA (cr 2.99).      His wife (Shefali ) heard the pt screaming this evening at home , she checked on him and found him agonally breathing. Wife called EMS and attempted CPR. EMS on scene at 9:30 (call placed at 9:15). Initial rhythm was VF. Patient had ROSC after 1 shock however he returned back to VT/Vfib , he received epi x3 , amio 450     Shocked externally with 200 J in cath lab 10:57 pm, received multiple shocks from ICD.   Had resumption of NSR in cath lab           Hospital Course:   In the cath lab, patient underwent VA ECMO placement. Coronary angiogram showed multivessel disease s/p SIRI to LM into prox circ, LAD,  mid-LAD. Post procedure noted to have R neck and R thigh hematoma. He received multiple units of pRBC, FFP, cryoprecipitate. Noted to have bleeding from R distal reperfusion cannula which resolved with manual pressure and clamping of cannula. He was not fully cooled due to ongoing bleeding. His pressor requirements continued to improve on 9/17; however, during the evening, he was noted to have fixed dilated pupils. CT head showed worsening cerebral edema with slitlike ventricles and pending herniation. Hypertonic saline started. Family decided to transition to comfort care on 9/18 given poor prognosis. Family consented to an autopsy.    Seen and discussed with attending.   Attending addendum to follow.     Cary Saba  Cardiology PGY5

## 2020-09-18 NOTE — DEATH PRONOUNCEMENT
MD DEATH PRONOUNCEMENT    Called to pronounce Keanu Spence dead.    Physical Exam: Unresponsive to noxious stimuli    Patient was pronounced dead at 3:10PMSept2020.    Active Problems:    Cardiac arrest (H)    Infectious disease present?: YES, MRSA bacteremia     Communicable disease present? (examples: HIV, chicken pox, TB, Ebola, CJD) :  NO    Multi-drug resistant organism present? (example: MRSA): YES    Please consider an autopsy if any of the following exist:  NO Unexpected or unexplained death during or following any dental, medical, or surgical diagnostic treatment procedures.   NO Death of mother at or up to seven days after delivery.     NO All  and pediatric deaths.     NO Death where the cause is sufficiently obscure to delay completion of the death certificate.   NO Deaths in which autopsy would confirm a suspected illness/condition that would affect surviving family members or recipients of transplanted organs.     The following deaths must be reported to the 's Office:  NO A death that may be due entirely or in part to any factors other than natural disease (recent surgery, recent trauma, suspected abuse/neglect).   NO A death that may be an accident, suicide, or homicide.     NO Any sudden, unexpected death in which there is no prior history of significant heart disease or any other condition associated with sudden death.   NO A death under suspicious, unusual, or unexpected circumstances.    NO Any death which is apparently due to natural causes but in which the  does not have a personal physician familiar with the patient s medical history, social, or environmental situation or the circumstances of the terminal event.   NO Any death apparently due to Sudden Infant Death Syndrome.     NO Deaths that occur during, in association with, or as consequences of a diagnostic, therapeutic, or anesthetic procedure.   NO Any death in which a fracture of a major bone has  occurred within the past (6) six months.   NO A death of persons note seen by their physician within 120 days of demise.     NO Any death in which the  was an inmate of a public institution or was in the custody of Law Enforcement personnel.   NO  All unexpected deaths of children   NO Solid organ donors   NO Unidentified bodies   NO Deaths of persons whose bodies are to be cremated or otherwise disposed of so that the bodies will later be unavailable for examination;   NO Deaths unattended by a physician outside of a licensed healthcare facility or licensed residential hospice program   NO Deaths occurring within 24 hours of arrival to a health care facility if death is unexpected.    NO Deaths associated with the decedent s employment.   YES Deaths attributed to acts of terrorism.   NO Any death in which there is uncertainty as to whether it is a medical examiner s care should be discussed with the medical investigator.        Body disposition: Autopsy was discussed with family member:  Significant other in person.  Permission for autopsy was obtained.

## 2020-09-18 NOTE — PROGRESS NOTES
[facility], [place]  Procedure Note          Extubation:       Keanu Spence  MRN# [id number]   September 18, 2020, 2:58 PM         Patient extubated at: September 18, 2020, 2:58 PM   Supplemental Oxygen: None   Cough: None   Secretion Mode: None   Secretion Amount: Small amount, moderately thick and white / yellow in color   Respiratory Exam:: Breath sounds: diminished     Location: bilaterally   Skin Exam:: Patient color: dusky   Patient Status: Currently appears comfortable   Arterial Blood Gasses:          Recorded by Jessica Carranza

## 2020-09-18 NOTE — PROGRESS NOTES
ECLS Discontinuation Note:    ECLS was discontinued 9/18/2020 at 14:56.    Ming Guzman, RRT  ECMO Specialist  9/18/2020 3:03 PM

## 2020-09-18 NOTE — PROGRESS NOTES
Cardiology Progress Note  Keanu pSence MRN: [id number]  Age: 70 year old, : 1950      Plan for today:   - Monitor Na levels   - Goals of care discussion with family given worsening neurologic status         Assessment and Plan:     Keanu Spence is a 70 year old male who was admitted on 2020 s/p cardiac V fib arrest. 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 Had ROSC in cath lab. CT chest/abdomen/pelvis showed large neck hematoma, moderate hemoperitoneum.      Neurology: Metabolic encephalopathy   Hypoxic brain injury  Cerebral edema   Concern for seizure  Intubated, sedated   Increase temp to 36 degrees given bleeding. Will cool with ECMO , no thermoguard  - Discontinue sedation and paralytics   - Hypertonic saline and keep Na 150-155, Na checks Q4   - RASS goal -4 to -5   - CTH on  hypoxic injury   - VEEG  - neurocritical care consult  - palliiative care consult       Cardiovascular / Hemodynamics: Refractory VF arrest.  SIRI to LM to LCx and LAD. Peripheral V-A ECMO inserted for cardiac arrest. LA 11.7 17Fr in RFA, 25Fr in RFV  Acute on chronic systolic heart failure  Cardiogenic shock  Atrial fibrillation   Peripheral vascular disease, R thigh hematoma   Occluded R anterior tibial artery, mid-distal SFA  TTE: Normal RV function, LV 5-10%  --wean pressors/inotropes as able   --continue ASA 81mg and ticagrelor 90mg BID  --ACT goal 140-160  --hold lipitor for now given likely hepatic injury during arrest  --hold ACE/ARB for now given likely reduced renal fxn after arrest  --holding beta blocker given shock     Pulmonary: Acute hypoxic respiratory failure   Vent Settings: PC 12/10/  CXR: Lines in stable position.  --wean vent as able  --daily CXR  --Q2h ABGs for now  --consider scheduled duonebs if signs of lung dz, currently PRN       GI and Nutrition: No known medical hx.  "  --monitor BID LFTs  --bowel regimen  --GI Prophylaxis: PPI      Renal, Fluid and Electrolytes: Acute on chronic kidney injury   Mixed metabolic and respiratory acidosis   --monitor urine output  --maintain K>3 and Mg>2   --trend lactic acid      Infectious Disease: L femoral abscess s/p debridement   MRSA bacteremia   - Vancomycin will be needed for at least 6 weeks per OSH EOT 10/15/2020  --zosyn x5 days for ECMO  --daily blood cultures  --Vascular following for L femoral wound      Hematology and Oncology: Anemia - acute blood loss anemia , hemoperitoneum and groin hematomas  Coagulopathy   Reiving heparin for ECMO and ASA/ticagrelor for SIRI.   --cryo PRN fibrinogen < 200; FFP for INR >2  --Transfuse for Hgb<10  --heparin gtt for ECMO with ACT goal 140-160  --US LE w/ arterial duplex per ECMO protocol   --DVT PPX: Heparin as above     Endocrinology: Diabetes T2DM   BG elevated.  --insulin gtt  --f/u HgbA1c    Lines: R femoral arterial and venous ECMO cannulae September 16, 2020  R radial arterial line September 16, 2020  ETT September 16, 2020  Fontaine catheter September 16, 2020  OG tube September 16, 2020  Restraint: needed    Current lines are required for patient management               Subjective/Interval Events     Fixed dilated pupils last night   CTH showed worsening cerebral edema. Sedation turned off   Remains unresponsive           Objective     Pulse 81   Temp 96.8  F (36  C)   Resp 10   Ht 1.803 m (5' 11\")   Wt 97.1 kg (214 lb 1.1 oz)   SpO2 100%   BMI 29.86 kg/m    Temp:  [94.5  F (34.7  C)-96.8  F (36  C)] 96.8  F (36  C)  Pulse:  [] 81  Resp:  [10-22] 10  MAP:  [39 mmHg-195 mmHg] 68 mmHg  Arterial Line BP: ()/() 82/61  FiO2 (%):  [40 %] 40 %  SpO2:  [93 %-100 %] 100 %  Wt Readings from Last 2 Encounters:   09/18/20 97.1 kg (214 lb 1.1 oz)     I/O last 3 completed shifts:  In: 4100.3 [I.V.:2097.3; NG/GT:90]  Out: 2500 [Urine:2000; Emesis/NG output:500]    GENERAL APPEARANCE: " intubated, unresponsive   Head: NC/AT  EYES: pupils 5mm fixed, dilated   HENT: Mouth without ulcers or lesions  NECK: R neck hematoma   Pulmonary: mechanical breath sounds   CV: NRRR  GI: Soft, nontender, normal bowel sounds, no HSM   MS: +wound in L femoral region. Site of prior endarterectomy   SKIN: R femoral ECMO arterial and venous cannula   NEURO: intubated          Data:     Vent Settings:  Resp: 10 SpO2: 100 % O2 Device: Mechanical Ventilator      Arterial Blood Gas:   Recent Labs   Lab 09/18/20  0558 09/18/20  0344 09/18/20  0209 09/18/20  0014   PH 7.41 7.42 7.40 7.40   PCO2 40 40 42 41   PO2 112* 117* 106* 99   HCO3 26 26 25 25   O2PER 40 40 40 40       Vitals:    09/17/20 0210 09/18/20 0400   Weight: 89.9 kg (198 lb 3.1 oz) 97.1 kg (214 lb 1.1 oz)   I/O last 3 completed shifts:  In: 4100.3 [I.V.:2097.3; NG/GT:90]  Out: 2500 [Urine:2000; Emesis/NG output:500]  Recent Labs   Lab 09/18/20  0558 09/18/20 0343 09/18/20  0015 09/17/20 2205   NA  --  147* 145*  --  144   POTASSIUM  --  4.9  --   --  5.4*   CHLORIDE  --  115*  --   --  112*   CO2  --  24  --   --  25   ANIONGAP  --  7  --   --  7   * 156*  --    < > 156*   BUN  --  55*  --   --  51*   CR  --  2.83*  --   --  2.93*   ROMAN  --  8.5  --   --  8.3*    < > = values in this interval not displayed.     No components found for: URINE   Recent Labs   Lab 09/18/20 0343 09/17/20 2205 09/17/20  1550   * 328* 305*   ALT 36 34 33   BILITOTAL 5.6* 5.2* 4.4*   ALBUMIN 2.0* 2.2* 2.0*   PROTTOTAL 5.0* 5.0* 4.7*   ALKPHOS 92 93 87     Temp: 96.8  F (36  C) Temp src: BladderTemp  Min: 94.5  F (34.7  C)  Max: 96.8  F (36  C)   Recent Labs   Lab 09/18/20  0343 09/17/20  2205 09/17/20  1550 09/17/20  0948 09/17/20  0557   WBC 16.6* 17.1* 14.4* 13.8* 18.3*   HGB 8.1* 8.4* 8.4* 7.8* 7.3*   HCT 24.7* 25.3* 26.3* 25.8* 24.4*   MCV 85 85 88 90 91   RDW 18.6* 18.1* 18.0* 18.3* 21.3*   PLT 90* 116* 93* 102* 171     Recent Labs   Lab 09/18/20  0343  09/17/20 2205 09/17/20  1550 09/17/20  0948 09/17/20  0259   INR 2.34* 2.32* 2.35* 2.44* 4.62*   PTT 38* 36 40* 70*  --      Recent Labs   Lab 09/18/20  0558 09/18/20  0343 09/18/20  0014 09/17/20 2205 09/17/20  1550   * 156* 162* 156* 135*  135*       All imaging personally reviewed:  Recent Results (from the past 24 hour(s))   Cardiac Catheterization    Narrative    - Successful right femoral 17Fr arterial and 25Fr venous cannulation for   VA ECMO. Antegrade perfusion sheath positioned in the right SFA.  - Successful defibrillation and restoration of sinus rhythm.  - Severe coronary artery disease   - 100% occluded mLAD with patent LIMA-mLAD   - 100% occluded vein grafts to RCA, diagonal, and OM1.   - 100% occluded native RCA.   - 100% occluded OM1.   - 80% distal LM into circumflex stenosis; 80% calcified mid-circumflex   stenosis.   - 70% ostial LAD stenosis; 70% mid LAD stenosis.   - Successful angioplasty and stents:   - 4.0x16mm Synergy SIRI to LM into proximal circumflex   - 3.5x13mm Orsiro SIRI to ostial LAD in a T-stent fashion   - 2.39k48fg and 2.5x22mm Orsiro SIRI to mid LAD in overlapping fashion  - Unsuccessful attempts:   - OM1 - chronic occlusion, unable to wire despite appropriate escalation   - mid circumflex - successful wiring, unable to deliver 1.5mm predilation   balloon (Takeru)   - RCA - chronic occlusion, unable to wire despite appropriate escalation  - Right radial arterial line inserted and secured  - Right internal jugular venous triple lumen catheter inserted and secured    Complications:  - Right thigh hematoma and right neck hematoma in the setting of   therapeutic anticoagulation, oral antiplatelets, and cangrelor   administration.   CT Head w/o Contrast    Narrative    CT HEAD W/O CONTRAST 9/17/2020 2:56 AM    History: ecmo    Comparison: None.    Technique: Using multidetector thin collimation helical acquisition  technique, axial, coronal and sagittal CT images from the  skull base  to the vertex were obtained without intravenous contrast.     Findings: Retained contrast after coronary angiography, likely related  to renal dysfunction. Abnormal enhancement within the basal ganglia  bilaterally caudate nuclei greater than putamina. Notably, all of this  hyperdensity after angiography is not present on the virtual  noncontrast images suggesting all represents contrast and there is no  definite hemorrhage.  No intracranial hemorrhage, mass effect, or midline shift. The  ventricles are proportionate to the cerebral sulci. The gray to white  matter differentiation of the cerebral hemispheres is preserved. The  basal cisterns are patent.    The visualized paranasal sinuses are clear. The mastoid air cells are  clear. Partially visualized endotracheal tube.       Impression    Impression: Abnormal bilateral basal ganglia enhancement which can be  seen with hypoxic ischemic injury.    I have personally reviewed the examination and initial interpretation  and I agree with the findings.    SHIREEN WEST MD   CT Chest Abdomen Pelvis w/o Contrast   Result Value    Radiologist flags See impression (Urgent)    Narrative    EXAMINATION: CT CHEST ABDOMEN PELVIS W/O CONTRAST, 9/17/2020 3:01 AM    TECHNIQUE:  Helical CT images from the thoracic inlet through the  symphysis pubis were obtained  without contrast.    COMPARISON: None    HISTORY: ecmo    DLP: 2648 mGy*cm    FINDINGS:    Lines and tubes: Left chest wall implantable cardiac defibrillator.  Right IJ central venous catheter tip terminates in the high right  atrium. Endotracheal tube terminates in the midthoracic trachea.  Gastric tube terminates in the stomach. Right groin approach ECMO  arterial cannula within the right common iliac artery and venous  cannula within the low superior vena cava. Right upper extremity PICC  terminates in the low SVC.    Chest: Median sternotomy wires. Large partially visualized soft tissue  hematoma within  the low neck extending down into the superior  mediastinum and right chest. The thyroid is unremarkable. Enlarged  heart. No pericardial effusion. Dense coronary artery atherosclerotic  calcifications. Coronary stents. Mildly dilated main pulmonary artery  measuring 3.3 cm. The thoracic aorta diameter is within normal limits.  Normal three-vessel branching pattern. No mediastinal or hilar  lymphadenopathy. The esophagus is within normal limits.    Debris is present within the distal right mainstem bronchus and  bronchus intermedius. Nonspecific diffuse groundglass opacities with  peripheral sparing. Extensive groundglass centrilobular nodularity  seen throughout all the lobes, but does demonstrate an upper lobe  predominance. Scattered areas of atelectasis. No pneumothorax or  pleural effusion. Few paraseptal and centrilobular areas of emphysema  within the lung apices.    Abdomen and pelvis: No obvious focal liver abnormality. No  intrahepatic biliary dilation. Prominent common bile duct.  Cholecystectomy clips. The pancreas and spleen are unremarkable. Mild  thickening of the adrenal glands. Delayed nephrogram is seen with both  kidneys due to recent contrast administration in the Cath Lab.  Multiple bilateral simple renal cysts. No hydronephrosis. The ureters  are unremarkable. The urinary bladder is decompressed with a Fontaine  catheter in place. No dilated loops of bowel. Prominent appearance of  the duodenal bowel wall is likely related to hemoperitoneum and  mesenteric edema. No significant bowel wall thickening is appreciated.  Distal loops of small bowel demonstrate hyper dense material following  the course of small bowel (series 9, image [id number]). Moderate clonic  stool burden. Few scattered colonic diverticula. Extensive  hemoperitoneum prominently seen within the pelvis and perihepatic  regions. Mesenteric edema. No abdominal or pelvic lymphadenopathy.  Normal caliber of the abdominal aorta. Extensive  vascular  calcifications of the aorta, mesenteric vessels, and iliac vessels.  Bilateral groin hematomas and scattered foci of subcutaneous air. Soft  tissue defect overlying and extending nearly to the left common  femoral artery. No focal fluid collection is seen within this area.    Bones and soft tissues: No axillary or inguinal lymphadenopathy.  Anasarca. Minimally displaced right third, fourth, fifth, sixth rib  fractures left fourth, fifth, sixth rib fractures. Multilevel  degenerative changes of the thoracic and lumbar spine. Degenerative  changes of the hips. Sclerosis and subchondral cystic changes of the  sacroiliac joints.      Impression    IMPRESSION:   1. Post surgical changes of ECMO cannulation. Bilateral groin  hematomas and moderate volume hemoperitoneum.  2. Large right neck hematoma which extends into the superior  mediastinum.  3. Hyperdense appearing distal loops of small bowel are nonspecific  and may represent gastrointestinal blood or submucosal hemorrhage from  ischemia. However, this may be normally dense appearing intraluminal  contents.  4. Extensive groundglass opacities with peripheral sparing is  suggestive of pulmonary edema. There is superimposed ground glass  nodularity throughout the lungs which may represent superimposed  aspiration as bronchial debris is present.  Alternatively an  underlying Coivd 19 infection is in the differential and can produce  rounded ground glass opacities.    5. Soft tissue defect within the left groin overlying and extending  nearly to the level of the left common femoral artery likely related  to patient's known recent left sided endarterectomy.  6. Minimally displaced bilateral rib fractures, likely sequela of CPR.  7. Cardiomegaly.    [Urgent Result: See impression    Finding was identified on 9/17/2020 3:41 AM.     Dr. Alves was contacted by Dr. Geronimo at 9/17/2020 3:44 AM  and verbalized understanding of the urgent finding.     I have  personally reviewed the examination and initial interpretation  and I agree with the findings.    HARPAL BAKER MD   XR Chest Port 1 View    Narrative    EXAM: Chest x-ray  9/17/2020 4:14 AM     HISTORY:  Check endotracheal tube placement and ECLS cannula  placement.       COMPARISON: CT 9/17/2020    FINDINGS: Supine frontal radiograph of the chest. Left chest wall  implantable cardiac defibrillator. The endotracheal tube is 3.3 cm  above the lara. Right IJ central venous catheter tip projects over  the cavoatrial junction. Lower approach ECMO cannula projects over the  low SVC. Right upper extremity PICC with the tip projecting over the  low SVC. Gastric tube courses below the level of diaphragm with the  tip below the field-of-view. The trachea is midline. The  cardiomediastinal silhouette is within normal limits. No pleural  effusion. No focal consolidation. The pulmonary vessels are distinct.  No pneumothorax. The groundglass opacity seen on CT cannot be  visualized on this exam. The upper abdomen is unremarkable.      Impression    IMPRESSION:   1. The endotracheal tube is 3.3 cm above the lara. Other support  devices are appropriately positioned.  2. The groundglass opacities and nodularity seen on CT cannot be  visualized on this exam.    I have personally reviewed the examination and initial interpretation  and I agree with the findings.    HARPAL BAKER MD             Medications     Current Facility-Administered Medications   Medication     artificial tears ophthalmic ointment     aspirin (ASA) chewable tablet 81 mg     calcium chloride in  mL intermittent infusion 1 g     cisatracurium (NIMBEX) 200 mg in D5W 100 mL infusion     EPINEPHrine (ADRENALIN) 5 mg in sodium chloride 0.9 % 250 mL infusion     fentaNYL (SUBLIMAZE) infusion     heparin 100 UNIT/ML injection 380-750 Units     heparin 2 unit/mL in 0.9% NaCl (for REPERFUSION CATHETER)     heparin 25,000 units in 0.45% NaCl 250 mL  ANTICOAGULANT  infusion     lidocaine (LMX4) cream     lidocaine 1 % 0.1-1 mL     magnesium sulfate 2 g in water intermittent infusion     magnesium sulfate 4 g in 100 mL sterile water (premade)     Medication Considerations - To maintain platelet inhibition after discontinuation of cangrelor (KENGREAL) [see admin instructions]     midazolam (VERSED) drip - ADULT 100 mg/100 mL in NS PRE-MIX     midazolam (VERSED) injection 1-3 mg     naloxone (NARCAN) injection 0.1-0.4 mg     norepinephrine (LEVOPHED) 16 mg in  mL infusion     pantoprazole (PROTONIX) 40 mg IV push injection     phenylephrine (JUNI-SYNEPHRINE) 50 mg in sodium chloride 0.9 % 250 mL infusion     piperacillin-tazobactam (ZOSYN) 2.25 g vial to attach to  ml bag     potassium chloride 20 mEq in 50 mL intermittent infusion     propofol (DIPRIVAN) infusion     rifampin (REIFADEN) suspension 300 mg     sodium chloride (PF) 0.9% PF flush 3 mL     sodium chloride (PF) 0.9% PF flush 3 mL     sodium chloride 3% infusion     sodium phosphate 10 mmol in D5W intermittent infusion     sodium phosphate 15 mmol in D5W intermittent infusion     sodium phosphate 20 mmol in D5W intermittent infusion     sodium phosphate 25 mmol in D5W intermittent infusion     ticagrelor (BRILINTA) tablet 90 mg     [START ON 9/19/2020] vancomycin 1250 mg in 0.9% NaCl 250 mL intermittent infusion 1,250 mg     vasopressin 40 units in NS 40 mL (PITRESSIN) infusion

## 2020-09-18 NOTE — PROGRESS NOTES
Event note    Nurse noticed that the pupils aredilated and fixed around 9 PM.   -STAT 23.4% hypertonic was given once then   -STAT CT head that showed diffuse brain edema and effacement of the sulci and ventricles and basal cisterns.   -Gave another dose of 23.4% sodium chloride and   -hypertonic saline 3%  gtt with rate of 50 ml/hr.     Neuro exam: (on versed 8 mg/hr, fentanyl 200, and propofol 30)  Pupils: 6 mm and not reactive bilaterally, no corneal reflex bilaterally,no oculocephalic, no cough reflex and no response to pain and no DTR and toes mute bilaterally      Plan:  - Stop all sedation to get accurate neuro exam  - Continue hypertonic saline 3% 50 ml/hr and check sodium q 4 hour. Sodium goal of 155-160      Mariza Lugo MD  PGY 3 neurology resident   [phone]

## 2020-09-19 LAB
BACTERIA SPEC CULT: ABNORMAL
BACTERIA SPEC CULT: ABNORMAL
Lab: ABNORMAL
MDC_IDC_LEAD_IMPLANT_DT: NORMAL
MDC_IDC_LEAD_IMPLANT_DT: NORMAL
MDC_IDC_LEAD_LOCATION: NORMAL
MDC_IDC_LEAD_LOCATION: NORMAL
MDC_IDC_LEAD_LOCATION_DETAIL_1: NORMAL
MDC_IDC_LEAD_LOCATION_DETAIL_1: NORMAL
MDC_IDC_LEAD_MFG: NORMAL
MDC_IDC_LEAD_MFG: NORMAL
MDC_IDC_LEAD_MODEL: NORMAL
MDC_IDC_LEAD_MODEL: NORMAL
MDC_IDC_LEAD_POLARITY_TYPE: NORMAL
MDC_IDC_LEAD_POLARITY_TYPE: NORMAL
MDC_IDC_LEAD_SERIAL: NORMAL
MDC_IDC_LEAD_SERIAL: NORMAL
MDC_IDC_PG_IMPLANT_DTM: NORMAL
MDC_IDC_PG_MFG: NORMAL
MDC_IDC_PG_MODEL: NORMAL
MDC_IDC_PG_SERIAL: NORMAL
MDC_IDC_PG_TYPE: NORMAL
MDC_IDC_SESS_CLINIC_NAME: NORMAL
MDC_IDC_SESS_DTM: NORMAL
MDC_IDC_SESS_TYPE: NORMAL
NSE SERPL-MCNC: 97.6 UG/L (ref 3.7–8.9)
SPECIMEN SOURCE: ABNORMAL

## 2020-09-19 NOTE — PROGRESS NOTES
ECMO Attending Progress Note  2020    Keanu Spence is a 70 year old male who was cannulated for ECMO 2020 due to refractory VF arrest    Cannulation Site:  17 Fr in the R femoral artery  25 Fr in the R femoral vein    Interval events: Severe bleeding from R groin - CVTS called and distal perfusion cannula removed.  Bleeding resolved.  Rewarmed to 36 due to bleeding    Physical Exam:   T 36, HR 67, BP 76-59, O2 sat 100%    Labs:  Cr 2.9  Hgb 8.4, Plt 93  INR 2.35    ABG 7.32/42/176/22    Blood Flow (Circuit) LPM: 4.73 LPM  Gas Flow  LPM: 1 LPM  Gas FiO2   %: 50 %  ACT  (seconds): 145 seconds  Blood Temp  (degrees C): 36.1 C  Pulse Oximetry  (SpO2%): 100 %  Arterial Pressure  mmH mmHg    ECMO Issues including assessments and plan on DOS 2020:  Neuro: Sedated for mechanical ventilation and ECMO.  No acute distress.  NIRS stable b/l  RASS goal: -3  CV: Cardiogenic shock.  Hemodynamically stable on low dose norepi and vaso  Pulm: Keep vent settings at rest settings as above.  FEN/Renal: Electrolytes stable w/ replacement protocols in place, Cr stable, UOP stable  Heme: ACT goal: 140-160, Hemoglobin stable .  Minimal oozing around the ECMO cannulas.  ID: Receiving empiric antibiotics  Cannulae: Position is acceptable on exam and the available imaging.  Extremities are well-perfused.     I have personally reviewed the ECMO flows, oxygenation and CO2 clearance, anticoagulation, and cannula position.  I have also personally assessed the patient's systemic response with hemodynamics, oxygenation, ventilation, and bleeding.       The patient requires continued ECMO support and management in the ICU.  I have discussed patient care and treatment plan with the primary team.      Raffi Lang MD, PhD  Interventional/Critical Care Cardiology  [phone]    2020

## 2020-09-19 NOTE — PROGRESS NOTES
ECMO Attending Progress Note  2020    Keanu Spence is a 70 year old male who was cannulated for ECMO 2020 due to refractory VF arrest    Cannulation Site:  17 Fr in the R femoral artery  25 Fr in the R femoral vein    Interval events: Improved hemodynamics, worsening cerebral edema with pending herniation - initiation of hypertonic saline; distal perfusion catheter was removed yesterday due to bleeding    Physical Exam:  Temp:  [95.9  F (35.5  C)-96.8  F (36  C)] 96.8  F (36  C)  Pulse:  [0-101] 0  Resp:  [0-10] 0  MAP:  [13 mmHg-95 mmHg] 13 mmHg  Arterial Line BP: ()/(13-74)   FiO2 (%):  [40 %] 40 %  SpO2:  [90 %-100 %] 100 %    Intake/Output Summary (Last 24 hours) at 2020  Last data filed at 2020 1500  Gross per 24 hour   Intake 2192.71 ml   Output 2150 ml   Net 42.71 ml    Ventilation Mode: CMV/AC  (Continuous Mandatory Ventilation/ Assist Control)  FiO2 (%): 40 %  Rate Set (breaths/minute): 10 breaths/min  Tidal Volume Set (mL): 450 mL  PEEP (cm H2O): 7 cmH2O  Oxygen Concentration (%): 40 %  Peak Inspiratory Pressure (cm H2O) (Drager Verenice): 20  Resp: (!) 0       Labs:  Recent Labs   Lab 20  1258 20  1000 20  0834 20  0558   PH 7.40 7.40 7.40 7.41   PCO2 42 42 41 40   PO2 116* 116* 117* 112*   HCO3 26 26 26 26   O2PER 40 40 40 40      Recent Labs   Lab 20  1000 20  0343 20  2205 20  1550   WBC 16.7* 16.6* 17.1* 14.4*   HGB 7.7* 8.1* 8.4* 8.4*     Creatinine   Date Value Ref Range Status   2020 3.02 (H) 0.66 - 1.25 mg/dL Final   2020 2.83 (H) 0.66 - 1.25 mg/dL Final   2020 2.93 (H) 0.66 - 1.25 mg/dL Final   2020 2.92 (H) 0.66 - 1.25 mg/dL Final       Blood Flow (Circuit) LPM: 4.73 LPM  Gas Flow  LPM: 1 LPM  Gas FiO2   %: 50 %  ACT  (seconds): 145 seconds  Blood Temp  (degrees C): 36.1 C  Pulse Oximetry  (SpO2%): 100 %  Arterial Pressure  mmH mmHg    ECMO Issues including assessments and plan on  DOS 9/18/2020:  Neuro: Sedated for mechanical ventilation and ECMO.  No acute distress.  NIRS stable b/l  RASS goal: -3  CV: Cardiogenic shock.  Hemodynamically stable on low dose norepi and vaso  Pulm: Keep vent settings at rest settings as above.  FEN/Renal: Electrolytes stable w/ replacement protocols in place, Cr stable, UOP stable  Heme: ACT goal: 140-160, Hemoglobin stable .  Minimal oozing around the ECMO cannulas.  ID: Receiving empiric antibiotics  Cannulae: Position is acceptable on exam and the available imaging.  Extremities are well-perfused.     I have personally reviewed the ECMO flows, oxygenation and CO2 clearance, anticoagulation, and cannula position.  I have also personally assessed the patient's systemic response with hemodynamics, oxygenation, ventilation, and bleeding.       The patient requires continued ECMO support and management in the ICU.  I have discussed patient care and treatment plan with the primary team.      Raffi Lang MD, PhD  Interventional/Critical Care Cardiology  [phone]    September 18, 2020

## 2020-09-20 LAB
BACTERIA SPEC CULT: ABNORMAL
BACTERIA SPEC CULT: ABNORMAL
BLD PROD TYP BPU: NORMAL
BLD UNIT ID BPU: 0
BLOOD PRODUCT CODE: NORMAL
BPU ID: NORMAL
Lab: ABNORMAL
SPECIMEN SOURCE: ABNORMAL
TRANSFUSION STATUS PATIENT QL: NORMAL
TRANSFUSION STATUS PATIENT QL: NORMAL

## 2020-09-21 ENCOUNTER — RECORDS - HEALTHEAST (OUTPATIENT)
Dept: ADMINISTRATIVE | Facility: OTHER | Age: 70
End: 2020-09-21

## 2020-09-21 ENCOUNTER — COMMUNICATION - HEALTHEAST (OUTPATIENT)
Dept: NURSING | Facility: CLINIC | Age: 70
End: 2020-09-21

## 2020-09-21 LAB
PLATELET INHIBITION WITH AA: 84 %
PLATELET INHIBITION WITH ADP: 91 %

## 2020-09-22 ENCOUNTER — COMMUNICATION - HEALTHEAST (OUTPATIENT)
Dept: ANTICOAGULATION | Facility: CLINIC | Age: 70
End: 2020-09-22

## 2020-09-22 DIAGNOSIS — I48.4 ATYPICAL ATRIAL FLUTTER (H): ICD-10-CM

## 2020-09-22 LAB — NSE SERPL-MCNC: 242.9 UG/L (ref 3.7–8.9)

## 2020-09-23 LAB
BACTERIA SPEC CULT: NO GROWTH
NSE SERPL-MCNC: 163.7 UG/L (ref 3.7–8.9)
SPECIMEN SOURCE: NORMAL

## 2020-09-24 LAB
BACTERIA SPEC CULT: NO GROWTH
HCC DEVICE COMMENTS: NORMAL
HCC DEVICE IMPLANTING PROVIDER: NORMAL
HCC DEVICE MANUFACTURE: NORMAL
HCC DEVICE MODEL: NORMAL
HCC DEVICE SERIAL NUMBER: NORMAL
HCC DEVICE TYPE: NORMAL
SPECIMEN SOURCE: NORMAL

## 2020-09-29 LAB
S100 CA BINDING PROTEIN B SER-MCNC: 6520 NG/L (ref 0–96)
S100 CA BINDING PROTEIN B SER-MCNC: ABNORMAL NG/L (ref 0–96)
S100 CA BINDING PROTEIN B SER-MCNC: ABNORMAL NG/L (ref 0–96)

## 2020-10-04 LAB
6MAM SERPL QL CFM: NEGATIVE
7AMINOCLONAZEPAM BLD CFM-MCNC: NEGATIVE NG/ML
ALPRAZ SERPL-MCNC: NEGATIVE NG/ML
AMPHETAMINES SPEC-MCNC: NEGATIVE NG/ML
APAP BLD-MCNC: NEGATIVE UG/ML
BARBITURATES SPEC-MCNC: NEGATIVE UG/ML
BENZODIAZ SERPL QL: POSITIVE
BENZODIAZ SPEC-MCNC: POSITIVE NG/ML
BUPRENORPHINE SERPLBLD-MCNC: NEGATIVE NG/ML
CARBOXYTHC BLD-MCNC: NEGATIVE NG/ML
CARISOPRODOL IA: NEGATIVE UG/ML
CHLORDIAZEP SERPL-MCNC: NEGATIVE NG/ML
CLONAZEPAM SERPL CFM-MCNC: NEGATIVE NG/ML
COCAINE METABOLITE IA: NEGATIVE NG/ML
CODEINE SERPL-MCNC: 6.4 NG/ML
DECLARED MEDICATIONS: ABNORMAL
DESALKYLFLURAZ SERPL-MCNC: NEGATIVE NG/ML
DIAZEPAM SERPL-MCNC: NEGATIVE NG/ML
DIHYDROCODEINE: NEGATIVE NG/ML
ETHANOL BLD-MCNC: NEGATIVE GM/DL
FENTANYL BLD CFM-MCNC: 1.1 NG/ML
FENTANYL IA: POSITIVE NG/ML
FENTANYL SPEC QL: POSITIVE
FLURAZEPAM SERPL-MCNC: NEGATIVE NG/ML
GABAPENTIN IA: NEGATIVE UG/ML
HYDROCODONE SERPL-MCNC: NEGATIVE NG/ML
HYDROMORPHONE SERPL-MCNC: NEGATIVE NG/ML
LORAZEPAM BLD CFM-MCNC: NEGATIVE NG/ML
MEPERIDINE SERPLBLD-MCNC: NEGATIVE NG/ML
METHADONE BLD-MCNC: NEGATIVE NG/ML
MIDAZOLAM BLD CFM-MCNC: 146 NG/ML
MORPHINE SERPL-MCNC: NEGATIVE NG/ML
NORCHLORDIAZEP SERPL-MCNC: NEGATIVE NG/ML
NORDIAZEPAM SERPL-MCNC: NEGATIVE NG/ML
NORFENTANYL BLD CFM-MCNC: 0.6 NG/ML
OPIATES SERPL QL CFM: POSITIVE
OPIATES SPEC-MCNC: POSITIVE NG/ML
OTHER DRUGS DETECTED: POSITIVE
OXAZEPAM SERPL-MCNC: NEGATIVE NG/ML
OXYCODONE SERPL QL: NEGATIVE
OXYCODONE SERPL-MCNC: NEGATIVE NG/ML
OXYCODONE SERPLBLD SCN-MCNC: NEGATIVE NG/ML
OXYMORPHONE SERPLBLD CFM-MCNC: NEGATIVE NG/ML
PCP SPEC-MCNC: NEGATIVE NG/ML
PROPOXYPH SPEC-MCNC: NEGATIVE NG/ML
TEMAZEPAM SERPL-MCNC: NEGATIVE NG/ML
TRAMADOL BLD-MCNC: NEGATIVE NG/ML
TRIAZOLAM BLD-MCNC: NEGATIVE NG/ML

## 2020-11-12 LAB — COPATH REPORT: NORMAL

## 2021-05-26 ENCOUNTER — RECORDS - HEALTHEAST (OUTPATIENT)
Dept: ADMINISTRATIVE | Facility: CLINIC | Age: 71
End: 2021-05-26

## 2021-05-27 ENCOUNTER — RECORDS - HEALTHEAST (OUTPATIENT)
Dept: ADMINISTRATIVE | Facility: CLINIC | Age: 71
End: 2021-05-27

## 2021-05-27 NOTE — PATIENT INSTRUCTIONS - HE
On recent scan - the swollen lymph nodes have resolved and the fluid in & around your lungs has gone away (likely due to lasix)    On pulmonary function tests: No COPD      - there was mild restriction, now stab;e/slight better, likely due to enlarged heart      - The previous decreased diffusing capacity (how well oxygen goes from air to blood) is now normal, was likely due to fluid around & in lungs    Return to clinic in 1 year after new pulmonary Function Tests, discuss lung cancer screening CT again at that time, criteria:    Lung Cancer Screening pre-scan counseling Visit    The patient fits the risk profile of patients who benefit from this screening:  -The patient is >55 years old and <80 years old  -The patient has __ pack year history (over 30)  -The patient has smoked within the past 15 years  -The patient has no medical comorbidity severe enough that it would cause mortality prior to mortality due to the lung cancer attempting to be detected.    Discussion with patient regarding the harms associated with LDCT screening include false-negative and false-positive results, incidental findings, overdiagnosis, and radiation exposure were reviewed at length.   The patient understands that pursuing this screening test may result in a biopsy that was not necessary. It may also produced added stress over a nodule that is likely not cancer.    Of 100 patients who get screening, 25 will have a positive scan. Of those 25, only 1 will have cancer.  Overdiagnosis is estimated at 10% of patients-- they would not have been detected in the patient's lifetime without screening. Less than 1% of patients likely had death related to radiation exposure increase.   Average low-dose CT associated with 0.61 to 1.5 mSv. Annual background radiation exposure in the [place] averages 2.4 mS; mammogram is 0.7mSv.    The benefits are reduction in risk of death from lung cancer. The number needed to treat is 320 (for every 320  patients who undergo screening, 1 patient will have a benefit in mortality from early detection from the screening).    Undergoing this screening implies willingness to pursue further potentially invasive testing to discover potential cancer.

## 2021-05-27 NOTE — TELEPHONE ENCOUNTER
RN cannot approve Refill Request    RN can NOT refill this medication Protocol failed and NO refill given.       Elo Carvalho, [facility] Triage/Med Refill 4/10/2019    Requested Prescriptions   Pending Prescriptions Disp Refills     metFORMIN (GLUCOPHAGE) 850 MG tablet [Pharmacy Med Name: metFORMIN HCl Oral Tablet 850 MG] 180 tablet 0     Sig: TAKE ONE TABLET BY MOUTH TWICE DAILY WITH MEALS       Metformin Refill Protocol Failed - 4/10/2019 10:25 AM        Failed - Microalbumin in last year      Microalbumin, Random Urine   Date Value Ref Range Status   06/28/2016 11.42 (H) 0.00 - 1.99 mg/dL Final                  Passed - Blood pressure in last 12 months     BP Readings from Last 1 Encounters:   04/03/19 108/56             Passed - LFT or AST or ALT in last 12 months     Albumin   Date Value Ref Range Status   01/28/2019 3.3 (L) 3.5 - 5.0 g/dL Final     Bilirubin, Total   Date Value Ref Range Status   01/27/2019 1.2 (H) 0.0 - 1.0 mg/dL Final     Bilirubin, Direct   Date Value Ref Range Status   08/16/2017 0.2 <=0.5 mg/dL Final     Alkaline Phosphatase   Date Value Ref Range Status   01/27/2019 97 45 - 120 U/L Final     AST   Date Value Ref Range Status   01/27/2019 22 0 - 40 U/L Final     ALT   Date Value Ref Range Status   01/27/2019 27 0 - 45 U/L Final     Protein, Total   Date Value Ref Range Status   01/27/2019 6.6 6.0 - 8.0 g/dL Final                Passed - GFR or Serum Creatinine in last 6 months     GFR MDRD Non Af Amer   Date Value Ref Range Status   04/03/2019 50 (L) >60 mL/min/1.73m2 Final     GFR MDRD Af Amer   Date Value Ref Range Status   04/03/2019 >60 >60 mL/min/1.73m2 Final             Passed - Visit with PCP or prescribing provider visit in last 6 months or next 3 months     Last office visit with prescriber/PCP: 2/13/2019 OR same dept: 2/13/2019 Gaurang Duncan MD OR same specialty: 2/13/2019 Gaurang Duncan MD Last physical: Visit date not found Last MT visit: Visit date not found         Next  appt within 3 mo: Visit date not found  Next physical within 3 mo: Visit date not found  Prescriber OR PCP: Gaurang Duncan MD  Last diagnosis associated with med order: 1. Diabetes (H)  - metFORMIN (GLUCOPHAGE) 850 MG tablet [Pharmacy Med Name: metFORMIN HCl Oral Tablet 850 MG]; TAKE ONE TABLET BY MOUTH TWICE DAILY WITH MEALS  Dispense: 180 tablet; Refill: 0     If protocol passes may refill for 12 months if within 3 months of last provider visit (or a total of 15 months).           Passed - A1C in last 6 months     Hemoglobin A1c   Date Value Ref Range Status   01/28/2019 7.6 (H) 4.2 - 6.1 % Final

## 2021-05-27 NOTE — TELEPHONE ENCOUNTER
RN cannot approve Refill Request    RN can NOT refill this medication med is not covered by policy/route to provider.    Obi Panchal, [facility] Triage/Med Refill 4/10/2019    Requested Prescriptions   Pending Prescriptions Disp Refills     metFORMIN (GLUCOPHAGE) 850 MG tablet [Pharmacy Med Name: metFORMIN HCl Oral Tablet 850 MG] 180 tablet 0     Sig: TAKE ONE TABLET BY MOUTH TWICE DAILY WITH MEALS       Metformin Refill Protocol Failed - 4/9/2019 10:15 AM        Failed - Microalbumin in last year      Microalbumin, Random Urine   Date Value Ref Range Status   06/28/2016 11.42 (H) 0.00 - 1.99 mg/dL Final                  Passed - Blood pressure in last 12 months     BP Readings from Last 1 Encounters:   04/03/19 108/56             Passed - LFT or AST or ALT in last 12 months     Albumin   Date Value Ref Range Status   01/28/2019 3.3 (L) 3.5 - 5.0 g/dL Final     Bilirubin, Total   Date Value Ref Range Status   01/27/2019 1.2 (H) 0.0 - 1.0 mg/dL Final     Bilirubin, Direct   Date Value Ref Range Status   08/16/2017 0.2 <=0.5 mg/dL Final     Alkaline Phosphatase   Date Value Ref Range Status   01/27/2019 97 45 - 120 U/L Final     AST   Date Value Ref Range Status   01/27/2019 22 0 - 40 U/L Final     ALT   Date Value Ref Range Status   01/27/2019 27 0 - 45 U/L Final     Protein, Total   Date Value Ref Range Status   01/27/2019 6.6 6.0 - 8.0 g/dL Final                Passed - GFR or Serum Creatinine in last 6 months     GFR MDRD Non Af Amer   Date Value Ref Range Status   04/03/2019 50 (L) >60 mL/min/1.73m2 Final     GFR MDRD Af Amer   Date Value Ref Range Status   04/03/2019 >60 >60 mL/min/1.73m2 Final             Passed - Visit with PCP or prescribing provider visit in last 6 months or next 3 months     Last office visit with prescriber/PCP: 2/13/2019 OR same dept: 2/13/2019 Gaurang Duncan MD OR same specialty: 2/13/2019 Gaurang Duncan MD Last physical: Visit date not found Last MTM visit: Visit date not found          Next appt within 3 mo: Visit date not found  Next physical within 3 mo: Visit date not found  Prescriber OR PCP: Gaurang Duncan MD  Last diagnosis associated with med order: 1. Diabetes (H)  - metFORMIN (GLUCOPHAGE) 850 MG tablet [Pharmacy Med Name: metFORMIN HCl Oral Tablet 850 MG]; TAKE ONE TABLET BY MOUTH TWICE DAILY WITH MEALS  Dispense: 180 tablet; Refill: 0     If protocol passes may refill for 12 months if within 3 months of last provider visit (or a total of 15 months).           Passed - A1C in last 6 months     Hemoglobin A1c   Date Value Ref Range Status   01/28/2019 7.6 (H) 4.2 - 6.1 % Final

## 2021-05-27 NOTE — PROGRESS NOTES
Assessment and Plan:  Keanu Spence is a 68 y.o. with a past medical history significant for prior CABG, severe heart failure, and appeared resumptive diagnosis of CHF who presented to clinic for follow-up of his hospitalization in Jan 2019 for shortness of breath, during which she was treated for bronchitis, COPD exacerbation.  During that hospitalization a chest CT showed small right greater than left bilateral effusions, and a thoracic adenopathy that was thought to be most likely reactive with a 3-month follow-up recommended.  Upon discharge his Lasix was increased from 20-40 mg p.o. daily.  His cough and shortness of breath have completely resolved.  He currently has no respiratory complaints.  He has an as needed albuterol inhaler, however rarely needs it.  He has not smoked since 2017, however has a greater than 30-pack-year history before then.  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     1/20/2019 PFTs: No evidence of obstructive lung disease.  The patient however did have restriction with a TLC of 61% of predicted and a decreased diffusing capacity at 79% of predicted when corrected for hemoglobin.  No prior PFTs for comparison.      4/12/2019 repeat CT scan showed resolution of the mediastinal adenopathy adenopathy, pleural effusions and parenchymal GGOs thought 2/2 heart failure seen on 1/22/2019 chest CT      4/15/2019 román/DLCO: diffusing capacity improved, now normal. FVC not significantly changed (though slight increased from 2.62 to 2.68 L)     His mild restrictive lung disease is likely due to his cardiomegaly and history of thoracic surgery. His GGOs and small bilateral effusions were likely related to heart failure and have now resolved since increasing lasix.    - patient is interested in annual lung cancer screening CTs - next one due in 4/2020, will schedule clinic visit to re-assess at that time  - Pulmonary  Functioning Testing at next year's clinic appointment to evaluate for stability in mild restriction        CCx: follow-up shortness of breath     HPI: Mr. Spence states that his breathing continues to feel well.  He denies shortness of breath, cough, chest pain, or other respiratory complaints.  He has not needed to use his albuterol inhaler recently.    ROS:  A review of 12 organ systems was performed with pertinent positives and negatives noted in the HPI.      Current Meds:  Current Outpatient Medications   Medication Sig     ACCU-CHEK COMPACT TEST Strp Test daily before all meals/snacks and once before bedtime.     acetaminophen (TYLENOL) 325 MG tablet Take 2 tablets (650 mg total) by mouth every 4 (four) hours as needed.     albuterol (PROAIR HFA;PROVENTIL HFA;VENTOLIN HFA) 90 mcg/actuation inhaler Inhale 2 puffs every 4 (four) hours as needed for wheezing or shortness of breath.     amLODIPine (NORVASC) 5 MG tablet Take 1 tablet (5 mg total) by mouth daily.     aspirin 81 MG EC tablet Take 1 tablet (81 mg total) by mouth every evening.     atorvastatin (LIPITOR) 40 MG tablet TAKE ONE TABLET BY MOUTH DAILY AT BEDTIME     blood glucose test (ACCU-CHEK SMARTVIEW TEST STRIP) strips Dispense brand per patient's insurance at pharmacy discretion. Testing once a day only     blood-glucose meter, drum-type (ACCU-CHEK COMPACT PLUS CARE) Kit Test daily before all meals/snacks and once before bedtime.     furosemide (LASIX) 40 MG tablet Take 1 tablet (40 mg total) by mouth daily.     generic lancets (ACCU-CHEK MULTICLIX LANCET) Test daily before all meals/snacks and once before bedtime.     glipiZIDE (GLUCOTROL XL) 5 MG 24 hr tablet TAKE ONE TABLET BY MOUTH ONCE DAILY     hydrALAZINE (APRESOLINE) 25 MG tablet TAKE ONE TABLET BY MOUTH EVERY EIGHT HOURS     hydroCHLOROthiazide (HYDRODIURIL) 25 MG tablet Take 25 mg by mouth daily.     isosorbide mononitrate (IMDUR) 30 MG 24 hr tablet Take 1 tablet (30 mg total) by mouth 2  "(two) times a day.     lisinopril (PRINIVIL,ZESTRIL) 20 MG tablet Take 20 mg by mouth 2 (two) times a day.     metFORMIN (GLUCOPHAGE) 850 MG tablet Take 850 mg by mouth 2 (two) times a day with meals.     metFORMIN (GLUCOPHAGE) 850 MG tablet TAKE ONE TABLET BY MOUTH TWICE DAILY WITH MEALS     metoprolol succinate (TOPROL-XL) 200 MG 24 hr tablet Take 1 tablet (200 mg total) by mouth daily.     multivitamin with minerals (THERA-M) 9 mg iron-400 mcg Tab tablet Take 1 tablet by mouth daily.     warfarin (COUMADIN/JANTOVEN) 2.5 MG tablet Take 5 mg M,W,F and 2.5 mg all other days.       Labs:  No results found for this or any previous visit (from the past 72 hour(s)).    I have personally reviewed all pertinent imaging studies and PFT results unless otherwise noted.    Imaging studies:  Ct Chest Without Contrast    Result Date: 4/12/2019  EXAM: CT CHEST WO CONTRAST LOCATION: [facility] DATE/TIME: 4/12/2019 8:57 AM INDICATION: Neoplasm: chest, lung, suspected COMPARISON: 01/22/2019 TECHNIQUE: Helical images were obtained through the chest. Multiplanar reformats were obtained. Dose reduction techniques were used. IV CONTRAST: None. FINDINGS: LUNGS AND PLEURA: 4 mm calcified granuloma at each base. Lungs otherwise clear. Resolution of pleural effusions. MEDIASTINUM: Cardiomegaly. Previous CABG. No lymphadenopathy. LIMITED UPPER ABDOMEN: Prior cholecystectomy. Dense atherosclerotic calcifications. MUSCULOSKELETAL: No suspicious lesions. Postoperative changes of sternum and xiphoid.     CONCLUSION: 1.  No evidence for malignancy. No lymphadenopathy. 2.  Improvement in changes of CHF but modest cardiomegaly persists following CABG.         Physical Exam:  /60   Pulse 68   Ht 5' 11\" (1.803 m)   Wt 207 lb (93.9 kg)   SpO2 95%   BMI 28.87 kg/m    General - Well nourished  Ears/Mouth -  OP pink moist, no thrush  Neck - no cervical lymphadenopathy  Lungs - Clear to ausculation bilaterally   CVS - regular rhythm " with no murmurs, rubs or gallups  Abdomen - soft, NT, ND, NABS  Ext - no cyanosis, clubbing or edema  Skin - no rash  Psychology - alert and oriented, answers appropriate        Electronically signed by:    Damian Hernandez MD  [facility] Pulmonary and Critical Care Medicine

## 2021-05-27 NOTE — PROGRESS NOTES
INR 2.6 After talking with pt and discussing history of greens/salads and medication change. Pt will  continue  with current diet and dosing of Warfarin.  Continue with moderation of Vit K and green leafy vegetables. Cautioned to call with increase bruising or bleeding. Reminded to call with medication change especially antibiotic. Call with any questions or concerns or any up coming procedures. Cautioned about using Herbal medication.

## 2021-05-27 NOTE — TELEPHONE ENCOUNTER
Who is calling:  Pharmacy  Reason for Call:  [facility] pharmacy is calling for the patient to request the Metformin 850 mg be filled today if possible as the patient is out of medication.  Date of last appointment with primary care: 2/21  Okay to leave a detailed message: Yes

## 2021-05-28 NOTE — PATIENT INSTRUCTIONS - HE
1. Continue present medications    2. We discussed low salt diet    3. Prostate cancer screening today.    4. We will get colonoscopy records.    5. Lab testing today

## 2021-05-28 NOTE — PROGRESS NOTES
ASSESSMENT AND PLAN:    1. NIKKI (obstructive sleep apnea)  Minimal symptoms.    2. Chronic systolic heart failure, related to CAD, history of CABS and stents.   Denies having angina, compensated with current treatment with lisinopril, metoprolol, furosemide.    3. Type 2 diabetes mellitus with chronic kidney disease, without long-term current use of insulin, unspecified CKD stage (H)  Her reports low fasting glucose levels, and denies symptoms of hyperglycemia.   - Glycosylated Hemoglobin A1c  - Microalbumin, Random Urine    4. Hypertension  Continue present medications, including amlodipine.   - Basic Metabolic Panel    5. Hyperlipidemia LDL goal < 70  On atorvastatin    6. Peripheral Vascular Disease  History of LLE fem-pop bypass surgery, by Dr. Chakraborty, 17 years ago.  Has noted mild claudication symptoms recently.  Not severe, and not rapidly progressive.  Exam does not reveal resting poor capillary filling.  Will follow symptoms and refer to Dr. Chakraborty if this progresses. He is on chronic anticoagulation.    7. History of atrial flutter  NSR now, post cardioversion.     8. Chronic anticoagulation  - warfarin (COUMADIN/JANTOVEN) 2.5 MG tablet; Take 5 mg M,W,F and 2.5 mg all other days.  Dispense: 110 tablet; Refill: 0    8. Screening for prostate cancer  Will continue annual screening.   - PSA, Annual Screen (Prostatic-Specific Antigen)    9. Screening for colon cancer  Had colonoscopy 2012.  Revealed a few hyperplastic polyps.  Due again in 2022.        Patient Instructions   1. Continue present medications    2. We discussed low salt diet    3. Prostate cancer screening today.    4. We will get colonoscopy records.    5. Lab testing today    CHIEF COMPLAINT:  Chief Complaint   Patient presents with     Follow-up     HISTORY OF PRESENT ILLNESS:  Keanu Spence is a 68 y.o. male presents in follow up.  He informs me about his recent echo EF which has improved to 33% from 25%.  He does not have worsened  "dyspnea, or orthopnea.  Edema is controlled.  His diabetes is good - FBS tests are low 100's. He denies chest pain.  He has noted LLE claudication symptoms return.  He had bypass surgery on that leg 15+ years ago and symptoms now are reminiscent.  No fever, or unusual cough, or change in bowel or bladder symptoms.     REVIEW OF SYSTEMS:   See HPI, all other systems on review are negative.    Past Medical History:   Diagnosis Date     Cardiomyopathy (H)      CHF (congestive heart failure) (H)      Chronic anticoagulation 2019     Chronic bronchitis (H)      Coronary artery disease      Diabetes mellitus (H)      HLD (hyperlipidemia)      Hypertension      Obesity      Peripheral vascular disease (H)      Screening for prostate cancer 2019     Sleep apnea     no cpap     Social History     Tobacco Use   Smoking Status Former Smoker     Packs/day: 0.20     Years: 40.00     Pack years: 8.00     Start date:      Last attempt to quit: 2016     Years since quittin.4   Smokeless Tobacco Never Used     Family History   Problem Relation Age of Onset     Diabetes Mother      Diabetes Father      Heart disease Father      Diabetes Sister      Hypertension Sister      Diabetes Brother      Hypertension Brother      Past Surgical History:   Procedure Laterality Date     CARDIAC CATHETERIZATION  2016     CARDIAC CATHETERIZATION  10/11/2016     to lad/om1 in stent occlusion     CORONARY ANGIOPLASTY       CORONARY ARTERY BYPASS GRAFT  10/98    lima     coronary stent  10/11/2016    bebe to left main and om1     coronary stents       FEMORAL BYPASS      left. [facility]     VITALS:  Vitals:    19 0912   BP: 122/60   Patient Site: Left Arm   Patient Position: Sitting   Cuff Size: Adult Large   Pulse: 64   SpO2: 94%   Weight: 212 lb 4.8 oz (96.3 kg)   Height: 5' 11\" (1.803 m)     Wt Readings from Last 3 Encounters:   19 212 lb 4.8 oz (96.3 kg)   19 207 lb (93.9 kg)   04/15/19 " 207 lb (93.9 kg)     PHYSICAL EXAM:  Constitutional:  In NAD, alert and oriented  Neck: no significant adenopathy, axilla negative for adenopathy  Cardiac:  S1 S2 without murmur  Lungs: Clear to auscultation  Abdomen:   Soft, flat and non-tender   Extremities: mild edema, without inflammation, reduced distal pulses, capillary filling OK bilaterally, LLE well healed old bypass scar     DECISION TO OBTAIN OLD RECORDS AND/OR OBTAIN HISTORY FROM SOMEONE OTHER THAN PATIENT, AND/OR ACCESSING CARE EVERYWHERE):  1 0    REVIEW AND SUMMARIZATION OF OLD RECORDS, AND/OR OBTAINING HISTORY FROM SOMEONE OTHER THAN PATIENT, AND/OR DISCUSSION OF CASE WITH ANOTHER HEALTH CARE PROVIDER:  2 reviewed cardiology assessment    REVIEW AND/OR ORDER OF OF CLINICAL LAB TESTS: 1 ordered today.    REVIEW AND/OR ORDER OF RADIOLOGY TESTS: 1 0    REVIEW AND/OR ORDER OF MEDICAL TESTS (EKG/ECHO/COLONOSCOPY/EGD): 1  Reviewed recent echocardiogram    INDEPENDENT  VISUALIZATION OF IMAGE, TRACING, OR SPECIMEN ITSELF (2 EACH):      TOTAL: 3    Current Outpatient Medications   Medication Sig Dispense Refill     ACCU-CHEK COMPACT TEST Strp Test daily before all meals/snacks and once before bedtime. 60 strip 12     acetaminophen (TYLENOL) 325 MG tablet Take 2 tablets (650 mg total) by mouth every 4 (four) hours as needed.  0     albuterol (PROAIR HFA;PROVENTIL HFA;VENTOLIN HFA) 90 mcg/actuation inhaler Inhale 2 puffs every 4 (four) hours as needed for wheezing or shortness of breath. 1 each 0     amLODIPine (NORVASC) 5 MG tablet Take 1 tablet (5 mg total) by mouth daily. 90 tablet 2     aspirin 81 MG EC tablet Take 1 tablet (81 mg total) by mouth every evening. 90 tablet 3     atorvastatin (LIPITOR) 40 MG tablet TAKE ONE TABLET BY MOUTH DAILY AT BEDTIME 90 tablet 3     blood glucose test (ACCU-CHEK SMARTVIEW TEST STRIP) strips Dispense brand per patient's insurance at pharmacy discretion. Testing once a day only 90 strip 3     blood-glucose meter, drum-type  (ACCU-CHEK COMPACT PLUS CARE) Kit Test daily before all meals/snacks and once before bedtime. 1 kit 0     furosemide (LASIX) 40 MG tablet Take 1 tablet (40 mg total) by mouth daily. 180 tablet 3     generic lancets (ACCU-CHEK MULTICLIX LANCET) Test daily before all meals/snacks and once before bedtime. 3 each 0     glipiZIDE (GLUCOTROL XL) 5 MG 24 hr tablet TAKE ONE TABLET BY MOUTH ONCE DAILY 90 tablet 5     hydrALAZINE (APRESOLINE) 25 MG tablet TAKE ONE TABLET BY MOUTH EVERY EIGHT HOURS 270 tablet 2     hydroCHLOROthiazide (HYDRODIURIL) 25 MG tablet Take 25 mg by mouth daily.       isosorbide mononitrate (IMDUR) 30 MG 24 hr tablet Take 1 tablet (30 mg total) by mouth 2 (two) times a day. 180 tablet 1     lisinopril (PRINIVIL,ZESTRIL) 20 MG tablet Take 20 mg by mouth 2 (two) times a day.       metFORMIN (GLUCOPHAGE) 850 MG tablet Take 850 mg by mouth 2 (two) times a day with meals.       metoprolol succinate (TOPROL-XL) 200 MG 24 hr tablet Take 1 tablet (200 mg total) by mouth daily. 90 tablet 2     multivitamin with minerals (THERA-M) 9 mg iron-400 mcg Tab tablet Take 1 tablet by mouth daily.       warfarin (COUMADIN/JANTOVEN) 2.5 MG tablet Take 5 mg M,W,F and 2.5 mg all other days. 110 tablet 0     No current facility-administered medications for this visit.      Ken Keen MD  Internal Medicine  [facility]

## 2021-05-29 ENCOUNTER — RECORDS - HEALTHEAST (OUTPATIENT)
Dept: ADMINISTRATIVE | Facility: CLINIC | Age: 71
End: 2021-05-29

## 2021-05-29 NOTE — TELEPHONE ENCOUNTER
Anticoagulation Transition of Care    Keanu Spence was referred to transition management of warfarin from [facility] to [facility].    Warfarin indication: Atrial Flutter   Current INR goal 2.0-3.0   Date of last primary care office visit 5/7/19     If you are agreeable, [facility] will assume warfarin management for your patient with you as the listed referring provider.     Please confirm agreement by routing back your response. No further action is necessary (referral orders,etc).    Augusto Denton RN

## 2021-05-30 ENCOUNTER — RECORDS - HEALTHEAST (OUTPATIENT)
Dept: ADMINISTRATIVE | Facility: CLINIC | Age: 71
End: 2021-05-30

## 2021-05-30 VITALS — BODY MASS INDEX: 30.66 KG/M2 | WEIGHT: 219 LBS | HEIGHT: 71 IN

## 2021-05-30 VITALS — HEIGHT: 71 IN | WEIGHT: 218 LBS | BODY MASS INDEX: 30.52 KG/M2

## 2021-05-30 VITALS — BODY MASS INDEX: 31.39 KG/M2 | WEIGHT: 225.06 LBS

## 2021-05-30 VITALS — BODY MASS INDEX: 30.8 KG/M2 | HEIGHT: 71 IN | WEIGHT: 220 LBS

## 2021-05-30 VITALS — WEIGHT: 228 LBS | BODY MASS INDEX: 31.92 KG/M2 | HEIGHT: 71 IN

## 2021-05-30 VITALS — BODY MASS INDEX: 31.27 KG/M2 | WEIGHT: 224.19 LBS

## 2021-05-30 VITALS — WEIGHT: 222 LBS | BODY MASS INDEX: 31.08 KG/M2 | HEIGHT: 71 IN

## 2021-05-30 VITALS — WEIGHT: 220.56 LBS | BODY MASS INDEX: 30.76 KG/M2

## 2021-05-30 VITALS — WEIGHT: 223 LBS | HEIGHT: 71 IN | BODY MASS INDEX: 31.22 KG/M2

## 2021-05-30 NOTE — PROGRESS NOTES
BMP ordered.  -[facility]    Notes recorded by Julia Skinner MD on 7/29/2019 at 4:56 PM CDT  Please let the patient know that I have reviewed his lab reports.  Creatinine is elevated to 1.7 from 1.3.    Recommend to hold Lasix for one and report BMP in 5 days.  Thanks  Lucius

## 2021-05-31 ENCOUNTER — RECORDS - HEALTHEAST (OUTPATIENT)
Dept: ADMINISTRATIVE | Facility: CLINIC | Age: 71
End: 2021-05-31

## 2021-05-31 VITALS — WEIGHT: 222 LBS | BODY MASS INDEX: 31.08 KG/M2 | HEIGHT: 71 IN

## 2021-05-31 VITALS — BODY MASS INDEX: 29.77 KG/M2 | WEIGHT: 219.5 LBS

## 2021-05-31 VITALS — BODY MASS INDEX: 31.15 KG/M2 | HEIGHT: 71 IN | WEIGHT: 222.5 LBS

## 2021-05-31 VITALS — HEIGHT: 73 IN | WEIGHT: 219.2 LBS | BODY MASS INDEX: 29.05 KG/M2

## 2021-05-31 VITALS — WEIGHT: 218.56 LBS | HEIGHT: 71 IN | BODY MASS INDEX: 30.6 KG/M2

## 2021-05-31 VITALS — BODY MASS INDEX: 29.6 KG/M2 | HEIGHT: 72 IN | WEIGHT: 218.56 LBS

## 2021-05-31 NOTE — PROGRESS NOTES
ASSESSMENT AND PLAN:    1. Need for pneumococcal vaccine  Given.     2. Need for hepatitis C screening test  He is not sure he ever had transfusion.  Will consider testing in future.     3. Type 2 diabetes mellitus with other specified complication  Good control.  Recent A1c 6.6, .      4. Hypertension  Controlled with hydralazine, metoprolol, lisinopril, amlodipine, and furosemide.     5. NIKKI (obstructive sleep apnea)  Diagnosis is mild.  He does not use CPAP, and doesn't have daytime sleepiness     6. Chronic anticoagulation  ongoing    7. Sleep disorder  Difficulty staying asleep.  Concern about using antidepressants given his heart condition.  Will try ambien and follow results.  We discussed potential side effects.   - zolpidem (AMBIEN) 5 MG tablet; Take 1 tablet (5 mg total) by mouth at bedtime as needed for sleep.  Dispense: 30 tablet; Refill: 0    8. Peripheral vascular disease (H)  Bilateral claudication, L > R, about 1/2 block walking, no rest pain.  Feet are negative for active ischemia, or breakdown.  S/p PAD surgery 17 years ago.  This is his significant symptom at this time.  Will get PREM, and refer to Dr. Chakraborty.    - Ambulatory referral to General Surgery, Dr. Chakraborty.     9. Chronic systolic heart failure  Clinically stable and compensated.  Wants to redo MRI, cardiac, later this year before deciding on defibrillator.      Patient Instructions   1. Schedule yearly eye exam.       2. Try ambien 5 mg at night for sleep    3. Referral to Dr. Chakraborty for leg symptoms.      4. Follow up in one month.      CHIEF COMPLAINT:  Chief Complaint   Patient presents with     Follow-up     6 mo ck      Insomnia     HISTORY OF PRESENT ILLNESS:  Keanu Spence is a 69 y.o. male with ongoing LE claudication with 1/2 block walking, worse on the left.  No rest pain.  No chest pain, Chronic GARCIA, no new cough, or more orthopnea or any PND.  No nausea or diarrhea.  Voiding Ok.      REVIEW OF SYSTEMS:   See HPI,  "all other systems on review are negative.    Past Medical History:   Diagnosis Date     Chronic anticoagulation 2019     Chronic systolic heart failure (H)      Coronary artery disease      Diabetes mellitus (H)      HLD (hyperlipidemia)      Hypertension      Ischemic cardiomyopathy      Peripheral vascular disease (H)      Screening for prostate cancer 2019     Sleep apnea     no cpap     Social History     Tobacco Use   Smoking Status Former Smoker     Packs/day: 0.20     Years: 40.00     Pack years: 8.00     Start date:      Last attempt to quit: 2016     Years since quittin.6   Smokeless Tobacco Never Used     Family History   Problem Relation Age of Onset     Diabetes Mother      Diabetes Father      Heart disease Father      Diabetes Sister      Hypertension Sister      Diabetes Brother      Hypertension Brother      Past Surgical History:   Procedure Laterality Date     CARDIAC CATHETERIZATION  2016     CARDIAC CATHETERIZATION  10/11/2016     to lad/om1 in stent occlusion     CATARACT EXTRACTION, BILATERAL       CORONARY ANGIOPLASTY       CORONARY ARTERY BYPASS GRAFT  10/98    [facility]     CORONARY STENT PLACEMENT  10/11/2016    bebe to left main and om1     FEMORAL BYPASS  2002    left. [facility]     VITALS:  Vitals:    19 0856   BP: 124/60   Patient Site: Left Arm   Patient Position: Sitting   Cuff Size: Adult Large   Pulse: (!) 58   Resp: 16   SpO2: 94%   Weight: 213 lb 9 oz (96.9 kg)   Height: 5' 11\" (1.803 m)     Wt Readings from Last 3 Encounters:   19 213 lb 9 oz (96.9 kg)   19 212 lb (96.2 kg)   19 212 lb 4.8 oz (96.3 kg)     PHYSICAL EXAM:  Constitutional:  In NAD, alert and oriented  Cardiac:  S1 S2 without murmur  Lungs: Clear to auscultation  Abdomen:   Soft, flat and non-tender, without guarding, rebound, or mass.    Extremities: no joint effusion or inflammation, mild edema, reduced capillary filling, no palpable feet pulses. "     DECISION TO OBTAIN OLD RECORDS AND/OR OBTAIN HISTORY FROM SOMEONE OTHER THAN PATIENT, AND/OR ACCESSING CARE EVERYWHERE):  1  0     REVIEW AND SUMMARIZATION OF OLD RECORDS, AND/OR OBTAINING HISTORY FROM SOMEONE OTHER THAN PATIENT, AND/OR DISCUSSION OF CASE WITH ANOTHER HEALTH CARE PROVIDER:  2 reviewed Dr. Skinner's note.     REVIEW AND/OR ORDER OF OF CLINICAL LAB TESTS: 1  Reviewed A1c and recent lab testing. .    REVIEW AND/OR ORDER OF RADIOLOGY TESTS: 1 ordered PREM.    REVIEW AND/OR ORDER OF MEDICAL TESTS (EKG/ECHO/COLONOSCOPY/EGD): 1  -    INDEPENDENT  VISUALIZATION OF IMAGE, TRACING, OR SPECIMEN ITSELF (2 EACH):  -     TOTAL: 4    Current Outpatient Medications   Medication Sig Dispense Refill     ACCU-CHEK COMPACT TEST Strp Test daily before all meals/snacks and once before bedtime. 60 strip 12     acetaminophen (TYLENOL) 325 MG tablet Take 2 tablets (650 mg total) by mouth every 4 (four) hours as needed.  0     albuterol (PROAIR HFA;PROVENTIL HFA;VENTOLIN HFA) 90 mcg/actuation inhaler Inhale 2 puffs every 4 (four) hours as needed for wheezing or shortness of breath. 1 each 0     amLODIPine (NORVASC) 5 MG tablet Take 1 tablet (5 mg total) by mouth daily. 90 tablet 2     aspirin 81 MG EC tablet Take 1 tablet (81 mg total) by mouth every evening. 90 tablet 3     atorvastatin (LIPITOR) 40 MG tablet TAKE ONE TABLET BY MOUTH DAILY AT BEDTIME 90 tablet 3     blood glucose test (ACCU-CHEK SMARTVIEW TEST STRIP) strips Dispense brand per patient's insurance at pharmacy discretion. Testing once a day only 90 strip 3     blood-glucose meter, drum-type (ACCU-CHEK COMPACT PLUS CARE) Kit Test daily before all meals/snacks and once before bedtime. 1 kit 0     furosemide (LASIX) 40 MG tablet Take 1 tablet (40 mg total) by mouth daily. 180 tablet 3     generic lancets (ACCU-CHEK MULTICLIX LANCET) Test daily before all meals/snacks and once before bedtime. 3 each 0     glipiZIDE (GLUCOTROL XL) 5 MG 24 hr tablet TAKE ONE TABLET BY  MOUTH ONCE DAILY 90 tablet 5     hydrALAZINE (APRESOLINE) 25 MG tablet TAKE ONE TABLET BY MOUTH EVERY EIGHT HOURS 270 tablet 2     hydroCHLOROthiazide (HYDRODIURIL) 25 MG tablet Take 25 mg by mouth daily.       isosorbide mononitrate (IMDUR) 30 MG 24 hr tablet TAKE ONE TABLET BY MOUTH TWICE DAILY  180 tablet 0     lisinopril (PRINIVIL,ZESTRIL) 20 MG tablet Take 20 mg by mouth 2 (two) times a day.       metFORMIN (GLUCOPHAGE) 850 MG tablet Take 850 mg by mouth 2 (two) times a day with meals.       metoprolol succinate (TOPROL-XL) 200 MG 24 hr tablet Take 1 tablet (200 mg total) by mouth daily. 90 tablet 2     multivitamin with minerals (THERA-M) 9 mg iron-400 mcg Tab tablet Take 1 tablet by mouth daily.       warfarin (COUMADIN/JANTOVEN) 2.5 MG tablet Take 5 mg M,W,F and 2.5 mg all other days. 110 tablet 0     zolpidem (AMBIEN) 5 MG tablet Take 1 tablet (5 mg total) by mouth at bedtime as needed for sleep. 30 tablet 0     No current facility-administered medications for this visit.      Ken Keen MD  Internal Medicine  [facility]

## 2021-05-31 NOTE — TELEPHONE ENCOUNTER
Spoke with pt regarding diabetic eye quality.  Pt reports he will make and appt next week, and go to [facility].

## 2021-05-31 NOTE — PATIENT INSTRUCTIONS - HE
1. Schedule yearly eye exam.       2. Try ambien 5 mg at night for sleep    3. Referral to Dr. Chakraborty for leg symptoms.      4. Follow up in one month.

## 2021-05-31 NOTE — TELEPHONE ENCOUNTER
Who is calling:  Patient  Reason for Call:  The patient is returning a call to [facility] regarding INR instruction and is driving for the next half hour. Please return his call after 12:00 pm today when he would be available to answer.    Okay to leave a detailed message: Yes

## 2021-06-01 ENCOUNTER — RECORDS - HEALTHEAST (OUTPATIENT)
Dept: ADMINISTRATIVE | Facility: CLINIC | Age: 71
End: 2021-06-01

## 2021-06-01 VITALS — WEIGHT: 219 LBS | BODY MASS INDEX: 29.03 KG/M2 | HEIGHT: 73 IN

## 2021-06-01 VITALS — BODY MASS INDEX: 29.17 KG/M2 | HEIGHT: 73 IN | WEIGHT: 220.06 LBS

## 2021-06-01 VITALS — BODY MASS INDEX: 29.34 KG/M2 | WEIGHT: 221.4 LBS | HEIGHT: 73 IN

## 2021-06-01 VITALS — HEIGHT: 73 IN | BODY MASS INDEX: 28.97 KG/M2 | WEIGHT: 218.6 LBS

## 2021-06-01 NOTE — PATIENT INSTRUCTIONS - HE
1. See Dr. Chakraborty as scheduled.     2. INR today.     3. No changes in medications.      4. Follow up in 2 months.

## 2021-06-01 NOTE — PROGRESS NOTES
ASSESSMENT AND PLAN:    1. Chronic anticoagulation  No symptoms of bleeding.  Continue INR monitoring.     2. Chronic kidney disease, stage III   Stable. Last creatinine 1.67 9/19.  No ATN from the contrast angio.     3. Cardiac pacemaker in situ, with ICD  Just placed.  No further dizzy spells.     4. Essential hypertension  Controlled.      5. Peripheral Vascular Disease  Ongoing evaluation.  He will see Dr. Chakraborty next week for options, no stent placement was possible.    Post Discharge Medication Reconciliation Status: discharge medications reconciled, continue medications without change     Patient Instructions   1. See Dr. Chakraborty as scheduled.     2. INR today.     3. No changes in medications.      4. Follow up in 2 months.     CHIEF COMPLAINT:  Chief Complaint   Patient presents with     Hospital Visit Follow Up     [facility], discharged 9/21/19     HISTORY OF PRESENT ILLNESS:  Keanu Spence is a 69 y.o. male with recent placement of pacer ICD for sinus node dysfunction and chronic systolic dysfunction.  He was having dizziness.  This has resolved, he feels well.  His one block claudication persists, not worsened.  Will see Dr. Chakraborty next week.  Stent placement was not possible per IR and LE angiography. No chest pain or unusual dyspnea.     REVIEW OF SYSTEMS:   See HPI, all other systems on review are negative.    Past Medical History:   Diagnosis Date     Cardiac pacemaker in situ 9/25/2019     Chronic anticoagulation 2/21/2019     Chronic systolic heart failure (H)      Coronary artery disease      Diabetes mellitus (H)      Dyslipidemia, goal LDL below 70      Essential hypertension      Ischemic cardiomyopathy      Peripheral vascular disease (H)      Screening for prostate cancer 5/7/2019     Sleep apnea     no cpap     Social History     Tobacco Use   Smoking Status Former Smoker     Packs/day: 0.20     Years: 40.00     Pack years: 8.00     Types: Cigarettes     Start date: 1971  "    Last attempt to quit: 2016     Years since quittin.8   Smokeless Tobacco Never Used     Family History   Problem Relation Age of Onset     Diabetes Mother      Diabetes Father      Heart disease Father      Diabetes Sister      Hypertension Sister      Diabetes Brother      Hypertension Brother      Diabetes type I Son      Past Surgical History:   Procedure Laterality Date     CARDIAC CATHETERIZATION  2016     CARDIAC CATHETERIZATION  10/11/2016     to lad/om1 in stent occlusion     CATARACT EXTRACTION, BILATERAL       CORONARY ANGIOPLASTY       CORONARY ARTERY BYPASS GRAFT  10/98    [facility]     CORONARY STENT PLACEMENT  10/11/2016    bebe to left main and om1     EP ICD INSERT N/A 2019    Procedure: EP ICD Insert;  Surgeon: Colton Montenegro MD;  Location: [facility];  Service: Cardiology     FEMORAL BYPASS Left     Kalee GiraldosDr. Chakraborty     IR EXTREMITY ANGIOGRAM BILATERAL  2019     VITALS:  Vitals:    19 1028   BP: 102/68   Patient Site: Left Arm   Patient Position: Sitting   Cuff Size: Adult Regular   Pulse: (!) 58   SpO2: 98%   Weight: 210 lb 7 oz (95.5 kg)   Height: 5' 11\" (1.803 m)     Wt Readings from Last 3 Encounters:   19 210 lb 7 oz (95.5 kg)   19 207 lb 5 oz (94 kg)   19 209 lb (94.8 kg)     PHYSICAL EXAM:  Constitutional:  In NAD, alert and oriented  Cardiac:  S1 S2   Lungs: Clear   Abdomen:   Soft, flat and non-tender    Extremities: mild edema, minimal feet pulses (reconstitution on angio), no ulcer or inflammation.     DECISION TO OBTAIN OLD RECORDS AND/OR OBTAIN HISTORY FROM SOMEONE OTHER THAN PATIENT, AND/OR ACCESSING CARE EVERYWHERE):  1 0    REVIEW AND SUMMARIZATION OF OLD RECORDS, AND/OR OBTAINING HISTORY FROM SOMEONE OTHER THAN PATIENT, AND/OR DISCUSSION OF CASE WITH ANOTHER HEALTH CARE PROVIDER:  2 reviewed pacer placement    REVIEW AND/OR ORDER OF OF CLINICAL LAB TESTS: 1  Reviewed recent lab testing. .    REVIEW AND/OR " ORDER OF RADIOLOGY TESTS: 1 reviewed LE angiography.    REVIEW AND/OR ORDER OF MEDICAL TESTS (EKG/ECHO/COLONOSCOPY/EGD): 1  0    INDEPENDENT  VISUALIZATION OF IMAGE, TRACING, OR SPECIMEN ITSELF (2 EACH):  0     TOTAL: 4    Current Outpatient Medications   Medication Sig Dispense Refill     ACCU-CHEK COMPACT TEST Strp Test daily before all meals/snacks and once before bedtime. 60 strip 12     acetaminophen (TYLENOL) 325 MG tablet Take 2 tablets (650 mg total) by mouth every 4 (four) hours as needed.  0     albuterol (PROAIR HFA;PROVENTIL HFA;VENTOLIN HFA) 90 mcg/actuation inhaler Inhale 2 puffs every 4 (four) hours as needed for wheezing or shortness of breath. 1 each 0     amLODIPine (NORVASC) 5 MG tablet Take 1 tablet (5 mg total) by mouth daily. 90 tablet 2     aspirin 81 MG EC tablet Take 1 tablet (81 mg total) by mouth every evening. 90 tablet 3     atorvastatin (LIPITOR) 40 MG tablet TAKE ONE TABLET BY MOUTH DAILY AT BEDTIME 90 tablet 3     blood glucose test (ACCU-CHEK SMARTVIEW TEST STRIP) strips Dispense brand per patient's insurance at pharmacy discretion. Testing once a day only 90 strip 3     blood-glucose meter, drum-type (ACCU-CHEK COMPACT PLUS CARE) Kit Test daily before all meals/snacks and once before bedtime. 1 kit 0     furosemide (LASIX) 40 MG tablet Take 1 tablet (40 mg total) by mouth daily. 180 tablet 3     generic lancets (ACCU-CHEK MULTICLIX LANCET) Test daily before all meals/snacks and once before bedtime. 3 each 0     glipiZIDE (GLUCOTROL XL) 5 MG 24 hr tablet TAKE ONE TABLET BY MOUTH ONCE DAILY 90 tablet 5     hydrALAZINE (APRESOLINE) 25 MG tablet TAKE ONE TABLET BY MOUTH EVERY EIGHT HOURS 270 tablet 2     hydroCHLOROthiazide (HYDRODIURIL) 25 MG tablet Take 25 mg by mouth daily.       isosorbide mononitrate (IMDUR) 30 MG 24 hr tablet TAKE ONE TABLET BY MOUTH TWICE DAILY  180 tablet 0     lisinopril (PRINIVIL,ZESTRIL) 20 MG tablet Take 20 mg by mouth 2 (two) times a day.       metFORMIN  (GLUCOPHAGE) 850 MG tablet Take 1 tablet (850 mg total) by mouth 2 (two) times a day with meals. Restart Monday 9/23  0     metoprolol succinate (TOPROL-XL) 200 MG 24 hr tablet Take 1 tablet (200 mg total) by mouth daily. 90 tablet 2     multivitamin with minerals (THERA-M) 9 mg iron-400 mcg Tab tablet Take 1 tablet by mouth daily.       oxyCODONE (ROXICODONE) 5 MG immediate release tablet Take 1-2 tablets (5-10 mg total) by mouth every 4 (four) hours as needed. 13 tablet 0     warfarin (COUMADIN/JANTOVEN) 2.5 MG tablet TAKE 1 TABLET BY MOUTH DAILY ON TUESDAY AND THURSDAY AND 2 TABLETS BY MOUTH DAILY ON ALL OTHER DAYS 180 tablet 1     zolpidem (AMBIEN) 5 MG tablet Take 1 tablet (5 mg total) by mouth at bedtime as needed for sleep. 30 tablet 0     Ken Keen MD  Internal Medicine  [facility]

## 2021-06-01 NOTE — TELEPHONE ENCOUNTER
Called patient and scheduled ED follow up appt on 09/25/19. Pt aware of to come 15 minutes early prior appt time.

## 2021-06-01 NOTE — H&P (VIEW-ONLY)
ASSESSMENT AND PLAN:    1. Pre-procedure assessment.   He is scheduled to have IR angiography and possibly stent placement. Exam and assessment today reveal no contraindication to having the planned LE angiogram and possible stent.  He will hold his warfarin 5 days prior to the procedure and resume when IR and Dr. Chakraborty are comfortable with re-starting.  His stage III chronic renal failure will be checked today.  Limiting dye exposure will be beneficial.       2. Sleep disorder  He has had good response with ambien and wishes to continue.  CSA signed.    - zolpidem (AMBIEN) 5 MG tablet; Take 1 tablet (5 mg total) by mouth at bedtime as needed for sleep.  Dispense: 30 tablet; Refill: 0    3. Flu vaccine need  - Influenza High Dose,Seasonal,PF 65+ Yrs    4. Peripheral Vascular Disease  Symptomatic claudication.  Has seen Dr. Chakraborty and had FELIX.      - Comprehensive Metabolic Panel    5. Type 2 diabetes mellitus   Asymptomatic will assess his control.   - Glycosylated Hemoglobin A1c  - Hemoglobin    6. Chronic renal failure, stage III  Has been stable, follow and check.   - Comprehensive Metabolic Panel    7. Paroxsymal atrial flutter  Pulse is regular today and has been since hospitalization.  On warfarin.    8. Chronic anticoagulation  No complications of bleeding.     Patient Instructions   1. Good to go for the radiology and stent procedure.     2. Blood testing today.     3. Refill of zolpidem    4. Follow up 6 weeks.      CHIEF COMPLAINT:  Chief Complaint   Patient presents with     Follow-up     signed CSA for Zolpidem     HISTORY OF PRESENT ILLNESS:  Keanu Spence is a 69 y.o. male here for follow up.  He feels well.  His claudication, L > R, is stable and moderately severe.  He has seen Dr. Chakraborty and is to have IR angiography and possible stent placement next week.  He is not having unusual dyspnea, or chest pain.  No recent fever, or illness.      REVIEW OF SYSTEMS:   See HPI, all other systems on  "review are negative.    Past Medical History:   Diagnosis Date     Chronic anticoagulation 2019     Chronic systolic heart failure (H)      Coronary artery disease      Diabetes mellitus (H)      HLD (hyperlipidemia)      Hypertension      Ischemic cardiomyopathy      Peripheral vascular disease (H)      Screening for prostate cancer 2019     Sleep apnea     no cpap        Chronic renal disease, stage III    Social History     Tobacco Use   Smoking Status Former Smoker     Packs/day: 0.20     Years: 40.00     Pack years: 8.00     Start date:      Last attempt to quit: 2016     Years since quittin.7   Smokeless Tobacco Never Used     Family History   Problem Relation Age of Onset     Diabetes Mother      Diabetes Father      Heart disease Father      Diabetes Sister      Hypertension Sister      Diabetes Brother      Hypertension Brother      Past Surgical History:   Procedure Laterality Date     CARDIAC CATHETERIZATION  2016     CARDIAC CATHETERIZATION  10/11/2016     to lad/om1 in stent occlusion     CATARACT EXTRACTION, BILATERAL       CORONARY ANGIOPLASTY       CORONARY ARTERY BYPASS GRAFT  10/98    [facility]     CORONARY STENT PLACEMENT  10/11/2016    bebe to left main and om1     FEMORAL BYPASS  2002    left. [facility]     VITALS:  Vitals:    19 0856   BP: 122/58   Patient Site: Left Arm   Patient Position: Sitting   Cuff Size: Adult Regular   Pulse: 66   SpO2: 98%   Weight: 211 lb 7 oz (95.9 kg)   Height: 5' 11\" (1.803 m)     Wt Readings from Last 3 Encounters:   19 211 lb 7 oz (95.9 kg)   19 213 lb 9 oz (96.9 kg)   19 212 lb (96.2 kg)     PHYSICAL EXAM:  Constitutional:  In NAD, alert and oriented  Neck: no significant cervical or axillary adenopathy  Cardiac:  S1 S2 rhythm is regular  Lungs: Clear   Abdomen:   Soft, flat and non-tender    Extremities: diminished distal pulses, mild edema, no erythema or ulcer   Neurologic:  Speech clear, no arm or leg " weakness, gait normal     Psychiatric:  Mood and behavior appropriate, thinking is clear.     DECISION TO OBTAIN OLD RECORDS AND/OR OBTAIN HISTORY FROM SOMEONE OTHER THAN PATIENT, AND/OR ACCESSING CARE EVERYWHERE):  1 0     REVIEW AND SUMMARIZATION OF OLD RECORDS, AND/OR OBTAINING HISTORY FROM SOMEONE OTHER THAN PATIENT, AND/OR DISCUSSION OF CASE WITH ANOTHER HEALTH CARE PROVIDER:  2 0    REVIEW AND/OR ORDER OF OF CLINICAL LAB TESTS: 1 reviewed renal function testing.    REVIEW AND/OR ORDER OF RADIOLOGY TESTS: 1 0    REVIEW AND/OR ORDER OF MEDICAL TESTS (EKG/ECHO/COLONOSCOPY/EGD): 1  0    INDEPENDENT  VISUALIZATION OF IMAGE, TRACING, OR SPECIMEN ITSELF (2 EACH):  0     TOTAL: 1    Current Outpatient Medications   Medication Sig Dispense Refill     ACCU-CHEK COMPACT TEST Strp Test daily before all meals/snacks and once before bedtime. 60 strip 12     acetaminophen (TYLENOL) 325 MG tablet Take 2 tablets (650 mg total) by mouth every 4 (four) hours as needed.  0     albuterol (PROAIR HFA;PROVENTIL HFA;VENTOLIN HFA) 90 mcg/actuation inhaler Inhale 2 puffs every 4 (four) hours as needed for wheezing or shortness of breath. 1 each 0     amLODIPine (NORVASC) 5 MG tablet Take 1 tablet (5 mg total) by mouth daily. 90 tablet 2     aspirin 81 MG EC tablet Take 1 tablet (81 mg total) by mouth every evening. 90 tablet 3     atorvastatin (LIPITOR) 40 MG tablet TAKE ONE TABLET BY MOUTH DAILY AT BEDTIME 90 tablet 3     blood glucose test (ACCU-CHEK SMARTVIEW TEST STRIP) strips Dispense brand per patient's insurance at pharmacy discretion. Testing once a day only 90 strip 3     blood-glucose meter, drum-type (ACCU-CHEK COMPACT PLUS CARE) Kit Test daily before all meals/snacks and once before bedtime. 1 kit 0     furosemide (LASIX) 40 MG tablet Take 1 tablet (40 mg total) by mouth daily. 180 tablet 3     generic lancets (ACCU-CHEK MULTICLIX LANCET) Test daily before all meals/snacks and once before bedtime. 3 each 0     glipiZIDE  (GLUCOTROL XL) 5 MG 24 hr tablet TAKE ONE TABLET BY MOUTH ONCE DAILY 90 tablet 5     hydrALAZINE (APRESOLINE) 25 MG tablet TAKE ONE TABLET BY MOUTH EVERY EIGHT HOURS 270 tablet 2     hydroCHLOROthiazide (HYDRODIURIL) 25 MG tablet Take 25 mg by mouth daily.       isosorbide mononitrate (IMDUR) 30 MG 24 hr tablet TAKE ONE TABLET BY MOUTH TWICE DAILY  180 tablet 0     lisinopril (PRINIVIL,ZESTRIL) 20 MG tablet Take 20 mg by mouth 2 (two) times a day.       metFORMIN (GLUCOPHAGE) 850 MG tablet Take 850 mg by mouth 2 (two) times a day with meals.       metoprolol succinate (TOPROL-XL) 200 MG 24 hr tablet Take 1 tablet (200 mg total) by mouth daily. 90 tablet 2     multivitamin with minerals (THERA-M) 9 mg iron-400 mcg Tab tablet Take 1 tablet by mouth daily.       warfarin (COUMADIN/JANTOVEN) 2.5 MG tablet TAKE 1 TABLET BY MOUTH DAILY ON TUESDAY AND THURSDAY AND 2 TABLETS BY MOUTH DAILY ON ALL OTHER DAYS 180 tablet 1     zolpidem (AMBIEN) 5 MG tablet Take 1 tablet (5 mg total) by mouth at bedtime as needed for sleep. 30 tablet 0     No current facility-administered medications for this visit.      Ken Keen MD  Internal Medicine  [facility]

## 2021-06-01 NOTE — PROGRESS NOTES
[facility] Transition of Care Encounter  Assessment & Plan                                                     Post Discharge Medication Reconciliation Status: discharge medications reconciled, continue medications without change    1. Sinus node dysfunction/Heart failure with reduced ejection fraction, NYHA class I (H)  Stable. Placement of ICD has been well tolerated, patient not noting pain or shortness of breath. Recommend following with primary and cardiology as instructed. Follow up INR scheduled for tomorrow at PCP visit.     2. Type 2 diabetes mellitus with other specified complication, without long-term current use of insulin (H)  Recommend continued monitoring. Patient currently taking appropriate metformin and glipizide. Though, patients kidney function has been fluctuating. Additionally, patients BG has been very well controlled. Recommend continued monitoring of kidney function and potentially low blood sugars and adjustment of both metformin and glipizide respectively.   - Consider decreasing glipizide if low sugars or risk vs. Benefit of metformin if eGFR falls below 45 ml/min    3. Sleep disorder  Stable. Patient currently well managed with zolpidem. Follow with PCP.     Follow Up  Return in about 1 day (around 9/25/2019) for Primary Care.    Subjective & Objective                                                       Keanu Spence is a 69 y.o. male called for a transitions of care visit. he was discharged from [facility] on 9/21/19 for ICD implant.    Chief Complaint: Transitions of care visit    Medication Adherence/Access: Uses a pillbox, sets up medications on his own once weekly. Takes medications three times a day.     Sinus node dysfunction/HFrEF: Metoprolol succinate 200 mg daily, furosemide 40 mg daily, lisinopril 20 mg twice daily, hydralazine 25 mg 3 times daily, HCTZ 25 mg daily, amlodipine 5 mg daily, isosorbide mononitrate 30 mg twice daily.  Also taking warfarin daily as instructed T/Thh 2.5  mg daily and 5 mg ROW. Denies any bleeding or bruising.   No pain or shortness of breath, no chest pain, patient has been taking the oxycodone once daily, this has been working well. Follow up with PCP 19.  Per discharge note: Successful implantation of a new [employer] MRI compatible dual coil, dual chamber ICD system   Left ventricle ejection fraction is severely reduced. The calculated left ventricular ejection fraction is 27% - follow up with device clinic at  [facility]   Lab Results   Component Value Date    INR 1.68 (H) 2019    INR 1.88 (H) 2019    INR 1.76 (H) 2019     Diabetes: Metformin 850 mg twice daily and glipizide XL 5 mg once daily.  Patient reports blood sugar of 82 this AM, normally between 82 and 95. Denies low BG.   Statin -atorvastatin 40 mg daily denies muscle aches or pains  Aspirin 81 mg daily- denies any bleeding/bruising  Lisinopril 20 mg twice daily  Lab Results   Component Value Date    HGBA1C 6.4 (H) 2019    HGBA1C 6.6 (H) 2019    HGBA1C 7.6 (H) 2019     Lab Results   Component Value Date    MICROALBUR 1.14 2019    LDLCALC 49 2019    CREATININE 1.67 (H) 2019     Lab Results   Component Value Date    K 4.4 2019     Lab Results   Component Value Date     2019     Insomnia: zolpidem, this has been helping him a lot. States that previous to this he has had a lot of difficulty with sleep.     PMH: reviewed in EPIC   Allergies/ADRs: reviewed in EPIC   Alcohol: Unable to discuss  Tobacco:   Social History     Tobacco Use   Smoking Status Former Smoker     Packs/day: 0.20     Years: 40.00     Pack years: 8.00     Types: Cigarettes     Start date:      Last attempt to quit: 2016     Years since quittin.8   Smokeless Tobacco Never Used     Recent Vitals:   BP Readings from Last 3 Encounters:   19 115/55   19 110/54   19 115/70      Wt Readings from Last 3 Encounters:   19 207 lb 5  oz (94 kg)   09/19/19 209 lb (94.8 kg)   09/13/19 202 lb 9 oz (91.9 kg)     ----------------    The patient declined an after visit summary    I spent 30 minutes with this patient today;  . All changes were made via collaborative practice agreement with Ken Keen MD. A copy of the visit note was provided to the patient's provider.     Susi Arellano, PharmD  Medication Therapy Management Pharmacist  [facility]         Current Outpatient Medications   Medication Sig Dispense Refill     ACCU-CHEK COMPACT TEST Strp Test daily before all meals/snacks and once before bedtime. 60 strip 12     acetaminophen (TYLENOL) 325 MG tablet Take 2 tablets (650 mg total) by mouth every 4 (four) hours as needed.  0     albuterol (PROAIR HFA;PROVENTIL HFA;VENTOLIN HFA) 90 mcg/actuation inhaler Inhale 2 puffs every 4 (four) hours as needed for wheezing or shortness of breath. 1 each 0     amLODIPine (NORVASC) 5 MG tablet Take 1 tablet (5 mg total) by mouth daily. 90 tablet 2     aspirin 81 MG EC tablet Take 1 tablet (81 mg total) by mouth every evening. 90 tablet 3     atorvastatin (LIPITOR) 40 MG tablet TAKE ONE TABLET BY MOUTH DAILY AT BEDTIME 90 tablet 3     blood glucose test (ACCU-CHEK SMARTVIEW TEST STRIP) strips Dispense brand per patient's insurance at pharmacy discretion. Testing once a day only 90 strip 3     blood-glucose meter, drum-type (ACCU-CHEK COMPACT PLUS CARE) Kit Test daily before all meals/snacks and once before bedtime. 1 kit 0     furosemide (LASIX) 40 MG tablet Take 1 tablet (40 mg total) by mouth daily. 180 tablet 3     generic lancets (ACCU-CHEK MULTICLIX LANCET) Test daily before all meals/snacks and once before bedtime. 3 each 0     glipiZIDE (GLUCOTROL XL) 5 MG 24 hr tablet TAKE ONE TABLET BY MOUTH ONCE DAILY 90 tablet 5     hydrALAZINE (APRESOLINE) 25 MG tablet TAKE ONE TABLET BY MOUTH EVERY EIGHT HOURS 270 tablet 2     hydroCHLOROthiazide (HYDRODIURIL) 25 MG tablet Take 25 mg by  mouth daily.       isosorbide mononitrate (IMDUR) 30 MG 24 hr tablet TAKE ONE TABLET BY MOUTH TWICE DAILY  180 tablet 0     lisinopril (PRINIVIL,ZESTRIL) 20 MG tablet Take 20 mg by mouth 2 (two) times a day.       metFORMIN (GLUCOPHAGE) 850 MG tablet Take 1 tablet (850 mg total) by mouth 2 (two) times a day with meals. Restart Monday 9/23  0     metoprolol succinate (TOPROL-XL) 200 MG 24 hr tablet Take 1 tablet (200 mg total) by mouth daily. 90 tablet 2     multivitamin with minerals (THERA-M) 9 mg iron-400 mcg Tab tablet Take 1 tablet by mouth daily.       oxyCODONE (ROXICODONE) 5 MG immediate release tablet Take 1-2 tablets (5-10 mg total) by mouth every 4 (four) hours as needed. 13 tablet 0     warfarin (COUMADIN/JANTOVEN) 2.5 MG tablet TAKE 1 TABLET BY MOUTH DAILY ON TUESDAY AND THURSDAY AND 2 TABLETS BY MOUTH DAILY ON ALL OTHER DAYS 180 tablet 1     zolpidem (AMBIEN) 5 MG tablet Take 1 tablet (5 mg total) by mouth at bedtime as needed for sleep. 30 tablet 0     No current facility-administered medications for this visit.

## 2021-06-01 NOTE — PATIENT INSTRUCTIONS - HE
1. Good to go for the radiology and stent procedure.     2. Blood testing today.     3. Refill of zolpidem    4. Follow up 6 weeks.

## 2021-06-01 NOTE — PROCEDURES
[facility]    Procedure: LE angiogram  Date/Time: 9/13/2019 2:12 PM  Performed by: Reji Brody MD  Authorized by: Reji Brody MD       Universal Protocol    Site marked: Yes    Prior images obtained and reviewed: Yes    Required items: required blood products, implants, devices, and special equipment available    Patient identity confirmed: verbally with patient, arm band and provided demographic data    Reevaluation: Patient was reevaluated immediately before administering moderate or deep sedation or anesthesia    Confirmation checklist: patient's identity using two indicators, relevant allergies, procedure was appropriate and matched the consent or emergent situation and correct equipment/implants were available    Time out: Immediately prior to procedure a time out was called to verify the correct patient, procedure, equipment, support staff and site/side marked as required    Universal Protocol: Joint Commission Universal Protocol was followed    Preparation: Patient was prepped and draped in the usual sterile fashion    Anesthesia    Local anesthesia used?: Yes    Anesthesia: local infiltration    Local anesthetic: lidocaine 1% without epinephrine    Sedation    Patient sedation: Yes    Sedation type: moderate (conscious) sedation    Sedation: fentanyl and diazepam    Vital signs: Vital signs monitored during sedation    Post-procedure    Description of procedure: Interventional Radiology Post-Procedure Note    Procedure: Bilateral LE angiogram.    Attending: Reji Brody MD    Findings: Extensive bilateral LE occlusive disease; please refer to radiology dictation for further details.    Plan: Discussed with Dr. Paez; bedrest x 4 hours.    Patient tolerance: Patient tolerated the procedure well with no immediate complications   Length of time physician present for 1:1 monitoring during sedation: 45

## 2021-06-01 NOTE — PATIENT INSTRUCTIONS - HE
Implantable Cardiac Defibrillator (ICD) Post-operative Checklist      The Device Registered Nurse (RN) reviewed the ICD function.      The Device RN did a wound assessment and wound care teaching.    Please call the Device Nurses with any signs of infection or questions regarding wound healing. Device Nurse Line: [phone], Option #3.      The Device RN demonstrated and displayed the specific remote monitoring system for your ICD.      The Device RN reviewed the Partnership Agreement Form.    Patient Instructions    Do not lift your left arm above the shoulder height, perform any vigorous arm movements such as swimming, golfing, washing windows, shoveling show, vacuuming or lifting greater than 10-15lbs with the affected arm for 4 weeks, from the date of implant.      To reduce the risk of infection, try to avoid any dental procedures for the first 6 weeks after your ICD implant. If you have an emergent or urgent dental need during this time, contact the device clinic for a prescription for an antibiotic.      You will receive a device identification (ID) card in the mail from the device  within 6 weeks to replace the temporary ID card you were given in the hospital.      You may travel by any mode of transportation; just show your ICD ID card. You may be subject to a hand search or use of a handheld wand, but official should not keep the wand over the implant site for greater than 5-10 seconds.       For any surgery, let your doctor know you have an ICD.      Your ICD is MRI safe       Most household appliances, including a microwave, will not interfere with your ICD function. If you suspect interference, simply move away from the source. Cell phones do not cause a problem. Please refer to the device booklet from the  or their website under the section on electromagnetic interference (JEWELL) for further guidelines on things that may interfere with your ICD.       If you receive a shock from  your device:  1. Keep a diary of your symptoms and activities at the time of the shock.  2. If you receive 1 shock, your symptoms subside and you feel well, call the Device Clinic. If this occurs after hours call the next morning.   3. If you receive 1 shock and your symptoms do not subside, or you receive multiple shocks: Call 911.      Device Clinic Contact Information  Device Nurses: [phone], Option #3.   Device Remote Specialists: [phone], Option #2. For questions about your Remote Transmission or Transmission Schedule  Device Schedulers: [phone], Option #1

## 2021-06-01 NOTE — PROGRESS NOTES
ASSESSMENT AND PLAN:    1. Pre-procedure assessment.   He is scheduled to have IR angiography and possibly stent placement. Exam and assessment today reveal no contraindication to having the planned LE angiogram and possible stent.  He will hold his warfarin 5 days prior to the procedure and resume when IR and Dr. Chakraborty are comfortable with re-starting.  His stage III chronic renal failure will be checked today.  Limiting dye exposure will be beneficial.       2. Sleep disorder  He has had good response with ambien and wishes to continue.  CSA signed.    - zolpidem (AMBIEN) 5 MG tablet; Take 1 tablet (5 mg total) by mouth at bedtime as needed for sleep.  Dispense: 30 tablet; Refill: 0    3. Flu vaccine need  - Influenza High Dose,Seasonal,PF 65+ Yrs    4. Peripheral Vascular Disease  Symptomatic claudication.  Has seen Dr. Chakraborty and had FELIX.      - Comprehensive Metabolic Panel    5. Type 2 diabetes mellitus   Asymptomatic will assess his control.   - Glycosylated Hemoglobin A1c  - Hemoglobin    6. Chronic renal failure, stage III  Has been stable, follow and check.   - Comprehensive Metabolic Panel    7. Paroxsymal atrial flutter  Pulse is regular today and has been since hospitalization.  On warfarin.    8. Chronic anticoagulation  No complications of bleeding.     Patient Instructions   1. Good to go for the radiology and stent procedure.     2. Blood testing today.     3. Refill of zolpidem    4. Follow up 6 weeks.      CHIEF COMPLAINT:  Chief Complaint   Patient presents with     Follow-up     signed CSA for Zolpidem     HISTORY OF PRESENT ILLNESS:  Keanu Spence is a 69 y.o. male here for follow up.  He feels well.  His claudication, L > R, is stable and moderately severe.  He has seen Dr. Chakraborty and is to have IR angiography and possible stent placement next week.  He is not having unusual dyspnea, or chest pain.  No recent fever, or illness.      REVIEW OF SYSTEMS:   See HPI, all other systems on  "review are negative.    Past Medical History:   Diagnosis Date     Chronic anticoagulation 2019     Chronic systolic heart failure (H)      Coronary artery disease      Diabetes mellitus (H)      HLD (hyperlipidemia)      Hypertension      Ischemic cardiomyopathy      Peripheral vascular disease (H)      Screening for prostate cancer 2019     Sleep apnea     no cpap        Chronic renal disease, stage III    Social History     Tobacco Use   Smoking Status Former Smoker     Packs/day: 0.20     Years: 40.00     Pack years: 8.00     Start date:      Last attempt to quit: 2016     Years since quittin.7   Smokeless Tobacco Never Used     Family History   Problem Relation Age of Onset     Diabetes Mother      Diabetes Father      Heart disease Father      Diabetes Sister      Hypertension Sister      Diabetes Brother      Hypertension Brother      Past Surgical History:   Procedure Laterality Date     CARDIAC CATHETERIZATION  2016     CARDIAC CATHETERIZATION  10/11/2016     to lad/om1 in stent occlusion     CATARACT EXTRACTION, BILATERAL       CORONARY ANGIOPLASTY       CORONARY ARTERY BYPASS GRAFT  10/98    [facility]a     CORONARY STENT PLACEMENT  10/11/2016    bebe to left main and om1     FEMORAL BYPASS  2002    left. [facility]     VITALS:  Vitals:    19 0856   BP: 122/58   Patient Site: Left Arm   Patient Position: Sitting   Cuff Size: Adult Regular   Pulse: 66   SpO2: 98%   Weight: 211 lb 7 oz (95.9 kg)   Height: 5' 11\" (1.803 m)     Wt Readings from Last 3 Encounters:   19 211 lb 7 oz (95.9 kg)   19 213 lb 9 oz (96.9 kg)   19 212 lb (96.2 kg)     PHYSICAL EXAM:  Constitutional:  In NAD, alert and oriented  Neck: no significant cervical or axillary adenopathy  Cardiac:  S1 S2 rhythm is regular  Lungs: Clear   Abdomen:   Soft, flat and non-tender    Extremities: diminished distal pulses, mild edema, no erythema or ulcer   Neurologic:  Speech clear, no arm or leg " weakness, gait normal     Psychiatric:  Mood and behavior appropriate, thinking is clear.     DECISION TO OBTAIN OLD RECORDS AND/OR OBTAIN HISTORY FROM SOMEONE OTHER THAN PATIENT, AND/OR ACCESSING CARE EVERYWHERE):  1 0     REVIEW AND SUMMARIZATION OF OLD RECORDS, AND/OR OBTAINING HISTORY FROM SOMEONE OTHER THAN PATIENT, AND/OR DISCUSSION OF CASE WITH ANOTHER HEALTH CARE PROVIDER:  2 0    REVIEW AND/OR ORDER OF OF CLINICAL LAB TESTS: 1 reviewed renal function testing.    REVIEW AND/OR ORDER OF RADIOLOGY TESTS: 1 0    REVIEW AND/OR ORDER OF MEDICAL TESTS (EKG/ECHO/COLONOSCOPY/EGD): 1  0    INDEPENDENT  VISUALIZATION OF IMAGE, TRACING, OR SPECIMEN ITSELF (2 EACH):  0     TOTAL: 1    Current Outpatient Medications   Medication Sig Dispense Refill     ACCU-CHEK COMPACT TEST Strp Test daily before all meals/snacks and once before bedtime. 60 strip 12     acetaminophen (TYLENOL) 325 MG tablet Take 2 tablets (650 mg total) by mouth every 4 (four) hours as needed.  0     albuterol (PROAIR HFA;PROVENTIL HFA;VENTOLIN HFA) 90 mcg/actuation inhaler Inhale 2 puffs every 4 (four) hours as needed for wheezing or shortness of breath. 1 each 0     amLODIPine (NORVASC) 5 MG tablet Take 1 tablet (5 mg total) by mouth daily. 90 tablet 2     aspirin 81 MG EC tablet Take 1 tablet (81 mg total) by mouth every evening. 90 tablet 3     atorvastatin (LIPITOR) 40 MG tablet TAKE ONE TABLET BY MOUTH DAILY AT BEDTIME 90 tablet 3     blood glucose test (ACCU-CHEK SMARTVIEW TEST STRIP) strips Dispense brand per patient's insurance at pharmacy discretion. Testing once a day only 90 strip 3     blood-glucose meter, drum-type (ACCU-CHEK COMPACT PLUS CARE) Kit Test daily before all meals/snacks and once before bedtime. 1 kit 0     furosemide (LASIX) 40 MG tablet Take 1 tablet (40 mg total) by mouth daily. 180 tablet 3     generic lancets (ACCU-CHEK MULTICLIX LANCET) Test daily before all meals/snacks and once before bedtime. 3 each 0     glipiZIDE  (GLUCOTROL XL) 5 MG 24 hr tablet TAKE ONE TABLET BY MOUTH ONCE DAILY 90 tablet 5     hydrALAZINE (APRESOLINE) 25 MG tablet TAKE ONE TABLET BY MOUTH EVERY EIGHT HOURS 270 tablet 2     hydroCHLOROthiazide (HYDRODIURIL) 25 MG tablet Take 25 mg by mouth daily.       isosorbide mononitrate (IMDUR) 30 MG 24 hr tablet TAKE ONE TABLET BY MOUTH TWICE DAILY  180 tablet 0     lisinopril (PRINIVIL,ZESTRIL) 20 MG tablet Take 20 mg by mouth 2 (two) times a day.       metFORMIN (GLUCOPHAGE) 850 MG tablet Take 850 mg by mouth 2 (two) times a day with meals.       metoprolol succinate (TOPROL-XL) 200 MG 24 hr tablet Take 1 tablet (200 mg total) by mouth daily. 90 tablet 2     multivitamin with minerals (THERA-M) 9 mg iron-400 mcg Tab tablet Take 1 tablet by mouth daily.       warfarin (COUMADIN/JANTOVEN) 2.5 MG tablet TAKE 1 TABLET BY MOUTH DAILY ON TUESDAY AND THURSDAY AND 2 TABLETS BY MOUTH DAILY ON ALL OTHER DAYS 180 tablet 1     zolpidem (AMBIEN) 5 MG tablet Take 1 tablet (5 mg total) by mouth at bedtime as needed for sleep. 30 tablet 0     No current facility-administered medications for this visit.      Ken Keen MD  Internal Medicine  [facility]

## 2021-06-01 NOTE — TELEPHONE ENCOUNTER
Please let him know it is OK to hold warfarin for 5 days prior to the procedure, the angiogram.  Dr. Osmani MD

## 2021-06-01 NOTE — PROGRESS NOTES
ASSESSMENT AND PLAN:    1. Dizziness  Episodes suggestive of pre-syncope, not vertigo or positional.  With tachycardia and bradycardia on EKG today, - and episodes of pulse in 30's at times, with HR also 110's, suspect this is symptomatic sick sinus syndrome, or first degree AV block with episodes of junctional rhythm.  He took 200 mg XL metoprolol this AM.  Will admit today for assessment given episodes of symptomatic bradycardia. Recent LE angiogram.  Need to assess renal function as well, can't exclude that renal failure from angiography and secondary electrolyte disturbance plays a role.     - Electrocardiogram Perform and Read    2. CAD with chronic systolic heart failure, remote history of CABS  Clinically his heart failure is compensated.  EF has improved recently. He sees Dr. Skinner.     3. Peripheral Vascular Disease  Bilateral decreased circulation.  Can't place stent.  There is bilateral run off.  He has symptomatic claudication.  Dr. Chakraborty is going to see.     4. History of atrial flutter/chronic anticoagulation  Was off the warfarin for 5 days before the LE angiogram, and back on it, the past few days.      CHIEF COMPLAINT:  Chief Complaint   Patient presents with     Dizziness     Dizzy spells, last 2 to 3 minutes, never happened before, light headache, denied chest pain and SOB     HISTORY OF PRESENT ILLNESS:  Keanu Spence is a 69 y.o. male with episodes of dizziness.  He feels like he must 'catch himself'  No actual syncope.  No vertigo, or positional nature.  No episodes of syncope.  Wife has noted irregular pulse - chronic for him, but also episodes of slow pulse.  He denies fever, or cough, or unusual dyspnea.  No headache or visual disturbance, or arm or leg weakness.      REVIEW OF SYSTEMS:   See HPI, all other systems on review are negative.    Past Medical History:   Diagnosis Date     Chronic anticoagulation 2/21/2019     Chronic systolic heart failure (H)      Coronary artery disease       "Diabetes mellitus (H)      HLD (hyperlipidemia)      Hypertension      Ischemic cardiomyopathy      Peripheral vascular disease (H)      Screening for prostate cancer 2019     Sleep apnea     no cpap     Social History     Tobacco Use   Smoking Status Former Smoker     Packs/day: 0.20     Years: 40.00     Pack years: 8.00     Start date:      Last attempt to quit: 2016     Years since quittin.7   Smokeless Tobacco Never Used     Family History   Problem Relation Age of Onset     Diabetes Mother      Diabetes Father      Heart disease Father      Diabetes Sister      Hypertension Sister      Diabetes Brother      Hypertension Brother      Past Surgical History:   Procedure Laterality Date     CARDIAC CATHETERIZATION  2016     CARDIAC CATHETERIZATION  10/11/2016     to lad/om1 in stent occlusion     CATARACT EXTRACTION, BILATERAL       CORONARY ANGIOPLASTY       CORONARY ARTERY BYPASS GRAFT  10/98    [facility]     CORONARY STENT PLACEMENT  10/11/2016    bebe to left main and om1     FEMORAL BYPASS  2002    left. [facility]     IR EXTREMITY ANGIOGRAM BILATERAL  2019     VITALS:  Vitals:    19 1421   BP: 110/54   Patient Site: Left Arm   Patient Position: Sitting   Cuff Size: Adult Regular   Pulse: 60   SpO2: 98%   Weight: 209 lb (94.8 kg)   Height: 5' 11\" (1.803 m)     Wt Readings from Last 3 Encounters:   19 209 lb (94.8 kg)   19 202 lb 9 oz (91.9 kg)   19 211 lb 7 oz (95.9 kg)     PHYSICAL EXAM:  Constitutional:  In NAD, alert and oriented  Neck: no significant cervical or axillary adenopathy  Cardiac:  S1 S2 with irregular pulse  Lungs: Clear  Neurologic:  Speech clear, no arm or leg weakness, gait normal       DECISION TO OBTAIN OLD RECORDS AND/OR OBTAIN HISTORY FROM SOMEONE OTHER THAN PATIENT, AND/OR ACCESSING CARE EVERYWHERE):  1  0     REVIEW AND SUMMARIZATION OF OLD RECORDS, AND/OR OBTAINING HISTORY FROM SOMEONE OTHER THAN PATIENT, AND/OR DISCUSSION OF " CASE WITH ANOTHER HEALTH CARE PROVIDER:  2 reviewed recent angiogram procedure notes    REVIEW AND/OR ORDER OF OF CLINICAL LAB TESTS: 1  0.    REVIEW AND/OR ORDER OF RADIOLOGY TESTS: 1 0    REVIEW AND/OR ORDER OF MEDICAL TESTS (EKG/ECHO/COLONOSCOPY/EGD): 2  Reviewed most recent echo, ordered EKG    INDEPENDENT  VISUALIZATION OF IMAGE, TRACING, OR SPECIMEN ITSELF (2 EACH): personally interpreted EKG and reviewed with the patient.     TOTAL: 6    Current Outpatient Medications   Medication Sig Dispense Refill     ACCU-CHEK COMPACT TEST Strp Test daily before all meals/snacks and once before bedtime. 60 strip 12     acetaminophen (TYLENOL) 325 MG tablet Take 2 tablets (650 mg total) by mouth every 4 (four) hours as needed.  0     albuterol (PROAIR HFA;PROVENTIL HFA;VENTOLIN HFA) 90 mcg/actuation inhaler Inhale 2 puffs every 4 (four) hours as needed for wheezing or shortness of breath. 1 each 0     amLODIPine (NORVASC) 5 MG tablet Take 1 tablet (5 mg total) by mouth daily. 90 tablet 2     aspirin 81 MG EC tablet Take 1 tablet (81 mg total) by mouth every evening. 90 tablet 3     atorvastatin (LIPITOR) 40 MG tablet TAKE ONE TABLET BY MOUTH DAILY AT BEDTIME 90 tablet 3     blood glucose test (ACCU-CHEK SMARTVIEW TEST STRIP) strips Dispense brand per patient's insurance at pharmacy discretion. Testing once a day only 90 strip 3     blood-glucose meter, drum-type (ACCU-CHEK COMPACT PLUS CARE) Kit Test daily before all meals/snacks and once before bedtime. 1 kit 0     furosemide (LASIX) 40 MG tablet Take 1 tablet (40 mg total) by mouth daily. 180 tablet 3     generic lancets (ACCU-CHEK MULTICLIX LANCET) Test daily before all meals/snacks and once before bedtime. 3 each 0     glipiZIDE (GLUCOTROL XL) 5 MG 24 hr tablet TAKE ONE TABLET BY MOUTH ONCE DAILY 90 tablet 5     hydrALAZINE (APRESOLINE) 25 MG tablet TAKE ONE TABLET BY MOUTH EVERY EIGHT HOURS 270 tablet 2     hydroCHLOROthiazide (HYDRODIURIL) 25 MG tablet Take 25 mg by  mouth daily.       isosorbide mononitrate (IMDUR) 30 MG 24 hr tablet TAKE ONE TABLET BY MOUTH TWICE DAILY  180 tablet 0     lisinopril (PRINIVIL,ZESTRIL) 20 MG tablet Take 20 mg by mouth 2 (two) times a day.       metFORMIN (GLUCOPHAGE) 850 MG tablet Take 850 mg by mouth 2 (two) times a day with meals.       metoprolol succinate (TOPROL-XL) 200 MG 24 hr tablet Take 1 tablet (200 mg total) by mouth daily. 90 tablet 2     multivitamin with minerals (THERA-M) 9 mg iron-400 mcg Tab tablet Take 1 tablet by mouth daily.       warfarin (COUMADIN/JANTOVEN) 2.5 MG tablet TAKE 1 TABLET BY MOUTH DAILY ON TUESDAY AND THURSDAY AND 2 TABLETS BY MOUTH DAILY ON ALL OTHER DAYS 180 tablet 1     zolpidem (AMBIEN) 5 MG tablet Take 1 tablet (5 mg total) by mouth at bedtime as needed for sleep. 30 tablet 0     Ken Keen MD  Internal Medicine  [facility]

## 2021-06-01 NOTE — TELEPHONE ENCOUNTER
New Appointment Needed  What is the reason for the visit:    Inpatient/ED Follow Up Appt Request  At what hospital or facility were you seen?: [facility]   What is the reason you were seen?: Sinus Node Dysfunction  What date were you admitted?: 9-19-19  What date were you discharged?: 9-21-19  What was the recommended timeframe for your follow up appointment?: 5 to 7 days   Provider Preference: Any available  How soon do you need to be seen?: 5 to 7  Days   Waitlist offered?: No  Okay to leave a detailed message:  Yes

## 2021-06-01 NOTE — TELEPHONE ENCOUNTER
FYI - Status Update  Who is Calling: [facility]   [phone]  Update: Patient is scheduled for an angiogram at [facility] 9/13/19. She is calling to discuss blood thinner orders. Recommend holding blood thinner 4 - 5 days prior to procedure, with INR goal of 1.4 or below.  Okay to leave a detailed message?:  Yes

## 2021-06-01 NOTE — PROGRESS NOTES
DEVICE CLINIC RN POST-OP NOTE    Reason for visit: 1 week PO ICD check and wound assessment     Device: [employer] D233 VIGILANT EL ICD  Procedure date: 9/20/2019  Implant Diagnosis: Primary Prevention, Sinus Node Dysfunction, Atrial Flutter, Ischemic Cardiomyopathy  Implanting Physician: Dr. Colton Montenegro      Assessment  Subjective: Patient reports feeling well and denies significant incisional discomfort  Vitals:   Vitals:    09/27/19 1314   BP: 110/60   Pulse: 84   Resp: 16   Temp: 98  F (36.7  C)     Heart Sounds: normal  Lung Sounds: clear to auscultation bilaterally  Incision/device pocket: Steri-strips removed. Area around incision was cleaned with adhesive remover. Incision is clean, dry, and intact with edges well approximated. There is no redness or drainage noted. Incision was wiped with alcohol wipe x1. Minimal swelling present.      Device Findings  Interrogation of device reveals normal sensing and capture thresholds  See the Paceart Report for a full summary of the device information.        Patient Education  Keanu Spence was not accompanied by anyone     [facility]'s Partnership Agreement for Device Patients and Post-operative Checklist were presented and reviewed with patient at today's appointment.    Signs and symptoms of infection, poor wound healing, and device function were reviewed. Range of motion and weight restrictions for the left side are 4 weeks. He was issued a Latitude remote monitor and instructed on its set-up and use for remote monitoring by the [employer] representative prior to hospital discharge. These instructions were reviewed with the patient at today s visit.       Plan  - 1 month remote scheduled 10/23/19  - 3 month PO to be scheduled tandem with Dr. Skinner, recall is in Epic.

## 2021-06-01 NOTE — PRE-PROCEDURE
Procedure Name: Bilateral LE angiogram  Date/Time: 9/13/2019 12:56 PM    Verbal consent obtained?: Yes  Written consent obtained?: Yes  Risks and benefits: Risks, benefits and alternatives were discussed  Discharge plan: Appropriate discharge home plan in place for patients who are going home after procedure   Consent given by: patient  Expected level of sedation: moderate  ASA Class: Class 2- mild systemic disease, no acute problems, no functional limitations  Mallampati: Grade 2- soft palate, base of uvula, tonsillar pillars, and portion of posterior pharyngeal wall visible  Patient states understanding of procedure being performed: Yes  Patient's understanding of procedure matches consent: Yes  Procedure consent matches procedure scheduled: Yes  Appropriately NPO: yes  Lungs: lungs clear with good breath sounds bilaterally  Heart: normal heart sounds and rate  History & Physical reviewed: History and physical reviewed and no updates needed  Statement of review: I have reviewed the lab findings, diagnostic data, medications, and the plan for sedation

## 2021-06-01 NOTE — TELEPHONE ENCOUNTER
ANTICOAGULATION  MANAGEMENT: Discharge Continuity of Care Review    Hospital admission on  9/19-9/21 for sinus node dysfunction.    Discharge disposition: Home    INR Results:       Recent labs: (last 7 days)     09/19/19  1715 09/20/19  0529 09/21/19  0520   INR 1.76* 1.88* 1.68*       Warfarin inpatient management: Warfarin held    Warfarin discharge instructions: home regimen continued     Medication Changes Affecting Anticoagulation: No    Additional Factors Affecting Anticoagulation: No    Plan     Agree with discharge plan for follow up on 9/25    Recommended follow up is scheduled    Anticoagulation calendar updated    Jimenez Lo RN

## 2021-06-02 ENCOUNTER — RECORDS - HEALTHEAST (OUTPATIENT)
Dept: ADMINISTRATIVE | Facility: CLINIC | Age: 71
End: 2021-06-02

## 2021-06-02 VITALS — BODY MASS INDEX: 29.72 KG/M2 | WEIGHT: 212.3 LBS | HEIGHT: 71 IN

## 2021-06-02 VITALS — WEIGHT: 207 LBS | BODY MASS INDEX: 28.98 KG/M2 | HEIGHT: 71 IN

## 2021-06-02 VITALS — HEIGHT: 71 IN | WEIGHT: 213.4 LBS | BODY MASS INDEX: 29.88 KG/M2

## 2021-06-02 VITALS — WEIGHT: 217.9 LBS | BODY MASS INDEX: 30.51 KG/M2 | HEIGHT: 71 IN

## 2021-06-02 VITALS — HEIGHT: 71 IN | WEIGHT: 203.5 LBS | BODY MASS INDEX: 28.49 KG/M2

## 2021-06-02 VITALS — BODY MASS INDEX: 28.21 KG/M2 | HEIGHT: 71 IN | WEIGHT: 201.5 LBS

## 2021-06-02 VITALS — WEIGHT: 207 LBS | HEIGHT: 71 IN | BODY MASS INDEX: 28.98 KG/M2

## 2021-06-02 VITALS — BODY MASS INDEX: 28.31 KG/M2 | WEIGHT: 203 LBS

## 2021-06-02 VITALS — WEIGHT: 219.4 LBS | BODY MASS INDEX: 29.35 KG/M2

## 2021-06-02 VITALS — BODY MASS INDEX: 30.98 KG/M2 | HEIGHT: 71 IN | WEIGHT: 221.25 LBS

## 2021-06-02 VITALS — HEIGHT: 71 IN | WEIGHT: 218.19 LBS | BODY MASS INDEX: 30.55 KG/M2

## 2021-06-02 VITALS — WEIGHT: 202 LBS | HEIGHT: 71 IN | BODY MASS INDEX: 28.28 KG/M2

## 2021-06-02 VITALS — HEIGHT: 71 IN | WEIGHT: 218 LBS | BODY MASS INDEX: 30.52 KG/M2

## 2021-06-02 VITALS — BODY MASS INDEX: 28.98 KG/M2 | HEIGHT: 71 IN | WEIGHT: 207 LBS

## 2021-06-02 VITALS — WEIGHT: 218.3 LBS | BODY MASS INDEX: 30.45 KG/M2

## 2021-06-02 NOTE — TELEPHONE ENCOUNTER
Patient at the pharmacy to pick this up currently     Controlled Substance Refill Request  Medication:   Requested Prescriptions     Pending Prescriptions Disp Refills     zolpidem (AMBIEN) 5 MG tablet [Pharmacy Med Name: Zolpidem Tartrate Oral Tablet 5 MG] 30 tablet 0     Sig: TAKE ONE TABLET BY MOUTH AT BEDTIME AS NEEDED FOR SLEEP     Date Last Fill: 9/5/19  Pharmacy: [facility]   Submit electronically to pharmacy  Controlled Substance Agreement on File:   Encounter-Level CSA Scan Date:    There are no encounter-level csa scan date.       Last office visit: 9/25/2019 Ken Keen MD Leslie White, [facility] Triage/Med Refill 10/10/2019 12:45 PM

## 2021-06-02 NOTE — PATIENT INSTRUCTIONS - HE
Keanu Spence,    It was a pleasure to see you today at the [facility].     My recommendations after this visit include:    Continue current medications.    Keep a symptom log (days when you don't feel as well, but blood sugars are ok)    Increase daily fluid (water) intake, especially before exercise.    Follow up with Dr. Skinner on 12/5/2019.  Follow up with Dr. Montenegro in 6 months    Shira Vaca, [facility], Electrophysiology  [phone]  EP nurses [phone]

## 2021-06-02 NOTE — TELEPHONE ENCOUNTER
I spoke with him.  He had a hypoglycemia reaction a few nights ago.  His glucoses have been in the 70's.  He will stop the glipizide altogether, and follow levels.  He has follow up with me October 30, will follow up sooner prn.  Dr. Osmani MD

## 2021-06-02 NOTE — PROGRESS NOTES
Preoperative Exam    Scheduled Procedure: LEFT FEMORAL POPLITEAL BYPASS  Surgery Date:  11/12/19  Surgery Location: [facility], fax [phone]    Surgeon:  Ken Chakraborty MD    Assessment/Plan:     1. Preoperative examination  His history and exam today reveals no contraindication to proceeding with the LLE revascularization surgery.  I recommend proceeding as planned, pending lab testing.  EKG today reveals paced rhythm.  His chronic renal failure is essentially stable, see below.  Anemia is mild, and also essentially stable, see below.    - HM1(CBC and Differential)  - Basic Metabolic Panel    2. Pacemaker status with ICD  Placed 9/20/2019, no further near syncopal symptoms.     3. SVT - atrial flutter episodes  Recently changed from amlodipine to diltiazem with no significant further symptoms.  Ongoing warfarin anticoagulation.  He will hold warfarin 7 days prior to surgery.     4. Type 2 Diabetes Mellitus  Recent nocturnal hypoglycemia, symptomatic.  He held his glipizide, but FBS started going back up, so he restarted it and has evening snack, with no further episodes and fasting glucoses have been 70-90 range.     5. Chronic renal failure, stage 3  Creatinine has ranged - 1.71, 1.55, 1.65, and now 1.80.  I believe recent angiography could have aggravated, but overall essentially stable.    6. Chronic anemia  10.4 and now 11.4, essentially stable, likely related to chronic renal disease.     Surgical Procedure Risk: Intermediate (reported cardiac risk generally 1-5%) - due to his ischemic cardiomyopathy.   Have you had prior anesthesia?: Yes  Have you or any family members had a previous anesthesia reaction:  No  Do you or any family members have a history of a clotting or bleeding disorder?: No  Cardiac Risk Assessment: increased risk for major cardiac complications based on  myocardial infarction history    APPROVAL GIVEN to proceed with proposed procedure, without further diagnostic  evaluation    Please Note:  Patient has an implanted device. ICD and pacemaker in place.     Functional Status: Independent  Patient plans to recover at home with family.     Subjective:      Keanu Spence is a 69 y.o. male who presents for a preoperative consultation.  He has consented to proceed with re-do fem-pop of the LLE for his claudication.  He had previous fem-pop in 2002 and has done well until recently.  No rest pain, no chest pain, or increased orthopnea, or PND.  Had recent pacer and ICD placed for tachycardia bradycardia, in setting of chronic ischemic cardiomyopathy.  Pre-syncopal episodes have resolved.  Had one nocturnal hypoglycemia episode.  With evening snacks this isn't happening further.      All other systems reviewed and are negative, other than those listed in the HPI.    Pertinent History  Do you have difficulty breathing or chest pain after walking up a flight of stairs: No  History of obstructive sleep apnea: No  Steroid use in the last 6 months: No  Frequent Aspirin/NSAID use: Yes: he will stop 7 days prior to surgery  Prior Blood Transfusion: No  Prior Blood Transfusion Reaction: No  If for some reason prior to, during or after the procedure, if it is medically indicated, would you be willing to have a blood transfusion?:  There is no transfusion refusal.    Current Outpatient Medications   Medication Sig Dispense Refill     ACCU-CHEK COMPACT TEST Strp Test daily before all meals/snacks and once before bedtime. 60 strip 12     acetaminophen (TYLENOL) 325 MG tablet Take 2 tablets (650 mg total) by mouth every 4 (four) hours as needed.  0     albuterol (PROAIR HFA;PROVENTIL HFA;VENTOLIN HFA) 90 mcg/actuation inhaler Inhale 2 puffs every 4 (four) hours as needed for wheezing or shortness of breath. 1 each 0     aspirin 81 MG EC tablet Take 1 tablet (81 mg total) by mouth every evening. 90 tablet 3     atorvastatin (LIPITOR) 40 MG tablet TAKE ONE TABLET BY MOUTH DAILY AT BEDTIME 90  tablet 3     blood glucose test (ACCU-CHEK SMARTVIEW TEST STRIP) strips Dispense brand per patient's insurance at pharmacy discretion. Testing once a day only 90 strip 3     blood-glucose meter, drum-type (ACCU-CHEK COMPACT PLUS CARE) Kit Test daily before all meals/snacks and once before bedtime. 1 kit 0     diltiazem (CARDIZEM CD) 240 MG 24 hr capsule Take 1 capsule (240 mg total) by mouth daily. 90 capsule 3     furosemide (LASIX) 40 MG tablet Take 1 tablet (40 mg total) by mouth daily. 180 tablet 3     generic lancets (ACCU-CHEK MULTICLIX LANCET) Test daily before all meals/snacks and once before bedtime. 3 each 0     glipiZIDE (GLUCOTROL XL) 5 MG 24 hr tablet TAKE ONE TABLET BY MOUTH ONCE DAILY 90 tablet 5     hydrALAZINE (APRESOLINE) 25 MG tablet TAKE ONE TABLET BY MOUTH EVERY EIGHT HOURS 270 tablet 2     hydroCHLOROthiazide (HYDRODIURIL) 25 MG tablet Take 25 mg by mouth daily.       isosorbide mononitrate (IMDUR) 30 MG 24 hr tablet TAKE ONE TABLET BY MOUTH TWICE DAILY  180 tablet 1     lisinopril (PRINIVIL,ZESTRIL) 20 MG tablet Take 20 mg by mouth 2 (two) times a day.       metFORMIN (GLUCOPHAGE) 850 MG tablet Take 1 tablet (850 mg total) by mouth 2 (two) times a day with meals. Restart Monday 9/23  0     metoprolol succinate (TOPROL-XL) 200 MG 24 hr tablet Take 1 tablet (200 mg total) by mouth daily. 90 tablet 2     multivitamin with minerals (THERA-M) 9 mg iron-400 mcg Tab tablet Take 1 tablet by mouth daily.       warfarin (COUMADIN/JANTOVEN) 2.5 MG tablet TAKE 1 TABLET BY MOUTH DAILY ON TUESDAY AND THURSDAY AND 2 TABLETS BY MOUTH DAILY ON ALL OTHER DAYS 180 tablet 1     zolpidem (AMBIEN) 5 MG tablet TAKE ONE TABLET BY MOUTH AT BEDTIME AS NEEDED FOR SLEEP 30 tablet 0     No Known Allergies    Patient Active Problem List   Diagnosis     Type 2 diabetes mellitus, without long-term current use of insulin (H)     Essential hypertension     Coronary Artery Disease     Peripheral Vascular Disease     NIKKI  (obstructive sleep apnea)     COPD (chronic obstructive pulmonary disease) (H)     Atypical atrial flutter (H)     Chronic anticoagulation     Heart failure with reduced ejection fraction, NYHA class II (H)     Sleep disorder     Anemia     Sinus node dysfunction (H)     Dyslipidemia, goal LDL below 70     Ischemic cardiomyopathy     Chronic kidney disease, stage III (moderate) (H)     ICD (implantable cardioverter-defibrillator) in place, dual chamber       Past Medical History:   Diagnosis Date     Cardiac pacemaker in situ 9/25/2019     Chronic anticoagulation 2/21/2019     Chronic systolic heart failure (H)      Coronary artery disease      Diabetes mellitus (H)      Dyslipidemia, goal LDL below 70      Essential hypertension      Ischemic cardiomyopathy      Peripheral vascular disease (H)      Screening for prostate cancer 5/7/2019     Sleep apnea     no cpap     Past Surgical History:   Procedure Laterality Date     CARDIAC CATHETERIZATION  09/19/2016     CARDIAC CATHETERIZATION  10/11/2016     to lad/om1 in stent occlusion     CATARACT EXTRACTION, BILATERAL       CORONARY ANGIOPLASTY       CORONARY ARTERY BYPASS GRAFT  10/98    [facility]     CORONARY STENT PLACEMENT  10/11/2016    bebe to left main and om1     EP ICD INSERT N/A 9/20/2019    Procedure: EP ICD Insert;  Surgeon: Colton Montenegro MD;  Location: [facility];  Service: Cardiology     FEMORAL BYPASS Left 2002    Dr. Mylene Sepulveda     IR EXTREMITY ANGIOGRAM BILATERAL  9/13/2019     Social History     Socioeconomic History     Marital status: Domestic Partner     Spouse name: Shefali POWELL)     Number of children: 1     Years of education: Not on file     Highest education level: Not on file   Occupational History     Employer: RETIRED   Social Needs     Financial resource strain: Not on file     Food insecurity:     Worry: Not on file     Inability: Not on file     Transportation needs:     Medical: Not on file     Non-medical:  "Not on file   Tobacco Use     Smoking status: Former Smoker     Packs/day: 0.20     Years: 40.00     Pack years: 8.00     Types: Cigarettes     Start date:      Last attempt to quit: 2016     Years since quittin.9     Smokeless tobacco: Never Used   Substance and Sexual Activity     Alcohol use: Yes     Comment: rare     Drug use: No     Sexual activity: Not on file   Lifestyle     Physical activity:     Days per week: Not on file     Minutes per session: Not on file     Stress: Not on file   Relationships     Social connections:     Talks on phone: Not on file     Gets together: Not on file     Attends Orthodox service: Not on file     Active member of club or organization: Not on file     Attends meetings of clubs or organizations: Not on file     Relationship status: Not on file     Intimate partner violence:     Fear of current or ex partner: Not on file     Emotionally abused: Not on file     Physically abused: Not on file     Forced sexual activity: Not on file   Other Topics Concern     Not on file   Social History Narrative    Retired from at [employer], and [employer].  He is from [place], and came to [place] on a scholarship to [facility] in . His son, Jamel, lives with him. He has a cat.     Patient Care Team:  Ken Keen MD as PCP - General (Internal Medicine)  Gaurang Duncan MD as Assigned PCP  Susi Arellano, PharmD as Pharmacist (Pharmacist)    Objective:     Vitals:    10/30/19 0805   BP: 114/54   Pulse: 60   SpO2: 97%   Weight: 212 lb (96.2 kg)   Height: 5' 10.98\" (1.803 m)     PHYSICAL EXAM:  General Appearance: In no acute distress  /54 (Patient Site: Left Arm, Patient Position: Sitting, Cuff Size: Adult Regular)   Pulse 60   Ht 5' 10.98\" (1.803 m)   Wt 212 lb (96.2 kg)   SpO2 97%   BMI 29.58 kg/m    NECK:  without cervical or axillary adenopathy  RESPIRATORY: Clear   CARDIOVASCULAR: S1, S2   ABDOMEN: soft, flat, and " non-tender  EXTREMITIES: No joint swelling, minor edema, no ulcer, reduced distal pulses both feet.   NEUROLOGIC: No arm or leg  weakness, speech is clear, gait is normal  PSYCHIATRIC: Oriented X 3, without confusion, behavior and affect normal, thinking is clear    Labs:  Recent Results (from the past 120 hour(s))   INR    Collection Time: 10/30/19  8:14 AM   Result Value Ref Range    INR 3.40 (H) 0.90 - 1.10   Electrocardiogram Perform and Read    Collection Time: 10/30/19  9:16 AM   Result Value Ref Range    SYSTOLIC BLOOD PRESSURE      DIASTOLIC BLOOD PRESSURE      VENTRICULAR RATE 60 BPM    ATRIAL RATE 60 BPM    P-R INTERVAL 256 ms    QRS DURATION 110 ms    Q-T INTERVAL 482 ms    QTC CALCULATION (BEZET) 482 ms    P Axis 22 degrees    R AXIS 100 degrees    T AXIS 105 degrees    MUSE DIAGNOSIS       Atrial-paced rhythm with prolonged AV conduction  Rightward axis  Nonspecific ST and T wave abnormality  Prolonged QT  Abnormal ECG  When compared with ECG of 19-SEP-2019 14:36,  Electronic atrial pacemaker is now Present  Confirmed by AIMEE DURAN, ESTEPHANIE LOC: ([id number]) on 10/30/2019 4:05:24 PM     Basic Metabolic Panel    Collection Time: 10/30/19  9:23 AM   Result Value Ref Range    Sodium 144 136 - 145 mmol/L    Potassium 4.0 3.5 - 5.0 mmol/L    Chloride 108 (H) 98 - 107 mmol/L    CO2 28 22 - 31 mmol/L    Anion Gap, Calculation 8 5 - 18 mmol/L    Glucose 123 70 - 125 mg/dL    Calcium 10.2 8.5 - 10.5 mg/dL    BUN 34 (H) 8 - 22 mg/dL    Creatinine 1.80 (H) 0.70 - 1.30 mg/dL    GFR MDRD Af Amer 46 (L) >60 mL/min/1.73m2    GFR MDRD Non Af Amer 38 (L) >60 mL/min/1.73m2   HM1 (CBC with Diff)    Collection Time: 10/30/19  9:23 AM   Result Value Ref Range    WBC 6.9 4.0 - 11.0 thou/uL    RBC 4.55 4.40 - 6.20 mill/uL    Hemoglobin 11.4 (L) 14.0 - 18.0 g/dL    Hematocrit 35.8 (L) 40.0 - 54.0 %    MCV 79 (L) 80 - 100 fL    MCH 25.1 (L) 27.0 - 34.0 pg    MCHC 32.0 32.0 - 36.0 g/dL    RDW 16.9 (H) 11.0 - 14.5 %    Platelets 265  140 - 440 thou/uL    MPV 8.3 7.0 - 10.0 fL    Neutrophils % 65 50 - 70 %    Lymphocytes % 20 20 - 40 %    Monocytes % 9 2 - 10 %    Eosinophils % 6 0 - 6 %    Basophils % 0 0 - 2 %    Neutrophils Absolute 4.5 2.0 - 7.7 thou/uL    Lymphocytes Absolute 1.4 0.8 - 4.4 thou/uL    Monocytes Absolute 0.6 0.0 - 0.9 thou/uL    Eosinophils Absolute 0.4 0.0 - 0.4 thou/uL    Basophils Absolute 0.0 0.0 - 0.2 thou/uL     Immunization History   Administered Date(s) Administered     Influenza [id number], 01/21/2010     Influenza high dose,seasonal,PF, 65+ yrs 09/13/2017, 09/05/2019     Influenza, inj, historic,unspecified 11/14/2007, 11/03/2008, 09/24/2009, 09/30/2010, 11/01/2011, 09/11/2014, 11/03/2015     Influenza,seasonal,quad inj =/> 6months 09/26/2016, 08/28/2018     Pneumo Conj 13-V (2010&after) 09/13/2017     Pneumo Polysac 23-V 11/14/2007, 08/06/2019     Td,adult,historic,unspecified 03/26/2007     Tdap 01/26/2012     Electronically signed by Ken Keen MD 10/30/19 8:07 AM

## 2021-06-02 NOTE — TELEPHONE ENCOUNTER
Type: alert remote ICD transmission for therapy and atrial arrhythmia burden of at least 6 hrs in 24 hrs.  Presenting rhythm: atrial fibrillation with ventricular sensing  bpm.  Battery/lead status: stable.  Arrhythmias: since 10/2/19; one VT episode on 10/7, unable to see onset, appears to be have 1:1 ratio, unable to discern from AF with RVR vs AT/-190 bpm, ATPx4 unsuccessful, unable to see termination. 1,119 mode switch episodes, EGMs show AF, current episode in progress since 10/7, AF burden 32%, v-rates >/=120bpm 10%.   Anticoagulant: warfarin.  Comments: appears to be normal ICD function. Routed to device RN for review.   Device/lead alerts: none. AIDA      Transmission reviewed, agree with above. Known A.flutter with recent ICD implant on 9/20/19 by Dr. Montenegro. AF burden ~32%. V-rates overall controlled per histograms, some rates noted up to 150-160s but minimal. On Warfarin and Toprol  mg daily.      Alert received last night for ATP therapy delivered. EGMs suggestive of AFL with RVR/1:1 conduction. Due to rate being in VT zone and stability of rhythm device treated as a dual-tachycardia and delivered ATPx4, then rate dropped below detection, therefore no shock delivered. However, after 2nd ATP there does appear to be a brief morphology change on RV EGM/Shock EGM, possible VT, then RVR resumes?   Reviewed with [facility] tech support. Rhythm ID is not currently on, recommendation given to bring patient in and turn on Rhythm ID/get template.     Call placed to patient, reviewed last nights tachycardia event. States he actually woke up drenched in sweat at around the same time and checked his Blood sugar, found it to be 31. Had his spouse get him a couple snacks and then was feeling much better. States he must not have eaten enough dinner. Advised patient to watch sugars closely and notify PMD of issues as this level is very low. He verbalized understanding, states it really only gets low about once  a year.     As far as the AF episodes, patient states he really cannot tell when he goes in/out of AF now that he has his device. Denies rapid palpitations, lightheadedness, shortness of breath, chest discomfort etc. States he has been compliant with meds and did not miss any Toprol recently.       Will review EGM/settings with Dr. Montenegro.     Lucille Zelaya RN

## 2021-06-02 NOTE — PROGRESS NOTES
In clinic device check with Device RN and follow-up with Shira Vaca RN, CNP.  Please see link for full device report.  Patient was informed of results and next follow up during today's visit.

## 2021-06-02 NOTE — PROGRESS NOTES
Spoke to patient who agrees to stop norvasc and start Cardizem 240mg daily.  He will continue Metoprolol 200mg daily and warfarin.    Follow up with Dr. Skinner scheduled for December, will send message to scheduling to set up appt in Afib clinic as well.    No further questions or concerns at this time.   Magdalene

## 2021-06-02 NOTE — PATIENT INSTRUCTIONS - HE
1.OK for having surgery.     2. Stop warfarin 5 days prior to the surgery.     3. Blood tests today.     4. Follow up post op as directed.

## 2021-06-02 NOTE — TELEPHONE ENCOUNTER
Spoke with pt to get more information.    Pt reports his BS has been running in the high 60s and low 70s in the AM, pt reports at one point he woke up in the middle of the night and it had dropped to 34.     Pt denies feeling ill, no c/o dizziness or shakiness.     Pt reports he is taking is metformin twice daily as prescribed and his glipizide once daily as prescribed.

## 2021-06-02 NOTE — PROGRESS NOTES
Transmission reviewed, agree with above. Known A.flutter with recent ICD implant on 9/20/19 by Dr. Montenegro. AF burden ~32%. V-rates overall controlled per histograms, some rates noted up to 150-160s but minimal. On Warfarin and Toprol  mg daily.      Alert received last night for ATP therapy delivered. EGMs suggestive of AFL with RVR/1:1 conduction. Due to rate being in VT zone and stability of rhythm device treated as a dual-tachycardia and delivered ATPx4, then rate dropped below detection, therefore no shock delivered. However, after 2nd ATP there does appear to be a brief morphology change on RV EGM/Shock EGM, possible VT, then RVR resumes?   Reviewed with [facility] tech support. Rhythm ID is not currently on, recommendation given to bring patient in and turn on Rhythm ID/get template.      Call placed to patient, reviewed last nights tachycardia event. States he actually woke up drenched in sweat at around the same time and checked his Blood sugar, found it to be 31. Had his spouse get him a couple snacks and then was feeling much better. States he must not have eaten enough dinner. Advised patient to watch sugars closely and notify PMD of issues as this level is very low. He verbalized understanding, states it really only gets low about once a year.      As far as the AF episodes, patient states he really cannot tell when he goes in/out of AF now that he has his device. Denies rapid palpitations, lightheadedness, shortness of breath, chest discomfort etc. States he has been compliant with meds and did not miss any Toprol recently      Episodes of atrial flutter sometimes one-to-one AV conduction with rapid heart rate  High-dose metoprolol 100 mg twice daily  Renal insufficiency, creatinine 1.67-not a good candidate for sotalol  Plan   Stop Norvasc  Start Cardizem 240 mg daily  Continue high-dose metoprolol  Warfarin-continue     Follow-up with Dr. Skinner-primary cardiologist  In future, may be candidate for  ablation  Schedule follow-up in A. fib [facility]

## 2021-06-03 VITALS
OXYGEN SATURATION: 98 % | HEIGHT: 71 IN | SYSTOLIC BLOOD PRESSURE: 102 MMHG | BODY MASS INDEX: 29.46 KG/M2 | HEART RATE: 58 BPM | WEIGHT: 210.44 LBS | DIASTOLIC BLOOD PRESSURE: 68 MMHG

## 2021-06-03 VITALS
OXYGEN SATURATION: 94 % | SYSTOLIC BLOOD PRESSURE: 104 MMHG | HEART RATE: 60 BPM | WEIGHT: 217.13 LBS | BODY MASS INDEX: 30.28 KG/M2 | DIASTOLIC BLOOD PRESSURE: 50 MMHG

## 2021-06-03 VITALS — HEIGHT: 71 IN | WEIGHT: 213.56 LBS | BODY MASS INDEX: 29.9 KG/M2

## 2021-06-03 VITALS
WEIGHT: 216 LBS | BODY MASS INDEX: 30.24 KG/M2 | OXYGEN SATURATION: 98 % | DIASTOLIC BLOOD PRESSURE: 48 MMHG | HEART RATE: 60 BPM | HEIGHT: 71 IN | SYSTOLIC BLOOD PRESSURE: 108 MMHG

## 2021-06-03 VITALS
RESPIRATION RATE: 16 BRPM | DIASTOLIC BLOOD PRESSURE: 60 MMHG | HEIGHT: 71 IN | HEART RATE: 84 BPM | SYSTOLIC BLOOD PRESSURE: 110 MMHG | BODY MASS INDEX: 29.26 KG/M2 | TEMPERATURE: 98 F | WEIGHT: 209 LBS

## 2021-06-03 VITALS
HEART RATE: 60 BPM | DIASTOLIC BLOOD PRESSURE: 54 MMHG | SYSTOLIC BLOOD PRESSURE: 114 MMHG | BODY MASS INDEX: 29.68 KG/M2 | WEIGHT: 212 LBS | OXYGEN SATURATION: 97 % | HEIGHT: 71 IN

## 2021-06-03 VITALS
HEIGHT: 71 IN | DIASTOLIC BLOOD PRESSURE: 52 MMHG | SYSTOLIC BLOOD PRESSURE: 98 MMHG | HEART RATE: 72 BPM | RESPIRATION RATE: 16 BRPM | BODY MASS INDEX: 29.68 KG/M2 | WEIGHT: 212 LBS

## 2021-06-03 VITALS
DIASTOLIC BLOOD PRESSURE: 58 MMHG | BODY MASS INDEX: 29.6 KG/M2 | WEIGHT: 211.44 LBS | OXYGEN SATURATION: 98 % | SYSTOLIC BLOOD PRESSURE: 122 MMHG | HEIGHT: 71 IN | HEART RATE: 66 BPM

## 2021-06-03 VITALS — HEIGHT: 71 IN | WEIGHT: 202.56 LBS | BODY MASS INDEX: 28.36 KG/M2

## 2021-06-03 VITALS — WEIGHT: 214.3 LBS | HEIGHT: 71 IN | BODY MASS INDEX: 30 KG/M2

## 2021-06-03 VITALS
DIASTOLIC BLOOD PRESSURE: 60 MMHG | BODY MASS INDEX: 29.88 KG/M2 | SYSTOLIC BLOOD PRESSURE: 106 MMHG | HEIGHT: 71 IN | HEART RATE: 60 BPM | WEIGHT: 213.4 LBS | RESPIRATION RATE: 16 BRPM

## 2021-06-03 VITALS
OXYGEN SATURATION: 98 % | SYSTOLIC BLOOD PRESSURE: 110 MMHG | BODY MASS INDEX: 29.26 KG/M2 | WEIGHT: 209 LBS | HEIGHT: 71 IN | HEART RATE: 60 BPM | DIASTOLIC BLOOD PRESSURE: 54 MMHG

## 2021-06-03 VITALS
SYSTOLIC BLOOD PRESSURE: 112 MMHG | HEART RATE: 60 BPM | BODY MASS INDEX: 29.96 KG/M2 | DIASTOLIC BLOOD PRESSURE: 60 MMHG | HEIGHT: 71 IN | WEIGHT: 214 LBS | OXYGEN SATURATION: 97 %

## 2021-06-03 VITALS — HEIGHT: 71 IN | WEIGHT: 212 LBS | BODY MASS INDEX: 29.68 KG/M2

## 2021-06-03 VITALS — BODY MASS INDEX: 29.02 KG/M2 | WEIGHT: 207.31 LBS | HEIGHT: 71 IN

## 2021-06-03 NOTE — ANESTHESIA CARE TRANSFER NOTE
Last vitals:   Vitals:    11/12/19 1023   BP: 131/73   Pulse: 60   Resp: 18   Temp: 36.6  C (97.9  F)   SpO2: 99%     Patient's level of consciousness is drowsy  Spontaneous respirations: yes  Maintains airway independently: yes  Dentition unchanged: yes  Oropharynx: oropharynx clear of all foreign objects    QCDR Measures:  ASA# 20 - Surgical Safety Checklist: WHO surgical safety checklist completed prior to induction    PQRS# 430 - Adult PONV Prevention: 4558F - Pt received => 2 anti-emetic agents (different classes) preop & intraop  ASA# 8 - Peds PONV Prevention: NA - Not pediatric patient, not GA or 2 or more risk factors NOT present  PQRS# 424 - Emmie-op Temp Management: 4559F - At least one body temp DOCUMENTED => 35.5C or 95.9F within required timeframe  PQRS# 426 - PACU Transfer Protocol: - Transfer of care checklist used  ASA# 14 - Acute Post-op Pain: ASA14B - Patient did NOT experience pain >= 7 out of 10

## 2021-06-03 NOTE — TELEPHONE ENCOUNTER
ANTICOAGULATION  MANAGEMENT PROGRAM    Keanu Spence is overdue for INR check.     Spoke with Keanu and scheduled INR appointment on 12/2.      Augusto Denton RN

## 2021-06-03 NOTE — PROGRESS NOTES
ASSESSMENT AND PLAN:    1. Type 2 diabetes mellitus  refill  - metFORMIN (GLUCOPHAGE) 500 MG tablet; Take 1 tablet (500 mg total) by mouth 2 (two) times a day with meals.  Dispense: 180 tablet; Refill: 3    2. Peripheral vascular disease   POD #7 Left fem pop bypass.  Satisfactory.  On warfarin with therapeutic INR, no bleeding.  Some pain but manageable.     3. Chronic anticoagulation  ongoing    4. Chronic kidney disease, stage III   Seems stable, 2.17 - 2.50 2.35.  Will continue to monitor.  No changes in medications recommended.     Patient Instructions   1. Follow up with Dr. Chakraborty as planned    2. Follow up one month.     CHIEF COMPLAINT:  Chief Complaint   Patient presents with     Follow-up     DOD 19     HISTORY OF PRESENT ILLNESS:  Keanu Spence is a 69 y.o. male now one week post repeat re-vascularization of LLE.  Swelling, modest pain, no unusual dyspnea, or dizziness, or nausea.  Blood sugars are OK.  He feels OK overall.  Staples cause some annoyance.     REVIEW OF SYSTEMS:   See HPI, all other systems on review are negative.    Past Medical History:   Diagnosis Date     Anemia      Atypical atrial flutter (H)      Cardiac pacemaker in situ 2019     Chronic anticoagulation 2019     Chronic renal failure, stage 3 (moderate) (H)      Chronic systolic heart failure (H)      COPD (chronic obstructive pulmonary disease) (H)      Coronary artery disease      Diabetes mellitus (H)      Dyslipidemia, goal LDL below 70      Essential hypertension      ICD (implantable cardioverter-defibrillator), dual, in situ      Ischemic cardiomyopathy      Peripheral vascular disease (H)      Screening for prostate cancer 2019     Sleep apnea     no cpap     Social History     Tobacco Use   Smoking Status Former Smoker     Packs/day: 0.20     Years: 40.00     Pack years: 8.00     Types: Cigarettes     Start date:      Last attempt to quit: 2016     Years since quittin.9   Smokeless  "Tobacco Never Used     Family History   Problem Relation Age of Onset     Diabetes Mother      Diabetes Father      Heart disease Father      Diabetes Sister      Hypertension Sister      Diabetes Brother      Hypertension Brother      Diabetes type I Son      Past Surgical History:   Procedure Laterality Date     CARDIAC CATHETERIZATION  09/19/2016     CARDIAC CATHETERIZATION  10/11/2016     to lad/om1 in stent occlusion     CATARACT EXTRACTION, BILATERAL       CORONARY ANGIOPLASTY       CORONARY ARTERY BYPASS GRAFT  10/98    [facility]     CORONARY STENT PLACEMENT  10/11/2016    bebe to left main and om1     EP ICD INSERT N/A 9/20/2019    Procedure: EP ICD Insert;  Surgeon: Colton Montenegro MD;  Location: [facility] Cath Lab;  Service: Cardiology     FEMORAL ARTERY - POPLITEAL ARTERY BYPASS GRAFT Left 11/12/2019    Procedure: LEFT FEMORAL POPLITEAL BYPASS, ARTERIOGRAM;  Surgeon: Ken Chakraborty MD;  Location: [facility] OR;  Service: General     FEMORAL BYPASS Left 2002    [facility],  Dr. Chakraborty     IR EXTREMITY ANGIOGRAM BILATERAL  9/13/2019     VITALS:  Vitals:    11/19/19 1049   BP: 108/48   Patient Site: Left Arm   Patient Position: Sitting   Cuff Size: Adult Large   Pulse: 60   SpO2: 98%   Weight: 216 lb (98 kg)   Height: 5' 11\" (1.803 m)     Wt Readings from Last 3 Encounters:   11/19/19 216 lb (98 kg)   11/15/19 214 lb 4.8 oz (97.2 kg)   10/30/19 212 lb (96.2 kg)     PHYSICAL EXAM:  Constitutional:  In NAD, alert and oriented  Cardiac:  S1 S2   Lungs: Clear   Abdomen:   Soft, flat and non-tender  Extremities: 2+ LLE edema, without erythema, or ulceration, suture line is intact.     DECISION TO OBTAIN OLD RECORDS AND/OR OBTAIN HISTORY FROM SOMEONE OTHER THAN PATIENT, AND/OR ACCESSING CARE EVERYWHERE):  1   0    REVIEW AND SUMMARIZATION OF OLD RECORDS, AND/OR OBTAINING HISTORY FROM SOMEONE OTHER THAN PATIENT, AND/OR DISCUSSION OF CASE WITH ANOTHER HEALTH CARE PROVIDER:  2 reviewed surgery notes, " and discharge summary.     REVIEW AND/OR ORDER OF OF CLINICAL LAB TESTS: 1  Reviewed creatinine levels.    REVIEW AND/OR ORDER OF RADIOLOGY TESTS: 1 0.    REVIEW AND/OR ORDER OF MEDICAL TESTS (EKG/ECHO/COLONOSCOPY/EGD): 1  0    INDEPENDENT  VISUALIZATION OF IMAGE, TRACING, OR SPECIMEN ITSELF (2 EACH):  0     TOTAL: 3    Current Outpatient Medications   Medication Sig Dispense Refill     ACCU-CHEK COMPACT TEST Strp Test daily before all meals/snacks and once before bedtime. 60 strip 12     acetaminophen (TYLENOL) 325 MG tablet Take 2 tablets (650 mg total) by mouth every 4 (four) hours as needed.  0     albuterol (PROAIR HFA;PROVENTIL HFA;VENTOLIN HFA) 90 mcg/actuation inhaler Inhale 2 puffs every 4 (four) hours as needed for wheezing or shortness of breath. 1 each 0     aspirin 81 MG EC tablet Take 1 tablet (81 mg total) by mouth every evening. 90 tablet 3     atorvastatin (LIPITOR) 40 MG tablet TAKE ONE TABLET BY MOUTH DAILY AT BEDTIME 90 tablet 3     blood glucose test (ACCU-CHEK SMARTVIEW TEST STRIP) strips Dispense brand per patient's insurance at pharmacy discretion. Testing once a day only 90 strip 3     blood-glucose meter, drum-type (ACCU-CHEK COMPACT PLUS CARE) Kit Test daily before all meals/snacks and once before bedtime. 1 kit 0     diltiazem (CARDIZEM CD) 240 MG 24 hr capsule Take 1 capsule (240 mg total) by mouth daily. 90 capsule 3     furosemide (LASIX) 40 MG tablet Take 1 tablet (40 mg total) by mouth daily. 180 tablet 3     generic lancets (ACCU-CHEK MULTICLIX LANCET) Test daily before all meals/snacks and once before bedtime. 3 each 0     glipiZIDE (GLUCOTROL XL) 5 MG 24 hr tablet TAKE ONE TABLET BY MOUTH ONCE DAILY 90 tablet 5     hydrALAZINE (APRESOLINE) 25 MG tablet TAKE ONE TABLET BY MOUTH EVERY EIGHT HOURS  270 tablet 1     hydroCHLOROthiazide (HYDRODIURIL) 25 MG tablet TAKE ONE TABLET BY MOUTH ONCE DAILY  90 tablet 2     isosorbide mononitrate (IMDUR) 30 MG 24 hr tablet TAKE ONE TABLET BY  MOUTH TWICE DAILY  180 tablet 1     lisinopril (PRINIVIL,ZESTRIL) 20 MG tablet TAKE ONE TABLET BY MOUTH TWICE DAILY  180 tablet 2     metFORMIN (GLUCOPHAGE) 850 MG tablet Take 1 tablet (850 mg total) by mouth 2 (two) times a day with meals. Restart Monday 9/23  0     metoprolol succinate (TOPROL-XL) 200 MG 24 hr tablet TAKE ONE TABLET BY MOUTH ONE TIME DAILY  90 tablet 1     multivitamin with minerals (THERA-M) 9 mg iron-400 mcg Tab tablet Take 1 tablet by mouth daily.       oxyCODONE (ROXICODONE) 5 MG immediate release tablet Take 1 tablet (5 mg total) by mouth every 4 (four) hours as needed for pain. 13 tablet 0     warfarin (COUMADIN/JANTOVEN) 2.5 MG tablet TAKE 1 TABLET BY MOUTH DAILY ON TUESDAY AND THURSDAY AND 2 TABLETS BY MOUTH DAILY ON ALL OTHER DAYS 180 tablet 1     zolpidem (AMBIEN) 5 MG tablet TAKE ONE TABLET BY MOUTH AT BEDTIME AS NEEDED FOR SLEEP 30 tablet 0     metFORMIN (GLUCOPHAGE) 500 MG tablet Take 1 tablet (500 mg total) by mouth 2 (two) times a day with meals. 180 tablet 3          Ken Keen MD  Internal Medicine  [facility]

## 2021-06-03 NOTE — ANESTHESIA POSTPROCEDURE EVALUATION
Patient: Keanu Spence  LEFT FEMORAL POPLITEAL BYPASS, ARTERIOGRAM  Anesthesia type: general    Patient location: PACU  Last vitals:   Vitals Value Taken Time   /57 11/12/2019  1:00 PM   Temp 36.6  C (97.9  F) 11/12/2019 10:23 AM   Pulse 60 11/12/2019  1:03 PM   Resp 12 11/12/2019  1:03 PM   SpO2 97 % 11/12/2019  1:03 PM   Vitals shown include unvalidated device data.  Post vital signs: stable  Level of consciousness: awake and responds to simple questions  Post-anesthesia pain: pain controlled  Post-anesthesia nausea and vomiting: no  Pulmonary: unassisted, return to baseline  Cardiovascular: stable and blood pressure at baseline  Hydration: adequate  Anesthetic events: no    QCDR Measures:  ASA# 11 - Emmie-op Cardiac Arrest: ASA11B - Patient did NOT experience unanticipated cardiac arrest  ASA# 12 - Emmie-op Mortality Rate: ASA12B - Patient did NOT die  ASA# 13 - PACU Re-Intubation Rate: ASA13B - Patient did NOT require a new airway mgmt  ASA# 10 - Composite Anes Safety: ASA10A - No serious adverse event    Additional Notes:

## 2021-06-03 NOTE — ANESTHESIA PREPROCEDURE EVALUATION
Anesthesia Evaluation      Patient summary reviewed   No history of anesthetic complications     Airway   Mallampati: II  Neck ROM: full   Pulmonary - normal exam    breath sounds clear to auscultation  (+) COPD, sleep apnea on no CPAP, , a smoker                         Cardiovascular - normal exam  Exercise tolerance: > or = 4 METS  (+) pacemaker, hypertension, CAD, CABG/stent, dysrhythmias, CHF, cardiomyopathy, hypercholesterolemia, PVD    Rhythm: regular  Rate: normal,      ROS comment:   Left ventricle ejection fraction is moderately decreased. The calculated left ventricular ejection fraction is 33%.    The following segments are akinetic: apical anterior, apical septal, apical inferior and apex. The following segments are hypokinetic: basal inferolateral, mid inferolateral and apical lateral. All other segments are normal.    Left atrial volume is severely increased.    Normal right ventricular size and systolic function.    Severely elevated central venous pressure    When compared to the previous study dated 1/30/2019, LVEF is higher     Definity contrast utilized    Moderate left ventricular enlargement.    Left ventricle ejection fraction is severely reduced. The calculated left ventricular ejection fraction is 27%.    The following segments are akinetic: basal inferior, mid anteroseptal, mid anterolateral, apical anterior, apical septal, apical inferior, apical lateral and apex. The following segments are hypokinetic: basal anterior, mid anterior, mid inferior and mid inferolateral    . E/e' > 15, suggesting elevated LV filling pressures    Right ventricle not optimally visualized    Moderate biatrial enlargement    Mild to moderate mitral regurgitation.    Mild to moderate tricuspid regurgitation estimate of RV systolic pressure 36 mmHg plus right atrial pressure    When compared to the previous study dated 5/6/2019, ejection fraction appears lower on the current study. The lateral wall motion  abnormality is more pronounced.        Neuro/Psych - negative ROS     Endo/Other    (+) diabetes mellitus type 2,      GI/Hepatic/Renal    (+)   chronic renal disease CRI,     Comments: Prostate CA          Dental - normal exam   (+) upper dentures                       Anesthesia Plan  Planned anesthetic: general endotracheal    ASA 3   Induction: intravenous   Anesthetic plan and risks discussed with: patient and spouse  Anesthesia plan special considerations: antiemetics, arterial catheterization,   Post-op plan: other (NIKKI orders)

## 2021-06-03 NOTE — TELEPHONE ENCOUNTER
RN cannot approve Refill Request    RN can NOT refill this medication historical medication requested.     Elo Carvalho, [facility] Triage/Med Refill 11/5/2019    Requested Prescriptions   Pending Prescriptions Disp Refills     lisinopril (PRINIVIL,ZESTRIL) 20 MG tablet [Pharmacy Med Name: Lisinopril Oral Tablet 20 MG] 180 tablet 2     Sig: TAKE ONE TABLET BY MOUTH TWICE DAILY       Ace Inhibitors Refill Protocol Passed - 11/5/2019 11:18 AM        Passed - PCP or prescribing provider visit in past 12 months       Last office visit with prescriber/PCP: 2/13/2019 Gaurang Duncan MD OR same dept: 2/13/2019 Gaurang Duncan MD OR same specialty: 2/13/2019 Gaurang Duncan MD  Last physical: 2/27/2018 Last MTM visit: Visit date not found   Next visit within 3 mo: Visit date not found  Next physical within 3 mo: Visit date not found  Prescriber OR PCP: Gaurang Duncan MD  Last diagnosis associated with med order: 1. Renovascular hypertension with goal blood pressure less than 130/85  - lisinopril (PRINIVIL,ZESTRIL) 20 MG tablet [Pharmacy Med Name: Lisinopril Oral Tablet 20 MG]; TAKE ONE TABLET BY MOUTH TWICE DAILY  Dispense: 180 tablet; Refill: 2    2. Atherosclerosis of coronary artery bypass graft of native heart without angina pectoris  - hydroCHLOROthiazide (HYDRODIURIL) 25 MG tablet [Pharmacy Med Name: hydroCHLOROthiazide Oral Tablet 25 MG]; TAKE ONE TABLET BY MOUTH ONCE DAILY  Dispense: 90 tablet; Refill: 2    If protocol passes may refill for 12 months if within 3 months of last provider visit (or a total of 15 months).             Passed - Serum Potassium in past 12 months     Lab Results   Component Value Date    Potassium 4.0 10/30/2019             Passed - Blood pressure filed in past 12 months     BP Readings from Last 1 Encounters:   10/30/19 114/54             Passed - Serum Creatinine in past 12 months     Creatinine   Date Value Ref Range Status   10/30/2019 1.80 (H) 0.70 - 1.30 mg/dL Final              hydroCHLOROthiazide (HYDRODIURIL) 25 MG tablet [Pharmacy Med Name: hydroCHLOROthiazide Oral Tablet 25 MG] 90 tablet 2     Sig: TAKE ONE TABLET BY MOUTH ONCE DAILY       Diuretics/Combination Diuretics Refill Protocol  Passed - 11/5/2019 11:18 AM        Passed - Visit with PCP or prescribing provider visit in past 12 months     Last office visit with prescriber/PCP: 2/13/2019 Gaurang Duncan MD OR same dept: 2/13/2019 Gaurang Duncan MD OR same specialty: 2/13/2019 Gaurang Duncan MD  Last physical: 2/27/2018 Last MTM visit: Visit date not found   Next visit within 3 mo: Visit date not found  Next physical within 3 mo: Visit date not found  Prescriber OR PCP: Gaurang Duncan MD  Last diagnosis associated with med order: 1. Renovascular hypertension with goal blood pressure less than 130/85  - lisinopril (PRINIVIL,ZESTRIL) 20 MG tablet [Pharmacy Med Name: Lisinopril Oral Tablet 20 MG]; TAKE ONE TABLET BY MOUTH TWICE DAILY  Dispense: 180 tablet; Refill: 2    2. Atherosclerosis of coronary artery bypass graft of native heart without angina pectoris  - hydroCHLOROthiazide (HYDRODIURIL) 25 MG tablet [Pharmacy Med Name: hydroCHLOROthiazide Oral Tablet 25 MG]; TAKE ONE TABLET BY MOUTH ONCE DAILY  Dispense: 90 tablet; Refill: 2    If protocol passes may refill for 12 months if within 3 months of last provider visit (or a total of 15 months).             Passed - Serum Potassium in past 12 months      Lab Results   Component Value Date    Potassium 4.0 10/30/2019             Passed - Serum Sodium in past 12 months      Lab Results   Component Value Date    Sodium 144 10/30/2019    Sodium, Urine 90 01/29/2019             Passed - Blood pressure on file in past 12 months     BP Readings from Last 1 Encounters:   10/30/19 114/54             Passed - Serum Creatinine in past 12 months      Creatinine   Date Value Ref Range Status   10/30/2019 1.80 (H) 0.70 - 1.30 mg/dL Final

## 2021-06-03 NOTE — TELEPHONE ENCOUNTER
Controlled Substance Refill Request  Medication:   Requested Prescriptions     Pending Prescriptions Disp Refills     zolpidem (AMBIEN) 5 MG tablet [Pharmacy Med Name: Zolpidem Tartrate Oral Tablet 5 MG] 30 tablet 0     Sig: TAKE ONE TABLET BY MOUTH AT BEDTIME AS NEEDED FOR SLEEP     Date Last Fill: 10.10.19  Pharmacy: [facility]   Submit electronically to pharmacy  Controlled Substance Agreement on File:   Encounter-Level CSA Scan Date:    There are no encounter-level csa scan date.       Last office visit: Last office visit pertaining to requested medication was 10.30.19.

## 2021-06-04 VITALS
RESPIRATION RATE: 18 BRPM | OXYGEN SATURATION: 99 % | DIASTOLIC BLOOD PRESSURE: 60 MMHG | BODY MASS INDEX: 31.53 KG/M2 | SYSTOLIC BLOOD PRESSURE: 116 MMHG | HEIGHT: 71 IN | HEART RATE: 60 BPM | WEIGHT: 225.25 LBS

## 2021-06-04 VITALS
HEART RATE: 60 BPM | HEIGHT: 71 IN | DIASTOLIC BLOOD PRESSURE: 50 MMHG | BODY MASS INDEX: 29.82 KG/M2 | TEMPERATURE: 97.8 F | SYSTOLIC BLOOD PRESSURE: 110 MMHG | OXYGEN SATURATION: 100 % | WEIGHT: 213 LBS

## 2021-06-04 VITALS
RESPIRATION RATE: 20 BRPM | HEIGHT: 71 IN | OXYGEN SATURATION: 97 % | TEMPERATURE: 97.4 F | DIASTOLIC BLOOD PRESSURE: 78 MMHG | WEIGHT: 213.25 LBS | HEART RATE: 60 BPM | BODY MASS INDEX: 29.85 KG/M2 | SYSTOLIC BLOOD PRESSURE: 116 MMHG

## 2021-06-04 VITALS — BODY MASS INDEX: 29.01 KG/M2 | WEIGHT: 208 LBS

## 2021-06-04 VITALS
OXYGEN SATURATION: 97 % | HEIGHT: 71 IN | SYSTOLIC BLOOD PRESSURE: 104 MMHG | WEIGHT: 212.31 LBS | DIASTOLIC BLOOD PRESSURE: 66 MMHG | HEART RATE: 60 BPM | BODY MASS INDEX: 29.72 KG/M2

## 2021-06-04 VITALS
HEART RATE: 62 BPM | DIASTOLIC BLOOD PRESSURE: 62 MMHG | BODY MASS INDEX: 29.9 KG/M2 | SYSTOLIC BLOOD PRESSURE: 106 MMHG | HEIGHT: 71 IN | WEIGHT: 213.56 LBS | OXYGEN SATURATION: 98 %

## 2021-06-04 VITALS
DIASTOLIC BLOOD PRESSURE: 52 MMHG | SYSTOLIC BLOOD PRESSURE: 104 MMHG | OXYGEN SATURATION: 96 % | WEIGHT: 208.6 LBS | BODY MASS INDEX: 29.09 KG/M2 | HEART RATE: 62 BPM

## 2021-06-04 VITALS — HEIGHT: 71 IN | BODY MASS INDEX: 27.75 KG/M2 | WEIGHT: 198.2 LBS

## 2021-06-04 VITALS — BODY MASS INDEX: 31.43 KG/M2 | WEIGHT: 224.5 LBS | HEIGHT: 71 IN

## 2021-06-04 NOTE — PATIENT INSTRUCTIONS - HE
1. Use hydrocortisone cream for itchy red rash for the legs.     2. Blood tests today    3. Follow up one month.     4. Use mild soap such as dove

## 2021-06-04 NOTE — PROGRESS NOTES
ASSESSMENT AND PLAN:    1. Type 2 diabetes mellitus   Asymptomatic.  Will assess.   - Glycosylated Hemoglobin A1c    2. Peripheral vascular disease (H)  S/p fem/pop bypass LLE, swelling is improving, the leg and foot are warm.     3. Chronic atrial fibrillation  HR controlled.     4. Chronic anticoagulation  No bleeding.   - INR    5. Acute bronchitis due to other specified organisms  History and exam are consistent with acute bronchitis.  Will treat.   - azithromycin (ZITHROMAX Z-LISETTE) 250 MG tablet; Take 2 tablets (500 mg) on  Day 1,  followed by 1 tablet (250 mg) once daily on Days 2 through 5.  Dispense: 6 tablet; Refill: 0    6. Asteatotic eczema  Mild area of LE.  He is advised to use mild soap like Dove.  Treat with topical hydrocortisone.   - hydrocortisone with aloe 1 % Crea cream; Apply to affected area 2 times daily  Dispense: 30 g; Refill: 1    7. Chronic renal failure, stage 3 (moderate) (H)  Assess stability.   - Basic Metabolic Panel    Patient Instructions   1. Use hydrocortisone cream for itchy red rash for the legs.     2. Blood tests today    3. Follow up one month.     4. Use mild soap such as dove    CHIEF COMPLAINT:  Chief Complaint   Patient presents with     Diabetes     Due for A1C, pend the order     HISTORY OF PRESENT ILLNESS:  Keanu Spence is a 69 y.o. male here in follow up.  Has LRI symptoms with cough and congestion with some sputum.  No fever, or chills, or pleuritic pain.  No hemoptysis.  Has some itching and continued swelling of the LLE, but overall it is better, no increased dyspnea.     REVIEW OF SYSTEMS:   See HPI, all other systems on review are negative.    Past Medical History:   Diagnosis Date     Anemia      Atypical atrial flutter (H)      Cardiac pacemaker in situ 9/25/2019     Chronic anticoagulation 2/21/2019     Chronic renal failure, stage 3 (moderate) (H)      Chronic systolic heart failure (H)      COPD (chronic obstructive pulmonary disease) (H)      Coronary  "artery disease      Diabetes mellitus (H)      Dyslipidemia, goal LDL below 70      Essential hypertension      ICD (implantable cardioverter-defibrillator), dual, in situ      Ischemic cardiomyopathy      Peripheral vascular disease (H)      Screening for prostate cancer 5/7/2019     Sleep apnea     no cpap     Social History     Tobacco Use   Smoking Status Former Smoker     Packs/day: 0.20     Years: 40.00     Pack years: 8.00     Types: Cigarettes     Start date: 1971     Last attempt to quit: 12/5/2016     Years since quitting: 3.0   Smokeless Tobacco Never Used     Family History   Problem Relation Age of Onset     Diabetes Mother      Diabetes Father      Heart disease Father      Diabetes Sister      Hypertension Sister      Diabetes Brother      Hypertension Brother      Diabetes type I Son      Past Surgical History:   Procedure Laterality Date     CARDIAC CATHETERIZATION  09/19/2016     CARDIAC CATHETERIZATION  10/11/2016     to lad/om1 in stent occlusion     CATARACT EXTRACTION, BILATERAL       CORONARY ANGIOPLASTY       CORONARY ARTERY BYPASS GRAFT  10/98    [facility]     CORONARY STENT PLACEMENT  10/11/2016    bebe to left main and om1     EP ICD INSERT N/A 9/20/2019    Procedure: EP ICD Insert;  Surgeon: Colton Montenegro MD;  Location: [facility] Cath Lab;  Service: Cardiology     FEMORAL ARTERY - POPLITEAL ARTERY BYPASS GRAFT Left 11/12/2019    Procedure: LEFT FEMORAL POPLITEAL BYPASS, ARTERIOGRAM;  Surgeon: Ken Chakraborty MD;  Location: [facility] OR;  Service: General     [facility]     IR EXTREMITY ANGIOGRAM BILATERAL  9/13/2019     VITALS:  Vitals:    12/19/19 1106   BP: 112/60   Patient Site: Left Arm   Patient Position: Sitting   Cuff Size: Adult Regular   Pulse: 60   SpO2: 97%   Weight: 214 lb (97.1 kg)   Height: 5' 11\" (1.803 m)     Wt Readings from Last 3 Encounters:   12/19/19 214 lb (97.1 kg)   12/05/19 212 lb (96.2 kg)   11/19/19 216 lb " (98 kg)     PHYSICAL EXAM:  Constitutional:  In NAD, alert and oriented  HEENT: some nares and throat congestion, brassy cough  Neck: no significant cervical or axillary adenopathy  Cardiac:  S1 S2 without murmur  Lungs: decreased bilaterallty, faint rhonchi  Extremities: LLE is warm down to toes, 2+ edema, no ulceration, area of asteatotic eczema    DECISION TO OBTAIN OLD RECORDS AND/OR OBTAIN HISTORY FROM SOMEONE OTHER THAN PATIENT, AND/OR ACCESSING CARE EVERYWHERE):  1 reviewed Dr. Chakraborty's note    REVIEW AND SUMMARIZATION OF OLD RECORDS, AND/OR OBTAINING HISTORY FROM SOMEONE OTHER THAN PATIENT, AND/OR DISCUSSION OF CASE WITH ANOTHER HEALTH CARE PROVIDER:  2 0    REVIEW AND/OR ORDER OF OF CLINICAL LAB TESTS: 1  ordered    REVIEW AND/OR ORDER OF RADIOLOGY TESTS: 1 0.    REVIEW AND/OR ORDER OF MEDICAL TESTS (EKG/ECHO/COLONOSCOPY/EGD): 1  0    INDEPENDENT  VISUALIZATION OF IMAGE, TRACING, OR SPECIMEN ITSELF (2 EACH):  0     TOTAL: 2    Current Outpatient Medications   Medication Sig Dispense Refill     ACCU-CHEK COMPACT TEST Strp Test daily before all meals/snacks and once before bedtime. 60 strip 12     acetaminophen (TYLENOL) 325 MG tablet Take 2 tablets (650 mg total) by mouth every 4 (four) hours as needed.  0     albuterol (PROAIR HFA;PROVENTIL HFA;VENTOLIN HFA) 90 mcg/actuation inhaler Inhale 2 puffs every 4 (four) hours as needed for wheezing or shortness of breath. 1 each 0     aspirin 81 MG EC tablet Take 1 tablet (81 mg total) by mouth every evening. 90 tablet 3     atorvastatin (LIPITOR) 40 MG tablet TAKE ONE TABLET BY MOUTH DAILY AT BEDTIME 90 tablet 3     blood glucose test (ACCU-CHEK SMARTVIEW TEST STRIP) strips Dispense brand per patient's insurance at pharmacy discretion. Testing once a day only 90 strip 3     blood-glucose meter, drum-type (ACCU-CHEK COMPACT PLUS CARE) Kit Test daily before all meals/snacks and once before bedtime. 1 kit 0     diltiazem (CARDIZEM CD) 180 MG 24 hr capsule Take 1  capsule (180 mg total) by mouth daily. 90 capsule 3     furosemide (LASIX) 40 MG tablet Take 1 tablet (40 mg total) by mouth daily. 180 tablet 3     generic lancets (ACCU-CHEK MULTICLIX LANCET) Test daily before all meals/snacks and once before bedtime. 3 each 0     glipiZIDE (GLUCOTROL XL) 5 MG 24 hr tablet TAKE ONE TABLET BY MOUTH ONCE DAILY 90 tablet 5     hydrALAZINE (APRESOLINE) 25 MG tablet TAKE ONE TABLET BY MOUTH EVERY EIGHT HOURS  270 tablet 1     hydroCHLOROthiazide (HYDRODIURIL) 25 MG tablet TAKE ONE TABLET BY MOUTH ONCE DAILY  90 tablet 2     isosorbide mononitrate (IMDUR) 30 MG 24 hr tablet TAKE ONE TABLET BY MOUTH TWICE DAILY  180 tablet 1     lisinopril (PRINIVIL,ZESTRIL) 20 MG tablet TAKE ONE TABLET BY MOUTH TWICE DAILY  180 tablet 2     metFORMIN (GLUCOPHAGE) 500 MG tablet Take 1 tablet (500 mg total) by mouth 2 (two) times a day with meals. 180 tablet 3     metoprolol succinate (TOPROL-XL) 200 MG 24 hr tablet TAKE ONE TABLET BY MOUTH ONE TIME DAILY  90 tablet 1     multivitamin with minerals (THERA-M) 9 mg iron-400 mcg Tab tablet Take 1 tablet by mouth daily.       oxyCODONE (ROXICODONE) 5 MG immediate release tablet Take 1 tablet (5 mg total) by mouth every 4 (four) hours as needed for pain. 13 tablet 0     warfarin (COUMADIN/JANTOVEN) 2.5 MG tablet TAKE 1 TABLET BY MOUTH DAILY ON TUESDAY AND THURSDAY AND 2 TABLETS BY MOUTH DAILY ON ALL OTHER DAYS 180 tablet 1     zolpidem (AMBIEN) 5 MG tablet Take 1 tablet (5 mg total) by mouth at bedtime as needed for sleep. 30 tablet 0     azithromycin (ZITHROMAX Z-LISETTE) 250 MG tablet Take 2 tablets (500 mg) on  Day 1,  followed by 1 tablet (250 mg) once daily on Days 2 through 5. 6 tablet 0     hydrocortisone with aloe 1 % Crea cream Apply to affected area 2 times daily 30 g 1     metFORMIN (GLUCOPHAGE) 850 MG tablet Take 1 tablet (850 mg total) by mouth 2 (two) times a day with meals. Restart Monday 9/23  0     Ken Keen MD  Internal  Medicine  [facility]

## 2021-06-04 NOTE — PATIENT INSTRUCTIONS - HE
1. Take the augmentin one pill twice daily for 7 days.      2. Use oxycodone/acetaminphen one pill as needed for pain.     3. Follow up as scheduled.  No change in other medications.     4. Skip one dose of warfarin as directed.

## 2021-06-04 NOTE — PROGRESS NOTES
In clinic device check with Device RN and follow-up with Dr. Skinner.  Please see link for full device report.  Patient was informed of results and next follow up during today's visit.

## 2021-06-04 NOTE — PROGRESS NOTES
ASSESSMENT AND PLAN:    1. Shortness of breath  No indication of CHF or infiltrate on CXR.  Not having orthopnea.  Will treat as bronchitis, unresponsive to azithromycin.     - XR Chest 2 Views    2. PVD (peripheral vascular disease)   LLE is improving, without erythema, or breakdown.  No ulcer.  Foot is warm.  It is improving.  Some pain at night.  Will use prn oxycodone sparingly prn as the swelling gradually improves.   - oxyCODONE-acetaminophen (PERCOCET/ENDOCET) 5-325 mg per tablet; Take 1 tablet by mouth every 6 (six) hours as needed for pain.  Dispense: 30 tablet; Refill: 0    3. Bronchitis  He will skip warfarin on day 4 of the treatment.   - amoxicillin-clavulanate (AUGMENTIN) 875-125 mg per tablet; Take 1 tablet by mouth 2 (two) times a day for 7 days.  Dispense: 14 tablet; Refill: 0    Patient Instructions   1. Take the augmentin one pill twice daily for 7 days.      2. Use oxycodone/acetaminphen one pill as needed for pain.     3. Follow up as scheduled.  No change in other medications.     4. Skip one dose of warfarin as directed.     CHIEF COMPLAINT:  Chief Complaint   Patient presents with     Follow-up     Bronchitis is getting worse, antibiotics not working     Post-op Problem     Left leg pain     HISTORY OF PRESENT ILLNESS:  Keanu Spence is a 69 y.o. male with dyspnea.  He finds the azithromycin did not improve his cough or dyspnea.  He has less congestion but still dyspnea.  No chest pain, or orthopnea.  No pleuritic pain.  His leg edema is improved but he still has some edema, and intermittent pain at night. No fever, or chills or hemoptysis.     REVIEW OF SYSTEMS:   See HPI, all other systems on review are negative.    Past Medical History:   Diagnosis Date     Anemia      Atypical atrial flutter (H)      Cardiac pacemaker in situ 9/25/2019     Chronic anticoagulation 2/21/2019     Chronic renal failure, stage 3 (moderate) (H)      Chronic systolic heart failure (H)      COPD (chronic  obstructive pulmonary disease) (H)      Coronary artery disease      Diabetes mellitus (H)      Dyslipidemia, goal LDL below 70      Essential hypertension      ICD (implantable cardioverter-defibrillator), dual, in situ      Ischemic cardiomyopathy      Peripheral vascular disease (H)      Screening for prostate cancer 5/7/2019     Sleep apnea     no cpap     Social History     Tobacco Use   Smoking Status Former Smoker     Packs/day: 0.20     Years: 40.00     Pack years: 8.00     Types: Cigarettes     Start date: 1971     Last attempt to quit: 12/5/2016     Years since quitting: 3.0   Smokeless Tobacco Never Used     Family History   Problem Relation Age of Onset     Diabetes Mother      Diabetes Father      Heart disease Father      Diabetes Sister      Hypertension Sister      Diabetes Brother      Hypertension Brother      Diabetes type I Son      Past Surgical History:   Procedure Laterality Date     CARDIAC CATHETERIZATION  09/19/2016     CARDIAC CATHETERIZATION  10/11/2016     to lad/om1 in stent occlusion     CATARACT EXTRACTION, BILATERAL       CORONARY ANGIOPLASTY       CORONARY ARTERY BYPASS GRAFT  10/98    [facility]     CORONARY STENT PLACEMENT  10/11/2016    bebe to left main and om1     EP ICD INSERT N/A 9/20/2019    Procedure: EP ICD Insert;  Surgeon: Colton Montenegro MD;  Location: [facility] Cath Lab;  Service: Cardiology     FEMORAL ARTERY - POPLITEAL ARTERY BYPASS GRAFT Left 11/12/2019    Procedure: LEFT FEMORAL POPLITEAL BYPASS, ARTERIOGRAM;  Surgeon: Ken Chakraborty MD;  Location: [facility] OR;  Service: General     [facility]     IR EXTREMITY ANGIOGRAM BILATERAL  9/13/2019     VITALS:  Vitals:    12/30/19 1519   BP: 104/50   Patient Site: Left Arm   Patient Position: Sitting   Cuff Size: Adult Large   Pulse: 60   SpO2: 94%   Weight: 217 lb 2 oz (98.5 kg)     Wt Readings from Last 3 Encounters:   12/30/19 217 lb 2 oz (98.5 kg)   12/19/19 214  lb (97.1 kg)   12/05/19 212 lb (96.2 kg)     PHYSICAL EXAM:  Constitutional:  In NAD, alert and oriented  Neck: no significant cervical or axillary adenopathy  Cardiac:  S1 S2   Lungs: no wheezes or rhonchi heard  Extremities: LLE edema with warmth, no erythema, or ulcer, less edema than before.     DECISION TO OBTAIN OLD RECORDS AND/OR OBTAIN HISTORY FROM SOMEONE OTHER THAN PATIENT, AND/OR ACCESSING CARE EVERYWHERE):  1  0     REVIEW AND SUMMARIZATION OF OLD RECORDS, AND/OR OBTAINING HISTORY FROM SOMEONE OTHER THAN PATIENT, AND/OR DISCUSSION OF CASE WITH ANOTHER HEALTH CARE PROVIDER:  2 0    REVIEW AND/OR ORDER OF OF CLINICAL LAB TESTS: 1  0    REVIEW AND/OR ORDER OF RADIOLOGY TESTS: 1 ordered.    REVIEW AND/OR ORDER OF MEDICAL TESTS (EKG/ECHO/COLONOSCOPY/EGD): 1  0    INDEPENDENT  VISUALIZATION OF IMAGE, TRACING, OR SPECIMEN ITSELF (2 EACH):  2 personally viewed and interpreted the CXR and reviewed with the patient.      TOTAL: 3    Current Outpatient Medications   Medication Sig Dispense Refill     ACCU-CHEK COMPACT TEST Strp Test daily before all meals/snacks and once before bedtime. 60 strip 12     acetaminophen (TYLENOL) 325 MG tablet Take 2 tablets (650 mg total) by mouth every 4 (four) hours as needed.  0     albuterol (PROAIR HFA;PROVENTIL HFA;VENTOLIN HFA) 90 mcg/actuation inhaler Inhale 2 puffs every 4 (four) hours as needed for wheezing or shortness of breath. 1 each 0     aspirin 81 MG EC tablet Take 1 tablet (81 mg total) by mouth every evening. 90 tablet 3     atorvastatin (LIPITOR) 40 MG tablet TAKE ONE TABLET BY MOUTH DAILY AT BEDTIME 90 tablet 3     blood glucose test (ACCU-CHEK SMARTVIEW TEST STRIP) strips Dispense brand per patient's insurance at pharmacy discretion. Testing once a day only 90 strip 3     blood-glucose meter, drum-type (ACCU-CHEK COMPACT PLUS CARE) Kit Test daily before all meals/snacks and once before bedtime. 1 kit 0     diltiazem (CARDIZEM CD) 180 MG 24 hr capsule Take 1  capsule (180 mg total) by mouth daily. 90 capsule 3     furosemide (LASIX) 40 MG tablet Take 1 tablet (40 mg total) by mouth daily. 180 tablet 3     generic lancets (ACCU-CHEK MULTICLIX LANCET) Test daily before all meals/snacks and once before bedtime. 3 each 0     glipiZIDE (GLUCOTROL XL) 5 MG 24 hr tablet TAKE ONE TABLET BY MOUTH ONCE DAILY 90 tablet 5     hydrALAZINE (APRESOLINE) 25 MG tablet TAKE ONE TABLET BY MOUTH EVERY EIGHT HOURS  270 tablet 1     hydroCHLOROthiazide (HYDRODIURIL) 25 MG tablet TAKE ONE TABLET BY MOUTH ONCE DAILY  90 tablet 2     hydrocortisone with aloe 1 % Crea cream Apply to affected area 2 times daily 30 g 1     isosorbide mononitrate (IMDUR) 30 MG 24 hr tablet TAKE ONE TABLET BY MOUTH TWICE DAILY  180 tablet 1     lisinopril (PRINIVIL,ZESTRIL) 20 MG tablet TAKE ONE TABLET BY MOUTH TWICE DAILY  180 tablet 2     metFORMIN (GLUCOPHAGE) 500 MG tablet Take 1 tablet (500 mg total) by mouth 2 (two) times a day with meals. 180 tablet 3     metoprolol succinate (TOPROL-XL) 200 MG 24 hr tablet TAKE ONE TABLET BY MOUTH ONE TIME DAILY  90 tablet 1     multivitamin with minerals (THERA-M) 9 mg iron-400 mcg Tab tablet Take 1 tablet by mouth daily.       oxyCODONE (ROXICODONE) 5 MG immediate release tablet Take 1 tablet (5 mg total) by mouth every 4 (four) hours as needed for pain. 13 tablet 0     warfarin (COUMADIN/JANTOVEN) 2.5 MG tablet TAKE 1 TABLET BY MOUTH DAILY ON TUESDAY AND THURSDAY AND 2 TABLETS BY MOUTH DAILY ON ALL OTHER DAYS 180 tablet 1     zolpidem (AMBIEN) 5 MG tablet Take 1 tablet (5 mg total) by mouth at bedtime as needed for sleep. 30 tablet 0     amoxicillin-clavulanate (AUGMENTIN) 875-125 mg per tablet Take 1 tablet by mouth 2 (two) times a day for 7 days. 14 tablet 0     oxyCODONE-acetaminophen (PERCOCET/ENDOCET) 5-325 mg per tablet Take 1 tablet by mouth every 6 (six) hours as needed for pain. 30 tablet 0     No current facility-administered medications for this visit.      Ken  JOHNNY Keen MD  Internal Medicine  [facility]

## 2021-06-04 NOTE — TELEPHONE ENCOUNTER
Controlled Substance Refill Request  Medication:   Requested Prescriptions     Pending Prescriptions Disp Refills     zolpidem (AMBIEN) 5 MG tablet 30 tablet 0     Sig: Take 1 tablet (5 mg total) by mouth at bedtime as needed for sleep.     Date Last Fill: 11/8/19  [email or web address]   Submit electronically to pharmacy  Controlled Substance Agreement on File:   Encounter-Level CSA Scan Date:    There are no encounter-level csa scan date.       Last office visit: Last office visit pertaining to requested medication was 11/19/19.

## 2021-06-05 NOTE — PATIENT INSTRUCTIONS - HE
1. Discussed the stage 2 or stage 3 kidney disease    2. Lab testing today.     3. No changes in medications except stop ambien, and try lorazepam 0.5mg one or two at night as needed for sleep.

## 2021-06-05 NOTE — PROGRESS NOTES
ASSESSMENT AND PLAN:    1. Sleep disorder  Seems to be unresponsive to ambien.  As his leg improves and he feels overall better, this should improve.  He says he was never a long sleeper.  Will try lorazepam for now.  We discussed its addiction potential.   - LORazepam (ATIVAN) 0.5 MG tablet; Take 1-2 tablets (0.5-1 mg total) by mouth at bedtime as needed for anxiety.  Dispense: 60 tablet; Refill: 0    2. Peripheral vascular disease   He is now 2.5 months post redo fem-pop bypass, and swelling is improving, foot remains warm, pain is better, claudication seems resolved.    - Basic Metabolic Panel    3. Type 2 diabetes mellitus  He feels control is satisfactory.  Will assess.   - Glycosylated Hemoglobin A1c    4. Chronic renal failure, stage 3  He has been stable.  Will continue to monitor.     5. Chronic anticoagulation  No complications.   -INR    Patient Instructions   1. Discussed the stage 2 or stage 3 kidney disease    2. Lab testing today.     3. No changes in medications except stop ambien, and try lorazepam 0.5mg one or two at night as needed for sleep.     CHIEF COMPLAINT:  Chief Complaint   Patient presents with     Follow-up     INR     HISTORY OF PRESENT ILLNESS:  Keanu Spence is a 69 y.o. male here in follow up.  His leg feels better overall, and less swollen.  He has some incision pain without breakdown. The claudication is resolved.  Main complaint is poor sleep, despite ambien.  At times he won't sleep at all.  Tries to avoid daytime knapping.  No unusual dyspnea or fever.  He never was a long sleeper.     REVIEW OF SYSTEMS:   See HPI, all other systems on review are negative.    Past Medical History:   Diagnosis Date     Anemia      Atypical atrial flutter (H)      Cardiac pacemaker in situ 9/25/2019     Chronic anticoagulation 2/21/2019     Chronic renal failure, stage 3 (moderate) (H)      Chronic systolic heart failure (H)      COPD (chronic obstructive pulmonary disease) (H)      Coronary  "artery disease      Diabetes mellitus (H)      Dyslipidemia, goal LDL below 70      Essential hypertension      ICD (implantable cardioverter-defibrillator), dual, in situ      Ischemic cardiomyopathy      Peripheral vascular disease (H)      Screening for prostate cancer 5/7/2019     Sleep apnea     no cpap     Social History     Tobacco Use   Smoking Status Former Smoker     Packs/day: 0.20     Years: 40.00     Pack years: 8.00     Types: Cigarettes     Start date: 1971     Last attempt to quit: 12/5/2016     Years since quitting: 3.1   Smokeless Tobacco Never Used     Family History   Problem Relation Age of Onset     Diabetes Mother      Diabetes Father      Heart disease Father      Diabetes Sister      Hypertension Sister      Diabetes Brother      Hypertension Brother      Diabetes type I Son      Past Surgical History:   Procedure Laterality Date     CARDIAC CATHETERIZATION  09/19/2016     CARDIAC CATHETERIZATION  10/11/2016     to lad/om1 in stent occlusion     CATARACT EXTRACTION, BILATERAL       CORONARY ANGIOPLASTY       CORONARY ARTERY BYPASS GRAFT  10/98    [facility]     CORONARY STENT PLACEMENT  10/11/2016    bebe to left main and om1     EP ICD INSERT N/A 9/20/2019    Procedure: EP ICD Insert;  Surgeon: Colton Montenegro MD;  Location: [facility] Cath Lab;  Service: Cardiology     FEMORAL ARTERY - POPLITEAL ARTERY BYPASS GRAFT Left 11/12/2019    Procedure: LEFT FEMORAL POPLITEAL BYPASS, ARTERIOGRAM;  Surgeon: Ken Chakraborty MD;  Location: [facility] OR;  Service: General     [facility]     IR EXTREMITY ANGIOGRAM BILATERAL  9/13/2019     VITALS:  Vitals:    01/20/20 0854   BP: 104/66   Patient Site: Left Arm   Patient Position: Sitting   Cuff Size: Adult Regular   Pulse: 60   SpO2: 97%   Weight: 212 lb 5 oz (96.3 kg)   Height: 5' 11\" (1.803 m)     Wt Readings from Last 3 Encounters:   01/20/20 212 lb 5 oz (96.3 kg)   12/30/19 217 lb 2 oz (98.5 kg) "   12/19/19 214 lb (97.1 kg)     PHYSICAL EXAM:  Constitutional:  In NAD, alert and oriented  Cardiac:  S1 S2   Lungs: Clear to auscultation  Extremities:LLE has intact incision, no drainage or erythema, 1+ edema, foot is warm.     Current Outpatient Medications   Medication Sig Dispense Refill     ACCU-CHEK COMPACT TEST Strp Test daily before all meals/snacks and once before bedtime. 60 strip 12     acetaminophen (TYLENOL) 325 MG tablet Take 2 tablets (650 mg total) by mouth every 4 (four) hours as needed.  0     albuterol (PROAIR HFA;PROVENTIL HFA;VENTOLIN HFA) 90 mcg/actuation inhaler Inhale 2 puffs every 4 (four) hours as needed for wheezing or shortness of breath. 1 each 0     aspirin 81 MG EC tablet Take 1 tablet (81 mg total) by mouth every evening. 90 tablet 3     atorvastatin (LIPITOR) 40 MG tablet TAKE ONE TABLET BY MOUTH DAILY AT BEDTIME 90 tablet 3     blood glucose test (ACCU-CHEK SMARTVIEW TEST STRIP) strips Dispense brand per patient's insurance at pharmacy discretion. Testing once a day only 90 strip 3     blood-glucose meter, drum-type (ACCU-CHEK COMPACT PLUS CARE) Kit Test daily before all meals/snacks and once before bedtime. 1 kit 0     diltiazem (CARDIZEM CD) 180 MG 24 hr capsule Take 1 capsule (180 mg total) by mouth daily. 90 capsule 3     furosemide (LASIX) 40 MG tablet Take 1 tablet (40 mg total) by mouth daily. 180 tablet 3     generic lancets (ACCU-CHEK MULTICLIX LANCET) Test daily before all meals/snacks and once before bedtime. 3 each 0     glipiZIDE (GLUCOTROL XL) 5 MG 24 hr tablet TAKE ONE TABLET BY MOUTH ONCE DAILY 90 tablet 5     hydrALAZINE (APRESOLINE) 25 MG tablet TAKE ONE TABLET BY MOUTH EVERY EIGHT HOURS  270 tablet 1     hydroCHLOROthiazide (HYDRODIURIL) 25 MG tablet TAKE ONE TABLET BY MOUTH ONCE DAILY  90 tablet 2     hydrocortisone with aloe 1 % Crea cream Apply to affected area 2 times daily 30 g 1     isosorbide mononitrate (IMDUR) 30 MG 24 hr tablet TAKE ONE TABLET BY MOUTH  TWICE DAILY  180 tablet 1     lisinopril (PRINIVIL,ZESTRIL) 20 MG tablet TAKE ONE TABLET BY MOUTH TWICE DAILY  180 tablet 2     metFORMIN (GLUCOPHAGE) 500 MG tablet Take 1 tablet (500 mg total) by mouth 2 (two) times a day with meals. 180 tablet 3     metoprolol succinate (TOPROL-XL) 200 MG 24 hr tablet TAKE ONE TABLET BY MOUTH ONE TIME DAILY  90 tablet 1     multivitamin with minerals (THERA-M) 9 mg iron-400 mcg Tab tablet Take 1 tablet by mouth daily.       warfarin (COUMADIN/JANTOVEN) 2.5 MG tablet TAKE 1 TABLET BY MOUTH DAILY ON TUESDAY AND THURSDAY AND 2 TABLETS BY MOUTH DAILY ON ALL OTHER DAYS 180 tablet 1     LORazepam (ATIVAN) 0.5 MG tablet Take 1-2 tablets (0.5-1 mg total) by mouth at bedtime as needed for anxiety. 60 tablet 0     No current facility-administered medications for this visit.      Ken Keen MD  Internal Medicine  [facility]

## 2021-06-05 NOTE — TELEPHONE ENCOUNTER
Who is calling:    Patient    Reason for Call:    Calling to give update on sleep aide that was prescribed on 01/20/2020.    Patient states if is NOT helping his insomnia sx's.    Requesting an alternative plan of care.    Pharm: [id number] - [place], [place] - [address]    Date of last appointment with primary care: 01/20/2020    Okay to leave a detailed message:   Yes    Please call patient to discus a treatment plan.

## 2021-06-05 NOTE — TELEPHONE ENCOUNTER
Controlled Substance Refill Request  Medication Name:   Requested Prescriptions     Pending Prescriptions Disp Refills     zolpidem (AMBIEN) 5 MG tablet [Pharmacy Med Name: Zolpidem Tartrate Oral Tablet 5 MG] 30 tablet 0     Sig: TAKE ONE TABLET BY MOUTH AT BEDTIME AS NEEDED FOR SLEEP     Date Last Fill: 12/12/19   zolpidem (AMBIEN) 5 MG tablet 30 tablet 0 12/12/2019  No   Sig - Route: Take 1 tablet (5 mg total) by mouth at bedtime as needed for sleep. - Oral   Sent to pharmacy as: zolpidem 5 mg tablet (AMBIEN)   E-Prescribing Status: Receipt confirmed by pharmacy (12/12/2019 12:10 PM CST)   Requested Pharmacy: [id number]  Submit electronically to pharmacy  Controlled Substance Agreement on file:   Encounter-Level CSA Scan Date:    There are no encounter-level csa scan date.        Last office visit:  12/20/19

## 2021-06-05 NOTE — PATIENT INSTRUCTIONS - HE
1. Stop the lorazepam and try the seroquel 50 mg by mouth at bedtime.  Can try two if needed.     2. Use augmentin 825 mg/125 mg by mouth twice daily for 7 days for bronchitis.      3. Follow up as scheduled.

## 2021-06-06 NOTE — PROGRESS NOTES
ASSESSMENT AND PLAN:    1. Hyperlipidemia   refill  - atorvastatin (LIPITOR) 40 MG tablet; Take 1 tablet (40 mg total) by mouth at bedtime.  Dispense: 90 tablet; Refill: 3    2. Respiratory tract congestion with cough  Exam and history are consistent with LRI. Xray does not reveal acute on chronic CHF or pneumonia.  Will treat as acute bronchitis.   - XR Chest 2 Views  - levoFLOXacin (LEVAQUIN) 500 MG tablet; Take 1 tablet (500 mg total) by mouth daily for 10 days.  Dispense: 10 tablet; Refill: 0  - predniSONE (DELTASONE) 20 MG tablet; Take 20 mg by mouth daily for 5 days.  Dispense: 5 tablet; Refill: 0    3. Chronic renal failure, stage 3 (moderate)   Will assess to monitor for progression  - Basic Metabolic Panel    4. Atypical atrial flutter   Ongoing chronic anticoagulation without bleeding.   - INR    5. Peripheral vascular disease  LLE edema is improving. There is still post operative pain, related to the surgery and the edema.  No indication of infection, or active ischemia.  Will continue post surgery acute pain for sometime further until the swelling does slowly resolve. We discussed and he understands the risks of combined use of opioid and benzodiazepine therapy. He is not using the lorazepam as it wasn't much help for his sleep  - oxyCODONE-acetaminophen (PERCOCET/ENDOCET) 5-325 mg per tablet; Take 1 tablet by mouth every 8 (eight) hours as needed for pain.  Dispense: 36 tablet; Refill: 0    6. Chronic kidney disease, stage III (moderate)  Need to monitor for progession.     7. Sleep disorder  He found the seroquel left him 'foggy' in the AM, but it did help him sleep.  He will try 1/2 dose and see how that works.     Patient Instructions   1. Take the levofloxicin 500 mg one pill daily for 10 days    2. take the prednisone 20 mg one pill daily for 5 days.     3. Take the oxycodone'acetaminophen pain fill one every 8 hours as needed.     4. Try the 1/2 seroquel for sleep at night.     5. Follow up as  previously arranged.      CHIEF COMPLAINT:  Chief Complaint   Patient presents with     Follow-up     HISTORY OF PRESENT ILLNESS:  Keanu Spence is a 69 y.o. male here with acute congested cough.  There is sputum, no hemoptysis, or high fever.  No pleuritic pain.  No change in chronic GARCIA. The LLE continues to improve, but still some pain in upper calf near the incision, where swelling is max.  The foot feels OK.  No chest pain.  Sleep was improved with the seroquel but side effects of 'foggy' head in the AM.  No confusion or hallucination.     REVIEW OF SYSTEMS:   See HPI, all other systems on review are negative.    Past Medical History:   Diagnosis Date     Anemia      Atypical atrial flutter (H)      Cardiac pacemaker in situ 9/25/2019     Chronic anticoagulation 2/21/2019     Chronic renal failure, stage 3 (moderate) (H)      Chronic systolic heart failure (H)      COPD (chronic obstructive pulmonary disease) (H)      Coronary artery disease      Diabetes mellitus (H)      Dyslipidemia, goal LDL below 70      Essential hypertension      ICD (implantable cardioverter-defibrillator), dual, in situ      Ischemic cardiomyopathy      Peripheral vascular disease (H)      Screening for prostate cancer 5/7/2019     Sleep apnea     no cpap     Social History     Tobacco Use   Smoking Status Former Smoker     Packs/day: 0.20     Years: 40.00     Pack years: 8.00     Types: Cigarettes     Start date: 1971     Last attempt to quit: 12/5/2016     Years since quitting: 3.2   Smokeless Tobacco Never Used     Family History   Problem Relation Age of Onset     Diabetes Mother      Diabetes Father      Heart disease Father      Diabetes Sister      Hypertension Sister      Diabetes Brother      Hypertension Brother      Diabetes type I Son      Past Surgical History:   Procedure Laterality Date     CARDIAC CATHETERIZATION  09/19/2016     CARDIAC CATHETERIZATION  10/11/2016     to lad/om1 in stent occlusion     CATARACT  "EXTRACTION, BILATERAL       CORONARY ANGIOPLASTY       CORONARY ARTERY BYPASS GRAFT  10/98    [facility]     CORONARY STENT PLACEMENT  10/11/2016    bebe to left main and om1     EP ICD INSERT N/A 9/20/2019    Procedure: EP ICD Insert;  Surgeon: Colton Montenegro MD;  Location: [facility] Cath Lab;  Service: Cardiology     FEMORAL ARTERY - POPLITEAL ARTERY BYPASS GRAFT Left 11/12/2019    Procedure: LEFT FEMORAL POPLITEAL BYPASS, ARTERIOGRAM;  Surgeon: Ken Chakraborty MD;  Location: [facility] OR;  Service: General     [facility]     IR EXTREMITY ANGIOGRAM BILATERAL  9/13/2019     VITALS:  Vitals:    02/13/20 1329   BP: 106/62   Patient Site: Left Arm   Patient Position: Sitting   Cuff Size: Adult Large   Pulse: 62   SpO2: 98%   Weight: 213 lb 9 oz (96.9 kg)   Height: 5' 11\" (1.803 m)     Wt Readings from Last 3 Encounters:   02/13/20 213 lb 9 oz (96.9 kg)   02/04/20 213 lb 4 oz (96.7 kg)   01/20/20 212 lb 5 oz (96.3 kg)     PHYSICAL EXAM:  Constitutional:  In NAD, alert and oriented, congested cough  HEENT: nose and throat clear, ears normal  Cardiac:  S1 S2   Lungs: a few scattered crackles bilaterally, no wheezes heard.   Extremities: 1+ LLE edema, no redness, no focal tenderness, the left foot is warm.     DECISION TO OBTAIN OLD RECORDS AND/OR OBTAIN HISTORY FROM SOMEONE OTHER THAN PATIENT, AND/OR ACCESSING CARE EVERYWHERE):  1  0    REVIEW AND SUMMARIZATION OF OLD RECORDS, AND/OR OBTAINING HISTORY FROM SOMEONE OTHER THAN PATIENT, AND/OR DISCUSSION OF CASE WITH ANOTHER HEALTH CARE PROVIDER:  2 0    REVIEW AND/OR ORDER OF OF CLINICAL LAB TESTS: 1  0.    REVIEW AND/OR ORDER OF RADIOLOGY TESTS: 1 ordered.    REVIEW AND/OR ORDER OF MEDICAL TESTS (EKG/ECHO/COLONOSCOPY/EGD): 1  0    INDEPENDENT  VISUALIZATION OF IMAGE, TRACING, OR SPECIMEN ITSELF (2 EACH):  2 personally viewed and interpreted the CXR and reviewed with the patient.      TOTAL: 3    Current Outpatient " Medications   Medication Sig Dispense Refill     ACCU-CHEK COMPACT TEST Strp Test daily before all meals/snacks and once before bedtime. 60 strip 12     acetaminophen (TYLENOL) 325 MG tablet Take 2 tablets (650 mg total) by mouth every 4 (four) hours as needed.  0     albuterol (PROAIR HFA;PROVENTIL HFA;VENTOLIN HFA) 90 mcg/actuation inhaler Inhale 2 puffs every 4 (four) hours as needed for wheezing or shortness of breath. 1 each 0     aspirin 81 MG EC tablet Take 1 tablet (81 mg total) by mouth every evening. 90 tablet 3     blood glucose test (ACCU-CHEK SMARTVIEW TEST STRIP) strips Dispense brand per patient's insurance at pharmacy discretion. Testing once a day only 90 strip 3     blood-glucose meter, drum-type (ACCU-CHEK COMPACT PLUS CARE) Kit Test daily before all meals/snacks and once before bedtime. 1 kit 0     diltiazem (CARDIZEM CD) 180 MG 24 hr capsule Take 1 capsule (180 mg total) by mouth daily. 90 capsule 3     furosemide (LASIX) 40 MG tablet Take 1 tablet (40 mg total) by mouth daily. 180 tablet 3     generic lancets (ACCU-CHEK MULTICLIX LANCET) Test daily before all meals/snacks and once before bedtime. 3 each 0     glipiZIDE (GLUCOTROL XL) 5 MG 24 hr tablet TAKE ONE TABLET BY MOUTH ONCE DAILY 90 tablet 5     hydrALAZINE (APRESOLINE) 25 MG tablet TAKE ONE TABLET BY MOUTH EVERY EIGHT HOURS  270 tablet 1     hydroCHLOROthiazide (HYDRODIURIL) 25 MG tablet TAKE ONE TABLET BY MOUTH ONCE DAILY  90 tablet 2     hydrocortisone with aloe 1 % Crea cream Apply to affected area 2 times daily 30 g 1     isosorbide mononitrate (IMDUR) 30 MG 24 hr tablet TAKE ONE TABLET BY MOUTH TWICE DAILY  180 tablet 1     lisinopril (PRINIVIL,ZESTRIL) 20 MG tablet TAKE ONE TABLET BY MOUTH TWICE DAILY  180 tablet 2     metFORMIN (GLUCOPHAGE) 500 MG tablet Take 1 tablet (500 mg total) by mouth 2 (two) times a day with meals. 180 tablet 3     metoprolol succinate (TOPROL-XL) 200 MG 24 hr tablet TAKE ONE TABLET BY MOUTH ONE TIME  DAILY  90 tablet 1     multivitamin with minerals (THERA-M) 9 mg iron-400 mcg Tab tablet Take 1 tablet by mouth daily.       QUEtiapine (SEROQUEL) 50 MG tablet Take 1 tablet (50 mg total) by mouth at bedtime. 30 tablet 6     warfarin ANTICOAGULANT (COUMADIN/JANTOVEN) 2.5 MG tablet TAKE 1 TABLET BY MOUTH DAILY ON TUESDAY AND THURSDAY AND 2 TABLETS BY MOUTH DAILY ON ALL OTHER DAYS 150 tablet 1     atorvastatin (LIPITOR) 40 MG tablet Take 1 tablet (40 mg total) by mouth at bedtime. 90 tablet 3     levoFLOXacin (LEVAQUIN) 500 MG tablet Take 1 tablet (500 mg total) by mouth daily for 10 days. 10 tablet 0     oxyCODONE-acetaminophen (PERCOCET/ENDOCET) 5-325 mg per tablet Take 1 tablet by mouth every 8 (eight) hours as needed for pain. 36 tablet 0     predniSONE (DELTASONE) 20 MG tablet Take 20 mg by mouth daily for 5 days. 5 tablet 0     No current facility-administered medications for this visit.      Ken Keen MD  Internal Medicine  [facility]

## 2021-06-06 NOTE — TELEPHONE ENCOUNTER
Medication Question or Clarification  Who is calling: patient   What medication are you calling about (include dose and sig)?:   predniSONE (DELTASONE) 10 mg tablet  50 tablet  0  3/4/2020      Sig: Take 4 tabs daily for 5 days, then 3 tabs daily for 5 days, then 2 tabs daily for 5 days, then 1 tab daily for 5 days, then stop..     Who prescribed the medication?: Ken Keen MD    What is your question/concern?: patient states he is on prednisone therapy  His blood glucose levels are going high 375 he is urinating more . Patient is requesting for providers suggestions Please advise .  Requested Pharmacy: [id number]  Okay to leave a detailed message?: No

## 2021-06-06 NOTE — TELEPHONE ENCOUNTER
Pharmacist calling stating they need to know if percocet script that was sent in today is for chronic pain or acute pain.  Script was also sent in with a dx of respiratory tract infection with cough.  This needs to be changed per pharmacist.  Lastly, the pharmacy needs documentation from pcp that he has discussed with pt the dangers of using both percocet and lorazepam together.  This can be put in pt's OV note today and faxed to: Fax: [phone].

## 2021-06-06 NOTE — TELEPHONE ENCOUNTER
Refill Request  Did you contact pharmacy: Pharmacy was calling  Medication name:   blood glucose test (ACCU-CHEK SMARTVIEW TEST STRIP) strips  Sig: Dispense brand per patient's insurance at pharmacy discretion. Testing once a day only    Requested Prescriptions      No prescriptions requested or ordered in this encounter     Who prescribed the medication: Dr. Keen  Requested Pharmacy: [facility] [phone]  Is patient out of medication: Unknown  Patient notified refills processed in 3 business days:  Pharmacy call  Okay to leave a detailed message: yes        Vikash stated the pharmacy is in need of a diagnosis code and the patient's insulin dependency status.

## 2021-06-06 NOTE — TELEPHONE ENCOUNTER
Please tell him to double his dose of glipizide until he is off the prednisone.  That should help.  Dr. Osmani MD

## 2021-06-06 NOTE — PROGRESS NOTES
ASSESSMENT AND PLAN:    1. Cough  Influenza negative.   - Influenza A/B Rapid Test    2. Lower respiratory infection  Persistent LRI cough, mostly not productive with no fever.  Yet has not improved with two courses of antibiotic therapy.  Went thru something like this one year ago, with including pulmonary evaluation. Recent CXR is negative.  Will treat one more time.  I offered Chest CT but he declines preferring one more course of treatment, will give longer prednisone treatment.   - predniSONE (DELTASONE) 10 mg tablet; Take 4 tabs daily for 5 days, then 3 tabs daily for 5 days, then 2 tabs daily for 5 days, then 1 tab daily for 5 days, then stop..  Dispense: 50 tablet; Refill: 0  - doxycycline (VIBRA-TABS) 100 MG tablet; Take 1 tablet (100 mg total) by mouth 2 (two) times a day for 10 days.  Dispense: 20 tablet; Refill: 0  - codeine-guaiFENesin (GUAIFENESIN AC)  mg/5 mL liquid; Take 5 mL by mouth 2 (two) times a day as needed for cough.  Dispense: 240 mL; Refill: 0    3. Sleep disorder  This is ongoing, his leg pain is better, and revascularization has gone well, he failed ambien, lorazepam, trazodone.  Will try temazepam and follow.    - temazepam (RESTORIL) 15 mg capsule; Take 1 capsule (15 mg total) by mouth at bedtime as needed for sleep.  Dispense: 30 capsule; Refill: 0    Patient Instructions   1. Take the prednisone as directed.     2. Take the antibiotic as directed.     3. Use the cough syrup as needed.     4. Try temazepam 15 mg at bedtime as needed, can use two at bedtime as needed.     CHIEF COMPLAINT:  Chief Complaint   Patient presents with     Cough     X 6 weeks of cough with lots of phlems, last acoupl eday ago I have bodyache     HISTORY OF PRESENT ILLNESS:  Keanu Spence is a 69 y.o. male here with persistent cough, intermittent, and at times intense.  Usually in evening and mid night time, no wheezes, or pleuritic pain, or fever, or chills.  His LE pain has improved significantly.   Reviewed recently by Dr. Chakraborty and is going well.      REVIEW OF SYSTEMS:   See HPI, all other systems on review are negative.    Past Medical History:   Diagnosis Date     Anemia      Atypical atrial flutter (H)      Cardiac pacemaker in situ 9/25/2019     Chronic anticoagulation 2/21/2019     Chronic renal failure, stage 3 (moderate) (H)      Chronic systolic heart failure (H)      COPD (chronic obstructive pulmonary disease) (H)      Coronary artery disease      Diabetes mellitus (H)      Dyslipidemia, goal LDL below 70      Essential hypertension      ICD (implantable cardioverter-defibrillator), dual, in situ      Ischemic cardiomyopathy      Peripheral vascular disease (H)      Screening for prostate cancer 5/7/2019     Sleep apnea     no cpap     Social History     Tobacco Use   Smoking Status Former Smoker     Packs/day: 0.20     Years: 40.00     Pack years: 8.00     Types: Cigarettes     Start date: 1971     Last attempt to quit: 12/5/2016     Years since quitting: 3.2   Smokeless Tobacco Never Used     Family History   Problem Relation Age of Onset     Diabetes Mother      Diabetes Father      Heart disease Father      Diabetes Sister      Hypertension Sister      Diabetes Brother      Hypertension Brother      Diabetes type I Son      Past Surgical History:   Procedure Laterality Date     CARDIAC CATHETERIZATION  09/19/2016     CARDIAC CATHETERIZATION  10/11/2016     to lad/om1 in stent occlusion     CATARACT EXTRACTION, BILATERAL       CORONARY ANGIOPLASTY       CORONARY ARTERY BYPASS GRAFT  10/98    [facility]     CORONARY STENT PLACEMENT  10/11/2016    bebe to left main and om1     EP ICD INSERT N/A 9/20/2019    Procedure: EP ICD Insert;  Surgeon: Colton Montenegro MD;  Location: [facility] Cath Lab;  Service: Cardiology     FEMORAL ARTERY - POPLITEAL ARTERY BYPASS GRAFT Left 11/12/2019    Procedure: LEFT FEMORAL POPLITEAL BYPASS, ARTERIOGRAM;  Surgeon: Ken Chakraborty MD;  Location: [facility]  "Main OR;  Service: General     FEMORAL BYPASS Left 2002    Dr. Mylene Sepulveda     IR EXTREMITY ANGIOGRAM BILATERAL  9/13/2019     VITALS:  Vitals:    03/04/20 1430   BP: 110/50   Patient Site: Left Arm   Patient Position: Sitting   Cuff Size: Adult Large   Pulse: 60   Temp: 97.8  F (36.6  C)   TempSrc: Tympanic   SpO2: 100%   Weight: 213 lb (96.6 kg)   Height: 5' 11\" (1.803 m)     Wt Readings from Last 3 Encounters:   03/04/20 213 lb (96.6 kg)   02/13/20 213 lb 9 oz (96.9 kg)   02/04/20 213 lb 4 oz (96.7 kg)     PHYSICAL EXAM:  Constitutional:  In NAD, alert and oriented, very harsh cough.    HEENT: nose and throat clear, ears normal  Neck: no cervical or axillary adenopathy  Cardiac:  S1 S2   Lungs: a few bilateral rhonchi, no rales or wheezes    DECISION TO OBTAIN OLD RECORDS AND/OR OBTAIN HISTORY FROM SOMEONE OTHER THAN PATIENT, AND/OR ACCESSING CARE EVERYWHERE):  1  0     REVIEW AND SUMMARIZATION OF OLD RECORDS, AND/OR OBTAINING HISTORY FROM SOMEONE OTHER THAN PATIENT, AND/OR DISCUSSION OF CASE WITH ANOTHER HEALTH CARE PROVIDER:  2 0    REVIEW AND/OR ORDER OF OF CLINICAL LAB TESTS: 1  0.    REVIEW AND/OR ORDER OF RADIOLOGY TESTS: 1 reviewed recent CXR.    REVIEW AND/OR ORDER OF MEDICAL TESTS (EKG/ECHO/COLONOSCOPY/EGD): 1  0    INDEPENDENT  VISUALIZATION OF IMAGE, TRACING, OR SPECIMEN ITSELF (2 EACH):  0     TOTAL: 1    Current Outpatient Medications   Medication Sig Dispense Refill     ACCU-CHEK COMPACT TEST Strp Test daily before all meals/snacks and once before bedtime. 60 strip 12     acetaminophen (TYLENOL) 325 MG tablet Take 2 tablets (650 mg total) by mouth every 4 (four) hours as needed.  0     albuterol (PROAIR HFA;PROVENTIL HFA;VENTOLIN HFA) 90 mcg/actuation inhaler Inhale 2 puffs every 4 (four) hours as needed for wheezing or shortness of breath. 1 each 0     aspirin 81 MG EC tablet Take 1 tablet (81 mg total) by mouth every evening. 90 tablet 3     atorvastatin (LIPITOR) 40 MG tablet Take 1 " tablet (40 mg total) by mouth at bedtime. 90 tablet 3     blood glucose test (ACCU-CHEK SMARTVIEW TEST STRIP) strips Dispense brand per patient's insurance at pharmacy discretion. Testing once a day only 90 strip 3     blood-glucose meter, drum-type (ACCU-CHEK COMPACT PLUS CARE) Kit Test daily before all meals/snacks and once before bedtime. 1 kit 0     diltiazem (CARDIZEM CD) 180 MG 24 hr capsule Take 1 capsule (180 mg total) by mouth daily. 90 capsule 3     furosemide (LASIX) 40 MG tablet Take 1 tablet (40 mg total) by mouth daily. 180 tablet 3     generic lancets (ACCU-CHEK MULTICLIX LANCET) Test daily before all meals/snacks and once before bedtime. 3 each 0     glipiZIDE (GLUCOTROL XL) 5 MG 24 hr tablet TAKE ONE TABLET BY MOUTH ONCE DAILY 90 tablet 5     hydrALAZINE (APRESOLINE) 25 MG tablet TAKE ONE TABLET BY MOUTH EVERY EIGHT HOURS  270 tablet 1     hydroCHLOROthiazide (HYDRODIURIL) 25 MG tablet TAKE ONE TABLET BY MOUTH ONCE DAILY  90 tablet 2     hydrocortisone with aloe 1 % Crea cream Apply to affected area 2 times daily 30 g 1     isosorbide mononitrate (IMDUR) 30 MG 24 hr tablet TAKE ONE TABLET BY MOUTH TWICE DAILY  180 tablet 1     lisinopril (PRINIVIL,ZESTRIL) 20 MG tablet TAKE ONE TABLET BY MOUTH TWICE DAILY  180 tablet 2     metFORMIN (GLUCOPHAGE) 500 MG tablet Take 1 tablet (500 mg total) by mouth 2 (two) times a day with meals. 180 tablet 3     metoprolol succinate (TOPROL-XL) 200 MG 24 hr tablet TAKE ONE TABLET BY MOUTH ONE TIME DAILY  90 tablet 1     multivitamin with minerals (THERA-M) 9 mg iron-400 mcg Tab tablet Take 1 tablet by mouth daily.       QUEtiapine (SEROQUEL) 50 MG tablet Take 1 tablet (50 mg total) by mouth at bedtime. 30 tablet 6     warfarin ANTICOAGULANT (COUMADIN/JANTOVEN) 2.5 MG tablet TAKE 1 TABLET BY MOUTH DAILY ON TUESDAY AND THURSDAY AND 2 TABLETS BY MOUTH DAILY ON ALL OTHER DAYS 150 tablet 1     codeine-guaiFENesin (GUAIFENESIN AC)  mg/5 mL liquid Take 5 mL by mouth 2  (two) times a day as needed for cough. 240 mL 0     doxycycline (VIBRA-TABS) 100 MG tablet Take 1 tablet (100 mg total) by mouth 2 (two) times a day for 10 days. 20 tablet 0     predniSONE (DELTASONE) 10 mg tablet Take 4 tabs daily for 5 days, then 3 tabs daily for 5 days, then 2 tabs daily for 5 days, then 1 tab daily for 5 days, then stop.. 50 tablet 0     temazepam (RESTORIL) 15 mg capsule Take 1 capsule (15 mg total) by mouth at bedtime as needed for sleep. 30 capsule 0     Ken Keen MD  Internal Medicine  [facility]

## 2021-06-06 NOTE — PATIENT INSTRUCTIONS - HE
1. Take the levofloxicin 500 mg one pill daily for 10 days    2. take the prednisone 20 mg one pill daily for 5 days.     3. Take the oxycodone'acetaminophen pain fill one every 8 hours as needed.     4. Try the 1/2 seroquel for sleep at night.     5. Follow up as previously arranged.

## 2021-06-06 NOTE — TELEPHONE ENCOUNTER
Medication Question or Clarification  Who is calling: Vikash with [facility]  What medication are you calling about (include dose and sig)?:   blood glucose test (ACCU-CHEK SMARTVIEW TEST STRIP) strips  Sig: Dispense brand per patient's insurance at pharmacy discretion. Testing once a day only  Who prescribed the medication?: Dr. Keen  What is your question/concern?: Vikash stated the pharmacy is in need of the diagnosis code and the patient's insulin dependency status.  Requested Pharmacy: [facility]  Okay to leave a detailed message?: Yes

## 2021-06-07 NOTE — PATIENT INSTRUCTIONS - HE
Today you were called for follow up of your breathing.  I am glad you are improving from your most recent bronchitis episode. I am glad you had your pacemaker placed and are no longer having dizzy episodes.     Plan:    Once we are able to start scheduling routine imaging studies, we will plan on doing a routine lung cancer screening CT scan of your lungs. It will probably not be able to be done for a couple of months (or possibly longer) in setting of the current pandemic. You will get a call to schedule the study -- we really appreciate your patience during this time.    You should follow up in about 12 months.     If you have any questions or concerns, please, call our clinic at [phone].          If you have concerns regarding coronavirus in yourself, please, go to [email or web address] for an online visit. If you have general questions regarding social distancing and preparedness during this epidemic, please, go to cdc.gov/COVID19 for most up-to-date information.

## 2021-06-07 NOTE — PROGRESS NOTES
"Keanu Spence is a 69 y.o. male who is being evaluated via a billable telephone visit.      The patient has been notified of following:     \"This telephone visit will be conducted via a call between you and your physician/provider. We have found that certain health care needs can be provided without the need for a physical exam.  This service lets us provide the care you need with a short phone conversation.  If a prescription is necessary we can send it directly to your pharmacy.  If lab work is needed we can place an order for that and you can then stop by our lab to have the test done at a later time.    Telephone visits are billed at different rates depending on your insurance coverage. During this emergency period, for some insurers they may be billed the same as an in-person visit.  Please reach out to your insurance provider with any questions.    If during the course of the call the physician/provider feels a telephone visit is not appropriate, you will not be charged for this service.\"    Patient has given verbal consent to a Telephone visit? Yes    Patient would like to receive their AVS by AVS Preference: Mail a copy.    Additional provider notes:   Mr. Spence is doing well. He had a pacemaker/ICD placed in 11/2019 & since that time his intermittent dizziness episodes have resolved. No further breathing issues. He has annual bronchitis episode every winter, & he is now getting over his recent episode now.     Assessment/Plan:  69 y.o. former smoker with most pertinent history of systolic HF & restrictive lung disease, being evaluated via phone for lung cancer screening & annual bronchitis.       Patient fulfills criteria for lung cancer screening (annual) & he is interested in continuing -- will have schedulers set up the CT once we are able to continue w/ routine screening in setting of the current pandemic    Annual clinic follow-up in 12 months    In addition to the diagnoses/plans above, I " educated Keanu Spence to practice social distancing & good hand hygiene.     Lung Cancer Screening pre-scan counseling Visit  The patient fits the risk profile of patients who benefit from this screening:  -The patient is >55 years old and <80 years old (55-77 years for Medicare patients)  -The patient has 30 pack year history (over 30)  -The patient has smoked within the past 15 years  -The patient has no medical comorbidity severe enough that it would cause mortality prior to mortality due to the lung cancer attempting to be detected.    Discussion with patient regarding the harms associated with LDCT screening include false-negative and false-positive results, incidental findings, overdiagnosis, and radiation exposure were reviewed at length.   The patient understands that pursuing this screening test may result in a biopsy that was not necessary. It may also produce added stress over a nodule that is likely not cancer.    Of 100 patients who get screening, 25 will have a positive scan. Of those 25, only 1 will have cancer.  Overdiagnosis is estimated at 10% of patients-- they would not have been detected in the patient's lifetime without screening. Less than 1% of patients likely had death related to radiation exposure increase.   Average low-dose CT associated with 0.61 to 1.5 mSv. Annual background radiation exposure in the [place] averages 2.4 mSv; mammogram is 0.7mSv.    The benefits are reduction in risk of death from lung cancer. The number needed to treat is 320 (for every 320 patients who undergo screening, 1 patient will have a benefit in mortality from early detection from the screening).    Undergoing this screening implies willingness to pursue further potentially invasive testing to discover potential cancer.    All questions were answered.    The patient was counseled regarding smoking cessation and its risk for lung cancer.    CAT:  How often do you cough?: 1  How much phlegm (mucous) do  you have in your chest?: 1  How tight does your chest feel?: 0  How breathless are you after climbing a hill or flight of stairs?: 1  How limited are your activities at home?: 1  How confident are you leaving home despite your lung condition?: 0  How soundly do you sleep?: 1  How much energy do you have?: 0  Total Score: 5    Phone call duration: 15 minutes    Kenya Tolebrt MD  Pulmonary and Critical Care

## 2021-06-07 NOTE — PROGRESS NOTES
"Keanu Spence is a 69 y.o. male who is being evaluated via a billable telephone visit.      Keanu Spence complains of    Chief Complaint   Patient presents with     Follow-up     Assessment/Plan:    1. Type 2 diabetes mellitus  He says that FBS levels are now 90's off the prednisone, and he is back to normal glipizide dose.      2. Peripheral vascular disease   No real claudication, and swelling is better.  Some intermittent non-specific pain. Doing well.     3. chronic bronchitis with COPD  Cough now finally better, and able to sleep.  The nebulizer in the ER did help substantially.  Each winter he will get a COPD exacerbation with asthmatic bronchitis.     4. Sleep disorder  Now better, using temazepam only intermittently.     Additional provider notes: He is doing well, finally.  The cough and chest congestion is now finally better. Sleeping better.  LE edema is better.  No claudication, some intermittent pain in the legs, not related to walking.  No unusual dyspnea.  No chest pain.  Overall quite happy with how he is doing.      I have reviewed and updated the patient's Past Medical History, Social History, Family History, Allergies and Medication List.    Post Discharge Medication Reconciliation Status: discharge medications reconciled, continue medications without change        The patient has been notified of following:     \"This telephone visit will be conducted via a call between you and your physician/provider. We have found that certain health care needs can be provided without the need for a physical exam.  This service lets us provide the care you need with a short phone conversation.  If a prescription is necessary we can send it directly to your pharmacy.  If lab work is needed we can place an order for that and you can then stop by our lab to have the test done at a later time.    If during the course of the call the physician/provider feels a telephone visit is not appropriate, you will not " "be charged for this service.\"       Phone call duration:  10 minutes    CA  intake time  2 minutes    Telephone Consent was completed by  staff and confirmed by Yes  "

## 2021-06-07 NOTE — TELEPHONE ENCOUNTER
He has noted his cough return, mostly at night, sometimes minimal yellow sputum.  No fever, or dyspnea and otherwise feels well.  He has noted that the prednisone is effective.  I suspect an element of bronchospasm with his COPD and chronic COPD.  Will give a short course of prednisone and start inhaler therapy.  Dr. Osmani MD

## 2021-06-07 NOTE — TELEPHONE ENCOUNTER
ANTICOAGULATION  MANAGEMENT    Assessment     Today's INR result of 1.8 is Subtherapeutic (goal INR of 2.0-3.0)        Warfarin taken as previously instructed    No new diet changes affecting INR    No new medication/supplements affecting INR    Continues to tolerate warfarin with no reported s/s of bleeding or thromboembolism     Previous INR was Subtherapeutic    Plan:     Spoke with Keanu regarding INR result and instructed:     Warfarin Dosing Instructions:  5 mg tonight to boost then change warfarin dose to 2.5 mg daily on sun/tu/thu; and 5 mg daily rest of week  (10 % change)    Instructed patient to follow up no later than: two weeks    Keanu verbalizes understanding and agrees to warfarin dosing plan.    Instructed to call the [facility] for any changes, questions or concerns. (#[phone])   ?   Augusto Denton RN    Subjective/Objective:      Keanu Spence, a 69 y.o. male is on warfarin.     Keanu reports:     Home warfarin dose: as updated on anticoagulation calendar per template     Missed doses: No     Medication changes:  No     S/S of bleeding or thromboembolism:  No     New Injury or illness:  No     Changes in diet or alcohol consumption:  No     Upcoming surgery, procedure or cardioversion:  No    Anticoagulation Episode Summary     Current INR goal:   2.0-3.0   TTR:   66.6 % (11.1 mo)   Next INR check:   5/5/2020   INR from last check:   1.80! (4/21/2020)   Weekly max warfarin dose:      Target end date:      INR check location:      Preferred lab:      Send INR reminders to:   [facility]    Indications    Atypical atrial flutter (H) [I48.4]           Comments:            Anticoagulation Care Providers     Provider Role Specialty Phone number    Ken Keen MD  Internal Medicine [phone]

## 2021-06-07 NOTE — PATIENT INSTRUCTIONS - HE
Keanu Spence    Thank you for coming to the [facility] today for a cardiac evaluation  It was my pleasure to see you today  A good contact for any questions would be Greta Domingo  RN @ [phone]      Plan:  ICD evaluation is excellent  Battery should last another 13 years  High amount of atrial pacing which is good to prevent slow heart rate  Almost no ventricular pacing-8%  No abnormal heart rhythms identified; none lasting > 1 minute  He had a successful left femoropopliteal bypass November 12, 2019  He was started on diltiazem 240 mg daily this is reduced to 180 mg daily because of relatively low blood pressure  You remain on warfarin and INR assessment has been good  Continue current medications no adjustments were made  Return in 6 months for comprehensive cardiac arrhythmia and device assessment  Your device will be checked frequently and every 3 months a more comprehensive assessment will be done

## 2021-06-08 NOTE — PROGRESS NOTES
"ASSESSMENT AND PLAN:  1. Tobacco dependence  Has not smoked for 30 days no side effects noted with Chantix medication refilled follow-up as needed.  Happy.  Please quit smoking    2. Overweight  His gained weight because he is more maximal discuss his if his discomfort is knee decreases he'll start walking again.    3. Ischemic cardiomyopathy  Reviewed notes from audiology appointment.  Echo results reviewed.  Currently taking her medications faithfully      4.  Left knee pain is tenderness in the ACL.  Crepitus is noted however is able to ambulate without discomfort in trying ice pack on the areas to his pain persists she can return      No orders of the defined types were placed in this encounter.    There are no discontinued medications.    No Follow-up on file.    CHIEF COMPLAINT:  Chief Complaint   Patient presents with     Follow-up     smoking censation med check and would like to discuss about MRI     Cough     x1 weeks, little mucus, phlegm stuffs in the morning per pt. And also has running nose.       HISTORY OF PRESENT ILLNESS:  Keanu is a 66 y.o. male presenting for a follow-up.     Ischemic Cardiomyopathy: He was seen by Dr. Skinner on 1/12/2017 for a follow-up. His hydrochlorothiazide was discontinued. He had a myocardium MRI done which showed his LV EF was 33.9%. He has another follow-up appointment in about 6 months.     Smoking Cessation: He states that he has smoked 1 cigarette in the past 30 days. His taking 2 1 mg Chantix a day. He notes that he had a few vivid dreams.      Diabetes: He states that his morning blood sugars have been \"good\". He has not been feeling hypoglycemic with his mediations.     Knee Pain: On 1/25/2017, he slipped on the ice and was able to catch himself from falling but he heard a pop in his left knee as he slipped. He notes that his knee pain improved over time. He denies instability and buckling. He has not been taking medication for his knee pain.     REVIEW OF SYSTEMS: "   He states he has had a mildly productive cough for the past week.   He has gained 4 pounds since last visit.   All other 10 point review of systems are negative.    PFSH:  Reviewed as below.     TOBACCO USE:  History   Smoking Status     Former Smoker     Packs/day: 0.20     Years: 40.00     Start date: 1971     Quit date: 12/5/2016   Smokeless Tobacco     Former User     Comment: Patient stated he is trying to quit.        VITALS:  Vitals:    02/02/17 1038   BP: 128/70   Patient Site: Left Arm   Patient Position: Sitting   Cuff Size: Adult Large   Pulse: 60   Resp: 16   Temp: 97.9  F (36.6  C)   TempSrc: Oral   Weight: (!) 225 lb 1 oz (102.1 kg)     Wt Readings from Last 3 Encounters:   02/02/17 (!) 225 lb 1 oz (102.1 kg)   01/12/17 222 lb (100.7 kg)   11/30/16 221 lb 4 oz (100.4 kg)     Body mass index is 31.39 kg/(m^2).    PHYSICAL EXAM:  General: Alert, cooperative, no distress, appears stated age  Head: Normocephalic, without obvious abnormality, atraumatic  Ears: Normal TM's and external ear canals, both ears  Throat: Lips, mucosa, and tongue normal; teeth and gums normal  Back: Symmetric, no curvature, ROM normal, no CVA tenderness  Lungs: Clear to auscultation bilaterally, respirations unlabored  Chest wall: No tenderness or deformity  Heart: Regular rate and rhythm, S1 and S2 normal, no murmur, rub, or gallop  Musculoskeletal: Mild crepitus present over lateral aspect of left knee.   Neurologic:  A & O x 3.  No tremor, no focal findings.    DATA REVIEWED:  Additional History from Old Records Summarized (2): Reviewed Dr. Skinner's note from 1/12/2017 regarding Ischemic Cardiomyopathy.  Decision to Obtain Records (1): NOne  Radiology Tests Summarized or Ordered (1): Reviewed Heart MRI from 1/19/2017.   Labs Reviewed or Ordered (1): None  Medicine Test Summarized or Ordered (1): None  Independent Review of EKG or X-RAY(2 each): None    The visit lasted a total of 14 minutes face to face with the patient. Over 50%  of the time was spent counseling and educating the patient about Ischemic Cardiomyopathy.     I, Krishan Chaudhary, am scribing for and in the presence of, Dr. Duncan.    I, Dr. Duncan, personally performed the services described in this documentation, as scribed by Krishan Chaudhary in my presence, and it is both accurate and complete.      MEDICATIONS:  Current Outpatient Prescriptions   Medication Sig Dispense Refill     ACCU-CHEK COMPACT TEST Strp Test daily before all meals/snacks and once before bedtime. 60 strip 12     amLODIPine (NORVASC) 10 MG tablet TAKE ONE TABLET BY MOUTH EVERY DAY 90 tablet 2     aspirin 81 MG EC tablet Take 1 tablet (81 mg total) by mouth every evening. 90 tablet 3     atorvastatin (LIPITOR) 40 MG tablet Take 1 tablet (40 mg total) by mouth bedtime. 90 tablet 3     blood-glucose meter, drum-type (ACCU-CHEK COMPACT PLUS CARE) Kit Test daily before all meals/snacks and once before bedtime. 1 kit 0     clopidogrel (PLAVIX) 75 mg tablet Take 1 tablet (75 mg total) by mouth daily. 90 tablet 1     furosemide (LASIX) 20 MG tablet Take 1 tablet (20 mg total) by mouth daily. 90 tablet 3     generic lancets (ACCU-CHEK MULTICLIX LANCET) Test daily before all meals/snacks and once before bedtime. 3 each 0     glipiZIDE (GLUCOTROL) 5 MG 24 hr tablet Take one daily 90 tablet 6     isosorbide mononitrate (IMDUR) 30 MG 24 hr tablet Take 1 tablet (30 mg total) by mouth 2 (two) times a day. 180 tablet 3     lisinopril (PRINIVIL,ZESTRIL) 20 MG tablet Take 1 tablet (20 mg total) by mouth 2 (two) times a day. 180 tablet 3     metFORMIN (GLUCOPHAGE) 850 MG tablet TAKE ONE TABLET BY MOUTH TWO TIMES A DAY WITH MEALS . 180 tablet 2     metoprolol tartrate (LOPRESSOR) 100 MG tablet TAKE ONE TABLET BY MOUTH TWO TIMES A  tablet 2     varenicline (CHANTIX) 1 mg tablet Take 1 tablet (1 mg total) by mouth 2 (two) times a day. Take with full glass of water. 60 tablet 2     No current facility-administered  medications for this visit.        Total Data Points: 3

## 2021-06-08 NOTE — PROGRESS NOTES
Chief Complaint   Patient presents with     Cough     1.5 weeks  getting worse          HPI:   Keanu Spence is a 66 y.o. male c/o cough for the last 1.5 weeks, mildly productive, mild shortness of breath. No fever.  Interfereing with sleep.  Quit smoking 3 months.  Mucinex has helped some    ROS:  Constitutional: decreased appetite  Eyes: negative   ENT: no ear pain, no sore throat  Respiratory: as per HPI   CV: no chest pain. No orthopnea or PND  GI: no vomiting. Looser stools  : negative   SKIN: no rash  MS: no swelling of legs  NEURO: no headache.     Medications:  Current Outpatient Prescriptions on File Prior to Visit   Medication Sig Dispense Refill     ACCU-CHEK COMPACT TEST Strp Test daily before all meals/snacks and once before bedtime. 60 strip 12     amLODIPine (NORVASC) 10 MG tablet TAKE ONE TABLET BY MOUTH EVERY DAY 90 tablet 3     aspirin 81 MG EC tablet Take 1 tablet (81 mg total) by mouth every evening. 90 tablet 3     atorvastatin (LIPITOR) 40 MG tablet Take 1 tablet (40 mg total) by mouth bedtime. 90 tablet 3     benzonatate (TESSALON) 200 MG capsule Take 1 capsule (200 mg total) by mouth 3 (three) times a day as needed for cough. 30 capsule 1     blood-glucose meter, drum-type (ACCU-CHEK COMPACT PLUS CARE) Kit Test daily before all meals/snacks and once before bedtime. 1 kit 0     clopidogrel (PLAVIX) 75 mg tablet Take 1 tablet (75 mg total) by mouth daily. 90 tablet 3     furosemide (LASIX) 20 MG tablet Take 1 tablet (20 mg total) by mouth daily. 90 tablet 3     generic lancets (ACCU-CHEK MULTICLIX LANCET) Test daily before all meals/snacks and once before bedtime. 3 each 0     glipiZIDE (GLUCOTROL) 5 MG 24 hr tablet Take one daily 90 tablet 6     isosorbide mononitrate (IMDUR) 30 MG 24 hr tablet Take 1 tablet (30 mg total) by mouth 2 (two) times a day. 180 tablet 3     lisinopril (PRINIVIL,ZESTRIL) 20 MG tablet Take 1 tablet (20 mg total) by mouth 2 (two) times a day. 180 tablet 3      "metFORMIN (GLUCOPHAGE) 850 MG tablet TAKE ONE TABLET BY MOUTH TWO TIMES A DAY WITH MEALS . 180 tablet 2     metoprolol tartrate (LOPRESSOR) 100 MG tablet TAKE ONE TABLET BY MOUTH TWO TIMES A  tablet 2     varenicline (CHANTIX) 1 mg tablet Take 1 tablet (1 mg total) by mouth 2 (two) times a day. Take with full glass of water. 60 tablet 4     No current facility-administered medications on file prior to visit.          Social History:  Social History   Substance Use Topics     Smoking status: Former Smoker     Packs/day: 0.20     Years: 40.00     Start date: 1971     Quit date: 12/5/2016     Smokeless tobacco: Former User      Comment: Patient stated he is trying to quit.      Alcohol use Yes      Comment: rare         Physical Exam:   Vitals:    02/17/17 1414   BP: 128/60   Pulse: 60   Resp: 16   Temp: 99  F (37.2  C)   TempSrc: Oral   SpO2: 96%   Weight: (!) 228 lb (103.4 kg)   Height: 5' 11\" (1.803 m)       GENERAL:   Alert. Oriented.  EYES: Clear  HENT:  Ears: R TM pearly gray. Normal landmarks. L TM pearly gray.  Normal landmarks  Nose: Clear.  Sinuses: Nontender.  Oropharynx:  No erythema. No exudate.  NECK: Supple. No adenopathy.  LUNGS: Clear to ascultation.  No crackles.  No wheezing  HEART: RRR  SKIN:  No rash.         Assessment/Plan:    1. Cough  albuterol (PROVENTIL HFA;VENTOLIN HFA) 90 mcg/actuation inhaler      Lungs clear.  No wheezing heard today but he thinks he has heard some.  Will try albuterol.  No signs of CHF.  Appears viral.  Recheck if worsening or not improving.      The following portions of the patient's history were reviewed and updated as appropriate: allergies, current medications, past family history, past medical history, past social history, past surgical history and problem list.    Daniella Palmer MD      2/17/2017        "

## 2021-06-08 NOTE — TELEPHONE ENCOUNTER
The prescription is for acute on chronic leg pain related to peripheral vascular disease and congestive heart failure.  Dr. Osmani MD

## 2021-06-08 NOTE — TELEPHONE ENCOUNTER
ANTICOAGULATION  MANAGEMENT    Assessment     Today's INR result of 2.4 is Therapeutic (goal INR of 2.0-3.0)        Warfarin taken as previously instructed    No new diet changes affecting INR    No new medication/supplements affecting INR    Continues to tolerate warfarin with no reported s/s of bleeding or thromboembolism     Previous INR was Therapeutic    Plan:     Spoke with Keanu regarding INR result and instructed:     Warfarin Dosing Instructions:  Continue current warfarin dose 2.5 mg daily on sun/tue/thu; and 5 mg daily rest of week  (0 % change)    Instructed patient to follow up no later than: one month      Keanu verbalizes understanding and agrees to warfarin dosing plan.    Instructed to call the [facility] for any changes, questions or concerns. (#[phone])   ?   Augusto Denton RN    Subjective/Objective:      Keanu Spence, a 70 y.o. male is on warfarin.     Keanu reports:     Home warfarin dose: as updated on anticoagulation calendar per template     Missed doses: No     Medication changes:  No     S/S of bleeding or thromboembolism:  No     New Injury or illness:  No     Changes in diet or alcohol consumption:  No     Upcoming surgery, procedure or cardioversion:  No    Anticoagulation Episode Summary     Current INR goal:   2.0-3.0   TTR:   65.6 % (11.1 mo)   Next INR check:   7/14/2020   INR from last check:   2.40 (6/16/2020)   Weekly max warfarin dose:      Target end date:      INR check location:      Preferred lab:      Send INR reminders to:   [facility]    Indications    Atypical atrial flutter (H) [I48.4]           Comments:            Anticoagulation Care Providers     Provider Role Specialty Phone number    Ken Keen MD  Internal Medicine [phone]

## 2021-06-08 NOTE — TELEPHONE ENCOUNTER
We discussed his recent testing.  The anemia is consistent with anemia due to renal and heart failure.  He has lost 7 pounds with increased diuresis, but cough, now mostly dry, is not much better. CT high resolution is negative except for chronic bronchitis changes.  He denies dysphagia but does report at times having regurgitation of fluid, and sometimes cough will be induced by drinking liquids.  Will start pantoprazole and get barium swallow xray.      Dr. Osmani MD

## 2021-06-08 NOTE — TELEPHONE ENCOUNTER
Medication Question or Clarification  Who is calling: [facility]  What medication are you calling about (include dose and sig)?:   HYDROcodone-acetaminophen 5-325 mg per tablet  60 tablet  0  6/8/2020      Sig - Route: Take 1 tablet by mouth every 8 (eight) hours as needed for pain. - Oral     Sent to pharmacy as: HYDROcodone 5 mg-acetaminophen 325 mg tablet         Who prescribed the medication?: Ken Keen MD    What is your question/concern?: We need to know if this is for chronic pain or acute pain. Please advise ASAP  Requested [facility]acy: [facility]  Okay to leave a detailed message?: Yes

## 2021-06-08 NOTE — TELEPHONE ENCOUNTER
ANTICOAGULATION  MANAGEMENT    Assessment     Today's INR result of 2.3 is Therapeutic (goal INR of 2.0-3.0)        Warfarin taken as previously instructed    No new diet changes affecting INR    No new medication/supplements affecting INR    Continues to tolerate warfarin with no reported s/s of bleeding or thromboembolism     Previous INR was Therapeutic    Plan:     Spoke with Keanu regarding INR result and instructed:     Warfarin Dosing Instructions:  Continue current warfarin dose 2.5 mg daily on sun/tue/thu; and 5 mg daily rest of week  (0 % change)    Instructed patient to follow up no later than: one month    Keanu verbalizes understanding and agrees to warfarin dosing plan.    Instructed to call the [facility] for any changes, questions or concerns. (#[phone])   ?   Augusto Denton RN    Subjective/Objective:      Keanu Spence, a 69 y.o. male is on warfarin.     Keanu reports:     Home warfarin dose: as updated on anticoagulation calendar per template     Missed doses: No     Medication changes:  No     S/S of bleeding or thromboembolism:  No     New Injury or illness:  No     Changes in diet or alcohol consumption:  No     Upcoming surgery, procedure or cardioversion:  No    Anticoagulation Episode Summary     Current INR goal:   2.0-3.0   TTR:   65.6 % (11.1 mo)   Next INR check:   6/16/2020   INR from last check:   2.30 (5/19/2020)   Weekly max warfarin dose:      Target end date:      INR check location:      Preferred lab:      Send INR reminders to:   [facility]    Indications    Atypical atrial flutter (H) [I48.4]           Comments:            Anticoagulation Care Providers     Provider Role Specialty Phone number    Ken Keen MD  Internal Medicine [phone]

## 2021-06-08 NOTE — TELEPHONE ENCOUNTER
Medication Question or Clarification  Who is calling: Pharmacy  What medication are you calling about (include dose and sig)?:   HYDROcodone-acetaminophen 5-325 mg per tablet  60 tablet  0  6/8/2020      Sig - Route: Take 1 tablet by mouth every 8 (eight) hours as needed for pain. - Oral     Sent to pharmacy as: HYDROcodone 5 mg-acetaminophen 325 mg tablet     Earliest Fill Date: 6/8/2020     E-Prescribing Status: Receipt confirmed by pharmacy (6/8/2020 12:51 PM CDT        Who prescribed the medication?:Ken Keen MD      What is your question/concern?: Pharmacy needs for diagnosis, Chronic or Acute, please advise ASAP patient will be picking prescription up in AM  Requested Pharmacy: [facility] # [id number]  Okay to leave a detailed message?: Yes

## 2021-06-08 NOTE — TELEPHONE ENCOUNTER
He continues to have harsh cough, mostly not productive.  I have increased his furosemide to 40 mg po two times a day with some weight loss but no real improvement in cough.  He is not otherwise having fever, or symptoms of CHF, even though he does have chronic systolic heart failure. No chest pain.  Will get high resolution CT chest, and lab testing.  Continue furosemide 40 mg po two times a day.  Follow up pending results.  Dr. Osmani MD

## 2021-06-08 NOTE — TELEPHONE ENCOUNTER
Refill Approved    Rx renewed per Medication Renewal Policy. Medication was last renewed on 2/13/19.2/13/18.    Elo Carvalho, [facility] Triage/Med Refill 5/7/2020     Requested Prescriptions   Pending Prescriptions Disp Refills     furosemide (LASIX) 40 MG tablet [Pharmacy Med Name: Furosemide Oral Tablet 40 MG] 180 tablet 0     Sig: TAKE ONE TABLET BY MOUTH ONE TIME DAILY       Diuretics/Combination Diuretics Refill Protocol  Passed - 5/5/2020 10:36 AM        Passed - Visit with PCP or prescribing provider visit in past 12 months     Last office visit with prescriber/PCP: 2/13/2019 Gaurang Duncan MD OR same dept: Visit date not found OR same specialty: 2/13/2019 Gaurang Duncan MD  Last physical: 2/27/2018 Last MTM visit: Visit date not found   Next visit within 3 mo: Visit date not found  Next physical within 3 mo: Visit date not found  Prescriber OR PCP: Gaurang Duncan MD  Last diagnosis associated with med order: 1. Acute on chronic systolic congestive heart failure (H)  - furosemide (LASIX) 40 MG tablet [Pharmacy Med Name: Furosemide Oral Tablet 40 MG]; TAKE ONE TABLET BY MOUTH ONE TIME DAILY   Dispense: 180 tablet; Refill: 0    2. Diabetes (H)  - glipiZIDE (GLUCOTROL XL) 5 MG 24 hr tablet [Pharmacy Med Name: glipiZIDE ER Oral Tablet Extended Release 24 Hour 5 MG]; TAKE ONE TABLET BY MOUTH ONE TIME DAILY   Dispense: 90 tablet; Refill: 0    If protocol passes may refill for 12 months if within 3 months of last provider visit (or a total of 15 months).             Passed - Serum Potassium in past 12 months      Lab Results   Component Value Date    Potassium 4.3 02/13/2020             Passed - Serum Sodium in past 12 months      Lab Results   Component Value Date    Sodium 146 (H) 02/13/2020             Passed - Blood pressure on file in past 12 months     BP Readings from Last 1 Encounters:   03/06/20 115/65             Passed - Serum Creatinine in past 12 months      Creatinine   Date Value Ref Range Status    02/13/2020 1.92 (H) 0.70 - 1.30 mg/dL Final                glipiZIDE (GLUCOTROL XL) 5 MG 24 hr tablet [Pharmacy Med Name: glipiZIDE ER Oral Tablet Extended Release 24 Hour 5 MG] 90 tablet 0     Sig: TAKE ONE TABLET BY MOUTH ONE TIME DAILY       Oral Hypoglycemics Refill Protocol Passed - 5/5/2020 10:36 AM        Passed - Visit with PCP or prescribing provider visit in last 6 months       Last office visit with prescriber/PCP: Visit date not found OR same dept: Visit date not found OR same specialty: 2/13/2019 Gaurang Duncan MD Last physical: Visit date not found Last MTM visit: Visit date not found         Next appt within 3 mo: Visit date not found  Next physical within 3 mo: Visit date not found  Prescriber OR PCP: Gaurang Duncan MD  Last diagnosis associated with med order: 1. Acute on chronic systolic congestive heart failure (H)  - furosemide (LASIX) 40 MG tablet [Pharmacy Med Name: Furosemide Oral Tablet 40 MG]; TAKE ONE TABLET BY MOUTH ONE TIME DAILY   Dispense: 180 tablet; Refill: 0    2. Diabetes (H)  - glipiZIDE (GLUCOTROL XL) 5 MG 24 hr tablet [Pharmacy Med Name: glipiZIDE ER Oral Tablet Extended Release 24 Hour 5 MG]; TAKE ONE TABLET BY MOUTH ONE TIME DAILY   Dispense: 90 tablet; Refill: 0     If protocol passes may refill for 12 months if within 3 months of last provider visit (or a total of 15 months).           Passed - A1C in last 6 months     Hemoglobin A1c   Date Value Ref Range Status   01/20/2020 6.5 (H) 3.5 - 6.0 % Final               Passed - Microalbumin in last year      Microalbumin, Random Urine   Date Value Ref Range Status   05/07/2019 1.14 0.00 - 1.99 mg/dL Final                  Passed - Blood pressure in last year     BP Readings from Last 1 Encounters:   03/06/20 115/65             Passed - Serum creatinine in last year     Creatinine   Date Value Ref Range Status   02/13/2020 1.92 (H) 0.70 - 1.30 mg/dL Final

## 2021-06-08 NOTE — TELEPHONE ENCOUNTER
Question following Office Visit  When did you see your provider: 4/15/20  What is your question:  He continues to have a chronic cough.  He has used the Prednisone and inhaler without resolution of cough. He would like to speak directly to Dr. Keen.    Okay to leave a detailed message: Yes

## 2021-06-09 ENCOUNTER — RECORDS - HEALTHEAST (OUTPATIENT)
Dept: ADMINISTRATIVE | Facility: CLINIC | Age: 71
End: 2021-06-09

## 2021-06-09 NOTE — PROGRESS NOTES
Assessment/Plan:     1. Ischemic cardiomyopathy with systolic dysfunction, NYHA class II: Keanu Spence appears well compensated.  No signs of fluid retention today.  His weights at home have remained stable.  He had stopped taking hydralazine 25 mg 3 times a day due to not having refills from his recent hospitalization.  I have restarted this.  I also decrease his amlodipine to 5 mg daily due to blood pressure being 106/66 today.  BMP and magnesium pending to monitor electrolytes for muscle cramps.  He was unable to watch the heart failure video today due to caring for his wife who recently had surgery.  He will watch it next time.  He continues to follow a low-sodium diet and monitor daily weights.  He continues to exercise on a regular basis which I encouraged him to continue.      Heart failure treatment includes:  - Beta blocker therapy with metroprolol succinate 200 mg daily  - ACEI therapy with lisinopril 20 mg twice a day  -Hydralazine 25 mg 3 times a day and isosorbide 30 mg twice a day  - Diuretic therapy with furosemide 20 mg daily and hydrochlorothiazide 25 mg daily    Follow-up in the heart failure clinic in 4 weeks and with Dr. Skinner on May 30 as scheduled    Subjective:     Keanu Spence is seen at [facility] heart failure clinic today for continued follow-up.  He follows up for ischemic cardiomyopathy with systolic dysfunction.  His most recent echocardiogram was done February 27, 2017 which showed an ejection fraction of 45%. His past medical history is also significant for hypertension, coronary artery disease, peripheral vascular disease, dyslipidemia, COPD, diabetes and obstructive sleep apnea. He had coronary artery bypass graft 4-vessel in 1998. He had a SIRI to the left main and OM1 in October of 2016.    During the last clinic visit, changed his Metroprolol tartrate to succinate 200 mg daily.  He denies any dizziness or lightheadedness.  He continues to have chronic fatigue which  has not worsened.  He denies any other acute heart failure symptoms.  He complains of muscle cramps during the night.  He denies lightheadedness, shortness of breath, dyspnea on exertion, orthopnea, PND, palpitations, chest pain, abdominal fullness/bloating and lower extremity edema.      He is monitoring home weights which are stable between 214-216 pounds.  He is following a low sodium diet.  He participates in regular physical activity including riding his stationary bike daily.      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, unspecified CKD stage, unspecified long term insulin use status     Hypertension     Coronary Artery Disease     Peripheral Vascular Disease     Tobacco dependence     Hyperlipidemia LDL goal < 70     Ischemic cardiomyopathy     Coronary artery disease involving coronary bypass graft of native heart with unspecified angina pectoris     Coronary artery chronic total occlusion     Essential hypertension with goal blood pressure less than 140/90     Dyslipidemia, goal LDL below 70     NIKKI (obstructive sleep apnea)       Past Medical History:   Diagnosis Date     Cardiomyopathy      CHF (congestive heart failure)      Chronic bronchitis      Coronary artery disease      Diabetes mellitus      Hypertension      Obesity      Peripheral vascular disease      Sleep apnea     no cpap       Past Surgical History:   Procedure Laterality Date     CARDIAC CATHETERIZATION  09/19/2016     CARDIAC CATHETERIZATION  10/11/2016     to lad/om1 in stent occlusion     CORONARY ANGIOPLASTY       CORONARY ARTERY BYPASS GRAFT  10/98    [facility]     coronary stent  10/11/2016    bebe to left main and om1      coronary stents       FEMORAL BYPASS  2002    left. [facility]       Family History   Problem Relation Age of Onset     Diabetes Mother      Diabetes Father      Heart disease Father      Diabetes Sister      Hypertension Sister      Diabetes Brother      Hypertension Brother        Social History     Social History     Marital status: Single     Spouse name: N/A     Number of children: N/A     Years of education: N/A     Occupational History     Not on file.     Social History Main Topics     Smoking status: Former Smoker     Packs/day: 0.20     Years: 40.00     Start date: 1971     Quit date: 12/5/2016     Smokeless tobacco: Former User      Comment: Patient stated he is trying to quit.      Alcohol use Yes      Comment: rare     Drug use: No     Sexual activity: Not on file     Other Topics Concern     Not on file     Social History Narrative       Current Outpatient Prescriptions   Medication Sig Dispense Refill     ACCU-CHEK COMPACT TEST Strp Test daily before all meals/snacks and once before bedtime. 60 strip 12     albuterol (PROVENTIL HFA;VENTOLIN HFA) 90 mcg/actuation inhaler Inhale 2 puffs every 6 (six) hours as needed for wheezing. 8.5 g 11     amLODIPine (NORVASC) 5 MG tablet TAKE ONE TABLET BY MOUTH EVERY DAY 90 tablet 3     aspirin 81 MG EC tablet Take 1 tablet (81 mg total) by mouth every evening. 90 tablet 3     atorvastatin (LIPITOR) 40 MG tablet Take 1 tablet (40 mg total) by mouth bedtime. 90 tablet 3     blood-glucose meter, drum-type (ACCU-CHEK COMPACT PLUS CARE) Kit Test daily before all meals/snacks and once before bedtime. 1 kit 0     clopidogrel (PLAVIX) 75 mg tablet Take 1 tablet (75 mg total) by mouth daily. 90 tablet 3     furosemide (LASIX) 20 MG tablet Take 1 tablet by mouth daily.  3     generic lancets (ACCU-CHEK MULTICLIX LANCET) Test daily before all meals/snacks and once before bedtime. 3 each 0     glipiZIDE (GLUCOTROL) 5 MG 24 hr tablet Take one daily 90 tablet 6      hydroCHLOROthiazide (HYDRODIURIL) 25 MG tablet Take 1 tablet (25 mg total) by mouth daily. 90 tablet 2     isosorbide mononitrate (IMDUR) 30 MG 24 hr tablet Take 1 tablet (30 mg total) by mouth 2 (two) times a day. 180 tablet 3     lisinopril (PRINIVIL,ZESTRIL) 20 MG tablet Take 1 tablet (20 mg total) by mouth 2 (two) times a day. 180 tablet 3     metFORMIN (GLUCOPHAGE) 850 MG tablet TAKE ONE TABLET BY MOUTH TWO TIMES A DAY WITH MEALS . 180 tablet 2     metoprolol succinate (TOPROL-XL) 200 MG 24 hr tablet Take 1 tablet (200 mg total) by mouth daily. 90 tablet 3     multivitamin with minerals (THERA-M) 9 mg iron-400 mcg Tab tablet Take 1 tablet by mouth daily.       varenicline (CHANTIX) 1 mg tablet Take 1 tablet (1 mg total) by mouth 2 (two) times a day. Take with full glass of water. 60 tablet 4     hydrALAZINE (APRESOLINE) 25 MG tablet Take 1 tablet (25 mg total) by mouth every 8 (eight) hours. 270 tablet 3     No current facility-administered medications for this visit.        No Known Allergies    Objective:     Vitals:    04/03/17 1037   BP: 106/66   Pulse: 64   Resp: 16     Wt Readings from Last 3 Encounters:   04/03/17 (!) 223 lb (101.2 kg)   03/29/17 (!) 224 lb 3 oz (101.7 kg)   03/14/17 218 lb (98.9 kg)       General Appearance:   Alert, cooperative and in no acute distress.   HEENT:  No scleral icterus; the mucous membranes were pink and moist.   Neck: JVP normal. No HJR.   Chest: The spine was straight. The chest was symmetric.   Lungs:   Respirations unlabored; the lungs are clear to auscultation.   Cardiovascular:   Regular rhythm. S1 and S2 without murmur, clicks or rubs. Radial and posterior tibial pulses are intact and symmetrical.    Abdomen:  Soft, nontender, nondistended, bowel sounds present   Extremities: No cyanosis, clubbing, or edema.   Skin: No xanthelasma.   Neurologic: Mood and affect are appropriate.         Lab Review   BMP and Mg pending          Cardiographics  Echocardiogram:  2/27/2017  Summary   1. The left ventricle is normal in size. Left ventricular systolic performance is mildly reduced. The ejection fraction is estimated to be 45%.  2. There is mild mid lateral and more moderate distal On selected views, the mid posterior segment appears hypokinetic.  3. No significant valvular heart disease is identified on this study.   4. There is mild left atrial enlargement.               25 minutes were spent face to face with the patient with greater than 50% spent on education and counseling.      Susi Santiago, [facility]   Heart Failure Clinic

## 2021-06-09 NOTE — ANESTHESIA POSTPROCEDURE EVALUATION
Patient: Keanu Spence  * No procedures listed *  Anesthesia type: general    Patient location: Cardiac Unit  Last vitals:   Vitals Value Taken Time   BP 91/60 7/21/2020  3:56 PM   Temp  7/21/2020  4:02 PM   Pulse 69 7/21/2020  4:00 PM   Resp  7/21/2020  4:02 PM   SpO2 100% 7/21/2020  4:02 PM   Vitals shown include unvalidated device data.  Post vital signs: stable  Level of consciousness: awake and responds to simple questions  Post-anesthesia pain: pain controlled  Post-anesthesia nausea and vomiting: no  Pulmonary: unassisted, return to baseline  Cardiovascular: stable and blood pressure at baseline  Hydration: adequate  Anesthetic events: no    QCDR Measures:  ASA# 11 - Emmie-op Cardiac Arrest: ASA11B - Patient did NOT experience unanticipated cardiac arrest  ASA# 12 - Emmie-op Mortality Rate: ASA12B - Patient did NOT die  ASA# 13 - PACU Re-Intubation Rate: ASA13B - Patient did NOT require a new airway mgmt  ASA# 10 - Composite Anes Safety: ASA10A - No serious adverse event    Additional Notes:

## 2021-06-09 NOTE — ANESTHESIA PREPROCEDURE EVALUATION
Anesthesia Evaluation      Patient summary reviewed   No history of anesthetic complications     Airway   Mallampati: II  Neck ROM: full   Pulmonary - normal exam    breath sounds clear to auscultation  (+) COPD, sleep apnea, a smoker                         Cardiovascular   Exercise tolerance: > or = 4 METS  (+) pacemaker, hypertension, CAD, CABG/stent, dysrhythmias, CHF, cardiomyopathy, hypercholesterolemia, PVD    Rhythm: irregular  Rate: abnormal,      ROS comment: ICD check 7/16/20:  Type: remote ICD transmission done prior to virtual appointment with Isatu COMBS CNP.  Presenting rhythm: appears atrial flutter with 2:1 conduction, VS rate 92 bpm. Rare PVCs.  Battery/lead status: stable  Arrhythmias: since 6/20/20, AF is persistent since 6/17/20, burden 100%, V-rates average in the 80's per trend. No VT/VF detected.  Anticoagulant: warfarin  Comments: normal ICD function.  Device/lead alerts: none. prd    Echo 9/19/2019:    Definity contrast utilized    Moderate left ventricular enlargement.    Left ventricle ejection fraction is severely reduced. The calculated left ventricular ejection fraction is 27%.    The following segments are akinetic: basal inferior, mid anteroseptal, mid anterolateral, apical anterior, apical septal, apical inferior, apical lateral and apex. The following segments are hypokinetic: basal anterior, mid anterior, mid inferior and mid inferolateral    . E/e' > 15, suggesting elevated LV filling pressures    Right ventricle not optimally visualized    Moderate biatrial enlargement    Mild to moderate mitral regurgitation.    Mild to moderate tricuspid regurgitation estimate of RV systolic pressure 36 mmHg plus right atrial pressure    When compared to the previous study dated 5/6/2019, ejection fraction appears lower on the current study. The lateral wall motion abnormality is more pronounced.     Neuro/Psych - negative ROS     Endo/Other    (+) diabetes mellitus type 2,       GI/Hepatic/Renal    (+)   chronic renal disease,     Comments: Prostate CA          Dental - normal exam   (+) upper dentures                         Anesthesia Plan  Planned anesthetic: general mask and total IV anesthesia    ASA 4   Induction: intravenous   Anesthetic plan and risks discussed with: patient    Post-op plan: other (NIKKI orders)

## 2021-06-09 NOTE — PROGRESS NOTES
ASSESSMENT AND PLAN:  1. Acute exacerbation of CHF (congestive heart failure) patient was recently admitted at [facility].  He is actively diuresing.  His Lasix knows of 40 mg per day.  His weight is down he notes no shortness of breath.  Repeating a BMP today.  Also repeating MP and impede today.  He'll see me again in 3 weeks.   Basic Metabolic Panel    BNP(B-type Natriuretic Peptide)   2. Essential hypertension after evaluating his symptoms hospitalist input him on hydralazine.  I would like him to take either medication for another month.  He understands how to take this medication.      3. Acute on chronic congestive heart failure, unspecified congestive heart failure type I refilled his Lasix.  The patient astutely noted that his hydrochlorothiazide had been discontinued earlier this year and this caused him to start to retain fluid after reviewing his notes it seems it in January Dr. Mc stopped his hydrochlorothiazide and restart that again in approximately a week at that time the patient will revert back to his 20 mg dose of Lasix.   furosemide (LASIX) 40 MG tablet   4. Ischemic cardiomyopathy he's had trouble calling the cardiac nurses with accurate weights.  I will ensure that they're able to receive his calls.          CHIEF COMPLAINT:  Chief Complaint   Patient presents with     Follow-up     [facility] discharge       HISTORY OF PRESENT ILLNESS:  Keanu is a 66 y.o. male presenting for a [facility] discharge. He presented to the [facility] ED on 2/27/2017 for an evaluation of worsening shortness of breath associated with a productive cough with clear or yellow sputum.  He also reported wheezing and chest tightness. A chest x-ray was done which showed cardiomegaly and venous congestion. He was found to be hypoxic at 87%. He was then admitted and consulted by cardiology. Due to wheezing, he also started on a course of prednisone. He was also prescribed hydralazine. With iv  diuresis, he improved significantly the next day and was discharged home in stable condition. Currently, he states that he is still urinating frequently. He denies chest pain and shortness of breath. He is inquiring about restarting hydrochlorothiazide.     REVIEW OF SYSTEMS:   He noticed that he has a tremor in his left hand.   He states that he is avoiding sodium rich food.    All other 10 point review of systems are negative.    PFSH:  Reviewed as below.     TOBACCO USE:  History   Smoking Status     Former Smoker     Packs/day: 0.20     Years: 40.00     Start date: 1971     Quit date: 12/5/2016   Smokeless Tobacco     Former User     Comment: Patient stated he is trying to quit.        VITALS:  Vitals:    03/03/17 0940   BP: 108/68   Patient Site: Right Arm   Patient Position: Sitting   Cuff Size: Adult Large   Pulse: 60   Resp: 20   Temp: 97.7  F (36.5  C)   TempSrc: Oral   Weight: 220 lb 9 oz (100 kg)     Wt Readings from Last 3 Encounters:   03/03/17 220 lb 9 oz (100 kg)   02/28/17 221 lb 3.2 oz (100.3 kg)   02/17/17 (!) 228 lb (103.4 kg)     Body mass index is 30.76 kg/(m^2).    PHYSICAL EXAM:  General: Alert, cooperative, no distress, appears stated age  Lungs: Rhonchi noted present over right base  Chest wall: No tenderness or deformity  Heart: Regular rate and rhythm, S1 and S2 normal, no murmur, rub, or gallop  Neurologic:  A & O x 3.  No tremor, no focal findings.     Psychiatric oriented ×3 not agitated and seems comfortable    DATA REVIEWED:  Additional History from Old Records Summarized (2): Reviewed Dr. Price s note from 2/28/2017 regarding discharge summary.   Decision to Obtain Records (1): None  Radiology Tests Summarized or Ordered (1): Reviewed Chest x-ray from 2/27/2017.  Labs Reviewed or Ordered (1): Reviewed multiple labs from 2/27/2017-2/28/2017. Labs ordered.   Medicine Test Summarized or Ordered (1): None  Independent Review of EKG or X-RAY(2 each): None    The visit lasted a total  of 15 minutes face to face with the patient. Over 50% of the time was spent counseling and educating the patient about CHF.     I, Krishan Chaudhary, am scribing for and in the presence of, Dr. Duncan.    I, Dr. Duncan, personally performed the services described in this documentation, as scribed by Krishan Chaudhary in my presence, and it is both accurate and complete.      MEDICATIONS:  Current Outpatient Prescriptions   Medication Sig Dispense Refill     ACCU-CHEK COMPACT TEST Strp Test daily before all meals/snacks and once before bedtime. 60 strip 12     albuterol (PROVENTIL HFA;VENTOLIN HFA) 90 mcg/actuation inhaler Inhale 2 puffs every 6 (six) hours as needed for wheezing. 8.5 g 11     amLODIPine (NORVASC) 10 MG tablet TAKE ONE TABLET BY MOUTH EVERY DAY 90 tablet 3     aspirin 81 MG EC tablet Take 1 tablet (81 mg total) by mouth every evening. 90 tablet 3     atorvastatin (LIPITOR) 40 MG tablet Take 1 tablet (40 mg total) by mouth bedtime. 90 tablet 3     blood-glucose meter, drum-type (ACCU-CHEK COMPACT PLUS CARE) Kit Test daily before all meals/snacks and once before bedtime. 1 kit 0     clopidogrel (PLAVIX) 75 mg tablet Take 1 tablet (75 mg total) by mouth daily. 90 tablet 3     furosemide (LASIX) 40 MG tablet Take 1 tablet (40 mg total) by mouth daily. 30 tablet 0     generic lancets (ACCU-CHEK MULTICLIX LANCET) Test daily before all meals/snacks and once before bedtime. 3 each 0     glipiZIDE (GLUCOTROL) 5 MG 24 hr tablet Take one daily 90 tablet 6     hydrALAZINE (APRESOLINE) 25 MG tablet Take 1 tablet (25 mg total) by mouth every 8 (eight) hours. 90 tablet 0     isosorbide mononitrate (IMDUR) 30 MG 24 hr tablet Take 1 tablet (30 mg total) by mouth 2 (two) times a day. 180 tablet 3     lisinopril (PRINIVIL,ZESTRIL) 20 MG tablet Take 1 tablet (20 mg total) by mouth 2 (two) times a day. 180 tablet 3     metFORMIN (GLUCOPHAGE) 850 MG tablet TAKE ONE TABLET BY MOUTH TWO TIMES A DAY WITH MEALS . 180 tablet 2      metoprolol tartrate (LOPRESSOR) 100 MG tablet TAKE ONE TABLET BY MOUTH TWO TIMES A  tablet 2     multivitamin with minerals (THERA-M) 9 mg iron-400 mcg Tab tablet Take 1 tablet by mouth daily.       predniSONE (DELTASONE) 20 MG tablet Take 2 tablets (40 mg total) by mouth daily with breakfast for 3 days. 6 tablet 0     varenicline (CHANTIX) 1 mg tablet Take 1 tablet (1 mg total) by mouth 2 (two) times a day. Take with full glass of water. 60 tablet 4     No current facility-administered medications for this visit.        Total Data Points: 4

## 2021-06-09 NOTE — TELEPHONE ENCOUNTER
"Mr. Spence calls back this morning stating, \"I haven't heard anything aback from anyone.  They told me I had A-fib like back on the 17th.\"     I re-reviewed the remote transmission findings which were reviewed with Mr. Spence initially on 06/20/2020 by my colleague, Shayna Quevedo RN.  I likewise, re-iterated to Mr. Spence that he is known to have atrial fibrillation (AF), but he is on warfarin, most recent INR 2.4 and that AF is not a life-threatening rhythm.  Additionally, he takes metoprolol which keeps his heart rates within normal range.  He reminds me that he feels \"a little more tired\" when he does his activities.  I acknowledged Mr. Spence's concern for the A-fib and his fatigue, and if there was an urgent need for him to see the doctor, we would arrange that; however, as his fatigue is not precluding him from his daily activities, we can wait until Dr. Montenegro's return on 06/29/2020.  He verbalizes understanding of these instructions and is agreeable to the plan.  He is aware of what symptoms for which he should seek emergency medical assistance.  Nilam Rolon RN, MA  Device Nurse  [facility]    "

## 2021-06-09 NOTE — PROGRESS NOTES
Pre-Intra-Post education and instructions as listed below were reviewed with Patient via phone  Covid screen completed, results pending.  7/20/2020 9:41 AM  Greta Bloom RN

## 2021-06-09 NOTE — TELEPHONE ENCOUNTER
Repeat AFl alert came through on Patient today 6/24. Episode persists. Rerouted to Dr. Montenegro.     Shayna Quevedo RN BSN PHN  Device Nurse   [facility]

## 2021-06-09 NOTE — TELEPHONE ENCOUNTER
Type: alert remote ICD transmission for AT/AF at least 24 of 24 hours.  Presenting rhythm: appears atrial flutter with ventricular sensing, rate 100 bpm.  Battery/lead status: stable  Arrhythmias: since 5/1/20, four mode switches, longest in progress since 6/17/20 at 8:34AM, burden <1%, V-rates average  bpm per logbook. No VT/VF detected.   Anticoagulant: warfarin  Comments: normal ICD function. Routed to device RN.  Device/lead alerts: none. prd    Repeat transmission from today for a rhythm check. Aflutter VS 90-100s continues and persistent since 6/17 834am.     Pt denies any symptoms, but notes he has had a little trouble sleeping, but overall feels normal. Denies any missed dilt doses and continues on warfarin. Aflutter rate is ~325ms.     Routed to Dr. Montenegro for review and recommendations.   Shayna Quevedo RN BSN PHN  Device Nurse   [facility]

## 2021-06-09 NOTE — PROGRESS NOTES
CV ordered by Dr Montenegro, pt has and ICD  Pt is on coumadin and INR's reviewed by Acosta Peterson CNP and pt is on for 7/14 this will be reviewed with Acosta and then pt needs EP/NP visit and then can go on for CV  Pt is aware and will call him next week

## 2021-06-09 NOTE — PROGRESS NOTES
ROS:  Cough, wheezing, leg swelling, easy bleeding.  All other ROS is WNL.    Unable to do BP/P  No Video available

## 2021-06-09 NOTE — PROGRESS NOTES
Patient seen in clinic for HF education s/p recent hospital discharge 2/28/17.  Reviewed  HF Sx Awareness & Action plan  handout and  [employer]  booklet highlighting :  __X_patient s type of heart failure _X__Na management in diet  __X_importance of daily wts  _X__Fluid Guidelines, if applicable  __X_medication review and importance of compliance   Instructed patient in signs and sx of heart failure, reiterated when to call clinic - reviewed HF hotline # [phone] and after hours call # [phone].  Patient seems to have a good understanding of HF signs/symptoms and when to call. Educated pt on how to read nutritional food labels to track daily sodium intake and pt is already familiar. Pt states he eats out a couple times a month. Gave pt a dining out booklet. Pt has a scale at home and will continue to track daily weights.   Patient verbalized understanding of HF discussion.  Plan for f/u with continued HF education reviewed.  F/u scheduled on 4/3 with Susi MELENDREZ - will continue to reinforce HF management education.

## 2021-06-09 NOTE — TELEPHONE ENCOUNTER
ANTICOAGULATION  MANAGEMENT    Assessment     Today's INR result of 2.5 is Therapeutic (goal INR of 2.0-3.0)        Warfarin taken as previously instructed    No new diet changes affecting INR    No new medication/supplements affecting INR    Continues to tolerate warfarin with no reported s/s of bleeding or thromboembolism     Previous INR was Therapeutic     Keanu says he may have cardioversion this week. We'll plan weekly inrs if this is delayed    Plan:     Spoke with Keanu regarding INR result and instructed:     Warfarin Dosing Instructions:  Continue current warfarin dose 2.5 mg daily on sun/tue/thu; and 5 mg daily rest of week  (0 % change)    Instructed patient to follow up no later than: one week pre cardioversion    Keanu verbalizes understanding and agrees to warfarin dosing plan.    Instructed to call the [facility] for any changes, questions or concerns. (#[phone])   ?   Augusto Denton RN    Subjective/Objective:      Keanu Spence, a 70 y.o. male is on warfarin.     Keanu reports:     Home warfarin dose: as updated on anticoagulation calendar per template     Missed doses: No     Medication changes:  No     S/S of bleeding or thromboembolism:  No     New Injury or illness:  No     Changes in diet or alcohol consumption:  No     Upcoming surgery, procedure or cardioversion:  above    Anticoagulation Episode Summary     Current INR goal:   2.0-3.0   TTR:   65.5 % (11.1 mo)   Next INR check:   7/21/2020   INR from last check:   2.50 (7/14/2020)   Weekly max warfarin dose:      Target end date:      INR check location:      Preferred lab:      Send INR reminders to:   [facility]    Indications    Atypical atrial flutter (H) [I48.4]           Comments:            Anticoagulation Care Providers     Provider Role Specialty Phone number    Ken Keen MD  Internal Medicine [phone]

## 2021-06-09 NOTE — TELEPHONE ENCOUNTER
The esophagram is normal.  He reports that his cough is better.  Has Afib and cardiology will arrange for cardioversion or pacing treatment.  He does feel fatigue when in Afib, but not severe.  We could consider changing the lisinopril to losartan for the cough but will wait and see how his cough does post conversion, and he is in agreement with that.    Dr. Osmani MD

## 2021-06-09 NOTE — PROGRESS NOTES
Assessment/Plan:     1. Ischemic cardiomyopathy with systolic dysfunction, NYHA class I: Keanu Spence appears well compensated.  He has been stable since discharge.  No symptoms or signs of fluid retention.  We discussed heart failure, following a low salt diet, monitoring weights, and heart failure treatment. He met with a heart failure nurse clinician to discuss heart failure management.  I changed his metoprolol tartrate to metoprolol succinate 200 mg daily.  I encouraged him to call the clinic if he experiences any dizziness or lightheadedness.      Heart failure treatment includes:  - Beta blocker therapy with metroprolol succinate 200 mg daily  - ACEI therapy with lisinopril 20 mg twice a day  - Diuretic therapy with furosemide 20 mg daily and hydrochlorothiazide 25 mg daily    Follow-up in the heart failure clinic in 3 weeks and with Dr. Skinner in 8 weeks    Subjective:     Keanu Spence is seen at [facility] heart failure clinic today for post-hospitalization follow-up.  He was hospitalized at [facility] from February 27 to February 28, 2017 with dyspnea and cough.  His BNP on admission was 772.  He received IV diuretics and oral prednisone while hospitalized which improved his symptoms.  The most recent evaluation of His ejection fraction was 45% from an  echo on 2/27/2017.  His past medical history is also significant for hypertension, coronary artery disease, peripheral vascular disease, dyslipidemia, COPD, diabetes and obstructive sleep apnea.  He had coronary artery bypass graft 4-vessel in 1998. He had a SIRI to the left main and OM1 in October of 2016.    Since being discharged from the hospital, Keanu feels that he is improving.  He denies any acute heart failure symptoms today.  He denies fatigue, lightheadedness, shortness of breath, dyspnea on exertion, orthopnea, PND, palpitations, chest pain, abdominal fullness/bloating and lower extremity edema.      Keanu Spence s weight at  discharge was 221 pounds.  He is monitoring home weights which are stable between 215-218 pounds.  He is following a low sodium diet.  He participates in regular physical activity including riding his stationary bike for 15 minutes daily.      Medication reconciliation was done today.    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, unspecified CKD stage, unspecified long term insulin use status     Hypertension     Coronary Artery Disease     Peripheral Vascular Disease     Chronic Bronchitis With Acute Exacerbation     Tobacco dependence     Hyperlipidemia LDL goal < 70     Ischemic cardiomyopathy     Coronary artery disease involving coronary bypass graft of native heart with unspecified angina pectoris     Acute on chronic systolic congestive heart failure     Coronary artery chronic total occlusion     Essential hypertension with goal blood pressure less than 140/90     Dyslipidemia, goal LDL below 70     NIKKI (obstructive sleep apnea)       Past Medical History:   Diagnosis Date     Cardiomyopathy      CHF (congestive heart failure)      Chronic bronchitis      Coronary artery disease      Diabetes mellitus      Hypertension      Obesity      Peripheral vascular disease      Sleep apnea     no cpap       Past Surgical History:   Procedure Laterality Date     CARDIAC CATHETERIZATION  09/19/2016     CARDIAC CATHETERIZATION  10/11/2016     to lad/om1 in stent occlusion     CORONARY ANGIOPLASTY       CORONARY ARTERY BYPASS GRAFT  10/98    lima     coronary stent  10/11/2016    bebe to left main and om1     coronary stents       FEMORAL BYPASS  2002    left. [facility]       Family History   Problem  Relation Age of Onset     Diabetes Mother      Diabetes Father      Heart disease Father      Diabetes Sister      Hypertension Sister      Diabetes Brother      Hypertension Brother        Social History     Social History     Marital status: Single     Spouse name: N/A     Number of children: N/A     Years of education: N/A     Occupational History     Not on file.     Social History Main Topics     Smoking status: Former Smoker     Packs/day: 0.20     Years: 40.00     Start date: 1971     Quit date: 12/5/2016     Smokeless tobacco: Former User      Comment: Patient stated he is trying to quit.      Alcohol use Yes      Comment: rare     Drug use: No     Sexual activity: Not on file     Other Topics Concern     Not on file     Social History Narrative       Current Outpatient Prescriptions   Medication Sig Dispense Refill     ACCU-CHEK COMPACT TEST Strp Test daily before all meals/snacks and once before bedtime. 60 strip 12     albuterol (PROVENTIL HFA;VENTOLIN HFA) 90 mcg/actuation inhaler Inhale 2 puffs every 6 (six) hours as needed for wheezing. 8.5 g 11     amLODIPine (NORVASC) 10 MG tablet TAKE ONE TABLET BY MOUTH EVERY DAY 90 tablet 3     aspirin 81 MG EC tablet Take 1 tablet (81 mg total) by mouth every evening. 90 tablet 3     atorvastatin (LIPITOR) 40 MG tablet Take 1 tablet (40 mg total) by mouth bedtime. 90 tablet 3     blood-glucose meter, drum-type (ACCU-CHEK COMPACT PLUS CARE) Kit Test daily before all meals/snacks and once before bedtime. 1 kit 0     clopidogrel (PLAVIX) 75 mg tablet Take 1 tablet (75 mg total) by mouth daily. 90 tablet 3     furosemide (LASIX) 40 MG tablet Take 1 tablet (40 mg total) by mouth daily. (Patient taking differently: Take 20 mg by mouth daily. ) 90 tablet 3     generic lancets (ACCU-CHEK MULTICLIX LANCET) Test daily before all meals/snacks and once before bedtime. 3 each 0     glipiZIDE (GLUCOTROL) 5 MG 24 hr tablet Take one daily 90 tablet 6     hydrALAZINE (APRESOLINE)  25 MG tablet Take 1 tablet (25 mg total) by mouth every 8 (eight) hours. 90 tablet 0     hydroCHLOROthiazide (HYDRODIURIL) 25 MG tablet Take 1 tablet (25 mg total) by mouth daily. 90 tablet 2     isosorbide mononitrate (IMDUR) 30 MG 24 hr tablet Take 1 tablet (30 mg total) by mouth 2 (two) times a day. 180 tablet 3     lisinopril (PRINIVIL,ZESTRIL) 20 MG tablet Take 1 tablet (20 mg total) by mouth 2 (two) times a day. 180 tablet 3     metFORMIN (GLUCOPHAGE) 850 MG tablet TAKE ONE TABLET BY MOUTH TWO TIMES A DAY WITH MEALS . 180 tablet 2     multivitamin with minerals (THERA-M) 9 mg iron-400 mcg Tab tablet Take 1 tablet by mouth daily.       varenicline (CHANTIX) 1 mg tablet Take 1 tablet (1 mg total) by mouth 2 (two) times a day. Take with full glass of water. 60 tablet 4     metoprolol succinate (TOPROL-XL) 200 MG 24 hr tablet Take 1 tablet (200 mg total) by mouth daily. 90 tablet 3     No current facility-administered medications for this visit.        No Known Allergies    Objective:     Vitals:    03/13/17 0830   BP: 104/60   Pulse: 68   Resp: 16     Wt Readings from Last 3 Encounters:   03/13/17 220 lb (99.8 kg)   03/03/17 220 lb 9 oz (100 kg)   02/28/17 221 lb 3.2 oz (100.3 kg)       General Appearance:   Alert, cooperative and in no acute distress.   HEENT:  No scleral icterus; the mucous membranes were pink and moist.   Neck: JVP normal. No HJR   Chest: The spine was straight. The chest was symmetric.   Lungs:   Respirations unlabored; the lungs are clear to auscultation.   Cardiovascular:   Regular rhythm. S1 and S2 without murmur, clicks or rubs. Radial and posterior tibial pulses are intact and symmetrical.    Abdomen:  Soft, nontender, nondistended, bowel sounds present   Extremities: No cyanosis or edema.   Skin: No xanthelasma.   Neurologic: Mood and affect are appropriate.         Lab Review   Lab Results   Component Value Date    CREATININE 1.29 03/03/2017    BUN 33 (H) 03/03/2017      03/03/2017    K 4.6 03/03/2017     (H) 03/03/2017    CO2 22 03/03/2017     Lab Results   Component Value Date     (H) 03/03/2017     BNP (pg/mL)   Date Value   03/03/2017 360 (H)   02/27/2017 772 (H)   07/25/2016 553 (H)     CREATININE (mg/dL)   Date Value   03/03/2017 1.29   02/28/2017 1.25   02/27/2017 1.24   11/02/2016 1.67 (H)       Cardiographics  Echocardiogram: 2/27/2017  Summary   1. The left ventricle is normal in size. Left ventricular systolic performance is mildly reduced. The ejection fraction is estimated to be 45%.  2. There is mild mid lateral and more moderate distal On selected views, the mid posterior segment appears hypokinetic.  3. No significant valvular heart disease is identified on this study.   4. There is mild left atrial enlargement.             40 minutes were face to face spent with the patient with greater than 50% spent on education and counseling.      Susi Santiago, [facility]   Heart Failure Clinic

## 2021-06-09 NOTE — PROGRESS NOTES
CCx: cough    HPI: Mr. Spence is a 66 year old male with a significant history of CAD with CHF, DM, NIKKI, and Htn who presents with a cough.  He caught a cold in early February that started a cough, and shared this cough with his wife and son who also had symptoms but for much shorter.  He describes his cough as coming in spells and when he started coughing he couldn't stop.  He lost some sleep from this and initially it was a dry cough but then became productive.  He was in the hospital overnight on Feb 27 for this cough and treated for his CHF, and given a short course of prednisone which he thinks helped.  He did start to cough again some when he stopped the prednisone, but feels that over the past 5 days he has only had a couple of coughing spells.  He is overall much better.  He denies dyspnea or new swelling.  He is still off of tobacco since quitting 4 months ago.      PMH:  Past Medical History:   Diagnosis Date     Cardiomyopathy      CHF (congestive heart failure)      Chronic bronchitis      Coronary artery disease      Diabetes mellitus      Hypertension      Obesity      Peripheral vascular disease      Sleep apnea     no cpap       PSH:  Past Surgical History:   Procedure Laterality Date     CARDIAC CATHETERIZATION  09/19/2016     CARDIAC CATHETERIZATION  10/11/2016     to lad/om1 in stent occlusion     CORONARY ANGIOPLASTY       CORONARY ARTERY BYPASS GRAFT  10/98    [facility]     coronary stent  10/11/2016    bebe to left main and om1     coronary stents       FEMORAL BYPASS  2002    left. [facility]       SH:  Social History     Social History     Marital status: Single     Spouse name: N/A     Number of children: N/A     Years of education: N/A     Occupational History     Not on file.     Social History Main Topics     Smoking status: Former Smoker     Packs/day: 0.20     Years: 40.00     Start date: 1971     Quit date: 12/5/2016     Smokeless tobacco: Former User      Comment: Patient stated  he is trying to quit.      Alcohol use Yes      Comment: rare     Drug use: No     Sexual activity: Not on file     Other Topics Concern     Not on file     Social History Narrative       Family history:  Family History   Problem Relation Age of Onset     Diabetes Mother      Diabetes Father      Heart disease Father      Diabetes Sister      Hypertension Sister      Diabetes Brother      Hypertension Brother          ROS:  Review of Systems - History obtained from the patient  General ROS: negative  Psychological ROS: negative  ENT ROS: negative  Allergy and Immunology ROS: negative  Endocrine ROS: negative  Respiratory ROS: positive for - cough  negative for - hemoptysis, orthopnea, pleuritic pain, shortness of breath, sputum changes or stridor  Cardiovascular ROS: no chest pain or palpitations  Gastrointestinal ROS: no abdominal pain, change in bowel habits, or black or bloody stools  Genito-Urinary ROS: no dysuria, trouble voiding, or hematuria  Musculoskeletal ROS: negative  Neurological ROS: no TIA or stroke symptoms  Dermatological ROS: negative      Current Meds:  Current Outpatient Prescriptions   Medication Sig     ACCU-CHEK COMPACT TEST Strp Test daily before all meals/snacks and once before bedtime.     albuterol (PROVENTIL HFA;VENTOLIN HFA) 90 mcg/actuation inhaler Inhale 2 puffs every 6 (six) hours as needed for wheezing.     amLODIPine (NORVASC) 10 MG tablet TAKE ONE TABLET BY MOUTH EVERY DAY     aspirin 81 MG EC tablet Take 1 tablet (81 mg total) by mouth every evening.     atorvastatin (LIPITOR) 40 MG tablet Take 1 tablet (40 mg total) by mouth bedtime.     blood-glucose meter, drum-type (ACCU-CHEK COMPACT PLUS CARE) Kit Test daily before all meals/snacks and once before bedtime.     clopidogrel (PLAVIX) 75 mg tablet Take 1 tablet (75 mg total) by mouth daily.     furosemide (LASIX) 40 MG tablet Take 1 tablet (40 mg total) by mouth daily. (Patient taking differently: Take 20 mg by mouth daily. )      "generic lancets (ACCU-CHEK MULTICLIX LANCET) Test daily before all meals/snacks and once before bedtime.     glipiZIDE (GLUCOTROL) 5 MG 24 hr tablet Take one daily     hydrALAZINE (APRESOLINE) 25 MG tablet Take 1 tablet (25 mg total) by mouth every 8 (eight) hours.     hydroCHLOROthiazide (HYDRODIURIL) 25 MG tablet Take 1 tablet (25 mg total) by mouth daily.     isosorbide mononitrate (IMDUR) 30 MG 24 hr tablet Take 1 tablet (30 mg total) by mouth 2 (two) times a day.     lisinopril (PRINIVIL,ZESTRIL) 20 MG tablet Take 1 tablet (20 mg total) by mouth 2 (two) times a day.     metFORMIN (GLUCOPHAGE) 850 MG tablet TAKE ONE TABLET BY MOUTH TWO TIMES A DAY WITH MEALS .     metoprolol succinate (TOPROL-XL) 200 MG 24 hr tablet Take 1 tablet (200 mg total) by mouth daily.     multivitamin with minerals (THERA-M) 9 mg iron-400 mcg Tab tablet Take 1 tablet by mouth daily.     varenicline (CHANTIX) 1 mg tablet Take 1 tablet (1 mg total) by mouth 2 (two) times a day. Take with full glass of water.       Labs:  No results found for this or any previous visit (from the past 72 hour(s)).    I have personally reviewed all imaging and PFT data available pertinent to this visit.    Imaging studies:  Xr Chest Ap Portable    Result Date: 2/27/2017  XR CHEST AP PORTABLE 2/27/2017 4:37 AM INDICATION: Short of breath. COMPARISON: 7/1/2016. FINDINGS: Heart is enlarged. Median sternotomy. No pneumothorax. Venous congestion. Minimal effusions. CHF. Hypertrophic change both AC joints.      PFTs:  He did not do PFTs today    Physical Exam:  Visit Vitals     /56     Pulse 65     Resp 15     Ht 5' 11\" (1.803 m)     Wt 218 lb (98.9 kg)     SpO2 98%  Comment: RA     BMI 30.4 kg/m2     General - Well nourished, obese  Ears/Mouth - TMs clear bilaterally,  OP pink moist, no thrush  Neck - no cervical lymphadenopathy  Lungs - Clear to ausculation bilaterally  CVS - regular rhythm with no murmurs, rubs or gallups  Abdomen - soft, NT, ND, " NABS  Ext - no cyanosis, clubbing or edema  Skin - no rash  Psychology - alert and oriented, answers appropriate      Assessment and Plan:Keanu Spence is a 66 y.o. with a past medical history significant for heart disease and diabetes who presents to clinic today for chronic cough.  He clearly describes a viral prodrome with multiple family members affected.  His cough is mostly gone now and appears to have run its course.  While he has a few competing co-morbidities that could cause a cough (CHF, lisinopril use), I don't think any of these are at play.  I am really pleased his primary care team was successful at getting him through smoking cessation.   1. Cough - viral bronchitis, this appears to be essentially gone, and I don't think he needs any further therapy  2. Tobacco abuse - he doesn't describe daily sputum production or significant dyspnea, while he has a smoking history, I do not think he necessarily needs PFTs at this time      Electronically signed by:    Ellis Marlow MD PhD  [facility]

## 2021-06-09 NOTE — ANESTHESIA CARE TRANSFER NOTE
Last vitals:   Vitals:    07/21/20 1430   BP: 111/63   Pulse: 60   Resp: 16   Temp:    SpO2: 100%     Patient's level of consciousness is drowsy  Spontaneous respirations: yes  Maintains airway independently: yes  Dentition unchanged: yes  Oropharynx: oropharynx clear of all foreign objects    QCDR Measures:  ASA# 20 - Surgical Safety Checklist: WHO surgical safety checklist completed prior to induction    PQRS# 430 - Adult PONV Prevention: 4558F - Pt received => 2 anti-emetic agents (different classes) preop & intraop  ASA# 8 - Peds PONV Prevention: NA - Not pediatric patient, not GA or 2 or more risk factors NOT present  PQRS# 424 - Emmie-op Temp Management: NA - MAC anesthesia or case < 60 minutes  PQRS# 426 - PACU Transfer Protocol: - Transfer of care checklist used  ASA# 14 - Acute Post-op Pain: ASA14B - Patient did NOT experience pain >= 7 out of 10

## 2021-06-09 NOTE — PROGRESS NOTES
ASSESSMENT AND PLAN:  1. Tobacco dependence still not smoking complement her on this.  He feels like he can breathe better.      2. Essential hypertension blood pressures well controlled taking metoprolol once per day no side effects noted Ht C aspirin added to his regimen in the past his dry weight is between 215th 216 pounds     3. Dyslipidemia, goal LDL below 70 on a statin no side effects noted       4.  Coronary artery disease he's had no chest pain he's been busy he is walking regularly he's lost weight.  He has now seen pulmonology and that no further testing was advocated he schedules a cardiology in April and see Dr. Mc in May.  CHIEF COMPLAINT:  Chief Complaint   Patient presents with     Follow-up       HISTORY OF PRESENT ILLNESS:  Keanu is a 66 y.o. male presenting for a follow-up. He was seen by Dr. Marlow at [facility] on 3/14/2017 for an evaluation of cough. He was diagnosed viral bronchitis. He states that his symptoms have improved significantly. He states that he is breathing well. He denies shortness of breath and chest pain. He has been using a stationary bike to exercise however he has not been exercising recently due to his wife's medical condition. He has been taking his medications faithfully.     REVIEW OF SYSTEMS:   He states that he was been sleeping well but he will intermittently wake up after sleeping for 4 hours.   He notes intermittent thigh cramping.   All other 10 point review of systems are negative.    PFSH:  He has not smoked in 3 months. Reviewed as below.     TOBACCO USE:  History   Smoking Status     Former Smoker     Packs/day: 0.20     Years: 40.00     Start date: 1971     Quit date: 12/5/2016   Smokeless Tobacco     Former User     Comment: Patient stated he is trying to quit.        VITALS:  Vitals:    03/29/17 1000   BP: 116/64   Patient Site: Right Arm   Patient Position: Semi-white   Cuff Size: Adult Regular   Pulse: 60   Resp: 16   Temp: 97.9  F (36.6   C)   TempSrc: Oral   Weight: (!) 224 lb 3 oz (101.7 kg)     Wt Readings from Last 3 Encounters:   03/29/17 (!) 224 lb 3 oz (101.7 kg)   03/14/17 218 lb (98.9 kg)   03/13/17 220 lb (99.8 kg)     Body mass index is 31.27 kg/(m^2).    PHYSICAL EXAM:  General: Alert, cooperative, no distress, appears stated age  Head: Normocephalic, without obvious abnormality, atraumatic  Eyes: PERRL, conjunctiva/cornea clear, EOM's intact, fundi benign, both eyes  Ears: Normal TM's and external ear canals, both ears  Nose: Nares normal, septum midline, mucosa normal, no drainage or sinus tenderness  Throat: Lips, mucosa, and tongue normal; teeth and gums normal  Back: Symmetric, no curvature, ROM normal, no CVA tenderness  Lungs: Clear to auscultation bilaterally, respirations unlabored  Chest wall: No tenderness or deformity  Heart: Regular rate and rhythm, S1 and S2 normal, no murmur, rub, or gallop  Abdomen: Soft, non tender, bowel sounds active all four quadrants, no masses, no organomegaly.      DATA REVIEWED:  Additional History from Old Records Summarized (2): Reviewed Dr. Kirkland's note from 3/14/2017 regarding cough.   Decision to Obtain Records (1): None  Radiology Tests Summarized or Ordered (1): None  Labs Reviewed or Ordered (1): Reviewed BMP from 3/3/2017  Medicine Test Summarized or Ordered (1): None  Independent Review of EKG or X-RAY(2 each): None    The visit lasted a total of 12 minutes face to face with the patient. Over 50% of the time was spent counseling and educating the patient about CHF.     Krishan CHRISTOPHER, am scribing for and in the presence of, Dr. Duncan.    Dr. Dakota CHRISTOPHER, personally performed the services described in this documentation, as scribed by Krishan Chaudhary in my presence, and it is both accurate and complete.      MEDICATIONS:  Current Outpatient Prescriptions   Medication Sig Dispense Refill     ACCU-CHEK COMPACT TEST Strp Test daily before all meals/snacks and once before bedtime. 60  strip 12     albuterol (PROVENTIL HFA;VENTOLIN HFA) 90 mcg/actuation inhaler Inhale 2 puffs every 6 (six) hours as needed for wheezing. 8.5 g 11     amLODIPine (NORVASC) 10 MG tablet TAKE ONE TABLET BY MOUTH EVERY DAY 90 tablet 3     aspirin 81 MG EC tablet Take 1 tablet (81 mg total) by mouth every evening. 90 tablet 3     atorvastatin (LIPITOR) 40 MG tablet Take 1 tablet (40 mg total) by mouth bedtime. 90 tablet 3     blood-glucose meter, drum-type (ACCU-CHEK COMPACT PLUS CARE) Kit Test daily before all meals/snacks and once before bedtime. 1 kit 0     clopidogrel (PLAVIX) 75 mg tablet Take 1 tablet (75 mg total) by mouth daily. 90 tablet 3     furosemide (LASIX) 40 MG tablet Take 1 tablet (40 mg total) by mouth daily. (Patient taking differently: Take 20 mg by mouth daily. ) 90 tablet 3     generic lancets (ACCU-CHEK MULTICLIX LANCET) Test daily before all meals/snacks and once before bedtime. 3 each 0     glipiZIDE (GLUCOTROL) 5 MG 24 hr tablet Take one daily 90 tablet 6     hydrALAZINE (APRESOLINE) 25 MG tablet Take 1 tablet (25 mg total) by mouth every 8 (eight) hours. 90 tablet 0     hydroCHLOROthiazide (HYDRODIURIL) 25 MG tablet Take 1 tablet (25 mg total) by mouth daily. 90 tablet 2     isosorbide mononitrate (IMDUR) 30 MG 24 hr tablet Take 1 tablet (30 mg total) by mouth 2 (two) times a day. 180 tablet 3     lisinopril (PRINIVIL,ZESTRIL) 20 MG tablet Take 1 tablet (20 mg total) by mouth 2 (two) times a day. 180 tablet 3     metFORMIN (GLUCOPHAGE) 850 MG tablet TAKE ONE TABLET BY MOUTH TWO TIMES A DAY WITH MEALS . 180 tablet 2     metoprolol succinate (TOPROL-XL) 200 MG 24 hr tablet Take 1 tablet (200 mg total) by mouth daily. 90 tablet 3     multivitamin with minerals (THERA-M) 9 mg iron-400 mcg Tab tablet Take 1 tablet by mouth daily.       varenicline (CHANTIX) 1 mg tablet Take 1 tablet (1 mg total) by mouth 2 (two) times a day. Take with full glass of water. 60 tablet 4     No current  facility-administered medications for this visit.        Total Data Points: 3

## 2021-06-09 NOTE — PROGRESS NOTES
Late nurse request- pt scheduled for cardioversion  Next week late add-on. DCCV scheduled 7/21.  messaged writer to place asymptomatic covid order- done. -[facility] [place]

## 2021-06-09 NOTE — TELEPHONE ENCOUNTER
Phone call from patient wondering about hearing back from Dr. Mccollum regarding his continued episode of afib.  Pt states he feels ok, but definitely can note a difference in his activity tolerance.  He can tell that he is out of rhythm and wonders what Dr. Mccollum would suggest.    Explained that Dr. mccollum is in procedure today and we will get back to him as soon as we are able, explained that afib is not a dangerous rhythm as long as he is taking Warfarin, which he is.  Pt states understanding.

## 2021-06-09 NOTE — PROGRESS NOTES
1950  Home:[phone] (home) Cell:[phone] (mobile)  Emergency Contact: Shefali Sosa [phone]     Component       INR   Latest Ref Rng & Units       0.90 - 1.10   2/21/2020       1.50 (H)   3/6/2020       2.00 (H)   3/10/2020       2.30 (H)   4/7/2020       1.80 (H)   4/21/2020       1.80 (H)   5/5/2020       2.60 (H)   5/19/2020       2.30 (H)   6/16/2020       2.40 (H)   7/14/2020       2.50 (H)       +++Important patient information for CSC/Cath Lab staff : None+++    [facility] EP Cath Lab Procedure Order   Cardioversion:  Cardioversion    Diagnosis:  AF  Anticipated Case Duration:  Standard  Scheduling Needs/Timeframe:  COVID Scheduling- urgent next available, per Isatu please schedule next week with either EP NP or Dr Montenegro    Current Device: Dual ICD  Device Company/Device Rep Needed for Procedure: [employer] ([email or web address])    Pre-Procedural Testing needed: COVID 19 nasal/lab test within 48hrs of procedure  Anesthesia:  General-CV Only  Research Protocol:  No    [facility] EP Cath Lab Prep   Ordering Provider: Isatu Cullen  Ordering Date: 7/17/2020  Orders Status: Intial order placed and Order set placed    H&P:  Compled by Isatu on 7/17/20 if scheduled within 30 days, pt to schedule with PMD if procedure outside of this timeframe  PCP: Ken Keen MD, [phone]    Pre-op Labs: Ordered AM of procedure    Medical Records Pertinent for Procedure:  Echo 9/20/19 EF 27%    Patient Education:    Teach with Patient: Will be completed via phone prior to procedure.    Risks Reviewed:     Cardioversion    >90% acute success rate, <10% failure to convert or   reverts shortly after cardioversion.    <1% embolic event of (CVA, pulmonary embolism, or   1. other site).    75% risk for superficial burn.  Risks associated with general anesthesia will be addressed by the Anesthesiology Department    Pre-Procedure Instructions that were Reviewed with Patient:  NPO after midnight, Remove all jewelry prior to coming in  for procedure, Shower prior to arrival, Notified patient of time and date of procedure by CV , Transportation arrangements needed s/p procedure, Post-procedure follow up process, Sedation plan/orders and Pre-procedure letter was sent to pt by CV     Pre-Procedure Medication Instructions:  Instructions given to pt regarding anticoagulants: Coumadin- instructed to continue anticoagulation uninterrupted through their procedure  Instructions given to pt regarding antiarrhythmic medication: Diltiazem and BB; Pt instructed to continue medication prior to procedure  Instructions for medication, other than anticoagulants/antiarrhythmics listed above, given to pt: to hold Metformin and Glipizide the morning of procedure, and to take remaining medications with small sips of water    No Known Allergies    Current Outpatient Medications:      ACCU-CHEK COMPACT TEST Strp, Test daily before all meals/snacks and once before bedtime., Disp: 60 strip, Rfl: 12     acetaminophen (TYLENOL) 325 MG tablet, Take 2 tablets (650 mg total) by mouth every 4 (four) hours as needed., Disp: , Rfl: 0     albuterol (PROAIR HFA;PROVENTIL HFA;VENTOLIN HFA) 90 mcg/actuation inhaler, Inhale 2 puffs every 4 (four) hours as needed for wheezing or shortness of breath., Disp: 1 each, Rfl: 0     aspirin 81 MG EC tablet, Take 1 tablet (81 mg total) by mouth every evening., Disp: 90 tablet, Rfl: 3     atorvastatin (LIPITOR) 40 MG tablet, Take 1 tablet (40 mg total) by mouth at bedtime., Disp: 90 tablet, Rfl: 3     blood glucose test (ACCU-CHEK SMARTVIEW TEST STRIP) strips, Dispense brand per patient's insurance at pharmacy discretion. Testing once a day only, Disp: 90 strip, Rfl: 3     blood-glucose meter, drum-type (ACCU-CHEK COMPACT PLUS CARE) Kit, Test daily before all meals/snacks and once before bedtime., Disp: 1 kit, Rfl: 0     diltiazem (CARDIZEM CD) 240 MG 24 hr capsule, Take 1 capsule (240 mg total) by mouth daily., Disp: 90  capsule, Rfl: 5     fluticasone propion-salmeteroL (ADVAIR DISKUS) 250-50 mcg/dose DISKUS, Inhale 1 puff 2 (two) times a day., Disp: 1 each, Rfl: 10     furosemide (LASIX) 40 MG tablet, TAKE ONE TABLET BY MOUTH ONE TIME DAILY , Disp: 180 tablet, Rfl: 2     generic lancets (ACCU-CHEK MULTICLIX LANCET), Test daily before all meals/snacks and once before bedtime., Disp: 3 each, Rfl: 0     glipiZIDE (GLUCOTROL XL) 5 MG 24 hr tablet, TAKE ONE TABLET BY MOUTH ONE TIME DAILY , Disp: 90 tablet, Rfl: 0     hydrALAZINE (APRESOLINE) 25 MG tablet, Take 1 tablet (25 mg total) by mouth every 8 (eight) hours., Disp: 270 tablet, Rfl: 1     hydroCHLOROthiazide (HYDRODIURIL) 25 MG tablet, TAKE ONE TABLET BY MOUTH ONCE DAILY , Disp: 90 tablet, Rfl: 2     HYDROcodone-acetaminophen 5-325 mg per tablet, Take 1 tablet by mouth every 8 (eight) hours as needed for pain., Disp: 60 tablet, Rfl: 0     hydrocortisone with aloe 1 % Crea cream, Apply to affected area 2 times daily, Disp: 30 g, Rfl: 1     isosorbide mononitrate (IMDUR) 30 MG 24 hr tablet, TAKE ONE TABLET BY MOUTH TWICE DAILY, Disp: 180 tablet, Rfl: 1     lisinopril (PRINIVIL,ZESTRIL) 20 MG tablet, TAKE ONE TABLET BY MOUTH TWICE DAILY , Disp: 180 tablet, Rfl: 2     metFORMIN (GLUCOPHAGE) 500 MG tablet, Take 1 tablet (500 mg total) by mouth 2 (two) times a day with meals., Disp: 180 tablet, Rfl: 3     metoprolol succinate (TOPROL-XL) 200 MG 24 hr tablet, Take 1 tablet (200 mg total) by mouth daily., Disp: 90 tablet, Rfl: 1     multivitamin with minerals (THERA-M) 9 mg iron-400 mcg Tab tablet, Take 1 tablet by mouth daily., Disp: , Rfl:      pantoprazole (PROTONIX) 40 MG tablet, Take 1 tablet (40 mg total) by mouth daily., Disp: 30 tablet, Rfl: 5     QUEtiapine (SEROQUEL) 50 MG tablet, Take 1 tablet (50 mg total) by mouth at bedtime., Disp: 30 tablet, Rfl: 6     temazepam (RESTORIL) 15 mg capsule, Take 1 capsule (15 mg total) by mouth at bedtime as needed for sleep., Disp: 30  capsule, Rfl: 0     warfarin ANTICOAGULANT (COUMADIN/JANTOVEN) 2.5 MG tablet, TAKE 1 TABLET BY MOUTH DAILY ON TUESDAY AND THURSDAY AND 2 TABLETS BY MOUTH DAILY ON ALL OTHER DAYS, Disp: 150 tablet, Rfl: 1    Documentation Date:7/17/2020 11:56 AM  Greta Domingo RN    Instructions for Patients Scheduled for Cardiac Procedures During the   COVID-19 Pandemic      - Your Provider has determined that your condition warrants going ahead with your procedure at this time.  - You will need to be tested for COVID-19 48-72 hours (2-3 days) prior to your procedure.  - We highly encourage you to get tested for COVID-19 at one of our designated [facility] testing sites. We process the tests within our lab, which allows us to get the results back quickly.   - If you choose to get tested at a non-[facility] location, you will need to contact your primary care provider to make those arrangements to ensure the results are available to your cardiologist before you arrive for your procedure. If we DO NOT receive the results in time, your procedure may be postponed or canceled. The results will need to get faxed to [phone].  - After testing, you will need to remain in self-quarantine until your procedure.  - If you are notified of a positive COVID-19 result; you will need to call your provider at [phone] for further recommendations.    - If the test is positive; PLEASE DO NOT PRESENT FOR YOUR PROCEDURE until you have been given further instructions.   - You will not be called with negative results, so arrive as instructed unless otherwise notified.    Don't:    Don't invite visitors or friends into your home.    Don't leave your home unless absolutely necessary.    Don't share utensils and other household items with others in the home.  Do:    Wash your hands regularly with soap and water and use hand  with at least 60% alcohol if you don't have easy access to soap and water.     Disinfect surface  areas daily, including doorknobs, electronics - especially phones, laptops and other devices.     Wash utensils and other items thoroughly.     Separate yourself from others in the household as best you can, including pets.    Keep your hands away from your face.     Practice all other prevention tips the CDC recommends, including covering your coughs and sneezes with a tissue or your sleeve and immediately throwing the tissue into the trash and washing your hands.    Call the clinic if you develop any of the following symptoms before your procedure:    Fever     Cough    Shortness of breath    Sore throat    Runny or stuffy nose    Muscle or body aches    Headaches    Fatigue    Vomiting and diarrhea    These steps will help keep you and your family, other patients and hospital staff safe from COVID-19

## 2021-06-09 NOTE — PROGRESS NOTES
I had telephone conversation with pt   Georgina  has been atrial flutter since 6- 17; ventricular rates pretty well controlled generally less than 110 bpm  Coincident with a flutter he feels worse more tired more short of breath  Recalls being on diltiazem 240 now 180  Antiarrhythmic medications are problematic  Creatinine equals 2.2  Ejection fraction 22%  About the only decent antiarrhythmic drug would be amiodarone considering his renal failure and advanced heart failure  Plan  Schedule patient for cardioversion  Will need to review INR status   Schedule with me and [facility]- so I can try to atrial overdrive pace terminate his atrial flutter before the cardioversion  After cardioversion see if he feels better and see if we maintain a sinus rhythm  Otherwise would add amiodarone; not suitable for other antiarrhythmic drugs   I called   in a prescription for diltiazem 240 mg daily

## 2021-06-09 NOTE — TELEPHONE ENCOUNTER
ANTICOAGULATION  MANAGEMENT: Discharge Review    Keanu Spence chart reviewed for anticoagulation continuity of care    Outpatient surgery/procedure on 7/21 for cardioversion.    Discharge disposition: Home    INR Results:       Recent labs: (last 7 days)     07/21/20  1239   INR 2.90*       Warfarin inpatient management: home regimen continued    Warfarin discharge instructions: home regimen continued     Medication Changes Affecting Anticoagulation: No    Additional Factors Affecting Anticoagulation: No    Plan     No adjustment to anticoagulation plan needed      Patient not contacted    Anticoagulation calendar updated    Augusto Denton RN

## 2021-06-10 NOTE — TELEPHONE ENCOUNTER
ANTICOAGULATION  MANAGEMENT    Assessment     Today's INR result of 2.1 is Therapeutic (goal INR of 2.0-3.0)        Warfarin taken as previously instructed    No new diet changes affecting INR    No interaction expected between lisinopril, HCTZ, isosorbide and warfarin (micromedex).  Holding lisinopril and HCTZ currently per hospital discharge orders.  Dose changed on isosorbide.       Hospitalized on 8/4-8/10 for leg ischemia and s/p TPA.   Following up with surgeon on 8/20. Following up with PCP in next week.  Notified to call if any changes are made.      Continues to tolerate warfarin with no reported s/s of bleeding or thromboembolism     Previous INR was Subtherapeutic    Plan:     Spoke on phone with Keanu regarding INR result and instructed:     Warfarin Dosing Instructions:  Continue current warfarin dose 5 mg daily on Mon, Thurs, Sat; and 2.5 mg daily rest of week  (0 % change)    Instructed patient to follow up no later than: 1-2 weeks     Education provided: importance of therapeutic range, monitoring for clotting signs and symptoms and when to seek medical attention/emergency care    Keanu verbalizes understanding and agrees to warfarin dosing plan.    Instructed to call the [facility] for any changes, questions or concerns. (#[phone])   ?   Gisela Rucker RN    Subjective/Objective:      Keanu Spence, a 70 y.o. male is on warfarin.     Keanu reports:     Home warfarin dose: verbally confirmed home dose with Keanu and updated on anticoagulation calendar     Missed doses: No     Medication changes:  Yes: see above     S/S of bleeding or thromboembolism:  No     New Injury or illness:  Yes, leg ischemia      Changes in diet or alcohol consumption:  No     Upcoming surgery, procedure or cardioversion:  No    Anticoagulation Episode Summary     Current INR goal:   2.0-3.0   TTR:   72.2 % (10.6 mo)   Next INR check:   9/1/2020   INR from last check:   2.10 (8/18/2020)   Weekly max warfarin dose:       Target end date:      INR check location:      Preferred lab:      Send INR reminders to:   [facility]    Indications    Atypical atrial flutter (H) [I48.4]           Comments:            Anticoagulation Care Providers     Provider Role Specialty Phone number    Ken Keen MD  Internal Medicine [phone]

## 2021-06-10 NOTE — TELEPHONE ENCOUNTER
FYI - Status Update  Who is Calling: Patient  Update: Returned call, [facility] not available for transfer at this time. Patient states he will try to call back again in about an hour.......

## 2021-06-10 NOTE — TELEPHONE ENCOUNTER
hydroCHLOROthiazide (HYDRODIURIL) 25 MG tablet [[id number]]     Electronically signed by: Kervin Palmer DO on 11/07/19 0647  Status: Discontinued    Ordering user: Kervin Palmer DO 11/07/19 0647  Authorized by: Kervin Palmer DO    Frequency:  11/07/19 - 08/10/20  Discontinued by: Shaji Costa MD 08/10/20 1151 [Stop Taking at Discharge]      lisinopril (PRINIVIL,ZESTRIL) 20 MG tablet [[id number]]     Electronically signed by: Kervin Palmer DO on 11/07/19 0647  Status: Discontinued    Ordering user: Kervin Palmer DO 11/07/19 0647  Authorized by: Kervin Palmer DO    Frequency:  11/07/19 - 08/10/20  Discontinued by: Shaji Costa MD 08/10/20 1151 [Stop Taking at Discharge]

## 2021-06-10 NOTE — ANESTHESIA CARE TRANSFER NOTE
Last vitals:   Vitals:    08/07/20 1601   BP: 158/60   Pulse: 70   Resp: 25   Temp:    SpO2: 100%     Patient's level of consciousness is drowsy  Spontaneous respirations: yes  Maintains airway independently: yes  Dentition unchanged: yes  Oropharynx: oropharynx clear of all foreign objects    QCDR Measures:  ASA# 20 - Surgical Safety Checklist: WHO surgical safety checklist completed prior to induction    PQRS# 430 - Adult PONV Prevention: 4558F - Pt received => 2 anti-emetic agents (different classes) preop & intraop  ASA# 8 - Peds PONV Prevention: NA - Not pediatric patient, not GA or 2 or more risk factors NOT present  PQRS# 424 - Emmie-op Temp Management: 4559F - At least one body temp DOCUMENTED => 35.5C or 95.9F within required timeframe  PQRS# 426 - PACU Transfer Protocol: - Transfer of care checklist used  ASA# 14 - Acute Post-op Pain: ASA14B - Patient did NOT experience pain >= 7 out of 10

## 2021-06-10 NOTE — PROGRESS NOTES
Assessment/Plan:     1. Ischemic cardiomyopathy with systolic dysfunction, NYHA class II: Keanu Spence appears well compensated.  No signs of fluid retention today.  He continues to follow a low-sodium diet and weigh himself on a daily basis.  He tries to ride his stationary bike on a daily basis.  No changes to medications as he is on maximum doses of ACE inhibitor and beta-blocker.        Heart failure treatment includes:  - Beta blocker therapy with metroprolol succinate 200 mg daily  - ACEI therapy with lisinopril 20 mg twice a day  - Hydralazine 25 mg 3 times a day and isosorbide 30 mg twice a day  - Diuretic therapy with furosemide 20 mg daily and hydrochlorothiazide 25 mg daily    Follow up with Dr Skinner on May 30 as scheduled and in the heart failure clinic as needed    Subjective:     Keanu Spence is seen at [facility] heart failure clinic today for continued follow-up.  He follows up for ischemic cardiomyopathy with systolic dysfunction. His most recent echocardiogram was done February 27, 2017 which showed an ejection fraction of 45%. His past medical history is also significant for hypertension, coronary artery disease, peripheral vascular disease, dyslipidemia, COPD, diabetes and obstructive sleep apnea. He had coronary artery bypass graft 4-vessel in 1998. He had a SIRI to the left main and OM1 in October of 2016.    During the last clinic visit, I restarted him on hydralazine 25 mg 3 times a day.  He states he tolerated this well.  He denies any dizziness or lightheadedness.  He continues to have chronic fatigue which has not worsened.  He denies any other acute heart failure symptoms.  He denies lightheadedness, shortness of breath, dyspnea on exertion, orthopnea, PND, palpitations, chest pain, abdominal fullness/bloating and lower extremity edema.      He is monitoring home weights which are stable between 213-218 pounds.  He is following a low sodium diet.  He participates in regular  physical activity including riding his stationary bike daily.      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, unspecified CKD stage, unspecified long term insulin use status     Hypertension     Coronary Artery Disease     Peripheral Vascular Disease     Tobacco dependence     Hyperlipidemia LDL goal < 70     Ischemic cardiomyopathy     Coronary artery disease involving coronary bypass graft of native heart with unspecified angina pectoris     Coronary artery chronic total occlusion     Essential hypertension with goal blood pressure less than 140/90     Dyslipidemia, goal LDL below 70     NIKKI (obstructive sleep apnea)       Past Medical History:   Diagnosis Date     Cardiomyopathy      CHF (congestive heart failure)      Chronic bronchitis      Coronary artery disease      Diabetes mellitus      Hypertension      Obesity      Peripheral vascular disease      Sleep apnea     no cpap       Past Surgical History:   Procedure Laterality Date     CARDIAC CATHETERIZATION  09/19/2016     CARDIAC CATHETERIZATION  10/11/2016     to lad/om1 in stent occlusion     CORONARY ANGIOPLASTY       CORONARY ARTERY BYPASS GRAFT  10/98    [facility]a     coronary stent  10/11/2016    bebe to left main and om1     coronary stents       FEMORAL BYPASS  2002    left. [facility]       Family History   Problem Relation Age of Onset     Diabetes Mother      Diabetes Father      Heart disease Father      Diabetes Sister      Hypertension Sister      Diabetes Brother      Hypertension Brother        Social History     Social History     Marital status: Single     Spouse name: N/A     Number of children: N/A     Years of education: N/A      Occupational History     Not on file.     Social History Main Topics     Smoking status: Former Smoker     Packs/day: 0.20     Years: 40.00     Start date: 1971     Quit date: 12/5/2016     Smokeless tobacco: Former User      Comment: Patient stated he is trying to quit.      Alcohol use Yes      Comment: rare     Drug use: No     Sexual activity: Not on file     Other Topics Concern     Not on file     Social History Narrative       Current Outpatient Prescriptions   Medication Sig Dispense Refill     ACCU-CHEK COMPACT TEST Strp Test daily before all meals/snacks and once before bedtime. 60 strip 12     albuterol (PROVENTIL HFA;VENTOLIN HFA) 90 mcg/actuation inhaler Inhale 2 puffs every 6 (six) hours as needed for wheezing. 8.5 g 11     amLODIPine (NORVASC) 5 MG tablet TAKE ONE TABLET BY MOUTH EVERY DAY 90 tablet 3     aspirin 81 MG EC tablet Take 1 tablet (81 mg total) by mouth every evening. 90 tablet 3     atorvastatin (LIPITOR) 40 MG tablet Take 1 tablet (40 mg total) by mouth bedtime. 90 tablet 3     blood-glucose meter, drum-type (ACCU-CHEK COMPACT PLUS CARE) Kit Test daily before all meals/snacks and once before bedtime. 1 kit 0     clopidogrel (PLAVIX) 75 mg tablet Take 1 tablet (75 mg total) by mouth daily. 90 tablet 3     furosemide (LASIX) 20 MG tablet Take 1 tablet by mouth daily.  3     generic lancets (ACCU-CHEK MULTICLIX LANCET) Test daily before all meals/snacks and once before bedtime. 3 each 0     glipiZIDE (GLUCOTROL) 5 MG 24 hr tablet Take one daily 90 tablet 6     hydrALAZINE (APRESOLINE) 25 MG tablet Take 1 tablet (25 mg total) by mouth every 8 (eight) hours. 270 tablet 3     hydroCHLOROthiazide (HYDRODIURIL) 25 MG tablet Take 1 tablet (25 mg total) by mouth daily. 90 tablet 2     isosorbide mononitrate (IMDUR) 30 MG 24 hr tablet Take 1 tablet (30 mg total) by mouth 2 (two) times a day. 180 tablet 3     lisinopril (PRINIVIL,ZESTRIL) 20 MG tablet Take 1 tablet (20 mg total) by mouth 2 (two)  times a day. 180 tablet 3     metFORMIN (GLUCOPHAGE) 850 MG tablet TAKE ONE TABLET BY MOUTH TWO TIMES A DAY WITH MEALS . 180 tablet 2     metoprolol succinate (TOPROL-XL) 200 MG 24 hr tablet Take 1 tablet (200 mg total) by mouth daily. 90 tablet 3     multivitamin with minerals (THERA-M) 9 mg iron-400 mcg Tab tablet Take 1 tablet by mouth daily.       varenicline (CHANTIX) 1 mg tablet Take 1 tablet (1 mg total) by mouth 2 (two) times a day. Take with full glass of water. 60 tablet 4     No current facility-administered medications for this visit.        No Known Allergies    Objective:     Vitals:    05/01/17 0825   BP: 114/66   Pulse: 60   Resp: 16     Wt Readings from Last 3 Encounters:   05/01/17 219 lb (99.3 kg)   04/03/17 (!) 223 lb (101.2 kg)   03/29/17 (!) 224 lb 3 oz (101.7 kg)       General Appearance:   Alert, cooperative and in no acute distress.   HEENT:  No scleral icterus; the mucous membranes were pink and moist.   Neck: JVP normal. No HJR.   Chest: The spine was straight. The chest was symmetric.   Lungs:   Respirations unlabored; the lungs are clear to auscultation.   Cardiovascular:   Regular rhythm. S1 and S2 without murmur, clicks or rubs. Radial and posterior tibial pulses are intact and symmetrical.    Abdomen:  Soft, nontender, nondistended, bowel sounds present   Extremities: No cyanosis, clubbing, or edema.   Skin: No xanthelasma.   Neurologic: Mood and affect are appropriate.         Lab Review   Lab Results   Component Value Date    CREATININE 1.37 (H) 04/03/2017    BUN 29 (H) 04/03/2017     04/03/2017    K 4.0 04/03/2017     (H) 04/03/2017    CO2 23 04/03/2017     Lab Results   Component Value Date     (H) 03/03/2017     BNP (pg/mL)   Date Value   03/03/2017 360 (H)   02/27/2017 772 (H)   07/25/2016 553 (H)     Creatinine (mg/dL)   Date Value   04/03/2017 1.37 (H)   03/03/2017 1.29   02/28/2017 1.25   02/27/2017 1.24       Cardiographics  Echocardiogram:  2/27/2017  Summary   1. The left ventricle is normal in size. Left ventricular systolic performance is mildly reduced. The ejection fraction is estimated to be 45%.  2. There is mild mid lateral and more moderate distal On selected views, the mid posterior segment appears hypokinetic.  3. No significant valvular heart disease is identified on this study.   4. There is mild left atrial enlargement.      Cardiac MRI 1/19/2017  IMPRESSIONS:    1. Left ventricle is moderately enlarged with severe systolic dysfunction. The quantified left ventricular ejection fraction is 33.9%. There is mild global hypokinesis with severe hypokinesis to akinesis of the mid to distal septal, mid to apical anterior wall, apical lateral and mid inferiolateral wall.   2. Non-transmural myocardial infarction involving mid to apical anterior wall, mid to distal anteroseptal wall, basal to mid inferolateral, distal septum, basal to distal inferior wall and basal to mid inferolateral walls which involves 50% of wall thickness indicating moderate possible viability of myocardial segments. There is non-transmural infarction of the mid to distal anterolateral wall involving 25% of myocardial thickness representing viable myocardium.   3. Transmural infarction of the apical lateral wall.   4. Normal right ventricular size and function function.   5. Mild aortic insufficiency  6. Mild to moderate tricuspid regurgitation.  7. Mild mitral regurgitation.  8. Moderate left atrial enlargement. Mild right atrial enlargement        15 minutes were spent face to face with the patient with greater than 50% spent on education and counseling.      Susi Santiago, [facility]   Heart Failure Clinic

## 2021-06-10 NOTE — TELEPHONE ENCOUNTER
Wellness Screening Tool  Symptom Screening:  Do you have one of the following NEW symptoms:    Fever (subjective or >100.0)?  No    A new cough?  No    Shortness of breath?  No     Chills? No     New loss of taste or smell? No     Generalized body aches? No     New persistent headache? No     New sore throat? No     Nausea, vomiting, or diarrhea?  No    Within the past 3 weeks, have you been exposed to someone with a known positive illness below:    COVID-19 (known or suspected)?  No    Chicken pox?  No    Mealses?  No    Pertussis?  No    Patient notified of visitor policy- They may have one person accompany them to their appointment, but they will need to wear a mask and will be screened upon arrival for symptoms: Yes  Pt informed to wear a mask: Yes  Pt notified if they develop any symptoms listed above, prior to their appointment, they are to call the clinic directly at [phone] for further instructions.  Yes  Patient's appointment status: Patient will be seen in clinic as scheduled on 8/4/20

## 2021-06-10 NOTE — TELEPHONE ENCOUNTER
ANTICOAGULATION  MANAGEMENT: Discharge Review    Keanu ANNMARIE LanceSpence chart reviewed for anticoagulation continuity of care    Hospital admission on  8/4-10 for leg ischemia.    Discharge disposition: Home    INR Results:       Recent labs: (last 7 days)     08/06/20  0613 08/07/20  0826 08/07/20  1230 08/07/20  1446 08/09/20  0630   INR 1.76* 1.75* 1.65* 1.71* 1.82*       Warfarin inpatient management: reversed for surgery, resumed at discharge    Warfarin discharge instructions: home regimen continued     Medication Changes Affecting Anticoagulation: No    Additional Factors Affecting Anticoagulation: No    Plan     No adjustment to anticoagulation plan needed      Spoke with Keanu who said he was instructed to check inr 8/18, scheduled      Anticoagulation calendar updated    Augusto Denton RN

## 2021-06-10 NOTE — TELEPHONE ENCOUNTER
ANTICOAGULATION  MANAGEMENT    Assessment     Today's INR result of 2.7 is Therapeutic (goal INR of 2.0-3.0)        Warfarin recently held as instructed which may be affecting INR he says last week he took 5 mg on mo/th/fr    No new diet changes affecting INR    No new medication/supplements affecting INR    Continues to tolerate warfarin with no reported s/s of bleeding or thromboembolism     Previous INR was Therapeutic     Had CV 7/21 and then developed occlusion in leg,  procedure with Dr Chakraborty 8/7 or sooner, perhaps tomorrow    Anticoag is being discussed after heart care visit today    Plan:     Spoke with Keanu regarding INR result and instructed:     Warfarin Dosing Instructions:  Is currently holding for surgery which was scheduled for 8/7 but may be as soon as tomorrow. Will resume warfarin with surgeon's ok with current dose 5 mg daily on mon/thu/sat; and 2.5 mg daily rest of week unless otherwise instructed    Instructed patient to follow up no later than: per discharge instruction or with Granrola ov 8/17      Keanu verbalizes understanding and agrees to warfarin dosing plan.    Instructed to call the [facility] for any changes, questions or concerns. (#[phone])   ?   Augusto Denton RN    Subjective/Objective:      Keanu Spence, a 70 y.o. male is on warfarin.     Keanu reports:     Home warfarin dose: as updated on anticoagulation calendar per template     Missed doses: No     Medication changes:  No     S/S of bleeding or thromboembolism:  No     New Injury or illness:  No     Changes in diet or alcohol consumption:  No     Upcoming surgery, procedure or cardioversion:  No    Anticoagulation Episode Summary     Current INR goal:   2.0-3.0   TTR:   71.8 % (11.1 mo)   Next INR check:   8/17/2020   INR from last check:   2.70 (8/4/2020)   Weekly max warfarin dose:      Target end date:      INR check location:      Preferred lab:      Send INR reminders to:   [facility]    Indications     Atypical atrial flutter (H) [I48.4]           Comments:            Anticoagulation Care Providers     Provider Role Specialty Phone number    Ken Keen MD  Internal Medicine [phone]

## 2021-06-10 NOTE — ANESTHESIA PREPROCEDURE EVALUATION
Anesthesia Evaluation      Patient summary reviewed     Airway   Mallampati: III   Pulmonary - normal exam   (+) COPD, sleep apnea on no CPAP, ,                          Cardiovascular - normal exam  Exercise tolerance: < 4 METS  (+) pacemaker, hypertension, CAD, dysrhythmias (A flutter, s/p cardioversion 7/21/20), CHF, cardiomyopathy, PVD       ROS comment: TTE 9/2019:    Moderate left ventricular enlargement.    Left ventricle ejection fraction is severely reduced. The calculated left ventricular ejection fraction is 27%.    The following segments are akinetic: basal inferior, mid anteroseptal, mid anterolateral, apical anterior, apical septal, apical inferior, apical lateral and apex. The following segments are hypokinetic: basal anterior, mid anterior, mid inferior and mid inferolateral    . E/e' > 15, suggesting elevated LV filling pressures    Right ventricle not optimally visualized    Moderate biatrial enlargement    Mild to moderate mitral regurgitation.    Mild to moderate tricuspid regurgitation estimate of RV systolic pressure 36 mmHg plus right atrial pressure    When compared to the previous study dated 5/6/2019, ejection fraction appears lower on the current study. The lateral wall motion abnormality is more pronounced.     Neuro/Psych - negative ROS     Endo/Other    (+) diabetes mellitus,      GI/Hepatic/Renal    (+)   chronic renal disease CRI,      Other findings:   Hgb 6.2 - MD called hospitalist who confirmed that RBC were ordered to be given this AM prior to OR    Plt 156  Cr 2.4  INR 1.75      Dental    (+) poor dentition and upper dentures                       Anesthesia Plan  Planned anesthetic: general endotracheal    COVID not detected 8/4    Pre-induction arterial line  2 PIV  GETA - Ketamine 50mg for induction  Decadron, zofran  Fentanyl prn      ADDENDUM:  Patient experienced 9 seconds of nonsustained VTach with associated difficult to arouse mental state while in preop room. ICD did  not fire. Arrhythmia self resolved. Patient's mental status improved. Due to this, labs were drawn to investigate electrolytes and hemoglobin further.  Patient was taken to OR once electrolytes were determined to be within normal limits. 2U RBC in addition to the 2U given this morning were ordered for further replacement.     ASA 3 - emergent   Induction: intravenous   Anesthetic plan and risks discussed with: patient  Anesthesia plan special considerations: increased risk of difficult airway, arterial catheterization, IV therapy two IVs,   Post-op plan: routine recovery

## 2021-06-10 NOTE — ANESTHESIA POSTPROCEDURE EVALUATION
Patient: Keanu Spence  Procedure(s):  LEFT FEMORAL ENDARTERECTOMY (Left)  Anesthesia type: general    Patient location: PACU  Last vitals:   Vitals Value Taken Time   /59 8/7/2020  5:18 PM   Temp 37  C (98.6  F) 8/7/2020  4:45 PM   Pulse 63 8/7/2020  5:22 PM   Resp 22 8/7/2020  5:22 PM   SpO2 100 % 8/7/2020  5:22 PM   Vitals shown include unvalidated device data.  Post vital signs: stable  Level of consciousness: awake and responds to simple questions  Post-anesthesia pain: pain controlled  Post-anesthesia nausea and vomiting: no  Pulmonary: unassisted, return to baseline  Cardiovascular: stable and blood pressure at baseline  Hydration: adequate  Anesthetic events: no    QCDR Measures:  ASA# 11 - Emmie-op Cardiac Arrest: ASA11B - Patient did NOT experience unanticipated cardiac arrest  ASA# 12 - Emmie-op Mortality Rate: ASA12B - Patient did NOT die  ASA# 13 - PACU Re-Intubation Rate: ASA13B - Patient did NOT require a new airway mgmt  ASA# 10 - Composite Anes Safety: ASA10A - No serious adverse event    Additional Notes:

## 2021-06-10 NOTE — TELEPHONE ENCOUNTER
RN cannot approve Refill Request    RN can NOT refill this medication Protocol failed and NO refill given. Last office visit: 2/13/2019 Gaurang Duncan MD Last Physical: 2/27/2018 Last MTM visit: Visit date not found Last visit same specialty: 2/13/2019 Gaurang Duncan MD.  Next visit within 3 mo: Visit date not found  Next physical within 3 mo: Visit date not found      Elo Carvalho, [facility] Triage/Med Refill 8/11/2020    Requested Prescriptions   Pending Prescriptions Disp Refills     glipiZIDE (GLUCOTROL XL) 5 MG 24 hr tablet [Pharmacy Med Name: glipiZIDE ER Oral Tablet Extended Release 24 Hour 5 MG] 90 tablet 3     Sig: TAKE ONE TABLET BY MOUTH ONE TIME DAILY       Oral Hypoglycemics Refill Protocol Failed - 8/11/2020 11:39 AM        Failed - Microalbumin in last year      Microalbumin, Random Urine   Date Value Ref Range Status   05/07/2019 1.14 0.00 - 1.99 mg/dL Final                  Passed - Visit with PCP or prescribing provider visit in last 6 months       Last office visit with prescriber/PCP: Visit date not found OR same dept: Visit date not found OR same specialty: 2/13/2019 Gaurang Duncan MD Last physical: Visit date not found Last MTM visit: Visit date not found         Next appt within 3 mo: Visit date not found  Next physical within 3 mo: Visit date not found  Prescriber OR PCP: Gaurang Duncan MD  Last diagnosis associated with med order: 1. Diabetes (H)  - glipiZIDE (GLUCOTROL XL) 5 MG 24 hr tablet [Pharmacy Med Name: glipiZIDE ER Oral Tablet Extended Release 24 Hour 5 MG]; TAKE ONE TABLET BY MOUTH ONE TIME DAILY   Dispense: 90 tablet; Refill: 0     If protocol passes may refill for 12 months if within 3 months of last provider visit (or a total of 15 months).           Passed - A1C in last 6 months     Hemoglobin A1c   Date Value Ref Range Status   08/05/2020 7.6 (H) <=5.6 % Final     Comment:     Prediabetes:   HBA1c       5.7 to 6.4%        Diabetes:        HBA1c        >=6.5%   Patients with Hgb  F >5%, total bilirubin >10.0 mg/dL, abnormal red cell turnover, severe renal or hepatic disease or malignancy should not have this A1C method used to diagnose or monitor diabetes.                   Passed - Blood pressure in last year     BP Readings from Last 1 Encounters:   08/10/20 118/66             Passed - Serum creatinine in last year     Creatinine   Date Value Ref Range Status   08/10/2020 4.18 (H) 0.70 - 1.30 mg/dL Final

## 2021-06-10 NOTE — PATIENT INSTRUCTIONS - HE
Keanu,     I will check with Dr. Chakraborty to confirm we have to stop your warfarin. Continue to hold it until you hear back.     Follow up with Dr. Montenegro as scheduled.     CIRO Lunsford

## 2021-06-10 NOTE — TELEPHONE ENCOUNTER
Who is calling:  Patient   Reason for Call:  The patient is calling [facility] regarding today's INR and is expecting a return call as soon as possible today 8/18.    Okay to leave a detailed message: Yes

## 2021-06-10 NOTE — PROGRESS NOTES
ASSESSMENT AND PLAN:    1. Type 2 diabetes mellitus  No issues with current control. -14    2. Hypertension  Satisfactory control.     3. Peripheral vascular disease  Significant edema, following surgical procedure to remove clots in graft.  Edema in groin and scrotum without inflammation, and improving.  Pain is persistent, peripheral flow seems intact.  He has follow up with Dr. Chakraborty in one week.  He has opioid rx for prn pain, which I hope will continue to improve as swelling improves.     4. Acute on chronic renal failure  Likely related to dye, most recently much better.  Still holding the ACEI therapy for now.  Will check BMP next week.  Follow up with me in one week.   - Basic Metabolic Panel; Future    CHIEF COMPLAINT:  Chief Complaint   Patient presents with     Follow-up     labs/kidney function - recent surgery for blood clots     HISTORY OF PRESENT ILLNESS:  Keanu Spence is a 70 y.o. male here in follow up. Had thrombectomy 18 days ago.  Swelling and pain have persisted but improving. Saw Dr. Chakraborty last week.  Had repeat CTA yesterday, results are pending.  On oral anticoagulation.  No symptoms of bleeding or fever, no cough or unusual dyspnea.     REVIEW OF SYSTEMS:   See HPI, all other systems on review are negative.    Past Medical History:   Diagnosis Date     Anemia      Atypical atrial flutter (H)      Cardiac pacemaker in situ 9/25/2019     Chronic anticoagulation 2/21/2019     Chronic renal failure, stage 3 (moderate) (H)      Chronic systolic heart failure (H)      COPD (chronic obstructive pulmonary disease) (H)      Coronary artery disease      Diabetes mellitus (H)      Dyslipidemia, goal LDL below 70      Essential hypertension      ICD (implantable cardioverter-defibrillator), dual, in situ      Ischemic cardiomyopathy      Peripheral vascular disease (H)      Screening for prostate cancer 5/7/2019     Sleep apnea     no cpap     Social History     Tobacco Use   Smoking  "Status Former Smoker     Packs/day: 0.20     Years: 40.00     Pack years: 8.00     Types: Cigarettes     Start date: 1971     Last attempt to quit: 12/5/2016     Years since quitting: 3.7   Smokeless Tobacco Never Used     Family History   Problem Relation Age of Onset     Diabetes Mother      Diabetes Father      Heart disease Father      Diabetes Sister      Hypertension Sister      Diabetes Brother      Hypertension Brother      Diabetes type I Son      Past Surgical History:   Procedure Laterality Date     CARDIAC CATHETERIZATION  09/19/2016     CARDIAC CATHETERIZATION  10/11/2016     to lad/om1 in stent occlusion     CATARACT EXTRACTION, BILATERAL       CORONARY ANGIOPLASTY       CORONARY ARTERY BYPASS GRAFT  10/98    [facility]     CORONARY STENT PLACEMENT  10/11/2016    bebe to left main and om1     EP ICD INSERT N/A 9/20/2019    Procedure: EP ICD Insert;  Surgeon: Colton Montenegro MD;  Location: [facility] Cath Lab;  Service: Cardiology     FEMORAL ARTERY - POPLITEAL ARTERY BYPASS GRAFT Left 11/12/2019    Procedure: LEFT FEMORAL POPLITEAL BYPASS, ARTERIOGRAM;  Surgeon: Ken Chakraborty MD;  Location: [facility] OR;  Service: General     [facility]     IR EXTREMITY ANGIOGRAM BILATERAL  9/13/2019     IR EXTREMITY ANGIOGRAM BILATERAL  8/5/2020     IR THROMBOLITIC INFUSION SEQUENTIAL DAY  8/6/2020     IR THROMBOLITIC INFUSION SEQUENTIAL DAY  8/7/2020     VITALS:  Vitals:    08/25/20 1238   BP: 116/60   Patient Site: Right Arm   Patient Position: Sitting   Cuff Size: Adult Regular   Pulse: 60   Resp: 18   SpO2: 99%   Weight: (!) 225 lb 4 oz (102.2 kg)   Height: 5' 11\" (1.803 m)     Wt Readings from Last 3 Encounters:   08/25/20 (!) 225 lb 4 oz (102.2 kg)   08/10/20 (!) 224 lb 8 oz (101.8 kg)   08/04/20 208 lb 9.6 oz (94.6 kg)     PHYSICAL EXAM:  Constitutional:  In NAD, alert and oriented  Cardiac:  S1 S2   Lungs: Clear   Abdomen:   Soft, flat and non-tender, " without guarding, rebound, or mass.    : there is penile edema without inflammation  Extremities: 2+ edema LLE, but warm foot with good capillary filling, no ulceration or inflammation    DECISION TO OBTAIN OLD RECORDS AND/OR OBTAIN HISTORY FROM SOMEONE OTHER THAN PATIENT, AND/OR ACCESSING CARE EVERYWHERE):  1  0     REVIEW AND SUMMARIZATION OF OLD RECORDS, AND/OR OBTAINING HISTORY FROM SOMEONE OTHER THAN PATIENT, AND/OR DISCUSSION OF CASE WITH ANOTHER HEALTH CARE PROVIDER:  2 reviewed discharge summary    REVIEW AND/OR ORDER OF OF CLINICAL LAB TESTS: 1  reviewed recent BMP.    REVIEW AND/OR ORDER OF RADIOLOGY TESTS: 1 0.    REVIEW AND/OR ORDER OF MEDICAL TESTS (EKG/ECHO/COLONOSCOPY/EGD): 1  0    INDEPENDENT  VISUALIZATION OF IMAGE, TRACING, OR SPECIMEN ITSELF (2 EACH):  0     TOTAL: 3    Current Outpatient Medications   Medication Sig Dispense Refill     ACCU-CHEK COMPACT TEST Strp Test daily before all meals/snacks and once before bedtime. 60 strip 12     acetaminophen (TYLENOL) 500 MG tablet Take 1,000 mg by mouth every 6 (six) hours as needed for pain.       albuterol (PROAIR HFA;PROVENTIL HFA;VENTOLIN HFA) 90 mcg/actuation inhaler Inhale 2 puffs every 4 (four) hours as needed for wheezing or shortness of breath. 1 each 0     aspirin 81 MG EC tablet Take 1 tablet (81 mg total) by mouth every evening. 90 tablet 3     atorvastatin (LIPITOR) 40 MG tablet Take 1 tablet (40 mg total) by mouth at bedtime. 90 tablet 3     blood glucose test (ACCU-CHEK SMARTVIEW TEST STRIP) strips Dispense brand per patient's insurance at pharmacy discretion. Testing once a day only 90 strip 3     blood-glucose meter, drum-type (ACCU-CHEK COMPACT PLUS CARE) Kit Test daily before all meals/snacks and once before bedtime. 1 kit 0     furosemide (LASIX) 40 MG tablet TAKE ONE TABLET BY MOUTH ONE TIME DAILY  180 tablet 2     generic lancets (ACCU-CHEK MULTICLIX LANCET) Test daily before all meals/snacks and once before bedtime. 3 each 0      glipiZIDE (GLUCOTROL XL) 5 MG 24 hr tablet TAKE ONE TABLET BY MOUTH ONE TIME DAILY  90 tablet 3     hydrALAZINE (APRESOLINE) 25 MG tablet Take 25 mg by mouth 2 (two) times a day.       isosorbide mononitrate (IMDUR) 30 MG 24 hr tablet Take 30 mg by mouth daily.       metoprolol succinate (TOPROL-XL) 200 MG 24 hr tablet Take 1 tablet (200 mg total) by mouth daily. 90 tablet 1     multivitamin with minerals (THERA-M) 9 mg iron-400 mcg Tab tablet Take 1 tablet by mouth daily.       oxyCODONE-acetaminophen (PERCOCET/ENDOCET) 5-325 mg per tablet Take 1 tablet by mouth every 6 (six) hours as needed for pain. 30 tablet 0     pantoprazole (PROTONIX) 40 MG tablet Take 1 tablet (40 mg total) by mouth daily. 30 tablet 5     warfarin ANTICOAGULANT (COUMADIN/JANTOVEN) 2.5 MG tablet Take 1 tablet (2.5mg) to 2 tablets (5mg) by mouth daily, as directed.  Adjust dose based on INR results. 130 tablet 1     diltiazem (CARDIZEM CD) 240 MG 24 hr capsule Take 1 capsule (240 mg total) by mouth daily. 90 capsule 5     fluticasone propion-salmeteroL (ADVAIR DISKUS) 250-50 mcg/dose DISKUS Inhale 1 puff 2 (two) times a day. 1 each 10     psyllium (METAMUCIL) 0.52 gram capsule Take 0.52 g by mouth daily.       temazepam (RESTORIL) 15 mg capsule Take 1 capsule (15 mg total) by mouth at bedtime as needed for sleep. 30 capsule 0     eKn Keen MD  Internal Medicine  [facility]

## 2021-06-10 NOTE — TELEPHONE ENCOUNTER
I spoke with Mr. Spence today, he was working under a sink, fixing a leak, and his left leg 'got caught' under him a few seconds.  Since then, he LLE claudication is back, but not rest pain.  I spoke with Dr. Chakraborty and he will call Mr. Spence today.  Mr. Spence feels OK otherwise.  The cardiology procedure went well, but blood pressure has been on the low side.  No lightheadedness or pre-syncope.  He will reduce his hydralazine to 25 mg po two times a day, the furosemide to 40 mg po daily (back to two times a day if swelling in legs returns), and reduce his isosorbide mononitrate to 30 mg daily.  I'll follow up after his assessment with Dr. Chakraborty.  Dr. Osmani MD

## 2021-06-11 NOTE — ANESTHESIA PREPROCEDURE EVALUATION
Anesthesia Evaluation      Patient summary reviewed   No history of anesthetic complications     Airway   Mallampati: III  Neck ROM: full   Pulmonary - normal exam   (+) COPD, sleep apnea on CPAP, ,                          Cardiovascular - normal exam  Exercise tolerance: > or = 4 METS  (+) pacemaker, hypertension, CAD, CABG/stent, dysrhythmias, CHF, cardiomyopathy, hypercholesterolemia, PVD     Neuro/Psych      Endo/Other    (+) diabetes mellitus type 2, obesity,      GI/Hepatic/Renal    (+)   chronic renal disease CRI,      Other findings: Class 2 chronic systolic CHF, SN dysfunction, pacer/AICD combo, h/o a-flutter and PAF.      Echo 9/20/19: Moderate left ventricular enlargement.  Left ventricle ejection fraction is severely reduced, ejection fraction is 27%.  The following segments are akinetic: basal inferior, mid anteroseptal, mid anterolateral, apical anterior, apical septal, apical inferior, apical lateral and apex. The following segments are hypokinetic: basal anterior, mid anterior, mid inferior and mid inferolateral.  E/e' > 15, suggesting elevated LV filling pressures.  Right ventricle not optimally visualized.  Moderate biatrial enlargement.  Mild to moderate mitral regurgitation.  Mild to moderate tricuspid regurgitation, estimate of RV systolic pressure 36 mmHg plus right atrial pressure.  When compared to the previous study dated 5/6/2019, ejection fraction appears lower on the current study. The lateral wall motion abnormality is more pronounced.    EKG 7/21/20: Demand pacemaker; interpretation is based on intrinsic rhythm.  Sinus rhythm with marked sinus arrhythmia with short MO with Premature ventricular complexes or Fusion complexes. Left axis deviation.  Anterolateral infarct , age undetermined  Covid negative 8/28.  K 3.7, GFR 42, INR 1.76.      Dental    (+) upper dentures                       Anesthesia Plan  Planned anesthetic: MAC    ASA 3     Anesthetic plan and risks discussed with:  patient  Anesthesia plan special considerations: antiemetics,   Post-op plan: routine recovery

## 2021-06-11 NOTE — ANESTHESIA POSTPROCEDURE EVALUATION
Patient: Keanu ANGUIANO Spence  Procedure(s):  IRRIGATION AND DEBRIDEMENT, LOWER EXTREMITY (Left)  Anesthesia type: MAC    Patient location: PACU  Last vitals:   Vitals Value Taken Time   /61 8/31/2020  1:30 PM   Temp 37  C (98.6  F) 8/31/2020  1:30 PM   Pulse 70 8/31/2020  1:30 PM   Resp 18 8/31/2020  1:30 PM   SpO2 100 % 8/31/2020  1:30 PM     Post vital signs: stable  Level of consciousness: alert  Post-anesthesia pain: pain controlled  Post-anesthesia nausea and vomiting: no  Pulmonary: room air  Cardiovascular: stable and blood pressure at baseline  Hydration: adequate  Anesthetic events: no    QCDR Measures:  ASA# 11 - Emmie-op Cardiac Arrest: ASA11B - Patient did NOT experience unanticipated cardiac arrest  ASA# 12 - Emmie-op Mortality Rate: ASA12B - Patient did NOT die  ASA# 13 - PACU Re-Intubation Rate: ASA13B - Patient did NOT require a new airway mgmt  ASA# 10 - Composite Anes Safety: ASA10A - No serious adverse event    Additional Notes:

## 2021-06-11 NOTE — TELEPHONE ENCOUNTER
I called, was unable to get through after several attempts. I LM informing to disregard end date and fill for # 60 with 1 refill as prescribed.

## 2021-06-11 NOTE — TELEPHONE ENCOUNTER
INR result is 1.8  INR   Date Value Ref Range Status   09/15/2020 3.11 (H) 0.90 - 1.10 Final       Will the patient be seen, or did they already see, MD or CNP today? No    Most Recent Warfarin dose day/week just got out of hosp  Sunday Monday Tuesday Wednesday Thursday Friday Saturday      2.5 mg 5 mg 5 mg 5 mg     Sunday Monday Tuesday Wednesday Thursday Friday Saturday   2.5 mg 5 mg 2.5 mg           Has the patient missed any doses of Coumadin, Warfarin, Jantoven in the past 7 days? No    Has the patients medications changed since the last visit? Yes Vancomycin Started in hospital, 3 days back - Amiodarone 200 mg 1/X daily, Rifampin 300 mg 2/X daily, Metoprolol reduced to 100 mg 1/X daily. Stopped - Diltiazem, Furosemide, Glipizide, Hydralazine and Metformin    Has the patient experienced any bleeding recently? No    Has the patient experienced any injuries or illness recently? Yes Wound Vac Left groin area with PIC line for antibiotic    Has the patient experienced any 'new' shortness of breath, severe headaches, or changes in vision recently? No    Has the patient had any changes in their diet, or alcohol consumption? Decreased Appetite    Is the patient here today to prepare for any type of upcoming surgery, procedure, or for a cardioversion procedure? No    What phone number can we reach the patient at today? [phone] Ashlee

## 2021-06-11 NOTE — TELEPHONE ENCOUNTER
Medication Question or Clarification  Who is calling: [facility]  What medication are you calling about (include dose and sig)?:   warfarin ANTICOAGULANT (COUMADIN/JANTOVEN) 1 MG tablet     0  9/16/2020      Sig - Route: Take 1 tablet (1 mg total) by mouth See Admin Instructions. Check INR with primary care physician and report to PCP for instructions on Coumadin dosing, To Target INR 2-3 for H/o a fib - Oral         Who prescribed the medication?: Octavia Mesa MBBS  What is your question/concern?: Pharmacy stated there is an interaction with the above medication and Rifampin.  Rifampin decreases Warfarin levels. Please advise  [facility]ed Pharmacy: [facility]  Okay to leave a detailed message?: No

## 2021-06-11 NOTE — TELEPHONE ENCOUNTER
Orders being requested: Skilled nursing 3 x 8 weeks; 3 PRNs  Reason service is needed/diagnosis: Skin integrity; wound vac; and management of PICC line  When are orders needed by: ASAP  Where to send Orders: Phone:  [phone]  Okay to leave detailed message?  Yes

## 2021-06-11 NOTE — TELEPHONE ENCOUNTER
ANTICOAGULATION  MANAGEMENT PROGRAM    Keanu Spence is being discharged from the [facility] ([facility]).    Reason for discharge:     ACM referral closed, anticoagulation episode resolved and INR standing order discontinued.         Augusto Denton RN

## 2021-06-11 NOTE — TELEPHONE ENCOUNTER
Dr Dillard    In regards of: rifAMPin capsule 300 mg    Needs medication clarification.    Please  Call [facility] @  [phone]

## 2021-06-11 NOTE — ANESTHESIA CARE TRANSFER NOTE
Last vitals:   Vitals:    08/31/20 1109   BP: 135/63   Pulse: 77   Resp: 16   Temp: 36.9  C (98.4  F)   SpO2: 100%     Patient's level of consciousness is awake  Spontaneous respirations: yes  Maintains airway independently: yes  Dentition unchanged: yes  Oropharynx: oropharynx clear of all foreign objects    QCDR Measures:  ASA# 20 - Surgical Safety Checklist: WHO surgical safety checklist completed prior to induction    PQRS# 430 - Adult PONV Prevention: 4558F-8P - Pt did NOT receive => 2 anti-emetic agents  ASA# 8 - Peds PONV Prevention: 4558F-1P - Medical reason for NOT administering combination therapy  PQRS# 424 - Emmie-op Temp Management: 4559F - At least one body temp DOCUMENTED => 35.5C or 95.9F within required timeframe  PQRS# 426 - PACU Transfer Protocol: - Transfer of care checklist used  ASA# 14 - Acute Post-op Pain: ASA14A - Patient experienced pain >= 7 out of 10

## 2021-06-11 NOTE — TELEPHONE ENCOUNTER
ANTICOAGULATION  MANAGEMENT- Home Care/Care Facility Result    Assessment     Today's INR result of 1.8 is Subtherapeutic (goal INR of 2.0-3.0)        Less warfarin administered while inpatient    No new diet changes affecting INR     Amiodarone started on 9/3 and rifampin started on 9/8 , Vanco IV per Mary Alice medication has not been delivered yet.    Interaction between amiodarone,rifampin,vancomycin IV and warfarin may be affecting INR    Continues to tolerate warfarin with no reported s/s of bleeding or thromboembolism     Previous INR was Supratherapeutic     Home Care Nurse confirmed that the patient has 2.5 mg tablet at home at this time.    Made aware that [facility] will be sending warfarin 1 mg tablet size to patient's pharmacy.    Plan:     Spoke with Home Care Nurse Mary Alice discussed INR result and instructed:     Warfarin Dosing Instructions: 1.25 mg today and tomorrow    Made aware that warfarin 1 mg tablet will be used for dose adjustment on next INR    Next INR to be drawn: 9/18    Education provided: importance of therapeutic range, target INR goal and significance of current INR result and potential interaction between warfarin and Amiodarone, Rifampin    Mary Alice verbalizes understanding and agrees to warfarin dosing plan.   ?   Carolyn Mclean RN    Subjective/Objective:      Keanu Spence, a 70 y.o. male is established on warfarin.     Home care/care facility RN's report of Keanu INR, recent warfarin dosing, diet changes, medication changes, and symptoms is documented below.    Additional findings: Template incorrect- anticoagulation calendar updated with warfarin doses received while inpatient     Anticoagulation Episode Summary     Current INR goal:   2.0-3.0   TTR:   69.0 % (10.2 mo)   Next INR check:   9/18/2020   INR from last check:   1.80! (9/16/2020)   Weekly max warfarin dose:      Target end date:      INR check location:      Preferred lab:      Send INR reminders to:   ROMIE MIDWAY     Indications    Atypical atrial flutter (H) [I48.4]           Comments:            Anticoagulation Care Providers     Provider Role Specialty Phone number    Ken Keen MD  Internal Medicine [phone]

## 2021-06-11 NOTE — TELEPHONE ENCOUNTER
Please tell them yes it is OK to dispense, he is on warfarin with instructions.  I also sent a prescription for a cough medication.

## 2021-06-11 NOTE — TELEPHONE ENCOUNTER
Chart review today shows patient is now  per Care Everywhere, appears had Cardiac/V.fib arrest at [facility]. See notes.   Lucille Zelaya RN

## 2021-06-12 NOTE — PROGRESS NOTES
Cryotherapy, skin lesion  Date/Time: 9/13/2017 10:00 AM  Performed by: MIKE HERNANDEZ  Authorized by: MIKE HERNANDEZ   Consent: Verbal consent obtained.  Risks and benefits: risks, benefits and alternatives were discussed  Consent given by: patient  Patient understanding: patient states understanding of the procedure being performed  Patient tolerance: Patient tolerated the procedure well with no immediate complications  Comments: 3 treatments of liquid nitrogen used to treat a very small solitary wart present over right index finger.

## 2021-06-12 NOTE — PROGRESS NOTES
ASSESSMENT:  1. Wart  Cryotherapy, skin lesion   2. Dyslipidemia, goal LDL below 70 discussed his cholesterol goals,    3. Ischemic cardiomyopathy his cardiomyopathy is well controlled he had stents placed more than 6 months ago by attempting to contact cardiologist see if there are drug-eluting stents or bare metal stent.  Since a six-month window was passed he does not need any prophylactic antibiotic coverage for dental procedures including tooth extractions I informed him of this by phone    4. Encounter to vaccinate patient         CHIEF COMPLAINT:  Chief Complaint   Patient presents with     Pre-op Exam     removal of top 7th teeth with Dr. Nguyen at [facility] [place] phone # [phone], DOS not set up yet per pt.     Genital Warts     R hand, index finger.       HISTORY OF PRESENT ILLNESS:  Keanu is a 67 y.o. male here for an internal medicine pre-operative consultation. The exam is requested by Dr. Nguyen in preparation for tooth removal to be performed at [facility] on TBD. Today s examination on 9/13/2017 is done to review the underlying surgical condition of broken teeth, clear for anesthesia, and review medical problems with appropriate changes in medications. He will have 8 teeth removed over multiple procedures.     CHF/CAD: He was seen by Dr. Skinner on 8/16/2017. His current medications were continued and he is to follow-up in 6 months. He denies chest pain.     Keanu has tolerated previous surgeries well without bleeding or anesthesia difficulty.      REVIEW OF SYSTEMS:   He notes that sometimes that he will not sleep well because is mind is running.   All other 10 point review of systems are negative.    PFSH:  Reviewed as below.   History   Smoking Status     Former Smoker     Packs/day: 0.20     Years: 40.00     Start date: 1971     Quit date: 12/5/2016   Smokeless Tobacco     Never Used     Comment: Patient stated he is trying to quit.        Family History   Problem Relation Age of  Onset     Diabetes Mother      Diabetes Father      Heart disease Father      Diabetes Sister      Hypertension Sister      Diabetes Brother      Hypertension Brother        Past Surgical History:   Procedure Laterality Date     CARDIAC CATHETERIZATION  09/19/2016     CARDIAC CATHETERIZATION  10/11/2016     to lad/om1 in stent occlusion     CORONARY ANGIOPLASTY       CORONARY ARTERY BYPASS GRAFT  10/98    lima     coronary stent  10/11/2016    bebe to left main and om1     coronary stents       FEMORAL BYPASS  2002    left. [facility]       No Known Allergies    Active Ambulatory Problems     Diagnosis Date Noted     Overweight      Diabetes mellitus due to underlying condition with diabetic chronic kidney disease, unspecified CKD stage, unspecified long term insulin use status      Hypertension      Coronary Artery Disease      Peripheral Vascular Disease      Tobacco dependence 07/15/2015     Hyperlipidemia LDL goal < 70 07/25/2016     Ischemic cardiomyopathy 07/25/2016     Coronary artery disease involving coronary bypass graft of native heart with unspecified angina pectoris 07/25/2016     Coronary artery chronic total occlusion 10/11/2016     Essential hypertension with goal blood pressure less than 140/90 01/12/2017     Dyslipidemia, goal LDL below 70 01/12/2017     NIKKI (obstructive sleep apnea) 02/27/2017     Resolved Ambulatory Problems     Diagnosis Date Noted     Chronic Bronchitis With Acute Exacerbation      Acute on chronic systolic congestive heart failure 07/25/2016     CHF exacerbation 02/27/2017     Acute exacerbation of CHF (congestive heart failure) 02/27/2017     Past Medical History:   Diagnosis Date     Cardiomyopathy      CHF (congestive heart failure)      Chronic bronchitis      Coronary artery disease      Diabetes mellitus      Hypertension      Obesity      Peripheral vascular disease      Sleep apnea        VITALS:  Vitals:    09/13/17 0847   BP: 110/58   Patient Site: Left Arm    Patient Position: Sitting   Cuff Size: Adult Large   Pulse: (!) 56   Resp: 20   Temp: 98.1  F (36.7  C)   TempSrc: Oral   Weight: 218 lb 9 oz (99.1 kg)   Height: 6' (1.829 m)     Wt Readings from Last 3 Encounters:   09/13/17 218 lb 9 oz (99.1 kg)   08/16/17 222 lb (100.7 kg)   05/30/17 220 lb (99.8 kg)     Body mass index is 29.64 kg/(m^2).    PHYSICAL EXAM:  General: Alert, cooperative, no distress, appears stated age  Lungs: Clear to auscultation bilaterally, respirations unlabored  Chest wall: No tenderness or deformity  Heart: Regular rate and rhythm, S1 and S2 normal, no murmur, rub, or gallop  Skin: Plantar wart present over right index finger.   Neurologic:  A & O x 3.  No tremor, no focal findings.     EKG: n/a    ADDITIONAL HISTORY SUMMARIZED (2): Reviewed Dr. Skinner's note from 8/16/2017 regarding CAD. Reviewed Dr. Barry's note from 12/31/2014 regarding PCI.   DECISION TO OBTAIN EXTRA INFORMATION (1): None.   RADIOLOGY TESTS (1): None  LABS (1): Reviewed labs from 8/16/2017.   MEDICINE TESTS (1): None.  INDEPENDENT REVIEW (2 each): Personally revealed mandible x-ray.     The visit lasted a total of 15 minutes face to face with the patient. Over 50% of the time was spent counseling and educating the patient about pre-op.    IKrishan, am scribing for and in the presence of,  .    alex CHRISTOPHER, personally performed the services described in this documentation, as scribed by Krishan Chaudhary in my presence, and it is both accurate and complete.    MEDICATIONS:  Current Outpatient Prescriptions   Medication Sig Dispense Refill     ACCU-CHEK COMPACT TEST Strp Test daily before all meals/snacks and once before bedtime. 60 strip 12     albuterol (PROVENTIL HFA;VENTOLIN HFA) 90 mcg/actuation inhaler Inhale 2 puffs every 6 (six) hours as needed for wheezing. 8.5 g 11     amLODIPine (NORVASC) 5 MG tablet TAKE ONE TABLET BY MOUTH EVERY DAY 90 tablet 3     aspirin 81 MG EC tablet Take 1 tablet  (81 mg total) by mouth every evening. 90 tablet 3     atorvastatin (LIPITOR) 40 MG tablet Take 1 tablet (40 mg total) by mouth at bedtime. 90 tablet 1     blood-glucose meter, drum-type (ACCU-CHEK COMPACT PLUS CARE) Kit Test daily before all meals/snacks and once before bedtime. 1 kit 0     CHANTIX 1 mg tablet Take 1 tablet by mouth 2 (two) times a day.  4     clopidogrel (PLAVIX) 75 mg tablet Take 1 tablet (75 mg total) by mouth daily. 90 tablet 3     furosemide (LASIX) 20 MG tablet Take 1 tablet by mouth daily.  3     generic lancets (ACCU-CHEK MULTICLIX LANCET) Test daily before all meals/snacks and once before bedtime. 3 each 0     glipiZIDE (GLUCOTROL) 5 MG 24 hr tablet Take one daily 90 tablet 6     hydrALAZINE (APRESOLINE) 25 MG tablet Take 1 tablet (25 mg total) by mouth every 8 (eight) hours. 270 tablet 3     hydroCHLOROthiazide (HYDRODIURIL) 25 MG tablet Take 1 tablet (25 mg total) by mouth daily. 90 tablet 2     isosorbide mononitrate (IMDUR) 30 MG 24 hr tablet Take 1 tablet (30 mg total) by mouth 2 (two) times a day. 180 tablet 3     isosorbide mononitrate (IMDUR) 30 MG 24 hr tablet TAKE ONE TABLET BY MOUTH TWICE DAILY  180 tablet 3     lisinopril (PRINIVIL,ZESTRIL) 20 MG tablet Take 1 tablet (20 mg total) by mouth 2 (two) times a day. 180 tablet 3     metFORMIN (GLUCOPHAGE) 850 MG tablet TAKE ONE TABLET BY MOUTH TWO TIMES A DAY WITH MEALS . 180 tablet 2     metoprolol succinate (TOPROL-XL) 200 MG 24 hr tablet Take 1 tablet (200 mg total) by mouth daily. 90 tablet 3     multivitamin with minerals (THERA-M) 9 mg iron-400 mcg Tab tablet Take 1 tablet by mouth daily.       No current facility-administered medications for this visit.        Total data points:5

## 2021-06-15 NOTE — PROGRESS NOTES
ASSESSMENT AND PLAN:  1. Hypertension blood pressure well controlled    2. Apnea has had symptoms suggestive of sleep apnea in the past, needs a dedicated sleep study.  Wife frequently wakes him up at night. Ambulatory referral to Sleep Medicine   3. Cataract reviewed records from vitreal retinal surgery, he needs a general ophthalmology consult.  I am sending him [facility].  Needs to have a cataract treated Ambulatory referral to Ophthalmology       CHIEF COMPLAINT:  Chief Complaint   Patient presents with     Follow-up       HISTORY OF PRESENT ILLNESS:  Keanu is a 67 y.o. male presenting for a follow-up.     Sleep Apnea: He states that he took a sleep study about 20 years ago and was diagnosed with sleep apnea. He did not receive a CPAP machine as he did not feel he needed it. Recently, he has noticed that his sleep apnea has worsened over the past few months. His wife needs to wake him up several times a night and he has woken up choking.     Cardiomyopathy/CAD: He denies shortness of breath and fatigue.     REVIEW OF SYSTEMS:   He was seen at [facility] for a vision check and was told that there is a possible retinal hole. He was then seen by [facility] and a retinal hole was not found on exam.   All other 10 point review of systems are negative.    PFSH:  He has discontinued smoking. Pertinent past, family, social and medical history reviewed.     TOBACCO USE:  History   Smoking Status     Former Smoker     Packs/day: 0.20     Years: 40.00     Start date: 1971     Quit date: 12/5/2016   Smokeless Tobacco     Never Used     Comment: Patient stated he is trying to quit.        VITALS:  Vitals:    01/18/18 1021   BP: 106/58   Patient Site: Left Arm   Patient Position: Sitting   Cuff Size: Adult Large   Pulse: 60   Temp: 97.6  F (36.4  C)   TempSrc: Oral   SpO2: 97%   Weight: 219 lb 8 oz (99.6 kg)     Wt Readings from Last 3 Encounters:   01/18/18 219 lb 8 oz (99.6 kg)   09/13/17 218 lb 9 oz (99.1 kg)   08/16/17  222 lb (100.7 kg)     Body mass index is 29.77 kg/(m^2).    PHYSICAL EXAM:  General: Alert, cooperative, no distress, appears stated age  Head: Normocephalic, without obvious abnormality, atraumatic  Throat: Lips, mucosa, and tongue normal; teeth and gums normal; +1 tonsils   Lungs: Clear to auscultation bilaterally, respirations unlabored  Chest wall: No tenderness or deformity  Heart: Regular rate and rhythm, S1 and S2 normal, no murmur, rub, or gallop  Neurologic: No tremor, no focal findings.    Psych: Oriented x3. Affect normal.     DATA REVIEWED:  Additional History from Old Records Summarized (2): Reviewed [facility] note from 1/9/2018 regarding possible retinal hole.     Decision to Obtain Records (1): None  Radiology Tests Summarized or Ordered (1): None  Labs Reviewed or Ordered (1): None  Medicine Test Summarized or Ordered (1): None  Independent Review of EKG or X-RAY(2 each): None    The visit lasted a total of 11 minutes face to face with the patient. Over 50% of the time was spent counseling and educating the patient about follow-up.     IKrishan, am scribing for and in the presence of, Dr. Duncan.    alex CHRISTOPHER personally performed the services described in this documentation, as scribed by Krishan Chaudhary in my presence, and it is both accurate and complete.      MEDICATIONS:  Current Outpatient Prescriptions   Medication Sig Dispense Refill     ACCU-CHEK COMPACT TEST Strp Test daily before all meals/snacks and once before bedtime. 60 strip 12     albuterol (PROVENTIL HFA;VENTOLIN HFA) 90 mcg/actuation inhaler Inhale 2 puffs every 6 (six) hours as needed for wheezing. 8.5 g 11     amLODIPine (NORVASC) 5 MG tablet TAKE ONE TABLET BY MOUTH EVERY DAY 90 tablet 3     aspirin 81 MG EC tablet Take 1 tablet (81 mg total) by mouth every evening. 90 tablet 3     atorvastatin (LIPITOR) 40 MG tablet TAKE ONE TABLET BY MOUTH DAILY AT BEDTIME 90 tablet 2     blood-glucose meter, drum-type (ACCU-CHEK  COMPACT PLUS CARE) Kit Test daily before all meals/snacks and once before bedtime. 1 kit 0     CHANTIX 1 mg tablet Take 1 tablet by mouth 2 (two) times a day.  4     CHANTIX 1 mg tablet TAKE ONE TABLET BY MOUTH TWICE DAILY with a full glass of water 56 tablet 0     CHANTIX 1 mg tablet TAKE ONE TABLET BY MOUTH TWICE DAILY with a full glass of water 56 tablet 1     clopidogrel (PLAVIX) 75 mg tablet Take 1 tablet (75 mg total) by mouth daily. 90 tablet 3     furosemide (LASIX) 20 MG tablet Take 1 tablet by mouth daily.  3     generic lancets (ACCU-CHEK MULTICLIX LANCET) Test daily before all meals/snacks and once before bedtime. 3 each 0     glipiZIDE (GLUCOTROL) 5 MG 24 hr tablet Take one daily 90 tablet 6     hydrALAZINE (APRESOLINE) 25 MG tablet Take 1 tablet (25 mg total) by mouth every 8 (eight) hours. 270 tablet 3     hydroCHLOROthiazide (HYDRODIURIL) 25 MG tablet TAKE ONE TABLET BY MOUTH ONCE DAILY. START 1 WEEK FROM 3/3/17. 90 tablet 0     isosorbide mononitrate (IMDUR) 30 MG 24 hr tablet Take 1 tablet (30 mg total) by mouth 2 (two) times a day. 180 tablet 3     isosorbide mononitrate (IMDUR) 30 MG 24 hr tablet TAKE ONE TABLET BY MOUTH TWICE DAILY  180 tablet 3     lisinopril (PRINIVIL,ZESTRIL) 20 MG tablet Take 1 tablet (20 mg total) by mouth 2 (two) times a day. 180 tablet 3     metFORMIN (GLUCOPHAGE) 850 MG tablet Take 1 tablet (850 mg total) by mouth 2 (two) times a day with meals. 180 tablet 1     metoprolol succinate (TOPROL-XL) 200 MG 24 hr tablet Take 1 tablet (200 mg total) by mouth daily. 90 tablet 3     multivitamin with minerals (THERA-M) 9 mg iron-400 mcg Tab tablet Take 1 tablet by mouth daily.       No current facility-administered medications for this visit.        Total Data Points: 2

## 2021-06-16 NOTE — TELEPHONE ENCOUNTER
Telephone Encounter by Augusto Denton RN at 9/13/2019  5:04 PM     Author: Augusto Denton RN Service: -- Author Type: Registered Nurse    Filed: 9/13/2019  5:15 PM Encounter Date: 9/13/2019 Status: Attested    : Augusto Denton RN (Registered Nurse) Cosigner: Ken Keen MD at 9/16/2019 10:23 AM    Attestation signed by Ken Keen MD at 9/16/2019 10:23 AM    Ken Keen MD                ANTICOAGULATION  MANAGEMENT    Assessment     Today's INR result of 1.5 is Therapeutic (goal INR of 2.0-3.0)        Warfarin taken as previously instructed held x 5 for angiogram today    No new diet changes affecting INR    No new medication/supplements affecting INR    Continues to tolerate warfarin with no reported s/s of bleeding or thromboembolism     Previous INR was Therapeutic    Plan:     Left a detailed message for Keanu regarding INR result and instructed:     Warfarin Dosing Instructions: if ok to restart per Dr Chakraborty resume 2.5 mg daily on tue/thu; and 5 mg daily rest of week     Instructed patient to follow up no later than: 1-2 weeks      Instructed to call the [facility] for any changes, questions or concerns. (#[phone])   ?   Augusto Denton RN    Subjective/Objective:      Keanu Spence, a 69 y.o. male is on warfarin.         Anticoagulation Episode Summary     Current INR goal:   2.0-3.0   TTR:   81.5 %   Next INR check:   9/27/2019   INR from last check:   1.56! (9/13/2019)   Weekly max warfarin dose:      Target end date:      INR check location:      Preferred lab:      Send INR reminders to:   [facility]    Indications    Atypical atrial flutter (H) [I48.4]           Comments:            Anticoagulation Care Providers     Provider Role Specialty Phone number    Ken Keen MD  Internal Medicine [phone]

## 2021-06-16 NOTE — TELEPHONE ENCOUNTER
Telephone Encounter by Augusto Denton RN at 7/1/2019 12:16 PM     Author: Augusto Denton RN Service: -- Author Type: Registered Nurse    Filed: 7/1/2019 12:43 PM Encounter Date: 7/1/2019 Status: Attested    : Augusto Denton RN (Registered Nurse) Cosigner: Ken Keen MD at 7/1/2019  4:17 PM    Attestation signed by Ken Keen MD at 7/1/2019  4:17 PM    Ken Keen MD                ANTICOAGULATION  MANAGEMENT    Assessment     Today's INR result of 2.9 is Therapeutic (goal INR of 2.0-3.0)        Warfarin taken as previously instructed    No new diet changes affecting INR has been lessening greens which he'll reinstate    No new medication/supplements affecting INR    Continues to tolerate warfarin with no reported s/s of bleeding or thromboembolism     Previous INR was Therapeutic    Plan:     Spoke with Keanu regarding INR result and instructed:     Warfarin Dosing Instructions:  Continue current warfarin dose 2.5 mg daily on tue/thu; and 5 mg daily rest of week  (0 % change)    Instructed patient to follow up no later than: one month      Keanu verbalizes understanding and agrees to warfarin dosing plan.    Instructed to call the [facility] for any changes, questions or concerns. (#[phone])   ?   Augusto Denton RN    Subjective/Objective:      Keanu Spence, a 69 y.o. male is on warfarin.     Keanu reports:     Home warfarin dose: as updated on anticoagulation calendar per template     Missed doses: No     Medication changes:  No     S/S of bleeding or thromboembolism:  No     New Injury or illness:  No     Changes in diet or alcohol consumption:  No     Upcoming surgery, procedure or cardioversion:  No    Anticoagulation Episode Summary     Current INR goal:   2.0-3.0   TTR:   90.3 % (4.5 mo)   Next INR check:   7/29/2019   INR from last check:   2.90 (7/1/2019)   Weekly max warfarin dose:      Target end date:      INR check location:      Preferred lab:      Send INR  reminders to:   [facility]    Indications    Atypical atrial flutter (H) [I48.4]           Comments:            Anticoagulation Care Providers     Provider Role Specialty Phone number    Ken Keen MD  Internal Medicine [phone]

## 2021-06-16 NOTE — TELEPHONE ENCOUNTER
Telephone Encounter by Augutso Denton RN at 10/17/2019 10:42 AM     Author: Augusto Denton RN Service: -- Author Type: Registered Nurse    Filed: 10/17/2019 10:48 AM Encounter Date: 10/17/2019 Status: Attested    : Augusto Denton RN (Registered Nurse) Cosigner: Ken Keen MD at 10/17/2019 11:45 AM    Attestation signed by Ken Keen MD at 10/17/2019 11:45 AM    Ken Keen MD                ANTICOAGULATION  MANAGEMENT    Assessment     Today's INR result of 2.8 is Therapeutic (goal INR of 2.0-3.0)        Warfarin taken as previously instructed    No new diet changes affecting INR    No new medication/supplements affecting INR    Continues to tolerate warfarin with no reported s/s of bleeding or thromboembolism     Previous INR was Therapeutic    Plan:     Spoke with Keanu regarding INR result and instructed:     Warfarin Dosing Instructions:  Continue current warfarin dose 2.5 mg daily on tue/thu; and 5 mg daily rest of week  (0 % change)    Instructed patient to follow up no later than: two weeks with pre op ov      Keanu verbalizes understanding and agrees to warfarin dosing plan.    Instructed to call the [facility] for any changes, questions or concerns. (#[phone])   ?   Augusto Denton RN    Subjective/Objective:      Keanu ANNMARIE Jordy, a 69 y.o. male is on warfarin.     Keanu reports:     Home warfarin dose: as updated on anticoagulation calendar per template     Missed doses: No     Medication changes:  No     S/S of bleeding or thromboembolism:  No     New Injury or illness:  No     Changes in diet or alcohol consumption:  No     Upcoming surgery, procedure or cardioversion:  No    Anticoagulation Episode Summary     Current INR goal:   2.0-3.0   TTR:   81.7 %   Next INR check:   10/30/2019   INR from last check:   2.80 (10/17/2019)   Weekly max warfarin dose:      Target end date:      INR check location:      Preferred lab:      Send INR reminders to:   [facility]     Indications    Atypical atrial flutter (H) [I48.4]           Comments:            Anticoagulation Care Providers     Provider Role Specialty Phone number    Ken Keen MD  Internal Medicine [phone]

## 2021-06-16 NOTE — TELEPHONE ENCOUNTER
Telephone Encounter by Donna Bowden CMA at 1/7/2020 12:29 PM     Author: Donna Bowden CMA Service: -- Author Type: Certified Medical Assistant    Filed: 1/7/2020  4:50 PM Encounter Date: 1/6/2020 Status: Signed    : Donna Bowden CMA (Certified Medical Assistant)       Medication: zolpidem (AMBIEN) 5 MG tablet  Last Date Filled 12/12/2019   pulled: YES         Only PCP Prescribing? : YES  Taken as prescribed from physician notes? YES    CSA in last year: YES  Random Utox in last year: Not needed  (if any of the above answer NO - schedule with PCP)     Opioids + benzodiazepines? YES  (if the above answer YES - schedule with PCP every 6 months)     Is patient on the Executive Review Team? No      All responses suggest: Refilling prescription, Pt is coming in for a follow up with PCP on 01/20/2020

## 2021-06-16 NOTE — TELEPHONE ENCOUNTER
Telephone Encounter by Augusto Denton RN at 11/18/2019 11:07 AM     Author: Augusto Denton RN Service: -- Author Type: Registered Nurse    Filed: 11/18/2019 11:17 AM Encounter Date: 11/18/2019 Status: Attested    : Augusto Denton RN (Registered Nurse) Cosigner: Ken Keen MD at 11/18/2019 11:33 AM    Attestation signed by Ken Keen MD at 11/18/2019 11:33 AM    Ken Keen MD                ANTICOAGULATION  MANAGEMENT: Discharge Review    Keanu Spence chart reviewed for anticoagulation continuity of care    Hospital admission on  11/12 for 11/16.    Discharge disposition: Home    INR Results:       Recent labs: (last 7 days)     11/12/19  0640 11/14/19  0727 11/15/19  0747 11/16/19  0750   INR 1.55* 1.76* 2.03* 2.12*       Warfarin inpatient management: home regimen continued    Warfarin discharge instructions: Next INR 11/19     Medication Changes Affecting Anticoagulation: No    Additional Factors Affecting Anticoagulation: No    Plan     No adjustment to anticoagulation plan needed      Recommended follow up is scheduled    Anticoagulation calendar updated    Augusto Denton RN

## 2021-06-16 NOTE — TELEPHONE ENCOUNTER
Telephone Encounter by Augusto Denton RN at 12/26/2019 10:56 AM     Author: Augusto Denton RN Service: -- Author Type: Registered Nurse    Filed: 12/26/2019 11:12 AM Encounter Date: 12/26/2019 Status: Attested    : Augusto Denton RN (Registered Nurse) Cosigner: Ken Keen MD at 12/26/2019 12:53 PM    Attestation signed by Ken Keen MD at 12/26/2019 12:53 PM    Ken Keen MD                ANTICOAGULATION  MANAGEMENT    Assessment     Today's INR result of 2.4 is Therapeutic (goal INR of 2.0-3.0)        Warfarin taken as previously instructed    No new diet changes affecting INR    No new medication/supplements affecting INR    Continues to tolerate warfarin with no reported s/s of bleeding or thromboembolism     Previous INR was Therapeutic    Plan:     Spoke with Keanu regarding INR result and instructed:     Warfarin Dosing Instructions:  Continue current warfarin dose 5 mg daily on mon/thu; and 2.5 mg daily rest of week  (0 % change)    Instructed patient to follow up no later than: one month with jose Colunga verbalizes understanding and agrees to warfarin dosing plan.    Instructed to call the [facility] for any changes, questions or concerns. (#[phone])   ?   Augusto Denton RN    Subjective/Objective:      Keanu Lanceman, a 69 y.o. male is on warfarin.     Keanu reports:     Home warfarin dose: as updated on anticoagulation calendar per template     Missed doses: No     Medication changes:  No     S/S of bleeding or thromboembolism:  No     New Injury or illness:  No     Changes in diet or alcohol consumption:  No     Upcoming surgery, procedure or cardioversion:  No    Anticoagulation Episode Summary     Current INR goal:   2.0-3.0   TTR:   73.0 % (9.3 mo)   Next INR check:   1/9/2020   INR from last check:   2.40 (12/26/2019)   Weekly max warfarin dose:      Target end date:      INR check location:      Preferred lab:      Send INR reminders to:   [facility]     Indications    Atypical atrial flutter (H) [I48.4]           Comments:            Anticoagulation Care Providers     Provider Role Specialty Phone number    Ken Keen MD  Internal Medicine [phone]

## 2021-06-16 NOTE — TELEPHONE ENCOUNTER
Telephone Encounter by Augusto Denton RN at 6/3/2019  1:23 PM     Author: Augusto Denton RN Service: -- Author Type: Registered Nurse    Filed: 6/3/2019  1:32 PM Encounter Date: 6/3/2019 Status: Attested    : Augusto Denton RN (Registered Nurse) Cosigner: Ken Keen MD at 6/3/2019  2:28 PM    Attestation signed by Ken Keen MD at 6/3/2019  2:28 PM    Ken Keen MD                ANTICOAGULATION  MANAGEMENT    Assessment     Today's INR result of 2.7 is Therapeutic (goal INR of 2.0-3.0)        Warfarin taken as previously instructed    No new diet changes affecting INR    No new medication/supplements affecting INR    Continues to tolerate warfarin with no reported s/s of bleeding or thromboembolism     Previous INR was Therapeutic    Plan:     Spoke with Keanu regarding INR result and instructed:     Warfarin Dosing Instructions:  Continue current warfarin dose 2.5 mg daily on tue/thu; and 5 mg daily rest of week  (0 % change)    Instructed patient to follow up no later than: one month      Keanu verbalizes understanding and agrees to warfarin dosing plan.    Instructed to call the [facility] for any changes, questions or concerns. (#[phone])   ?   Augusto Denton RN    Subjective/Objective:      Keanu ANNMARIE Jordy, a 69 y.o. male is on warfarin.     Keanu reports:     Home warfarin dose: as updated on anticoagulation calendar per template     Missed doses: No     Medication changes:  No     S/S of bleeding or thromboembolism:  No     New Injury or illness:  No     Changes in diet or alcohol consumption:  No     Upcoming surgery, procedure or cardioversion:  No    Anticoagulation Episode Summary     Current INR goal:   2.0-3.0   TTR:   87.7 % (3.6 mo)   Next INR check:   6/17/2019   INR from last check:   2.70 (6/3/2019)   Weekly max warfarin dose:      Target end date:      INR check location:      Preferred lab:      Send INR reminders to:   [facility]    Indications     Atypical atrial flutter (H) [I48.4]           Comments:            Anticoagulation Care Providers     Provider Role Specialty Phone number    Ken Keen MD  Internal Medicine [phone]

## 2021-06-16 NOTE — TELEPHONE ENCOUNTER
Telephone Encounter by Cristina Sinha RN at 8/6/2019 12:31 PM     Author: Cristina Sinha RN Service: -- Author Type: Registered Nurse    Filed: 8/6/2019 12:37 PM Encounter Date: 8/6/2019 Status: Attested    : Cristina Sinha RN (Registered Nurse) Cosigner: Ken Keen MD at 8/6/2019  2:10 PM    Attestation signed by Ken Keen MD at 8/6/2019  2:10 PM    Ken Keen MD                ANTICOAGULATION  MANAGEMENT    Assessment     Today's INR result of 3.4 is Supratherapeutic (goal INR of 2.0-3.0)        Warfarin taken as previously instructed    Decreased greens/vitamin K intake may be affecting INR    No new medication/supplements affecting INR    Continues to tolerate warfarin with no reported s/s of bleeding or thromboembolism     Previous INR was Supratherapeutic    Plan:     Spoke with Keanu regarding INR result and instructed:     Warfarin Dosing Instructions:  hold warfarin today only then continue current warfarin dose 2.5 mg daily on Tue, Thu; and 5 mg daily rest of week  (0 % change)    Instructed patient to follow up no later than: 7-10 days    Education provided: importance of consistent vitamin K intake, target INR goal and significance of current INR result, importance of following up for INR monitoring at instructed interval and importance of taking warfarin as instructed    Keanu verbalizes understanding and agrees to warfarin dosing plan.    Instructed to call the [facility] for any changes, questions or concerns. (#[phone])   ?   Cristina Sinha RN    Subjective/Objective:      Keanu Spence, a 69 y.o. male is on warfarin.     Keanu reports:     Home warfarin dose: verbally confirmed home dose with Keanu and updated on anticoagulation calendar     Missed doses: No     Medication changes:  No     S/S of bleeding or thromboembolism:  No     New Injury or illness:  No     Changes in diet or alcohol consumption:  Yes: reports not eating his greens, but plan to  go back to normal tonight     Upcoming surgery, procedure or cardioversion:  No    Anticoagulation Episode Summary     Current INR goal:   2.0-3.0   TTR:   79.4 % (5.7 mo)   Next INR check:   8/16/2019   INR from last check:   3.40! (8/6/2019)   Weekly max warfarin dose:      Target end date:      INR check location:      Preferred lab:      Send INR reminders to:   [facility]    Indications    Atypical atrial flutter (H) [I48.4]           Comments:            Anticoagulation Care Providers     Provider Role Specialty Phone number    Ken Keen MD  Internal Medicine [phone]

## 2021-06-16 NOTE — PROGRESS NOTES
Dear Dr. Gaurang Duncan Md  [address] [place] [address]    Thank you for the opportunity to participate in the care of Mr. Keanu Spence.    He is a 67 y.o. male who comes to the clinic with a chief complaint of excessive daytime sleepiness that is been going on for more than 5 years.  More than 20 years ago the patient actually underwent a sleep study from an outside sleep center and was advised to initiate CPAP therapy but he declined at that time.  He does not recall the name of the sleep center and we do not have the original sleep study here in the office.  Since that time he continues suffer from excessive daytime sleepiness.  He has been told that he has significant pauses in his breathing during sleep followed by loud snoring.  The patient's review of systems significant for coronary artery disease.     Ideal Sleep-Wake Cycle(devoid of societal pressure):    Patient would try to initiate sleep at around 10 PM with a sleep latency of 30 minutes. The patient would have 2 nocturnal awakening. Final wake up time is around 5:30 AM.    Past Medical History  Past Medical History:   Diagnosis Date     Cardiomyopathy      CHF (congestive heart failure)      Chronic bronchitis      Coronary artery disease      Diabetes mellitus      Hypertension      Obesity      Peripheral vascular disease      Sleep apnea     no cpap        Past Surgical History  Past Surgical History:   Procedure Laterality Date     CARDIAC CATHETERIZATION  09/19/2016     CARDIAC CATHETERIZATION  10/11/2016     to lad/om1 in stent occlusion     CORONARY ANGIOPLASTY       CORONARY ARTERY BYPASS GRAFT  10/98    [facility]     coronary stent  10/11/2016    [facility] to left main and om1     coronary stents       FEMORAL BYPASS  2002    left. [facility]  Current Outpatient Prescriptions   Medication Sig Dispense Refill     ACCU-CHEK COMPACT TEST Strp Test daily before all meals/snacks and once before bedtime. 60 strip 12      albuterol (PROVENTIL HFA;VENTOLIN HFA) 90 mcg/actuation inhaler Inhale 2 puffs every 6 (six) hours as needed for wheezing. 8.5 g 11     amLODIPine (NORVASC) 5 MG tablet TAKE ONE TABLET BY MOUTH ONCE DAILY 90 tablet 0     aspirin 81 MG EC tablet Take 1 tablet (81 mg total) by mouth every evening. 90 tablet 3     atorvastatin (LIPITOR) 40 MG tablet TAKE ONE TABLET BY MOUTH DAILY AT BEDTIME 90 tablet 2     blood glucose test (ACCU-CHEK SMARTVIEW TEST STRIP) strips Dispense brand per patient's insurance at pharmacy discretion. Testing once a day only 90 strip 3     blood-glucose meter, drum-type (ACCU-CHEK COMPACT PLUS CARE) Kit Test daily before all meals/snacks and once before bedtime. 1 kit 0     CHANTIX 1 mg tablet Take 1 tablet by mouth 2 (two) times a day.  4     CHANTIX 1 mg tablet TAKE ONE TABLET BY MOUTH TWICE DAILY with a full glass of water 56 tablet 1     CHANTIX 1 mg tablet TAKE ONE TABLET BY MOUTH TWICE DAILY WITH A FULL GLASS OF WATER 56 tablet 0     clopidogrel (PLAVIX) 75 mg tablet TAKE ONE TABLET BY MOUTH DAILY 90 tablet 1     furosemide (LASIX) 20 MG tablet Take 1 tablet by mouth daily.  3     furosemide (LASIX) 20 MG tablet TAKE ONE TABLET BY MOUTH ONCE DAILY 90 tablet 3     generic lancets (ACCU-CHEK MULTICLIX LANCET) Test daily before all meals/snacks and once before bedtime. 3 each 0     glipiZIDE (GLUCOTROL XL) 5 MG 24 hr tablet TAKE ONE TABLET BY MOUTH ONCE DAILY 90 tablet 5     hydrALAZINE (APRESOLINE) 25 MG tablet TAKE ONE TABLET BY MOUTH EVERY EIGHT HOURS  270 tablet 0     hydroCHLOROthiazide (HYDRODIURIL) 25 MG tablet TAKE ONE TABLET BY MOUTH ONCE DAILY. START 1 WEEK FROM 3/3/17. 90 tablet 2     isosorbide mononitrate (IMDUR) 30 MG 24 hr tablet Take 1 tablet (30 mg total) by mouth 2 (two) times a day. 180 tablet 3     isosorbide mononitrate (IMDUR) 30 MG 24 hr tablet TAKE ONE TABLET BY MOUTH TWICE DAILY  180 tablet 3     lisinopril (PRINIVIL,ZESTRIL) 20 MG tablet TAKE ONE TABLET BY MOUTH  TWICE DAILY  180 tablet 2     metFORMIN (GLUCOPHAGE) 850 MG tablet Take 1 tablet (850 mg total) by mouth 2 (two) times a day with meals. 180 tablet 1     metoprolol succinate (TOPROL-XL) 200 MG 24 hr tablet TAKE ONE TABLET BY MOUTH ONCE DAILY 90 tablet 0     multivitamin with minerals (THERA-M) 9 mg iron-400 mcg Tab tablet Take 1 tablet by mouth daily.       No current facility-administered medications for this visit.         Allergies  Review of patient's allergies indicates no known allergies.     Social History  Social History     Social History     Marital status: Single     Spouse name: N/A     Number of children: N/A     Years of education: N/A     Occupational History     Not on file.     Social History Main Topics     Smoking status: Former Smoker     Packs/day: 0.20     Years: 40.00     Start date: 1971     Quit date: 12/5/2016     Smokeless tobacco: Never Used      Comment: Patient stated he is trying to quit.      Alcohol use Yes      Comment: rare     Drug use: No     Sexual activity: Not on file     Other Topics Concern     Not on file     Social History Narrative        Family History  Family History   Problem Relation Age of Onset     Diabetes Mother      Diabetes Father      Heart disease Father      Diabetes Sister      Hypertension Sister      Diabetes Brother      Hypertension Brother         Review of Systems:  Constitutional: Negative except as noted in HPI.   Eyes: Negative except as noted in HPI.   ENT: Negative except as noted in HPI.   Cardiovascular: Negative except as noted in HPI.   Respiratory: Negative except as noted in HPI.   Gastrointestinal: Negative except as noted in HPI.   Genitourinary: Negative except as noted in HPI.   Musculoskeletal: Negative except as noted in HPI.   Integumentary: Negative except as noted in HPI.   Neurological: Negative except as noted in HPI.   Psychiatric: Negative except as noted in HPI.   Endocrine: Negative except as noted in HPI.  "  Hematologic/Lymphatic: Negative except as noted in HPI.      STOP BANG 3/6/2018   Do you snore loudly (louder than talking or loud enough to be heard through closed doors)? 1   Do you often feel tired, fatigued, or sleepy during daytime? 1   Has anyone observed you stop breathing in your sleep? 1   Do you have or are you being treated for high blood pressure? 1   BMI more than 35 kg/m2 0   Age over 50 years old? 1   Neck circumference greater than 16 inches? 0   Gender male? 1   Total Score 6   Epworths Sleepiness Scale 3/6/2018   Sitting and reading 1   Watching TV 1   Sitting, inactive in a public place (e.g. a theatre or a meeting) 0   As a passenger in a car for an hour without a break 0   Lying down to rest in the afternoon when circumstances permit 1   Sitting and talking to someone 0   Sitting quietly after a lunch without alcohol 1   In a car, while stopped for a few minutes in traffic 0   Total score 4   Rooming 3/6/2018   Usual bedtime 10   Sleep Latency 30 mn   Awakenings 2   Wake Up Time 530   Energy Drinks 0   Coffee 0   Cola y   Difficulty falling asleep Yes   Difficulty staying asleep Yes   Excessive daytime tiredness No   Excessive daytime sleepiness No   Dozing off while driving No   Shift Worker No   Sleep Walking? No   Sleep Talking? Yes   Kicking or punching? No   Restless legs symptoms Yes       Physical Exam:  /78  Pulse 67  Ht 5' 11\" (1.803 m)  Wt (!) 222 lb 8 oz (100.9 kg)  SpO2 95%  BMI 31.03 kg/m2  BMI:Body mass index is 31.03 kg/(m^2).   GEN: NAD, appropriate for age  Head: Normocephalic.  EYES: PERRLA, EOMI  ENT: Oropharynx is clear, mallampatti class 4+ airway.   Nasal mucosa is moist without erythema  Neck : Thyroid is within normal limits. neck circ 15\"  CV: Regular rate and rhythm, S1 & S2 positive.  LUNGS: Bilateral breathsounds heard.   ABDOMEN: Positive bowel sounds in all quadrants, soft, no rebound or guarding  MUSCULOSKELETAL: Bilateral trace leg swelling  SKIN: " warm, dry, no rashes  Neurological: Alert, oriented to time, place, and person.  Psych: normal mood, normal affect     Labs/Studies:     Lab Results   Component Value Date    WBC 5.5 02/27/2018    HGB 12.8 (L) 02/27/2018    HCT 38.5 (L) 02/27/2018    MCV 81 02/27/2018     02/27/2018         Chemistry        Component Value Date/Time     02/27/2018 0822    K 4.2 02/27/2018 0822     (H) 02/27/2018 0822    CO2 21 (L) 02/27/2018 0822    BUN 28 (H) 02/27/2018 0822    CREATININE 1.32 (H) 02/27/2018 0822     (H) 02/27/2018 0822        Component Value Date/Time    CALCIUM 9.9 02/27/2018 0822    ALKPHOS 87 08/16/2017 0816    AST 16 08/16/2017 0816    ALT 12 08/16/2017 0816    BILITOT 0.5 08/16/2017 0816            No results found for: FERRITIN  Lab Results   Component Value Date    TSH 1.15 07/20/2012         Assessment and Plan:  In summary Keanu Spence is a 67 y.o. year old male here for sleep disturbance.  1.  Suspected sleep apnea   Mr. Keanu Spence has high risk for obstructive sleep apnea based on the history of witnessed apnea, snoring and a crowded airway. I educated the patient on the underlying pathophysiology of obstructive sleep apnea. We reviewed the risks associated with sleep apnea, including increased cardiovascular risk and overall death. We talked about treatments briefly. I recommend getting an split-night nocturnal polysomnography. The patient should return to the clinic to discuss results and treatment option in a patient-centered approach.  2.  Hypersomnia  3.  Snoring  4.  Other sleep disturbance    Patient verbalized understanding of these issues, agrees with the plan and all questions were answered today. Patient was given an opportuntity to voice any other symptoms or concerns not listed above. Patient did not have any other symptoms or concerns.      Patient told to return in one week after the sleep study is interpreted.      Gonzalo Rojas DO  Board Certified  in Internal Medicine and Sleep Medicine  [facility].    (Note created with Dragon voice recognition and unintended spelling errors and word substitutions may occur)

## 2021-06-16 NOTE — TELEPHONE ENCOUNTER
Telephone Encounter by Augusto Denton RN at 1/20/2020 11:13 AM     Author: Augusto Denton RN Service: -- Author Type: Registered Nurse    Filed: 1/20/2020 11:25 AM Encounter Date: 1/20/2020 Status: Attested    : Augusto Denton RN (Registered Nurse) Cosigner: Ken Keen MD at 1/20/2020 11:55 AM    Attestation signed by Ken Keen MD at 1/20/2020 11:55 AM    Ken Keen MD                ANTICOAGULATION  MANAGEMENT    Assessment     Today's INR result of 2.2 is Therapeutic (goal INR of 2.0-3.0)        Warfarin taken as previously instructed    No new diet changes affecting INR    No new medication/supplements affecting INR    Continues to tolerate warfarin with no reported s/s of bleeding or thromboembolism     Previous INR was Therapeutic    Plan:     Spoke with Keanu regarding INR result and instructed:     Warfarin Dosing Instructions:  Continue current warfarin dose 5 mg daily on mon/thu; and 2.5 mg daily rest of week  (0 % change)    Instructed patient to follow up no later than: one month      Keanu verbalizes understanding and agrees to warfarin dosing plan.    Instructed to call the [facility] for any changes, questions or concerns. (#[phone])   ?   Augusto Denton RN    Subjective/Objective:      Keanu Spence, a 69 y.o. male is on warfarin.     Keanu reports:     Home warfarin dose: as updated on anticoagulation calendar per template     Missed doses: No     Medication changes:  No     S/S of bleeding or thromboembolism:  No     New Injury or illness:  No     Changes in diet or alcohol consumption:  No     Upcoming surgery, procedure or cardioversion:  No    Anticoagulation Episode Summary     Current INR goal:   2.0-3.0   TTR:   75.2 % (10.1 mo)   Next INR check:   2/17/2020   INR from last check:      Weekly max warfarin dose:      Target end date:      INR check location:      Preferred lab:      Send INR reminders to:   [facility]    Indications    Atypical  atrial flutter (H) [I48.4]           Comments:            Anticoagulation Care Providers     Provider Role Specialty Phone number    Ken Keen MD  Internal Medicine [phone]

## 2021-06-16 NOTE — TELEPHONE ENCOUNTER
Telephone Encounter by Jimenez Lo RN at 3/6/2020 10:57 AM     Author: Jimenez Lo RN Service: -- Author Type: RN, Care Manager    Filed: 3/6/2020 11:04 AM Encounter Date: 3/6/2020 Status: Attested    : Jimenez Lo RN (RN, Care Manager) Cosigner: Ken Keen MD at 3/8/2020  6:54 PM    Attestation signed by Ken Keen MD at 3/8/2020  6:54 PM    Ken Keen MD                ANTICOAGULATION  MANAGEMENT    Assessment     Today's INR result of 2.0 is Therapeutic (goal INR of 2.0-3.0)        Warfarin taken as previously instructed    No new diet changes affecting INR    Potential interaction between Doxycycline, Prednisone, and Protonix and warfarin which may affect subsequent INRs     3/4 started Doxy x10 days and Prednisone x20 days for cough.    ED visit earlier today for cough. Starting on Protonix x20 days.     Continues to tolerate warfarin with no reported s/s of bleeding or thromboembolism     Previous INR was Subtherapeutic    Plan:     Spoke with Keanu regarding INR result and instructed:     Warfarin Dosing Instructions:  Continue current warfarin dose    5 mg every Mon, Thu, Sat; 2.5 mg all other days         Instructed patient to follow up no later than: Tues 3/10. Appt made.     Education provided: importance of taking warfarin as instructed and potential interaction between warfarin and Doxycycline, Prednisone, and Protonix    Keanu verbalizes understanding and agrees to warfarin dosing plan.    Instructed to call the [facility] for any changes, questions or concerns. (#[phone])   ?   Jimenez Lo RN    Subjective/Objective:      Keanu Spence, a 69 y.o. male is on warfarin.     Keanu reports:     Home warfarin dose: template incorrect; verbally confirmed home dose with pt and updated on anticoagulation calendar     Missed doses: No     Medication changes:  Yes: see above     S/S of bleeding or thromboembolism:  No     New Injury or  illness:  Yes: cough     Changes in diet or alcohol consumption:  No     Upcoming surgery, procedure or cardioversion:  No    Anticoagulation Episode Summary     Current INR goal:   2.0-3.0   TTR:   74.2 % (11.1 mo)   Next INR check:   3/10/2020   INR from last check:   2.00 (3/6/2020)   Weekly max warfarin dose:      Target end date:      INR check location:      Preferred lab:      Send INR reminders to:   [facility]    Indications    Atypical atrial flutter (H) [I48.4]           Comments:            Anticoagulation Care Providers     Provider Role Specialty Phone number    Ken Keen MD  Internal Medicine [phone]

## 2021-06-16 NOTE — TELEPHONE ENCOUNTER
Telephone Encounter by Augusto Denton RN at 9/5/2019  9:56 AM     Author: Augusto Denton RN Service: -- Author Type: Registered Nurse    Filed: 9/5/2019 10:04 AM Encounter Date: 9/4/2019 Status: Attested    : Augusto Denton RN (Registered Nurse) Cosigner: Ken Keen MD at 9/5/2019 11:38 AM    Attestation signed by Ken Keen MD at 9/5/2019 11:38 AM    Ken Keen MD                ANTICOAGULATION  MANAGEMENT    Assessment     Today's INR result of 2.8 is Therapeutic (goal INR of 2.0-3.0)        Warfarin taken as previously instructed    No new diet changes affecting INR    No new medication/supplements affecting INR    Continues to tolerate warfarin with no reported s/s of bleeding or thromboembolism     Previous INR was Therapeutic     Saw Dr Keen, pre op for angiogram 9/13. Per his instruction will hold warfarin starting Sunday 9/8.    [facility] notified of ok for 5 day warfarin hold    Plan:     Spoke with Keanu regarding INR result and instructed:     Warfarin Dosing Instructions:  Continue current warfarin dose 2.5 mg daily on tue/thu; and 5 mg daily rest of week. Hold starting 9/8 for procedure and resume with surgeon permission that evening with usual dosing    Instructed patient to follow up no later than: 1-2 weeks after resumption      Keanu verbalizes understanding and agrees to warfarin dosing plan.    Instructed to call the [facility] for any changes, questions or concerns. (#[phone])   ?   Augusto Denton RN    Subjective/Objective:      Keanu Spence, a 69 y.o. male is on warfarin.     Keanu reports:     Home warfarin dose: as updated on anticoagulation calendar per template     Missed doses: No     Medication changes:  No     S/S of bleeding or thromboembolism:  No     New Injury or illness:  No     Changes in diet or alcohol consumption:  No     Upcoming surgery, procedure or cardioversion:  above    Anticoagulation Episode Summary     Current INR  goal:   2.0-3.0   TTR:   82.2 %   Next INR check:   9/27/2019   INR from last check:      Most recent INR:    2.80 (9/5/2019)   Weekly max warfarin dose:      Target end date:      INR check location:      Preferred lab:      Send INR reminders to:   [facility]    Indications    Atypical atrial flutter (H) [I48.4]           Comments:            Anticoagulation Care Providers     Provider Role Specialty Phone number    Ken Keen MD  Internal Medicine [phone]

## 2021-06-16 NOTE — TELEPHONE ENCOUNTER
Telephone Encounter by Augusto Denton RN at 10/3/2019  4:17 PM     Author: Augusto Denton RN Service: -- Author Type: Registered Nurse    Filed: 10/3/2019  4:26 PM Encounter Date: 10/3/2019 Status: Attested    : Augusto Denton RN (Registered Nurse) Cosigner: Ken Keen MD at 10/7/2019 11:44 AM    Attestation signed by Ken Keen MD at 10/7/2019 11:44 AM    Ken Keen MD                ANTICOAGULATION  MANAGEMENT    Assessment     Today's INR result of 2.5 is Therapeutic (goal INR of 2.0-3.0)        Warfarin taken as previously instructed    No new diet changes affecting INR    No new medication/supplements affecting INR    Continues to tolerate warfarin with no reported s/s of bleeding or thromboembolism     Previous INR was Therapeutic    Plan:     Spoke with Keanu regarding INR result and instructed:     Warfarin Dosing Instructions:  Continue current warfarin dose 2.5 mg daily on tue/thu; and 5 mg daily rest of week  (0 % change)    Instructed patient to follow up no later than: two weeks, scheduled      Keanu verbalizes understanding and agrees to warfarin dosing plan.    Instructed to call the [facility] for any changes, questions or concerns. (#[phone])   ?   Augusto Denton RN    Subjective/Objective:      Keanu ANNMARIE Jordy, a 69 y.o. male is on warfarin.     Keanu reports:     Home warfarin dose: as updated on anticoagulation calendar per template     Missed doses: No     Medication changes:  No     S/S of bleeding or thromboembolism:  No     New Injury or illness:  No     Changes in diet or alcohol consumption:  No     Upcoming surgery, procedure or cardioversion:  No    Anticoagulation Episode Summary     Current INR goal:   2.0-3.0   TTR:   80.5 %   Next INR check:   10/17/2019   INR from last check:   2.50 (10/3/2019)   Weekly max warfarin dose:      Target end date:      INR check location:      Preferred lab:      Send INR reminders to:   [facility]    Indications     Atypical atrial flutter (H) [I48.4]           Comments:            Anticoagulation Care Providers     Provider Role Specialty Phone number    Ken Keen MD  Internal Medicine [phone]

## 2021-06-16 NOTE — TELEPHONE ENCOUNTER
Telephone Encounter by Carolyn Green CMA at 10/10/2019 12:51 PM     Author: Carolyn Green CMA Service: -- Author Type: Certified Medical Assistant    Filed: 10/10/2019 12:54 PM Encounter Date: 10/9/2019 Status: Signed    : Carolyn Green CMA (Certified Medical Assistant)       Medication: Zolpidem   Last Date Filled 9/5/19   pulled: YES    Only PCP Prescribing? : YES  Taken as prescribed from physician notes? YES    CSA in last year: YES  Random Utox in last year: Not needed  (if any of the above answer NO - schedule with PCP)     Opioids + benzodiazepines? NO  (if the above answer YES - schedule with PCP every 6 months)     Is patient on the Executive Review Team? No      All responses suggest: Refilling prescription

## 2021-06-16 NOTE — PROGRESS NOTES
Preoperative Exam    Scheduled Procedure: Cataract Removal   Surgery Date: Right 3/7/2018 and Left 3/21/2018  Surgery Location: [facility]    Surgeon:  Dr. De La Garza    Assessment/Plan:     Keanu was seen today for pre-op exam.    Right cataract he can proceed for surgery no restrictions.  He has never had any issues with anesthesia.  Left cataract    Preop testing  -     Basic Metabolic Panel  -     HM1(CBC and Differential)  -     HM1 (CBC with Diff)    Coronary atherosclerosis he sees his cardiologist regularly is a scheduled visit in March.  He has had no symptoms of chest pain.  He takes all his medications faithfully    Diabetes mellitus due to underlying condition with diabetic chronic kidney disease, unspecified CKD stage, unspecified long term insulin use status  His diabetes is well controlled he has been taking his medications faithfully        Surgical Procedure Risk: Low (reported cardiac risk generally < 1%)  Have you had prior anesthesia?: Yes  Have you or any family members had a previous anesthesia reaction:  No  Do you or any family members have a history of a clotting or bleeding disorder?: No  Cardiac Risk Assessment: no increased risk for major cardiac complications    Patient approved for surgery with general or local anesthesia.    Please Note:  Patient uses a CPAP machine.    Functional Status: Independent  Patient plans to recover at home with family.     Subjective:      Keanu Spence is a 67 y.o. male who presents for a preoperative consultation.  His right eye is worse than his left eye. He notes that his night driving is somewhat worsened due to the cataracts.     Hypertension: His blood pressure is currently controlled at 110/62. He is taking his blood pressure medications faithfully. He notes that the hydralazine causes him to feel intermittent dizziness. He notes that he has been able to shovel without shortness of breath.        All other systems reviewed and  are negative, other than those listed in the HPI.    Pertinent History  Do you have difficulty breathing or chest pain after walking up a flight of stairs: No  History of obstructive sleep apnea: Yes: Noted 2/27/2017  Steroid use in the last 6 months: No  Frequent Aspirin/NSAID use: No  Prior Blood Transfusion: No  Prior Blood Transfusion Reaction: n/a  If for some reason prior to, during or after the procedure, if it is medically indicated, would you be willing to have a blood transfusion?:  There is no transfusion refusal.    Current Outpatient Prescriptions   Medication Sig Dispense Refill     ACCU-CHEK COMPACT TEST Strp Test daily before all meals/snacks and once before bedtime. 60 strip 12     albuterol (PROVENTIL HFA;VENTOLIN HFA) 90 mcg/actuation inhaler Inhale 2 puffs every 6 (six) hours as needed for wheezing. 8.5 g 11     amLODIPine (NORVASC) 5 MG tablet TAKE ONE TABLET BY MOUTH ONCE DAILY 90 tablet 0     aspirin 81 MG EC tablet Take 1 tablet (81 mg total) by mouth every evening. 90 tablet 3     atorvastatin (LIPITOR) 40 MG tablet TAKE ONE TABLET BY MOUTH DAILY AT BEDTIME 90 tablet 2     blood glucose test (ACCU-CHEK SMARTVIEW TEST STRIP) strips Dispense brand per patient's insurance at pharmacy discretion. Testing once a day only 90 strip 3     blood-glucose meter, drum-type (ACCU-CHEK COMPACT PLUS CARE) Kit Test daily before all meals/snacks and once before bedtime. 1 kit 0     CHANTIX 1 mg tablet Take 1 tablet by mouth 2 (two) times a day.  4     CHANTIX 1 mg tablet TAKE ONE TABLET BY MOUTH TWICE DAILY with a full glass of water 56 tablet 1     CHANTIX 1 mg tablet TAKE ONE TABLET BY MOUTH TWICE DAILY WITH A FULL GLASS OF WATER 56 tablet 0     clopidogrel (PLAVIX) 75 mg tablet TAKE ONE TABLET BY MOUTH DAILY 90 tablet 1     furosemide (LASIX) 20 MG tablet Take 1 tablet by mouth daily.  3     furosemide (LASIX) 20 MG tablet TAKE ONE TABLET BY MOUTH ONCE DAILY 90 tablet 3     generic lancets (ACCU-CHEK  MULTICLIX LANCET) Test daily before all meals/snacks and once before bedtime. 3 each 0     glipiZIDE (GLUCOTROL XL) 5 MG 24 hr tablet TAKE ONE TABLET BY MOUTH ONCE DAILY 90 tablet 5     hydrALAZINE (APRESOLINE) 25 MG tablet TAKE ONE TABLET BY MOUTH EVERY EIGHT HOURS  270 tablet 0     hydroCHLOROthiazide (HYDRODIURIL) 25 MG tablet TAKE ONE TABLET BY MOUTH ONCE DAILY. START 1 WEEK FROM 3/3/17. 90 tablet 2     isosorbide mononitrate (IMDUR) 30 MG 24 hr tablet Take 1 tablet (30 mg total) by mouth 2 (two) times a day. 180 tablet 3     isosorbide mononitrate (IMDUR) 30 MG 24 hr tablet TAKE ONE TABLET BY MOUTH TWICE DAILY  180 tablet 3     lisinopril (PRINIVIL,ZESTRIL) 20 MG tablet TAKE ONE TABLET BY MOUTH TWICE DAILY  180 tablet 2     metFORMIN (GLUCOPHAGE) 850 MG tablet Take 1 tablet (850 mg total) by mouth 2 (two) times a day with meals. 180 tablet 1     metoprolol succinate (TOPROL-XL) 200 MG 24 hr tablet TAKE ONE TABLET BY MOUTH ONCE DAILY 90 tablet 0     multivitamin with minerals (THERA-M) 9 mg iron-400 mcg Tab tablet Take 1 tablet by mouth daily.       No current facility-administered medications for this visit.         No Known Allergies    Patient Active Problem List   Diagnosis     Overweight     Diabetes mellitus due to underlying condition with diabetic chronic kidney disease, unspecified CKD stage, unspecified long term insulin use status     Hypertension     Coronary Artery Disease     Peripheral Vascular Disease     Tobacco dependence     Hyperlipidemia LDL goal < 70     Ischemic cardiomyopathy     Coronary artery disease involving coronary bypass graft of native heart with unspecified angina pectoris     Coronary artery chronic total occlusion     Essential hypertension with goal blood pressure less than 140/90     Dyslipidemia, goal LDL below 70     NIKKI (obstructive sleep apnea)       Past Medical History:   Diagnosis Date     Cardiomyopathy      CHF (congestive heart failure)      Chronic bronchitis       "Coronary artery disease      Diabetes mellitus      Hypertension      Obesity      Peripheral vascular disease      Sleep apnea     no cpap       Past Surgical History:   Procedure Laterality Date     CARDIAC CATHETERIZATION  09/19/2016     CARDIAC CATHETERIZATION  10/11/2016     to lad/om1 in stent occlusion     CORONARY ANGIOPLASTY       CORONARY ARTERY BYPASS GRAFT  10/98    [facility]     coronary stent  10/11/2016    bebe to left main and om1     coronary stents       FEMORAL BYPASS  2002    left. [facility]       Social History     Social History     Marital status: Single     Spouse name: N/A     Number of children: N/A     Years of education: N/A     Occupational History     Not on file.     Social History Main Topics     Smoking status: Former Smoker     Packs/day: 0.20     Years: 40.00     Start date: 1971     Quit date: 12/5/2016     Smokeless tobacco: Never Used      Comment: Patient stated he is trying to quit.      Alcohol use Yes      Comment: rare     Drug use: No     Sexual activity: Not on file     Other Topics Concern     Not on file     Social History Narrative       Patient Care Team:  Gaurang Duncan MD as PCP - General (Family Medicine)          Objective:     Vitals:    02/27/18 0754   BP: 110/62   Pulse: 60   Temp: 97.8  F (36.6  C)   TempSrc: Oral   SpO2: 97%   Weight: 218 lb 9 oz (99.1 kg)   Height: 5' 11\" (1.803 m)         Physical Exam:  Constitutional: He appears well-developed and well-nourished.   HEENT: Head: Normocephalic.    Right Ear: Tympanic membrane, external ear and canal normal.    Left Ear: Tympanic membrane, external ear and canal normal.    Nose: Nose normal.    Mouth/Throat: Mucous membranes are moist. Oropharynx is clear.    Eyes: Cataracts noted in both eyes.   Neck: Neck supple. No tenderness is present.   Cardiovascular: Normal rate and regular rhythm. No murmur heard.  Pulses: Femoral pulses are 2+ bilaterally.   Pulmonary/Chest: Effort normal and breath sounds " normal. There is normal air entry.   Abdominal: Soft. There is no hepatosplenomegaly. No inguinal hernia.   Musculoskeletal: Normal range of motion. Normal strength and tone. No abnormalities. Spine is straight. Normal duck walk. Normal heel-to-toe walk.   Neurological: He is alert. He has normal reflexes. Gait normal.   Psychiatric: He has a normal mood and affect. His speech is normal and behavior is normal.  Skin: Clear. No rashes.       There are no Patient Instructions on file for this visit.    EKG:  n/a    Labs:  No results found for this or any previous visit (from the past 24 hour(s)).    Immunization History   Administered Date(s) Administered     Influenza Q4w8-84, 01/21/2010     Influenza high dose, seasonal 09/13/2017     Influenza, inj, historic,unspecified 11/14/2007, 11/03/2008, 09/24/2009, 09/30/2010, 11/01/2011, 09/11/2014, 11/03/2015     Influenza, seasonal,quad inj 36+ mos 09/26/2016     Pneumo Conj 13-V (2010&after) 09/13/2017     Pneumo Polysac 23-V 11/14/2007     Td,adult,historic,unspecified 03/26/2007     Tdap 01/26/2012           Electronically signed by Gaurang Duncan MD 02/27/18 8:03 AM

## 2021-06-16 NOTE — TELEPHONE ENCOUNTER
Telephone Encounter by Augusto Denton RN at 3/10/2020 10:09 AM     Author: Augusto Denton RN Service: -- Author Type: Registered Nurse    Filed: 3/10/2020 10:18 AM Encounter Date: 3/10/2020 Status: Attested    : Augusto Denton RN (Registered Nurse) Cosigner: Ken Keen MD at 3/10/2020 11:08 AM    Attestation signed by Ken Keen MD at 3/10/2020 11:08 AM    Ken Keen MD                ANTICOAGULATION  MANAGEMENT    Assessment     Today's INR result of 2.3 is Therapeutic (goal INR of 2.0-3.0)        Warfarin taken as previously instructed    No new diet changes affecting INR    No new medication/supplements affecting INR    Continues to tolerate warfarin with no reported s/s of bleeding or thromboembolism     Previous INR was Therapeutic    Plan:     Spoke with Keanu regarding INR result and instructed:     Warfarin Dosing Instructions:  Continue current warfarin dose 5 mg daily on mon/thusat; and 2.5 mg daily rest of week  (0 % change)    Instructed patient to follow up no later than: one month      Keanu verbalizes understanding and agrees to warfarin dosing plan.    Instructed to call the [facility] for any changes, questions or concerns. (#[phone])   ?   Augusto Denton RN    Subjective/Objective:      Keanu Spence, a 69 y.o. male is on warfarin.     Keanu reports:     Home warfarin dose: as updated on anticoagulation calendar per template     Missed doses: No     Medication changes:  No     S/S of bleeding or thromboembolism:  No     New Injury or illness:  No     Changes in diet or alcohol consumption:  No     Upcoming surgery, procedure or cardioversion:  No    Anticoagulation Episode Summary     Current INR goal:   2.0-3.0   TTR:   74.2 % (11.1 mo)   Next INR check:   4/7/2020   INR from last check:   2.30 (3/10/2020)   Weekly max warfarin dose:      Target end date:      INR check location:      Preferred lab:      Send INR reminders to:   ROMIE [facility]    Indications     Atypical atrial flutter (H) [I48.4]           Comments:            Anticoagulation Care Providers     Provider Role Specialty Phone number    Ken Keen MD  Internal Medicine [phone]

## 2021-06-16 NOTE — TELEPHONE ENCOUNTER
Telephone Encounter by Augusto Denton RN at 2/21/2020  2:21 PM     Author: Augusto Denton RN Service: -- Author Type: Registered Nurse    Filed: 2/21/2020  4:50 PM Encounter Date: 2/21/2020 Status: Attested    : Augusto Denton RN (Registered Nurse) Cosigner: Ken Keen MD at 2/25/2020  9:09 AM    Attestation signed by Ken Keen MD at 2/25/2020  9:09 AM    Ken Keen MD                ANTICOAGULATION  MANAGEMENT    Assessment     Today's INR result of 1.5 is Subtherapeutic (goal INR of 2.0-3.0)        Warfarin taken as previously instructed    No new diet changes affecting INR    No new medication/supplements affecting INR finished prednisone, levaquin done 2/23    Continues to tolerate warfarin with no reported s/s of bleeding or thromboembolism     Previous INR was Therapeutic    Plan:     Spoke with Keanu regarding INR result and instructed:     Warfarin Dosing Instructions:  Change warfarin dose to 5 mg daily on mon/thu/sat; and 2.5 mg daily rest of week  (11 % change)    Instructed patient to follow up no later than: 1-2 weeks      Keanu verbalizes understanding and agrees to warfarin dosing plan.    Instructed to call the [facility] for any changes, questions or concerns. (#[phone])   ?   Augusto Denton RN    Subjective/Objective:      Keanu B Jordy, a 69 y.o. male is on warfarin.     Keanu reports:     Home warfarin dose: verbally confirmed home dose with Keanu and updated on anticoagulation calendar     Missed doses: No     Medication changes:  Yes: above     S/S of bleeding or thromboembolism:  No     New Injury or illness:  No     Changes in diet or alcohol consumption:  No     Upcoming surgery, procedure or cardioversion:  No    Anticoagulation Episode Summary     Current INR goal:   2.0-3.0   TTR:   75.9 % (11.1 mo)   Next INR check:   3/6/2020   INR from last check:   1.50! (2/21/2020)   Weekly max warfarin dose:      Target end date:      INR check location:       Preferred lab:      Send INR reminders to:   ROMIE [facility]WAY    Indications    Atypical atrial flutter (H) [I48.4]           Comments:            Anticoagulation Care Providers     Provider Role Specialty Phone number    Ken Keen MD  Internal Medicine [phone]

## 2021-06-16 NOTE — TELEPHONE ENCOUNTER
Telephone Encounter by Augusto Denton RN at 10/10/2019 11:41 AM     Author: Augusto Denton RN Service: -- Author Type: Registered Nurse    Filed: 10/10/2019 11:51 AM Encounter Date: 10/10/2019 Status: Attested    : Augusto Denton RN (Registered Nurse) Cosigner: Ken Keen MD at 10/10/2019  1:02 PM    Attestation signed by Ken Keen MD at 10/10/2019  1:02 PM    Ken Keen MD                Anticoagulation Annual Referral Renewal Review    Keanu Spence's chart reviewed for annual renewal of referral to anticoagulation monitoring.        Criteria for anticoagulation nurse and/or pharmacist renewal met   Warfarin indication: Atrial Flutter Yes, per indication   Current with INR monitoring/compliant Yes Yes   Date of last office visit 9/25/19 Yes, had office visit within last year   Time in Therapeutic Range (TTR) new  transfered from [facility]       Keanu Spence met all criteria for anticoagulation management program initiated renewal.  New INR standing orders and anticoagulation referral renewal placed.      Augusto Denton RN  11:42 AM

## 2021-06-16 NOTE — TELEPHONE ENCOUNTER
Telephone Encounter by Augusto Denton RN at 8/16/2019 10:59 AM     Author: Augusto Denton RN Service: -- Author Type: Registered Nurse    Filed: 8/16/2019 11:04 AM Encounter Date: 8/16/2019 Status: Attested    : Augusto Denton RN (Registered Nurse) Cosigner: Ken Keen MD at 8/16/2019  7:23 PM    Attestation signed by Ken Keen MD at 8/16/2019  7:23 PM    Ken Keen MD                ANTICOAGULATION  MANAGEMENT    Assessment     Today's INR result of 2.4 is Therapeutic (goal INR of 2.0-3.0)        Warfarin taken as previously instructed    No new diet changes affecting INR    No new medication/supplements affecting INR    Continues to tolerate warfarin with no reported s/s of bleeding or thromboembolism     Previous INR was Therapeutic    Plan:     Spoke with Keanu regarding INR result and instructed:     Warfarin Dosing Instructions:  Continue current warfarin dose 2.5 mg daily on tuethu; and 5 mg daily rest of week  (0 % change)    Instructed patient to follow up no later than: with ov 9/5      Keanu verbalizes understanding and agrees to warfarin dosing plan.    Instructed to call the [facility] for any changes, questions or concerns. (#[phone])   ?   Augusto Denton RN    Subjective/Objective:      Keanu ANNMARIE Jordy, a 69 y.o. male is on warfarin.     Keanu reports:     Home warfarin dose: as updated on anticoagulation calendar per template     Missed doses: No     Medication changes:  No     S/S of bleeding or thromboembolism:  No     New Injury or illness:  No     Changes in diet or alcohol consumption:  No     Upcoming surgery, procedure or cardioversion:  No    Anticoagulation Episode Summary     Current INR goal:   2.0-3.0   TTR:   78.4 % (6 mo)   Next INR check:   9/5/2019   INR from last check:   2.40 (8/16/2019)   Weekly max warfarin dose:      Target end date:      INR check location:      Preferred lab:      Send INR reminders to:   ROMIE [facility]WAY    Indications     Atypical atrial flutter (H) [I48.4]           Comments:            Anticoagulation Care Providers     Provider Role Specialty Phone number    Ken Keen MD  Internal Medicine [phone]

## 2021-06-16 NOTE — TELEPHONE ENCOUNTER
Telephone Encounter by Augusto Denton RN at 10/30/2019 12:41 PM     Author: Augusto Denton RN Service: -- Author Type: Registered Nurse    Filed: 10/30/2019 12:56 PM Encounter Date: 10/30/2019 Status: Attested    : Augusto Denton RN (Registered Nurse)    Related Notes: Original Note by Augusto Denton RN (Registered Nurse) filed at 10/30/2019 12:54 PM    Cosigner: Ken Keen MD at 10/30/2019  5:05 PM    Attestation signed by Ken Keen MD at 10/30/2019  5:05 PM    Ken Keen MD                ANTICOAGULATION  MANAGEMENT    Assessment     Today's INR result of 3.4 is Supratherapeutic (goal INR of 2.0-3.0)        Warfarin taken as previously instructed    No new diet changes affecting INR    No new medication/supplements affecting INR    Continues to tolerate warfarin with no reported s/s of bleeding or thromboembolism     Previous INR was Therapeutic    Plan:     Spoke with Keanu regarding INR result and instructed:     Warfarin Dosing Instructions:  have 2.5 mg today to lower inr then continue current warfarin dose 2.5 mg daily on tue/thu; and 5 mg daily rest of week. Stop warfarin on 11/7 until surgery 11/11. Keanu anticipates a 4 day hospital stay    Instructed patient to follow up no later than: 1-2 weeks after resumption pending discharge instruction    Keanu verbalizes understanding and agrees to warfarin dosing plan.    Instructed to call the [facility] for any changes, questions or concerns. (#[phone])   ?   Augusto Denton RN    Subjective/Objective:      Keanu Spence, a 69 y.o. male is on warfarin.     Keanu reports:     Home warfarin dose: as updated on anticoagulation calendar per template     Missed doses: No     Medication changes:  No     S/S of bleeding or thromboembolism:  No     New Injury or illness:  No     Changes in diet or alcohol consumption:  No     Upcoming surgery, procedure or cardioversion:  No    Anticoagulation Episode Summary     Current INR  goal:   2.0-3.0   TTR:   79.0 %   Next INR check:   11/26/2019   INR from last check:   3.40! (10/30/2019)   Weekly max warfarin dose:      Target end date:      INR check location:      Preferred lab:      Send INR reminders to:   ROMIE [facility]    Indications    Atypical atrial flutter (H) [I48.4]           Comments:            Anticoagulation Care Providers     Provider Role Specialty Phone number    Ken Keen MD  Internal Medicine [phone]

## 2021-06-16 NOTE — TELEPHONE ENCOUNTER
Telephone Encounter by Augusto Denton RN at 7/29/2019 12:01 PM     Author: Augusto Denton RN Service: -- Author Type: Registered Nurse    Filed: 7/29/2019 12:10 PM Encounter Date: 7/29/2019 Status: Attested    : Augusto Denton RN (Registered Nurse) Cosigner: Ken Keen MD at 7/29/2019  1:22 PM    Attestation signed by Ken Keen MD at 7/29/2019  1:22 PM    Ken Keen MD                ANTICOAGULATION  MANAGEMENT    Assessment     Today's INR result of 3.1 is Therapeutic (goal INR of 2.0-3.0)        Warfarin taken as previously instructed    No new diet changes affecting INR    No new medication/supplements affecting INR    Continues to tolerate warfarin with no reported s/s of bleeding or thromboembolism     Previous INR was Therapeutic    Plan:     Spoke with Kenau regarding INR result and instructed:     Warfarin Dosing Instructions:  Continue current warfarin dose 2.5 mg daily on tue/thu; and 5 mg daily rest of week  (0 % change)    Instructed patient to follow up no later than: 1-2 weeks      keanu verbalizes understanding and agrees to warfarin dosing plan.    Instructed to call the [facility] for any changes, questions or concerns. (#[phone])   ?   Augusto Denton RN    Subjective/Objective:      Keanu ANNMARIE Jordy, a 69 y.o. male is on warfarin.     Keanu reports:     Home warfarin dose: as updated on anticoagulation calendar per template     Missed doses: No     Medication changes:  No     S/S of bleeding or thromboembolism:  No     New Injury or illness:  No     Changes in diet or alcohol consumption:  No     Upcoming surgery, procedure or cardioversion:  No    Anticoagulation Episode Summary     Current INR goal:   2.0-3.0   TTR:   83.4 % (5.4 mo)   Next INR check:   8/12/2019   INR from last check:   3.10! (7/29/2019)   Weekly max warfarin dose:      Target end date:      INR check location:      Preferred lab:      Send INR reminders to:   ROMIE [facility]WAY    Indications     Atypical atrial flutter (H) [I48.4]           Comments:            Anticoagulation Care Providers     Provider Role Specialty Phone number    eKn Keen MD  Internal Medicine [phone]

## 2021-06-16 NOTE — TELEPHONE ENCOUNTER
Telephone Encounter by Jimenez Lo RN at 9/25/2019 12:05 PM     Author: Jimenez Lo RN Service: -- Author Type: RN, Care Manager    Filed: 9/25/2019 12:34 PM Encounter Date: 9/25/2019 Status: Attested    : Jimenez Lo RN (RN, Care Manager) Cosigner: Ken Keen MD at 9/25/2019  2:17 PM    Attestation signed by Ken Keen MD at 9/25/2019  2:17 PM    Ken Keen MD                ANTICOAGULATION  MANAGEMENT    Assessment     Today's INR result of 1.6 is Subtherapeutic (goal INR of 2.0-3.0)        Warfarin recently held as instructed which may be affecting INR- held 9/19 and 9/20 in hospital.    Hospitalized 9/19-9/21 for sinus node dysfunction.    No new diet changes affecting INR    No new medication/supplements affecting INR    Continues to tolerate warfarin with no reported s/s of bleeding or thromboembolism     Previous INR was Subtherapeutic    Plan:     Spoke with Keanu regarding INR result and instructed:     Warfarin Dosing Instructions:  Take booster dose of 7.5 mg today only then continue current warfarin dose    2.5 mg every Tue, Thu; 5 mg all other days         Instructed patient to follow up no later than: 10 days.     Education provided: importance of taking warfarin as instructed    Keanu verbalizes understanding and agrees to warfarin dosing plan.    Instructed to call the [facility] for any changes, questions or concerns. (#[phone])   ?   Jimenez Lo RN    Subjective/Objective:      Keanu Spence, a 69 y.o. male is on warfarin.     Keanu reports:     Home warfarin dose: verbally confirmed home dose with pt and updated on anticoagulation calendar     Missed doses: No     Medication changes:  Yes: started Oxycodone     S/S of bleeding or thromboembolism:  No     New Injury or illness:  Yes: hospitalized for sinus node dysfunction.      Changes in diet or alcohol consumption:  No     Upcoming surgery, procedure or cardioversion:   No    Anticoagulation Episode Summary     Current INR goal:   2.0-3.0   TTR:   81.5 %   Next INR check:   10/4/2019   INR from last check:   1.60! (9/25/2019)   Weekly max warfarin dose:      Target end date:      INR check location:      Preferred lab:      Send INR reminders to:   [facility]    Indications    Atypical atrial flutter (H) [I48.4]           Comments:            Anticoagulation Care Providers     Provider Role Specialty Phone number    Ken Keen MD  Internal Medicine [phone]

## 2021-06-16 NOTE — TELEPHONE ENCOUNTER
Telephone Encounter by Carolyn Green CMA at 12/12/2019 10:26 AM     Author: Carolyn Green CMA Service: -- Author Type: Certified Medical Assistant    Filed: 12/12/2019 10:30 AM Encounter Date: 12/11/2019 Status: Signed    : Carolyn Green CMA (Certified Medical Assistant)       Medication: Zolpidem  Last Date Filled 11/7/19   pulled: YES    Only PCP Prescribing? : YES  Taken as prescribed from physician notes? YES    CSA in last year: YES  Random Utox in last year: Not needed  (if any of the above answer NO - schedule with PCP)     Opioids + benzodiazepines? NO  (if the above answer YES - schedule with PCP every 6 months)     Is patient on the Executive Review Team? NO      All responses suggest: Scheduling with PCP for further intervention    Return in about 4 weeks (around 12/17/2019) for Recheck, Diabetes.     Schedule 12/19/19

## 2021-06-16 NOTE — TELEPHONE ENCOUNTER
Telephone Encounter by Augusto Denton RN at 12/13/2019 11:40 AM     Author: Augusto Denton RN Service: -- Author Type: Registered Nurse    Filed: 12/13/2019 11:49 AM Encounter Date: 12/13/2019 Status: Attested    : Augusto Denton RN (Registered Nurse) Cosigner: Ken Keen MD at 12/13/2019  6:00 PM    Attestation signed by Ken Keen MD at 12/13/2019  6:00 PM    Ken Keen MD                ANTICOAGULATION  MANAGEMENT    Assessment     Today's INR result of 3.7 is Supratherapeutic (goal INR of 2.0-3.0)        Warfarin taken as previously instructed    No new diet changes affecting INR    No new medication/supplements affecting INR    Continues to tolerate warfarin with no reported s/s of bleeding or thromboembolism     Previous INR was Supratherapeutic    Plan:     Spoke with Keanu regarding INR result and instructed:     Warfarin Dosing Instructions:  Change warfarin dose to 5 mg daily on mon/thu; and 2.5 mg daily rest of week  (15 % change) Keanu wants to try this dose    Instructed patient to follow up no later than: one week with jose Colunga verbalizes understanding and agrees to warfarin dosing plan.    Instructed to call the [facility] for any changes, questions or concerns. (#[phone])   ?   Augusto Denton RN    Subjective/Objective:      Keanu ANGUIANO Jordy, a 69 y.o. male is on warfarin.     Keanu reports:     Home warfarin dose: verbally confirmed home dose with Keanu and updated on anticoagulation calendar     Missed doses: No     Medication changes:  No     S/S of bleeding or thromboembolism:  No     New Injury or illness:  No     Changes in diet or alcohol consumption:  No     Upcoming surgery, procedure or cardioversion:  No    Anticoagulation Episode Summary     Current INR goal:   2.0-3.0   TTR:   74.0 % (8.9 mo)   Next INR check:   12/19/2019   INR from last check:   3.70! (12/13/2019)   Weekly max warfarin dose:      Target end date:      INR check location:       Preferred lab:      Send INR reminders to:   [facility]    Indications    Atypical atrial flutter (H) [I48.4]           Comments:            Anticoagulation Care Providers     Provider Role Specialty Phone number    Ken Keen MD  Internal Medicine [phone]

## 2021-06-16 NOTE — TELEPHONE ENCOUNTER
Telephone Encounter by Augusto Denton RN at 12/19/2019 12:49 PM     Author: Augusto Denton RN Service: -- Author Type: Registered Nurse    Filed: 12/19/2019 12:57 PM Encounter Date: 12/19/2019 Status: Attested    : Augusto Denton RN (Registered Nurse)    Related Notes: Original Note by Augusto Denton RN (Registered Nurse) filed at 12/19/2019 12:55 PM    Cosigner: Ken Keen MD at 12/19/2019  1:09 PM    Attestation signed by Ken Keen MD at 12/19/2019  1:09 PM    Ken Keen MD                ANTICOAGULATION  MANAGEMENT    Assessment     Today's INR result of 3.0 is Therapeutic (goal INR of 2.0-3.0)        Warfarin taken as previously instructed    No new diet changes affecting INR    No new medication/supplements affecting INR starting z ammy today uri    Continues to tolerate warfarin with no reported s/s of bleeding or thromboembolism     Previous INR was Supratherapeutic    Plan:     Spoke with Keanu regarding INR result and instructed:     Warfarin Dosing Instructions:  2.5 mg today then continue current warfarin dose 5 mg daily on mon/thu; and 2.5 mg daily rest of week  (0 % change)    Instructed patient to follow up no later than: one week    Keanu verbalizes understanding and agrees to warfarin dosing plan.    Instructed to call the [facility] for any changes, questions or concerns. (#[phone])   ?   Augusto Denton RN    Subjective/Objective:      Keanu Lanceman, a 69 y.o. male is on warfarin.     Keanu reports:     Home warfarin dose: as updated on anticoagulation calendar per template     Missed doses: No     Medication changes:  No     S/S of bleeding or thromboembolism:  No     New Injury or illness:  No     Changes in diet or alcohol consumption:  No     Upcoming surgery, procedure or cardioversion:  No    Anticoagulation Episode Summary     Current INR goal:   2.0-3.0   TTR:   72.3 % (9.1 mo)   Next INR check:   12/27/2019   INR from last check:   3.00 (12/19/2019)    Weekly max warfarin dose:      Target end date:      INR check location:      Preferred lab:      Send INR reminders to:   [facility]    Indications    Atypical atrial flutter (H) [I48.4]           Comments:            Anticoagulation Care Providers     Provider Role Specialty Phone number    Ken Keen MD  Internal Medicine [phone]

## 2021-06-16 NOTE — TELEPHONE ENCOUNTER
Telephone Encounter by Liset Larkin LPN at 11/5/2019  4:08 PM     Author: Liset Larkin LPN Service: -- Author Type: Licensed Nurse    Filed: 11/5/2019  4:14 PM Encounter Date: 11/5/2019 Status: Signed    : Liset Larkin LPN (Licensed Nurse)       Medication: Zolpidem  Last Date Filled 10/10/19   pulled: YES       Only PCP Prescribing? : NO  Taken as prescribed from physician notes? YES OV 9/5/19    CSA in last year: YES  Random Utox in last year: NO  (if any of the above answer NO - schedule with PCP)     Opioids + benzodiazepines? NO  (if the above answer YES - schedule with PCP every 6 months)     Is patient on the Executive Review Team? no      All responses suggest: Refilling prescription on 11/8/19 when due

## 2021-06-16 NOTE — TELEPHONE ENCOUNTER
Telephone Encounter by Augusto Denton RN at 2/13/2020  2:52 PM     Author: Augusto Denton RN Service: -- Author Type: Registered Nurse    Filed: 2/13/2020  4:31 PM Encounter Date: 2/13/2020 Status: Attested    : Augusto Denton RN (Registered Nurse)    Related Notes: Original Note by Augusto Denton RN (Registered Nurse) filed at 2/13/2020  4:06 PM    Cosigner: Ken Keen MD at 2/17/2020 10:51 AM    Attestation signed by Ken Keen MD at 2/17/2020 10:51 AM    Ken Keen MD                ANTICOAGULATION  MANAGEMENT    Assessment     Today's INR result of 2.0 is Therapeutic (goal INR of 2.0-3.0)        Warfarin taken as previously instructed    No new diet changes affecting INR    No new medication/supplements affecting INR    Potential interaction between levaquin, prednisone and warfarin which may affect subsequent INRs starting today-2/23    Continues to tolerate warfarin with no reported s/s of bleeding or thromboembolism     Previous INR was Therapeutic    Plan:     Spoke with Keanu regarding INR result and instructed:     Warfarin Dosing Instructions:  Continue current warfarin dose 5 mg daily on mon/thu; and 2.5 mg daily rest of week      Instructed patient to follow up no later than: one week      Keanu verbalizes understanding and agrees to warfarin dosing plan.    Instructed to call the [facility] for any changes, questions or concerns. (#[phone])   ?   Augusto Denton RN    Subjective/Objective:      Keanu Lanceman, a 69 y.o. male is on warfarin.     Keanu reports:     Home warfarin dose: as updated on anticoagulation calendar per template     Missed doses: No     Medication changes:  No     S/S of bleeding or thromboembolism:  No     New Injury or illness:  No     Changes in diet or alcohol consumption:  No     Upcoming surgery, procedure or cardioversion:  No    Anticoagulation Episode Summary     Current INR goal:   2.0-3.0   TTR:   77.0 % (10.9 mo)   Next INR check:    3/4/2020   INR from last check:   2.00 (2/13/2020)   Weekly max warfarin dose:      Target end date:      INR check location:      Preferred lab:      Send INR reminders to:   [facility]    Indications    Atypical atrial flutter (H) [I48.4]           Comments:            Anticoagulation Care Providers     Provider Role Specialty Phone number    Ken Keen MD  Internal Medicine [phone]

## 2021-06-16 NOTE — TELEPHONE ENCOUNTER
Telephone Encounter by Augusto Denton RN at 12/2/2019 10:54 AM     Author: Augusto Denton RN Service: -- Author Type: Registered Nurse    Filed: 12/2/2019 11:02 AM Encounter Date: 12/2/2019 Status: Attested    : Augusto Denton RN (Registered Nurse) Cosigner: Ken Keen MD at 12/2/2019 11:12 AM    Attestation signed by Ken Keen MD at 12/2/2019 11:12 AM    Ken Keen MD                ANTICOAGULATION  MANAGEMENT    Assessment     Today's INR result of 4.3 is Supratherapeutic (goal INR of 2.0-3.0)        Warfarin taken as previously instructed    with recent prcedures, low appetite may be affecting diet and INR    No new medication/supplements affecting INR    Continues to tolerate warfarin with no reported s/s of bleeding or thromboembolism     Previous INR was Therapeutic    Plan:     Spoke with Keanu regarding INR result and instructed:     Warfarin Dosing Instructions:  skip today then change warfarin dose to 2.5 mg daily on tue/thu/sat; and 5 mg daily rest of week  (8 % change)    Instructed patient to follow up no later than: 7-10 days    Keanu verbalizes understanding and agrees to warfarin dosing plan.    Instructed to call the [facility] for any changes, questions or concerns. (#[phone])   ?   Augusto Denton RN    Subjective/Objective:      Keanu B Jordy, a 69 y.o. male is on warfarin.     Keanu reports:     Home warfarin dose: verbally confirmed home dose with Keanu and updated on anticoagulation calendar     Missed doses: No     Medication changes:  No     S/S of bleeding or thromboembolism:  No     New Injury or illness:  No     Changes in diet or alcohol consumption:  Yes: above     Upcoming surgery, procedure or cardioversion:  No    Anticoagulation Episode Summary     Current INR goal:   2.0-3.0   TTR:   77.1 % (8.5 mo)   Next INR check:   12/13/2019   INR from last check:   4.30! (12/2/2019)   Weekly max warfarin dose:      Target end date:      INR check  location:      Preferred lab:      Send INR reminders to:   [facility]    Indications    Atypical atrial flutter (H) [I48.4]           Comments:            Anticoagulation Care Providers     Provider Role Specialty Phone number    Ken Keen MD  Internal Medicine [phone]

## 2021-06-17 NOTE — PROGRESS NOTES
Order for Durable Medical Equipment was processed and equipment ordered.   DME provider: [facility]  Date Faxed: 04/19/2018  Ordering Provider: Dr. Rojas  Equipment ordered: Cpap

## 2021-06-17 NOTE — PROGRESS NOTES
Patient was offered choice of vendor and chose [facility].  Patient Keanu Spence was set up at [facility] on 4/27/18. Patient received a Resmed [id number] Auto. Pressures were set at 5-15 CM H2O.   Patient s ramp is 5 cm H2O for Auto and FLEX/EPR is EPR.  Patient received a Resmed Mask name: Airfit P10  pillow Size large, heated tubing and heated humidifier.    Dayana Her

## 2021-06-17 NOTE — PROGRESS NOTES
Dear Dr. Gaurang Duncan MD  [address] [place] [address],    Thank you for the opportunity to participate in the care of Keanu Spence.     He is a 67 y.o.  male patient who comes to the sleep medicine clinic for review of his sleep study. The study was completed on 03/25/18 which showed the the patient had mild obstructive sleep apnea with an apnea hypopnea index of 14.3 events per hour with the lowest O2 sat of 82%.  The patient also has periodic limb movement sleep.      Current Outpatient Prescriptions   Medication Sig Dispense Refill     ACCU-CHEK COMPACT TEST Strp Test daily before all meals/snacks and once before bedtime. 60 strip 12     albuterol (PROVENTIL HFA;VENTOLIN HFA) 90 mcg/actuation inhaler Inhale 2 puffs every 6 (six) hours as needed for wheezing. 8.5 g 11     amLODIPine (NORVASC) 5 MG tablet TAKE ONE TABLET BY MOUTH ONCE DAILY 90 tablet 0     aspirin 81 MG EC tablet Take 1 tablet (81 mg total) by mouth every evening. 90 tablet 3     atorvastatin (LIPITOR) 40 MG tablet TAKE ONE TABLET BY MOUTH DAILY AT BEDTIME 90 tablet 2     blood glucose test (ACCU-CHEK SMARTVIEW TEST STRIP) strips Dispense brand per patient's insurance at pharmacy discretion. Testing once a day only 90 strip 3     blood-glucose meter, drum-type (ACCU-CHEK COMPACT PLUS CARE) Kit Test daily before all meals/snacks and once before bedtime. 1 kit 0     CHANTIX 1 mg tablet Take 1 tablet by mouth 2 (two) times a day.  4     CHANTIX 1 mg tablet TAKE ONE TABLET BY MOUTH TWICE DAILY with a full glass of water 56 tablet 1     CHANTIX 1 mg tablet TAKE ONE TABLET BY MOUTH TWICE DAILY WITH A FULL GLASS OF WATER 56 tablet 0     clopidogrel (PLAVIX) 75 mg tablet TAKE ONE TABLET BY MOUTH DAILY 90 tablet 1     furosemide (LASIX) 20 MG tablet Take 1 tablet by mouth daily.  3     furosemide (LASIX) 20 MG tablet TAKE ONE TABLET BY MOUTH ONCE DAILY 90 tablet 3     generic lancets (ACCU-CHEK MULTICLIX LANCET) Test daily before all  "meals/snacks and once before bedtime. 3 each 0     glipiZIDE (GLUCOTROL XL) 5 MG 24 hr tablet TAKE ONE TABLET BY MOUTH ONCE DAILY 90 tablet 5     hydrALAZINE (APRESOLINE) 25 MG tablet TAKE ONE TABLET BY MOUTH EVERY EIGHT HOURS  270 tablet 0     hydroCHLOROthiazide (HYDRODIURIL) 25 MG tablet TAKE ONE TABLET BY MOUTH ONCE DAILY. START 1 WEEK FROM 3/3/17. 90 tablet 2     isosorbide mononitrate (IMDUR) 30 MG 24 hr tablet Take 1 tablet (30 mg total) by mouth 2 (two) times a day. 180 tablet 3     isosorbide mononitrate (IMDUR) 30 MG 24 hr tablet TAKE ONE TABLET BY MOUTH TWICE DAILY  180 tablet 3     lisinopril (PRINIVIL,ZESTRIL) 20 MG tablet TAKE ONE TABLET BY MOUTH TWICE DAILY  180 tablet 2     metFORMIN (GLUCOPHAGE) 850 MG tablet Take 1 tablet (850 mg total) by mouth 2 (two) times a day with meals. 180 tablet 0     metoprolol succinate (TOPROL-XL) 200 MG 24 hr tablet TAKE ONE TABLET BY MOUTH ONCE DAILY 90 tablet 0     multivitamin with minerals (THERA-M) 9 mg iron-400 mcg Tab tablet Take 1 tablet by mouth daily.       No current facility-administered medications for this visit.        No Known Allergies    Physical Exam:  /62  Pulse 63  Ht 6' 0.5\" (1.842 m)  Wt 221 lb 6.4 oz (100.4 kg)  SpO2 97%  BMI 29.61 kg/m2  BMI:Body mass index is 29.61 kg/(m^2).   GEN: NAD, obese  Psych: normal mood, normal affect     Labs/Studies:  - We reviewed the results of the overnight PSG as described on the HPI.     Lab Results   Component Value Date    WBC 5.5 02/27/2018    HGB 12.8 (L) 02/27/2018    HCT 38.5 (L) 02/27/2018    MCV 81 02/27/2018     02/27/2018         Chemistry        Component Value Date/Time     02/27/2018 0822    K 4.2 02/27/2018 0822     (H) 02/27/2018 0822    CO2 21 (L) 02/27/2018 0822    BUN 28 (H) 02/27/2018 0822    CREATININE 1.32 (H) 02/27/2018 0822     (H) 02/27/2018 0822        Component Value Date/Time    CALCIUM 9.9 02/27/2018 0822    ALKPHOS 87 08/16/2017 0816    AST 16 " 08/16/2017 0816    ALT 12 08/16/2017 0816    BILITOT 0.5 08/16/2017 0816            No results found for: FERRITIN        Assessment and Plan:  In summary Keanu Spence is a 67 y.o. year old male here for sleep disturbance.  1. Obstructive Sleep Apnea  We had an extensive conversation to review the results of his sleep study and to  him on the importance of treating sleep apnea.  Patient decided to proceed with CPAP. He will start using the device as soon as he receives it with the intention to use if for the entire night. We discussed some tips to increase PAP tolerance as well as the normal curve of adaptation. CPAP is going to provide improved respiratory function during the night but it can cause some sleep disruption that tends to improve with continuous usage. He should return to the clinic in 7 weeks to review compliance and efficacy monitoring.  The patient does not have any preference with regards to durable medical equipment company, he is open to using [employer] as his [employer] company.  2.  Periodic limb movement sleep  Most likely will resolve after optimal pressure therapy  3.  Other sleep disturbance     Patient verbalized understanding of these issues, agrees with the plan and all questions were answered today. Patient was given an opportuntity to voice any other symptoms or concerns not listed above. Patient did not have any other symptoms or concerns.        Gonzalo Rojas DO  Board Certified in Internal Medicine and Sleep Medicine  [facility].    We spent a total of 15 minutes of face-to-face encounter and more than 50% of the encounter was used for counseling or coordination of care.    (Note created with Dragon voice recognition and unintended spelling errors and word substitutions may occur)

## 2021-06-17 NOTE — TELEPHONE ENCOUNTER
Telephone Encounter by Jodi Mcgovern, RN at 9/18/2020  5:13 PM     Author: Jodi Mcgovern, RN Service: -- Author Type: Registered Nurse    Filed: 9/18/2020  5:13 PM Encounter Date: 9/16/2020 Status: Signed    : Jodi Mcgovern, RN (Registered Nurse)       Shira Vaca, Lucille Medrano, RN; [facility] Rn Support Pool 57 minutes ago (4:12 PM)       Isatu/Moni pt.  Please have him increase amiodarone to 200 mg two times a day x 2 weeks.  Remote next week, if still in AFl will likely need cardioversion.  Also, reschedule him to see Moni (it was cancelled after he saw Isatu).   Thanks,   Shira

## 2021-06-17 NOTE — TELEPHONE ENCOUNTER
Telephone Encounter by Jimenez Lo RN at 4/7/2020 10:23 AM     Author: Jimenez Lo RN Service: -- Author Type: RN, Care Manager    Filed: 4/7/2020 10:29 AM Encounter Date: 4/7/2020 Status: Attested    : Jimenez Lo RN (RN, Care Manager) Cosigner: Ken Keen MD at 4/7/2020 10:38 AM    Attestation signed by Ken Keen MD at 4/7/2020 10:38 AM    Ken Keen MD                ANTICOAGULATION  MANAGEMENT    Assessment     Today's INR result of 1.8 is Subtherapeutic (goal INR of 2.0-3.0)        Warfarin taken as previously instructed    Increased greens/vitamin K intake may be affecting INR- will cut back to normal greens.    No new medication/supplements affecting INR    Continues to tolerate warfarin with no reported s/s of bleeding or thromboembolism     Previous INR was Therapeutic    Plan:     Spoke with Keanu regarding INR result and instructed:     Warfarin Dosing Instructions:  Take booster dose of 5 mg today only then continue current warfarin dose    5 mg every Mon, Thu, Sat; 2.5 mg all other days         Instructed patient to follow up no later than: 2 weeks.    Education provided: importance of consistent vitamin K intake and importance of taking warfarin as instructed    Keanu verbalizes understanding and agrees to warfarin dosing plan.    Instructed to call the [facility] for any changes, questions or concerns. (#[phone])   ?   Jimenez Lo RN    Subjective/Objective:      Keanu Lanceman, a 69 y.o. male is on warfarin.     Keanu reports:     Home warfarin dose: verbally confirmed home dose with Keanu and updated on anticoagulation calendar     Missed doses: No     Medication changes:  No     S/S of bleeding or thromboembolism:  No     New Injury or illness:  No     Changes in diet or alcohol consumption:  Yes: had more salads     Upcoming surgery, procedure or cardioversion:  No    Anticoagulation Episode Summary     Current INR goal:    2.0-3.0   TTR:   70.8 % (11.1 mo)   Next INR check:   4/21/2020   INR from last check:   1.80! (4/7/2020)   Weekly max warfarin dose:      Target end date:      INR check location:      Preferred lab:      Send INR reminders to:   [facility]    Indications    Atypical atrial flutter (H) [I48.4]           Comments:            Anticoagulation Care Providers     Provider Role Specialty Phone number    Ken Keen MD  Internal Medicine [phone]

## 2021-06-17 NOTE — TELEPHONE ENCOUNTER
"Telephone Encounter by Lucille Zelaya RN at 9/16/2020 11:00 AM     Author: Lucille Zelaya RN Service: -- Author Type: Registered Nurse    Filed: 9/16/2020  1:08 PM Encounter Date: 9/16/2020 Status: Addendum    : Lucille Zelaya RN (Registered Nurse)    Related Notes: Original Note by Lucille Zelaya RN (Registered Nurse) filed at 9/16/2020 11:59 AM       Type: alert remote ICD transmission for AT/AF at least 24/24 hours.  Presenting rhythm: atrial flutter with ventricular sensing, rate 120-130 bpm. No VT/VF detected.  Battery/lead status: stable  Arrhythmias: since 8/3/20, 1634 mode switches, per trend appears to be in progress since 9/11/20, EGMs confirm atrial flutter with intermittent functional atrial undersensing, burden 17% (since 7/21/20), V-rates per logbook average 70's-90's.  No VT/VF detected  Anticoagulant: warfarin  Comments: normal ICD function. Routed to device RN.   Device/lead alerts: none. prd           Transmission reviewed, agree with above. Recently lengthy hospitalization and discharged yesterday. Alert received when patient returned home showing return of A.fllutter since 9/11/20. Per notes patient had episode of AF during hospitalization as well, but had returned to NSR at some point. While in hospital patient reported tightness in chest that seemed to resolved when back in NSR.       Reviewed with patient today that device reports show he is back in A.flutter again. He states he has had a nagging/phlegm producing cough and wonders if these coughing spells are putting him in AF. Also reports some chest tightness again, similar to prior episodes but \"not severe pain\". Patient wondered if still in AFL today, so had him repeat at device transmission, still showing AFL. See EGM below. Histograms and 3 month AF view also copied below.      Will review with Shira Zelaya, VERENA                            "

## 2021-06-17 NOTE — TELEPHONE ENCOUNTER
Telephone Encounter by Augusto Denton RN at 5/5/2020 11:09 AM     Author: Augusto Denton RN Service: -- Author Type: Registered Nurse    Filed: 5/5/2020 12:13 PM Encounter Date: 5/5/2020 Status: Attested    : Augusto Denton RN (Registered Nurse) Cosigner: Ken Keen MD at 5/5/2020 12:38 PM    Attestation signed by Ken Keen MD at 5/5/2020 12:38 PM    Ken Keen MD                ANTICOAGULATION  MANAGEMENT    Assessment     Today's INR result of 2.6 is Therapeutic (goal INR of 2.0-3.0)        Warfarin taken as previously instructed    No new diet changes affecting INR    No new medication/supplements affecting INR    Continues to tolerate warfarin with no reported s/s of bleeding or thromboembolism     Previous INR was Subtherapeutic    Plan:     Spoke with Keanu regarding INR result and instructed:     Warfarin Dosing Instructions:  Continue current warfarin dose 2.5 mg daily on sun/tue/thu; and 5 mg daily rest of week  (0 % change)    Instructed patient to follow up no later than: two weeks    Keanu verbalizes understanding and agrees to warfarin dosing plan.    Instructed to call the [facility] for any changes, questions or concerns. (#[phone])   ?   Augusto Denton RN    Subjective/Objective:      Keanu Spence, a 69 y.o. male is on warfarin.     Keanu reports:     Home warfarin dose: as updated on anticoagulation calendar per template     Missed doses: No     Medication changes:  No     S/S of bleeding or thromboembolism:  No     New Injury or illness:  No     Changes in diet or alcohol consumption:  No     Upcoming surgery, procedure or cardioversion:  No    Anticoagulation Episode Summary     Current INR goal:   2.0-3.0   TTR:   65.6 % (11.1 mo)   Next INR check:   5/19/2020   INR from last check:   2.60 (5/5/2020)   Weekly max warfarin dose:      Target end date:      INR check location:      Preferred lab:      Send INR reminders to:   ROMIE MIDWAY    Indications     Atypical atrial flutter (H) [I48.4]           Comments:            Anticoagulation Care Providers     Provider Role Specialty Phone number    Ken Keen MD  Internal Medicine [phone]

## 2021-06-18 NOTE — LETTER
Letter by María Elena Lopez RN at      Author: María Elena Lopez RN Service: -- Author Type: --    Filed:  Encounter Date: 2/4/2019 Status: (Other)          February 4, 2019       Keanu Spence   [address] [place] [address]         Dear Keanu Spence,     At [facility], your well-being is important to us. To help you reach your best health possible, we are now offering MyHealth Tracker - a free daily health monitoring program.      It's easy to participate in MyHealth Tracker using a telephone, computer, or smartphone. Every morning, you simply call a designated telephone number, login to MyHealth Tracker via a computer, or use our smartphone application to answer a few health questions. That's it!      If your health problem has changed, a registered nurse (RN) will connect with you for further assessment and next steps that could prevent an emergency or hospitalization.  If needed, the RN connects with your provider and care team for further advice.      Spending a few minutes each day monitoring your health can avoid unnecessary complications and identify ways that can improve your health and quality of life!      An informational brochure is included with this invitation. If you have questions or would like to start right away, call [employer] at [phone] weekdays between 8 a.m. and 4:30 p.m.      Thank you again for choosing us as your healthcare partner. We are committed to helping you live a healthier life.        Sincerely,  Your [facility] Care team

## 2021-06-18 NOTE — LETTER
Letter by Kyle Hernandez MD at      Author: Kyle Hernandez MD Service: -- Author Type: --    Filed:  Encounter Date: 1/15/2019 Status: (Other)       Keanu Spence  [address] [place] [address]    January 15, 2019    Dear Mr. Spence,    Welcome to [facility]! Your appointment information is below.   Please bring the following to your appointment:    Insurance Card, so we may scan it for our records    Drivers license or valid ID, so we may scan it for our records    Co-pay (as applicable per your insurance plan)    A current list of your medications including over the counter products such as vitamins and supplements    Your medical records including copies of X-Ray films if you are transferring your care from another clinic.  If you do not have your records, please fill out the release of information form and we will request those records.     Provider: Kyle Hernandez MD  Appointment Date: February 20, 2019  Arrival Time: 10:00am for PFT, 11:00am for Dr. Hernandez    Location: [facility]         [address]        [place], [address]    **Please allow adequate time for your commute and parking. If you are more than 10 minutes late, you may be asked to reschedule.     If you need to cancel or reschedule your appointment, please notify us at least 24 hours prior to your appointment time so we are able to make this time available for another patient.    Thank you for choosing the [facility] for your health care needs. If you have any questions, please do not hesitate to contact us at any time at   [phone]. We look forward to caring for you.     Sincerely,     [facility] staff

## 2021-06-18 NOTE — PROGRESS NOTES
Dear Dr. Gaurang Duncan MD  [address] [place] [address],    Thank you for the opportunity to participate in the care of Keanu Spence.     He is a 68 y.o. y/o male patient who comes to the sleep medicine clinic for follow up.  This is the patient's first clinical visit since starting CPAP therapy.  He states that he is not using his machine as much as he should because the mask was uncomfortable.    Compliance Download data for 30 days:  Pressure settin-15 CWP  Residual AHI: 3.3 events per hour  Leak: Minimal  Compliance: 0  Mask Tolerance: Good  Skin irritation: None      Past Medical History:   Diagnosis Date     Cardiomyopathy (H)      CHF (congestive heart failure) (H)      Chronic bronchitis (H)      Coronary artery disease      Diabetes mellitus (H)      Hypertension      Obesity      Peripheral vascular disease (H)      Sleep apnea     no cpap       Past Surgical History:   Procedure Laterality Date     CARDIAC CATHETERIZATION  2016     CARDIAC CATHETERIZATION  10/11/2016     to lad/om1 in stent occlusion     CORONARY ANGIOPLASTY       CORONARY ARTERY BYPASS GRAFT  10/98    [facility]     coronary stent  10/11/2016    bebe to left main and om1     coronary stents       FEMORAL BYPASS      left. [facility]       Social History     Social History     Marital status: Single     Spouse name: N/A     Number of children: N/A     Years of education: N/A     Occupational History     Not on file.     Social History Main Topics     Smoking status: Former Smoker     Packs/day: 0.20     Years: 40.00     Start date:      Quit date: 2016     Smokeless tobacco: Never Used      Comment: Patient stated he is trying to quit.      Alcohol use Yes      Comment: rare     Drug use: No     Sexual activity: Not on file     Other Topics Concern     Not on file     Social History Narrative       Current Outpatient Prescriptions   Medication Sig Dispense Refill     ACCU-CHEK COMPACT TEST  Strp Test daily before all meals/snacks and once before bedtime. 60 strip 12     albuterol (PROVENTIL HFA;VENTOLIN HFA) 90 mcg/actuation inhaler Inhale 2 puffs every 6 (six) hours as needed for wheezing. 8.5 g 11     amLODIPine (NORVASC) 5 MG tablet TAKE ONE TABLET BY MOUTH ONCE DAILY 90 tablet 2     aspirin 81 MG EC tablet Take 1 tablet (81 mg total) by mouth every evening. 90 tablet 3     atorvastatin (LIPITOR) 40 MG tablet TAKE ONE TABLET BY MOUTH DAILY AT BEDTIME 90 tablet 2     blood glucose test (ACCU-CHEK SMARTVIEW TEST STRIP) strips Dispense brand per patient's insurance at pharmacy discretion. Testing once a day only 90 strip 3     blood-glucose meter, drum-type (ACCU-CHEK COMPACT PLUS CARE) Kit Test daily before all meals/snacks and once before bedtime. 1 kit 0     CHANTIX 1 mg tablet Take 1 tablet by mouth 2 (two) times a day.  4     CHANTIX 1 mg tablet TAKE ONE TABLET BY MOUTH TWICE DAILY with a full glass of water 56 tablet 1     CHANTIX 1 mg tablet TAKE ONE TABLET BY MOUTH TWICE DAILY WITH A FULL GLASS OF WATER 56 tablet 0     clopidogrel (PLAVIX) 75 mg tablet TAKE ONE TABLET BY MOUTH DAILY 90 tablet 1     furosemide (LASIX) 20 MG tablet Take 1 tablet by mouth daily.  3     furosemide (LASIX) 20 MG tablet TAKE ONE TABLET BY MOUTH ONCE DAILY 90 tablet 3     generic lancets (ACCU-CHEK MULTICLIX LANCET) Test daily before all meals/snacks and once before bedtime. 3 each 0     glipiZIDE (GLUCOTROL XL) 5 MG 24 hr tablet TAKE ONE TABLET BY MOUTH ONCE DAILY 90 tablet 5     hydrALAZINE (APRESOLINE) 25 MG tablet TAKE ONE TABLET BY MOUTH EVERY EIGHT HOURS  270 tablet 2     hydroCHLOROthiazide (HYDRODIURIL) 25 MG tablet TAKE ONE TABLET BY MOUTH ONCE DAILY. START 1 WEEK FROM 3/3/17. 90 tablet 2     isosorbide mononitrate (IMDUR) 30 MG 24 hr tablet Take 1 tablet (30 mg total) by mouth 2 (two) times a day. 180 tablet 3     isosorbide mononitrate (IMDUR) 30 MG 24 hr tablet TAKE ONE TABLET BY MOUTH TWICE DAILY  180  "tablet 3     lisinopril (PRINIVIL,ZESTRIL) 20 MG tablet TAKE ONE TABLET BY MOUTH TWICE DAILY  180 tablet 2     metFORMIN (GLUCOPHAGE) 850 MG tablet Take 1 tablet (850 mg total) by mouth 2 (two) times a day with meals. 180 tablet 0     metoprolol succinate (TOPROL-XL) 200 MG 24 hr tablet TAKE ONE TABLET BY MOUTH ONCE DAILY 90 tablet 2     multivitamin with minerals (THERA-M) 9 mg iron-400 mcg Tab tablet Take 1 tablet by mouth daily.       No current facility-administered medications for this visit.        No Known Allergies    Physical Exam:  /85 Comment: UR  Pulse 65  Ht 6' 0.5\" (1.842 m)  Wt 218 lb 9.6 oz (99.2 kg)  SpO2 98%  BMI 29.24 kg/m2  BMI:Body mass index is 29.24 kg/(m^2).   GEN: NAD,   Psych: normal mood, normal affect    Labs/Studies:     I reviewed the efficacy and compliance report from his device. Data summarized on the HPI and the CPAP compliance flow sheet.     Lab Results   Component Value Date    WBC 5.5 02/27/2018    HGB 12.8 (L) 02/27/2018    HCT 38.5 (L) 02/27/2018    MCV 81 02/27/2018     02/27/2018         Chemistry        Component Value Date/Time     02/27/2018 0822    K 4.2 02/27/2018 0822     (H) 02/27/2018 0822    CO2 21 (L) 02/27/2018 0822    BUN 28 (H) 02/27/2018 0822    CREATININE 1.32 (H) 02/27/2018 0822     (H) 02/27/2018 0822        Component Value Date/Time    CALCIUM 9.9 02/27/2018 0822    ALKPHOS 87 08/16/2017 0816    AST 16 08/16/2017 0816    ALT 12 08/16/2017 0816    BILITOT 0.5 08/16/2017 0816            No results found for: FERRITIN         Assessment and Plan:  In summary Keanu Spence is a 68 y.o. year old male who is here for compliance download review.    1.  Obstructive sleep apnea on CPAP  I educated patient on the CPAP desensitization protocol and would like him to try to implement this and so far as he can.  I also encouraged him to get another mask fitting so that he can receive a different mask when it is time for him to get " a new mask.  I would like him to follow-up with me in 6 weeks.  He is to bring his machine with him.  2.  Other sleep disturbance  3.  Elevated blood pressure reading  I will have the patient's blood pressure readings repeated during this clinic visit. I strongly advised the patient to follow up with PCP in one week.     Patient verbalized understanding of these issues, agrees with the plan and all questions were answered today. Patient was given an opportuntity to voice any other symptoms or concerns not listed above. Patient did not have any other symptoms or concerns.      Patient told to return in 6 weeks. Patient instructed to stop at  to schedule appointment before leaving today.    Gonzalo Rojas DO  Board Certified in Internal Medicine and Sleep Medicine  [facility].    I spent a total of 15 minutes of face-to-face encounter and more than 50% of the encounter was used for counseling or coordination of care.    (Note created with Dragon voice recognition and unintended spelling errors and word substitutions may occur)

## 2021-06-18 NOTE — LETTER
Letter by Kyle Hernandez MD at      Author: Kyle Hernandez MD Service: -- Author Type: --    Filed:  Encounter Date: 2/20/2019 Status: (Other)       Gaurang Duncan MD  [address] [place] [address]                                  February 20, 2019    Patient: Keanu Spence   MR Number: [id number]   YOB: 1950   Date of Visit: 2/20/2019     Dear Dr. Dakota MD:    Thank you for referring Keanu Spence to me for evaluation. Below are the relevant portions of my assessment and plan of care.    If you have questions, please do not hesitate to call me. I look forward to following Keanu along with you.    Sincerely,        Kyle Hernandez MD          CC  No Recipients  Kyle Hernandez MD  2/20/2019 11:37 AM  Sign at close encounter  Assessment and Plan:Keanu Spence is a 68 y.o. with a past medical history significant for prior CABG, severe heart failure, and appeared resumptive diagnosis of CHF who presents to clinic today for follow-up of hospitalization last month for shortness of breath, during which she was treated for bronchitis, COPD exacerbation.  During that hospitalization a chest CT showed small right greater than left bilateral effusions, and a thoracic adenopathy that was thought to be most likely reactive with a 3-month follow-up recommended.  Upon discharge his Lasix was increased from 20-40 mg p.o. daily.  He states that his cough and shortness of breath have completely resolved for 2 weeks now.  He currently has no respiratory complaints.  He has a as needed albuterol inhaler, however has not needed to use it for the last 1-2 weeks.  He has not smoked since 2017, however has a greater than 30-pack-year history before then.  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    1/20/2019 PFTs: No evidence of obstructive lung disease.  The patient however did have  restriction with a TLC of 61% of predicted and a decreased diffusing capacity at 79% of predicted when corrected for hemoglobin.  No prior PFTs for comparison.    I suspect that his mild restrictive lung disease is due to his cardiomegaly, history of thoracic surgery, and small bilateral effusions likely related to heart failure.    In April:  - repeat CT scan to follow-up on mild upper paratracheal and precarinal adenopathy adenopathy seen on 1/22/2019 chest CT  - Repeat spirometry with DLCO next visit to evaluate for stability    If all looks well, then annual:  - lung cancer screening CTs  - Pulmonary Functioning Testing to evaluate for stability in restriction      CCx: Follow-up hospitalization for shortness of breath    HPI: The patient states that the shortness of breath and cough for which she was hospitalized last month has completely resolved over the past 2 weeks.  He has not needed to use his as needed albuterol inhaler for the past 1-2 weeks, and is on no other inhalers.  His lower extremity swelling has greatly improved since a increased on his Lasix ultimately from 20-40 mg daily.  He denies any shortness of breath or chest pain and has no current respiratory complaints.  He is primarily interested in following up on his CT scan which showed enlarged lymph nodes.    PMH:  Past Medical History:   Diagnosis Date   ? Cardiomyopathy (H)    ? CHF (congestive heart failure) (H)    ? Chronic bronchitis (H)    ? Coronary artery disease    ? Diabetes mellitus (H)    ? HLD (hyperlipidemia)    ? Hypertension    ? Obesity    ? Peripheral vascular disease (H)    ? Sleep apnea     no cpap       PSH:  Past Surgical History:   Procedure Laterality Date   ? CARDIAC CATHETERIZATION  09/19/2016   ? CARDIAC CATHETERIZATION  10/11/2016     to lad/om1 in stent occlusion   ? CORONARY ANGIOPLASTY     ? CORONARY ARTERY BYPASS GRAFT  10/98    [facility]   ? coronary stent  10/11/2016    bebe to left main and om1   ? coronary  stents     ? FEMORAL BYPASS  2002    left. [facility]       SH:  Social History     Socioeconomic History   ? Marital status: Single     Spouse name: Not on file   ? Number of children: Not on file   ? Years of education: Not on file   ? Highest education level: Not on file   Occupational History   ? Not on file   Social Needs   ? Financial resource strain: Not on file   ? Food insecurity:     Worry: Not on file     Inability: Not on file   ? Transportation needs:     Medical: Not on file     Non-medical: Not on file   Tobacco Use   ? Smoking status: Former Smoker     Packs/day: 0.20     Years: 40.00     Pack years: 8.00     Start date:      Last attempt to quit: 2016     Years since quittin.2   ? Smokeless tobacco: Never Used   Substance and Sexual Activity   ? Alcohol use: Yes     Comment: rare   ? Drug use: No   ? Sexual activity: Not on file   Lifestyle   ? Physical activity:     Days per week: Not on file     Minutes per session: Not on file   ? Stress: Not on file   Relationships   ? Social connections:     Talks on phone: Not on file     Gets together: Not on file     Attends L[employer] service: Not on file     Active member of club or organization: Not on file     Attends meetings of clubs or organizations: Not on file     Relationship status: Not on file   ? Intimate partner violence:     Fear of current or ex partner: Not on file     Emotionally abused: Not on file     Physically abused: Not on file     Forced sexual activity: Not on file   Other Topics Concern   ? Not on file   Social History Narrative   ? Not on file   Patient quit smoking 2 years ago, had a prior greater than 30-pack-year history.  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  He had no known exhaust or other inhalational exposures there.  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  No recent travel or sick  contacts    Family history:  Family History   Problem Relation Age of Onset   ? Diabetes Mother    ? Diabetes Father    ? Heart disease Father    ? Diabetes Sister    ? Hypertension Sister    ? Diabetes Brother    ? Hypertension Brother    No known family history of lung cancer or other lung disease.  No known family history.    ROS:  A review of 12 organ systems was performed with pertinent positives and negatives noted in the HPI.    Current Meds:  Current Outpatient Medications   Medication Sig   ? ACCU-CHEK COMPACT TEST Strp Test daily before all meals/snacks and once before bedtime.   ? acetaminophen (TYLENOL) 325 MG tablet Take 2 tablets (650 mg total) by mouth every 4 (four) hours as needed.   ? albuterol (PROAIR HFA;PROVENTIL HFA;VENTOLIN HFA) 90 mcg/actuation inhaler Inhale 2 puffs every 4 (four) hours as needed for wheezing or shortness of breath.   ? amLODIPine (NORVASC) 5 MG tablet Take 1 tablet (5 mg total) by mouth daily.   ? aspirin 81 MG EC tablet Take 1 tablet (81 mg total) by mouth every evening.   ? atorvastatin (LIPITOR) 40 MG tablet TAKE ONE TABLET BY MOUTH DAILY AT BEDTIME   ? blood glucose test (ACCU-CHEK SMARTVIEW TEST STRIP) strips Dispense brand per patient's insurance at pharmacy discretion. Testing once a day only   ? blood-glucose meter, drum-type (ACCU-CHEK COMPACT PLUS CARE) Kit Test daily before all meals/snacks and once before bedtime.   ? furosemide (LASIX) 40 MG tablet Take 1 tablet (40 mg total) by mouth daily.   ? generic lancets (ACCU-CHEK MULTICLIX LANCET) Test daily before all meals/snacks and once before bedtime.   ? glipiZIDE (GLUCOTROL XL) 5 MG 24 hr tablet TAKE ONE TABLET BY MOUTH ONCE DAILY   ? hydrALAZINE (APRESOLINE) 25 MG tablet TAKE ONE TABLET BY MOUTH EVERY EIGHT HOURS   ? isosorbide mononitrate (IMDUR) 30 MG 24 hr tablet Take 1 tablet (30 mg total) by mouth 2 (two) times a day.   ? metoprolol succinate (TOPROL-XL) 200 MG 24 hr tablet Take 1 tablet (200 mg total) by  mouth daily.   ? mirtazapine (REMERON) 15 MG tablet Take 1 tablet (15 mg total) by mouth at bedtime.   ? multivitamin with minerals (THERA-M) 9 mg iron-400 mcg Tab tablet Take 1 tablet by mouth daily.   ? warfarin (COUMADIN/JANTOVEN) 2.5 MG tablet Take 5 mg M,W,F and 2.5 mg all other days.       Labs:  Recent Results (from the past 72 hour(s))   POCT INR   Result Value Ref Range    INR 1.2        I have personally reviewed all imaging and PFT data available pertinent to this visit.    Imaging studies:  Xr Chest 1 View Portable    Result Date: 1/28/2019  XR CHEST 1 VIEW PORTABLE 1/27/2019 11:28 PM INDICATION: Dyspnea COMPARISON: 01/15/2019 FINDINGS: Cardiomegaly is unchanged. Pulmonary vascularity normal. Prior median sternotomy and CABG. The lungs are clear. Small effusions seen within the costophrenic angles on the previous study have improved.      Physical Exam:  BP 94/60   Pulse 65   Resp 18   Wt 203 lb (92.1 kg)   SpO2 97% Comment: RA  BMI 28.31 kg/m     General - Well nourished  Ears/Mouth - OP pink moist, no thrush  Neck - no cervical lymphadenopathy  Lungs - Clear to ausculation bilaterally, no crackles or wheezes appreciated  CVS - regular rhythm with no murmurs, rubs or gallups  Abdomen - soft, NT, ND, NABS  Ext - no cyanosis or clubbing. Trace LE edema bilat   Skin - no rash  Psychology - alert and oriented, answers appropriate        Electronically signed by:    Damian Hernandez MD  [facility]monary [facility]

## 2021-06-19 NOTE — LETTER
Letter by Ken Keen MD at      Author: Ken Keen MD Service: -- Author Type: --    Filed:  Encounter Date: 9/5/2019 Status: (Other)         [facility] INTERNAL MEDICINE  09/05/19    Patient: Keanu Spence  YOB: 1950  Medical Record Number: [id number]  CSN: [id number]                                                                              Non-opioid Controlled Substance Agreement    I understand that my care provider has prescribed a controlled substance to help manage my condition(s). I am taking this medicine to help me function or work. I know this is strong medicine, and that it can cause serious side effects. Controlled substances can be sedating, addicting and may cause a dependency on the drug. They can affect my ability to drive or think, and cause depression. They need to be taken exactly as prescribed. Combining controlled substances with certain medicines or chemicals (such as cocaine, sedatives and tranquilizers, sleeping pills, meth) can be dangerous or even fatal. Also, if I stop controlled substances suddenly, I may have severe withdrawal symptoms.  If not helpful, I may be asked to stop them.    The risks, benefits, and side effects of these medicine(s) were explained to me. I agree that:    1. I will take part in other treatments as advised by my care team. This may be psychiatry or counseling, physical therapy, behavioral therapy, group treatment or a referral to a pain clinic. I will reduce or stop my medicine when my care team tells me to do so.  2. I will take my medicines as prescribed. I will not change the dose or schedule unless my care team tells me to. There will be no refills if I run out early.  I may be contactedwithout warning and asked to complete a urine drug test or pill count at any time.   3. I will keep all my appointments, and understand this is part of the monitoring of controlled substances. My care team may require an office visit for EVERY controlled  substance refill. If I miss appointments or dont follow instructions, my care team may stop my medicine.  4. I will not ask other providers to prescribe controlled substances, and I will not accept controlled substances from other people. If I need another prescribed controlled substance for a new reason, I will tell my care team within 1 business day.  5. I will use one pharmacy to fill all of my controlled substance prescriptions, and it is up to me to make sure that I do not run out of my medicines on weekends or holidays. If my care team is willing to refill my controlled substance prescription without a visit, I must request refills only during office hours, refills may take up to 3 days to process, and it may take up to 5 to 7 days for my medicine to be mailed and ready at my pharmacy. Prescriptions will not be mailed anywhere except my pharmacy.    6. I am responsible for my prescriptions. If the medicine/prescription is lost or stolen, it will not be replaced. I also agree not to share controlled substance medicines with anyone.          [facility] INTERNAL MEDICINE  09/05/19  Patient:  Keanu Spence  YOB: 1950  Medical Record Number: [id number]  CSN: [id number]    7. I agree to not use ANY illegal or recreational drugs. This includes marijuana, cocaine, bath salts or other drugs. I agree not to use alcohol unless my care team says I may. I agree to give urine samples whenever asked. If I dont give a urine sample, the care team may stop my medicine.    8. If I enroll in the [place] Medical Marijuana program, I will tell my care team. I will also sign an agreement to share my medical records with my care team.    9. I will bring in my list of medicines (or my medicine bottles) each time I come to the clinic.   10. I will tell my care team right away if I become pregnant or have a new medical problem treated outside of my regular clinic.  11. I understand that this medicine can affect my thinking and  judgment. It may be unsafe for me to drive, use machinery and do dangerous tasks. I will not do any of these things until I know how the medicine affects me. If my dose changes, I will wait to see how it affects me. I will contact my care team if I have concerns about medicine side effects.    I understand that if I do not follow any of the conditions above, my prescriptions or treatment may be stopped.      I agree that my provider, clinic care team, and pharmacy may work with any city, state or federal law enforcement agency that investigates the misuse, sale, or other diversion of my controlled medicine. I will allow my provider to discuss my care with or share a copy of this agreement with any other treating provider, pharmacy or emergency room where I receive care. I agree to give up (waive) any right of privacy or confidentiality with respect to these consents.   I have read this agreement and have asked questions about anything I did not understand.    ___________________________________________________________________________  Patient signature - Date/Time  -Keanu Spence                                      ___________________________________________________________________________  Witness signature                                                                    ___________________________________________________________________________  Provider signature- Ken Keen MD

## 2021-06-19 NOTE — LETTER
Letter by Ken Keen MD at      Author: Ken Keen MD Service: -- Author Type: --    Filed:  Encounter Date: 5/7/2019 Status: (Other)         Keanu Spence  [address] [place] [address]     May 7, 2019     Dear Mr. Spence,    Below are the results from your recent visit:    Resulted Orders   Microalbumin, Random Urine   Result Value Ref Range    Microalbumin, Random Urine 1.14 0.00 - 1.99 mg/dL    Creatinine, Urine 25.0 mg/dL    Microalbumin/Creatinine Ratio Random Urine 45.6 (H) <=19.9 mg/g    Narrative    Microalbumin, Random Urine  <2.0 mg/dL . . . . . . . . Normal  3.0-30.0 mg/dL . . . . . . Microalbuminuria  >30.0 mg/dL . . . . . .  . Clinical Proteinuria  Microalbumin/Creatinine Ratio, Random Urine  <20 mg/g . . . . .. . . . Normal   mg/g . . . . . . . Microalbuminuria  >300 mg/g . . . . . . . . Clinical Proteinuria   PSA, Annual Screen (Prostatic-Specific Antigen)   Result Value Ref Range    PSA 1.9 0.0 - 4.5 ng/mL    Narrative    Method is Abbott Prostate-Specific Antigen (PSA)  Standard-WHO 1st International (90:10)   Glycosylated Hemoglobin A1c   Result Value Ref Range    Hemoglobin A1c 6.6 (H) 3.5 - 6.0 %   Basic Metabolic Panel   Result Value Ref Range    Sodium 144 136 - 145 mmol/L    Potassium 4.1 3.5 - 5.0 mmol/L    Chloride 109 (H) 98 - 107 mmol/L    CO2 25 22 - 31 mmol/L    Anion Gap, Calculation 10 5 - 18 mmol/L    Glucose 116 70 - 125 mg/dL    Calcium 10.6 (H) 8.5 - 10.5 mg/dL    BUN 30 (H) 8 - 22 mg/dL    Creatinine 1.32 (H) 0.70 - 1.30 mg/dL    GFR MDRD Af Amer >60 >60 mL/min/1.73m2    GFR MDRD Non Af Amer 54 (L) >60 mL/min/1.73m2    Narrative    Fasting Glucose reference range is 70-99 mg/dL per  American Diabetes Association (ADA) guidelines.       Your tests are good. The abnormalities are minor.  The protein in the urine is noted.  The lisinopril medication is very helpful for that.  It is mild and we will follow that.  The kidney function is also satisfactory. The  diabetic control is good.      Please call with questions or contact us using [employer].    Sincerely,        Electronically signed by Ken Keen MD

## 2021-06-19 NOTE — PROGRESS NOTES
Patient returned his machine today due to not meeting compliance. He has a follow up with Dr. Rojas towards the end of the July, he would like to cancel this appointment.

## 2021-06-19 NOTE — LETTER
Letter by Ken Keen MD at      Author: Ken Keen MD Service: -- Author Type: --    Filed:  Encounter Date: 10/30/2019 Status: Signed       October 30, 2019     Patient: Keanu Spence   YOB: 1950   Date of Visit: 10/30/2019     To Whom It May Concern:    It is my medical opinion that Keanu Spence has to have major circulation surgery on 11/12/2019 and will not be medically ready for jury duty for a period of 2 months following that surgery. .    If you have any questions or concerns, please don't hesitate to call.    Sincerely,        Electronically signed by Ken Keen MD

## 2021-06-19 NOTE — LETTER
Letter by Kyle Hernandez MD at      Author: Kyle Hernandez MD Service: -- Author Type: --    Filed:  Encounter Date: 4/15/2019 Status: (Other)         Gaurang Duncan MD  [address] [place] [address]                                  April 17, 2019    Patient: Keanu Spence   MR Number: [id number]   YOB: 1950   Date of Visit: 4/15/2019     Dear Dr. Dakota MD:    Thank you for referring Keanu Spence to me for evaluation. Below are the relevant portions of my assessment and plan of care.    If you have questions, please do not hesitate to call me. I look forward to following Keanu along with you.    Sincerely,        Kyle Hernandez MD          CC  No Recipients  Kyle Hernandez MD  4/17/2019 10:13 PM  Sign at close encounter  Assessment and Plan:  Keanu Spence is a 68 y.o. with a past medical history significant for prior CABG, severe heart failure, and appeared resumptive diagnosis of CHF who presented to clinic for follow-up of his hospitalization in Jan 2019 for shortness of breath, during which she was treated for bronchitis, COPD exacerbation.  During that hospitalization a chest CT showed small right greater than left bilateral effusions, and a thoracic adenopathy that was thought to be most likely reactive with a 3-month follow-up recommended.  Upon discharge his Lasix was increased from 20-40 mg p.o. daily.  His cough and shortness of breath have completely resolved.  He currently has no respiratory complaints.  He has an as needed albuterol inhaler, however rarely needs it.  He has not smoked since 2017, however has a greater than 30-pack-year history before then.  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     1/20/2019 PFTs: No evidence of obstructive lung disease.  The patient however did have restriction with a TLC of 61% of predicted and a decreased  diffusing capacity at 79% of predicted when corrected for hemoglobin.  No prior PFTs for comparison.      4/12/2019 repeat CT scan showed resolution of the mediastinal adenopathy adenopathy, pleural effusions and parenchymal GGOs thought 2/2 heart failure seen on 1/22/2019 chest CT      4/15/2019 román/DLCO: diffusing capacity improved, now normal. FVC not significantly changed (though slight increased from 2.62 to 2.68 L)     His mild restrictive lung disease is likely due to his cardiomegaly and history of thoracic surgery. His GGOs and small bilateral effusions were likely related to heart failure and have now resolved since increasing lasix.    - patient is interested in annual lung cancer screening CTs - next one due in 4/2020, will schedule clinic visit to re-assess at that time  - Pulmonary Functioning Testing  at next year's clinic appointment to evaluate for stability in  mild restriction        CCx: follow-up shortness of breath     HPI: Mr. Spence states that his breathing continues to feel well.  He denies shortness of breath, cough, chest pain, or other respiratory complaints.  He has not needed to use his albuterol inhaler recently.    ROS:  A review of 12 organ systems was performed with pertinent positives and negatives noted in the HPI.      Current Meds:  Current Outpatient Medications   Medication Sig   ? ACCU-CHEK COMPACT TEST Strp Test daily before all meals/snacks and once before bedtime.   ? acetaminophen (TYLENOL) 325 MG tablet Take 2 tablets (650 mg total) by mouth every 4 (four) hours as needed.   ? albuterol (PROAIR HFA;PROVENTIL HFA;VENTOLIN HFA) 90 mcg/actuation inhaler Inhale 2 puffs every 4 (four) hours as needed for wheezing or shortness of breath.   ? amLODIPine (NORVASC) 5 MG tablet Take 1 tablet (5 mg total) by mouth daily.   ? aspirin 81 MG EC tablet Take 1 tablet (81 mg total) by mouth every evening.   ? atorvastatin (LIPITOR) 40 MG tablet TAKE ONE TABLET BY MOUTH DAILY AT  BEDTIME   ? blood glucose test (ACCU-CHEK SMARTVIEW TEST STRIP) strips Dispense brand per patient's insurance at pharmacy discretion. Testing once a day only   ? blood-glucose meter, drum-type (ACCU-CHEK COMPACT PLUS CARE) Kit Test daily before all meals/snacks and once before bedtime.   ? furosemide (LASIX) 40 MG tablet Take 1 tablet (40 mg total) by mouth daily.   ? generic lancets (ACCU-CHEK MULTICLIX LANCET) Test daily before all meals/snacks and once before bedtime.   ? glipiZIDE (GLUCOTROL XL) 5 MG 24 hr tablet TAKE ONE TABLET BY MOUTH ONCE DAILY   ? hydrALAZINE (APRESOLINE) 25 MG tablet TAKE ONE TABLET BY MOUTH EVERY EIGHT HOURS   ? hydroCHLOROthiazide (HYDRODIURIL) 25 MG tablet Take 25 mg by mouth daily.   ? isosorbide mononitrate (IMDUR) 30 MG 24 hr tablet Take 1 tablet (30 mg total) by mouth 2 (two) times a day.   ? lisinopril (PRINIVIL,ZESTRIL) 20 MG tablet Take 20 mg by mouth 2 (two) times a day.   ? metFORMIN (GLUCOPHAGE) 850 MG tablet Take 850 mg by mouth 2 (two) times a day with meals.   ? metFORMIN (GLUCOPHAGE) 850 MG tablet TAKE ONE TABLET BY MOUTH TWICE DAILY WITH MEALS   ? metoprolol succinate (TOPROL-XL) 200 MG 24 hr tablet Take 1 tablet (200 mg total) by mouth daily.   ? multivitamin with minerals (THERA-M) 9 mg iron-400 mcg Tab tablet Take 1 tablet by mouth daily.   ? warfarin (COUMADIN/JANTOVEN) 2.5 MG tablet Take 5 mg M,W,F and 2.5 mg all other days.       Labs:  No results found for this or any previous visit (from the past 72 hour(s)).    I have personally reviewed all pertinent imaging studies and PFT results unless otherwise noted.    Imaging studies:  Ct Chest Without Contrast    Result Date: 4/12/2019  EXAM: CT CHEST WO CONTRAST LOCATION: [facility] DATE/TIME: 4/12/2019 8:57 AM INDICATION: Neoplasm: chest, lung, suspected COMPARISON: 01/22/2019 TECHNIQUE: Helical images were obtained through the chest. Multiplanar reformats were obtained. Dose reduction techniques were used. IV  "CONTRAST: None. FINDINGS: LUNGS AND PLEURA: 4 mm calcified granuloma at each base. Lungs otherwise clear. Resolution of pleural effusions. MEDIASTINUM: Cardiomegaly. Previous CABG. No lymphadenopathy. LIMITED UPPER ABDOMEN: Prior cholecystectomy. Dense atherosclerotic calcifications. MUSCULOSKELETAL: No suspicious lesions. Postoperative changes of sternum and xiphoid.     CONCLUSION: 1.  No evidence for malignancy. No lymphadenopathy. 2.  Improvement in changes of CHF but modest cardiomegaly persists following CABG.         Physical Exam:  /60   Pulse 68   Ht 5' 11\" (1.803 m)   Wt 207 lb (93.9 kg)   SpO2 95%   BMI 28.87 kg/m     General - Well nourished  Ears/Mouth -  OP pink moist, no thrush  Neck - no cervical lymphadenopathy  Lungs - Clear to ausculation bilaterally   CVS - regular rhythm with no murmurs, rubs or gallups  Abdomen - soft, NT, ND, NABS  Ext - no cyanosis, clubbing or edema  Skin - no rash  Psychology - alert and oriented, answers appropriate        Electronically signed by:    Damian Hernandez MD  [facility] Pulmonary and Critical Care Medicine       "

## 2021-06-20 NOTE — LETTER
Letter by Melissa Romero RDCS at      Author: Melissa Romero RDCS Service: -- Author Type: --    Filed:  Encounter Date: 7/16/2020 Status: (Other)         Keanu Spence  [address] [place] [address]      July 16, 2020      Dear Mr. Spence,    RE: Remote Results    We are writing to you regarding your recent Remote ICD check from home. Your transmission was received successfully. Battery status is satisfactory at this time.     Your results are being reviewed.  You will be contacted if further information is necessary.     Your next device appointment will be a remote check on October 18, 2020; this will occur automatically.    To schedule or reschedule, please call [phone] and press 1.    NOTE: If you would like to do an extra transmission, please call [phone] and press 3 to speak to a nurse BEFORE transmitting. This ensures that the Device Clinic staff is aware of the reason you are sending a transmission, and can follow-up with you after it has been reviewed.    We will be checking your implanted device from home (remotely) every three months unless otherwise instructed. We will need to see you in the clinic at least once a year. You may need to be seen in the clinic sooner depending on the results of your check.    Please be aware:    The follow-up schedule is like a Physician prescription.    Your remote monitor is paired to your specific implanted device.      Sincerely,    [facility] Device [facility]

## 2021-06-20 NOTE — LETTER
Letter by Melissa Romero RDCS at      Author: Melissa Romero RDCS Service: -- Author Type: --    Filed:  Encounter Date: 3/4/2020 Status: (Other)         Keanu Spence  [address] [place] [address]      March 4, 2020      Dear Mr. Spence,    RE: Remote Results    We are writing to you regarding your recent Remote ICD check from home. Your transmission was received successfully. Battery status is satisfactory at this time.     Your results are showing no significant changes.    Your next device appointment will be a clinic visit.  Please call in March to schedule.      To schedule or reschedule, please call [phone] and press 1.    NOTE: If you would like to do an extra transmission, please call [phone] and press 3 to speak to a nurse BEFORE transmitting. This ensures that the Device Clinic staff is aware of the reason you are sending a transmission, and can follow-up with you after it has been reviewed.    We will be checking your implanted device from home (remotely) every three months unless otherwise instructed. We will need to see you in the clinic at least once a year. You may need to be seen in the clinic sooner depending on the results of your check.    Please be aware:    The follow-up schedule is like a Physician prescription.    Your remote monitor is paired to your specific implanted device.      Sincerely,    [facility] Device [facility]

## 2021-06-20 NOTE — LETTER
Letter by Yolanda Caputo RN at      Author: Yolanda Caputo RN Service: -- Author Type: --    Filed:  Encounter Date: 8/3/2020 Status: (Other)         Keanu Spence  [address] [place] [address]      August 3, 2020      Dear Mr. Spence,    RE: Remote Results    We are writing to you regarding your recent Remote ICD check from home. Your transmission was received successfully. Battery status is satisfactory at this time.     Your results are within normal limits.    Your next device appointment will be a clinic visit on 8/17/2020 at our  [facility] location, [address] [place] [address].    To schedule or reschedule, please call [phone] and press 1.    NOTE: If you would like to do an extra transmission, please call [phone] and press 3 to speak to a nurse BEFORE transmitting. This ensures that the Device Clinic staff is aware of the reason you are sending a transmission, and can follow-up with you after it has been reviewed.    We will be checking your implanted device from home (remotely) every three months unless otherwise instructed. We will need to see you in the clinic at least once a year. You may need to be seen in the clinic sooner depending on the results of your check.    Please be aware:    The follow-up schedule is like a Physician prescription.    Your remote monitor is paired to your specific implanted device.      Sincerely,    [facility] Device [facility]

## 2021-06-20 NOTE — PROGRESS NOTES
ASSESSMENT and plan  1. Diabetes mellitus (H)  In light of the symptoms affecting his feet and rechecking A1c today.  Last A1c was 7.3.  Rechecking a BMP as well.  - Basic Metabolic Panel  - Glycosylated Hemoglobin A1c    2. Paresthesia of both feet  He is noted from shooting pain with it only occurs at night and some numbness again that occurs at night, he does not notice any symptoms in his hands.  No changes in ambulation noted.  Foot exam today was normal  - Basic Metabolic Panel    3. Essential hypertension with goal blood pressure less than 140/90  Blood pressure rechecked he was normotensive, rechecking a lipid profile taking medications faithfully.  - Lipid Profile    4. Coronary artery disease involving coronary bypass graft of native heart with unspecified angina pectoris  No exercise intolerance walks daily does yardwork with no chest pain    5. Influenza vaccination given    - Influenza, Seasonal,Quad Inj, 36+ MOS    6. NIKKI (obstructive sleep apnea)  Reviewed notes from pulmonology no changes made to treatment of sleep apnea          There are no Patient Instructions on file for this visit.    Orders Placed This Encounter   Procedures     Influenza, Seasonal,Quad Inj, 36+ MOS     Basic Metabolic Panel     Lipid Profile     Order Specific Question:   Fasting is required?     Answer:   Yes     Glycosylated Hemoglobin A1c     There are no discontinued medications.    No Follow-up on file.    CHIEF COMPLAINT:  Chief Complaint   Patient presents with     Diabetes       HISTORY OF PRESENT ILLNESS:  Keanu is a 68 y.o. male     Who is here for a follow-up.  Saw his pulmonologist recently no changes were made he feels well.  Slightly perturbed because his pharmacy stated that he has not been seeing his doctor regularly for refills of medications.  He notes no chest pain shortness of breath he has noticed some tingling and shooting pain that occurs only at night for a few minutes in both feet not related to  "activity.  Sometimes it disturbed his sleep.  No chest pain noted no other symptoms noted at this time he would like a flu shot    REVIEW OF SYSTEMS:     Neuro positive for tingling in his feet that occurs at night  All other 10 point review systems are negative.    PFSH:     now retired    TOBACCO USE:  History   Smoking Status     Former Smoker     Packs/day: 0.20     Years: 40.00     Start date: 1971     Quit date: 12/5/2016   Smokeless Tobacco     Never Used     Comment: Patient stated he is trying to quit.        VITALS:  Vitals:    08/28/18 0759   BP: 134/76   Pulse: 73   Resp: 18   Temp: 98.4  F (36.9  C)   TempSrc: Oral   SpO2: 97%   Weight: 220 lb 1 oz (99.8 kg)   Height: 6' 0.5\" (1.842 m)     Wt Readings from Last 3 Encounters:   08/28/18 220 lb 1 oz (99.8 kg)   06/13/18 218 lb 9.6 oz (99.2 kg)   04/19/18 221 lb 6.4 oz (100.4 kg)       PHYSICAL EXAM:    Interactive -American gentleman sitting comfortably exam room in no acute distress  HEENT neck supple mucous membranes moist no sinus tenderness noted  Respiratory system clear to auscultation equal breath sounds no wheezes no crackles  CVS irregularly regular no murmurs rubs or gallops appreciated peripheral pulses are good  CNS cranial nerves II to XII intact digital sensation in both feet is intact    DATA REVIEWED:  Additional History from Old Records Summarized (2):   Reviewed notes from  from pulmonology CPAP desensitization will be started  Decision to Obtain Records (1): 0  Radiology Tests Summarized or Ordered (1): 0  Labs Reviewed or Ordered (1): bmp, lipid, hemoglobin a1c  Medicine Test Summarized or Ordered (1): 0  Independent Review of EKG or X-RAY(2 each): 0    The visit lasted a total of 20 minutes face to face with the patient. Over 50% of the time was spent counseling and educating the patient about   Hypertension, paresthesia, hyperlipidemia..    MEDICATIONS:  Current Outpatient Prescriptions   Medication Sig " Dispense Refill     ACCU-CHEK COMPACT TEST Strp Test daily before all meals/snacks and once before bedtime. 60 strip 12     albuterol (PROVENTIL HFA;VENTOLIN HFA) 90 mcg/actuation inhaler Inhale 2 puffs every 6 (six) hours as needed for wheezing. 8.5 g 11     amLODIPine (NORVASC) 5 MG tablet TAKE ONE TABLET BY MOUTH ONCE DAILY 90 tablet 2     aspirin 81 MG EC tablet Take 1 tablet (81 mg total) by mouth every evening. 90 tablet 3     atorvastatin (LIPITOR) 40 MG tablet TAKE ONE TABLET BY MOUTH DAILY AT BEDTIME 90 tablet 1     blood glucose test (ACCU-CHEK SMARTVIEW TEST STRIP) strips Dispense brand per patient's insurance at pharmacy discretion. Testing once a day only 90 strip 3     blood-glucose meter, drum-type (ACCU-CHEK COMPACT PLUS CARE) Kit Test daily before all meals/snacks and once before bedtime. 1 kit 0     CHANTIX 1 mg tablet Take 1 tablet by mouth 2 (two) times a day.  4     CHANTIX 1 mg tablet TAKE ONE TABLET BY MOUTH TWICE DAILY with a full glass of water 56 tablet 1     CHANTIX 1 mg tablet TAKE ONE TABLET BY MOUTH TWICE DAILY WITH A FULL GLASS OF WATER 56 tablet 0     clopidogrel (PLAVIX) 75 mg tablet TAKE ONE TABLET BY MOUTH DAILY 90 tablet 0     furosemide (LASIX) 20 MG tablet Take 1 tablet by mouth daily.  3     furosemide (LASIX) 20 MG tablet TAKE ONE TABLET BY MOUTH ONCE DAILY 90 tablet 3     generic lancets (ACCU-CHEK MULTICLIX LANCET) Test daily before all meals/snacks and once before bedtime. 3 each 0     glipiZIDE (GLUCOTROL XL) 5 MG 24 hr tablet TAKE ONE TABLET BY MOUTH ONCE DAILY 90 tablet 5     hydrALAZINE (APRESOLINE) 25 MG tablet TAKE ONE TABLET BY MOUTH EVERY EIGHT HOURS  270 tablet 2     hydroCHLOROthiazide (HYDRODIURIL) 25 MG tablet TAKE ONE TABLET BY MOUTH ONCE DAILY. START 1 WEEK FROM 3/3/17. 90 tablet 2     isosorbide mononitrate (IMDUR) 30 MG 24 hr tablet Take 1 tablet (30 mg total) by mouth 2 (two) times a day. 180 tablet 3     isosorbide mononitrate (IMDUR) 30 MG 24 hr tablet  TAKE ONE TABLET BY MOUTH TWICE DAILY  180 tablet 1     lisinopril (PRINIVIL,ZESTRIL) 20 MG tablet TAKE ONE TABLET BY MOUTH TWICE DAILY  180 tablet 2     metFORMIN (GLUCOPHAGE) 850 MG tablet TAKE ONE TABLET BY MOUTH TWICE DAILY WITH MEALS 180 tablet 0     metoprolol succinate (TOPROL-XL) 200 MG 24 hr tablet TAKE ONE TABLET BY MOUTH ONCE DAILY 90 tablet 2     multivitamin with minerals (THERA-M) 9 mg iron-400 mcg Tab tablet Take 1 tablet by mouth daily.       No current facility-administered medications for this visit.      Please note that this clinical encounter uses voice recognition software, there may be typographical errors present

## 2021-06-20 NOTE — LETTER
Letter by Shayna Quevedo RN at      Author: Shayna Quevedo RN Service: -- Author Type: --    Filed:  Encounter Date: 5/1/2020 Status: (Other)         Keanu Spence  [address] [place] [address]      May 1, 2020      Dear Kalee LanceSpence,    RE: Remote Results    We are writing to you regarding your recent Remote ICD check from home. Your transmission was received successfully. Battery status is satisfactory at this time.     Your results are showing no significant changes.    Your next device appointment will be a remote check on 8/4/2020; this will occur automatically.    To schedule or reschedule, please call [phone] and press 1.    NOTE: If you would like to do an extra transmission, please call [phone] and press 3 to speak to a nurse BEFORE transmitting. This ensures that the Device Clinic staff is aware of the reason you are sending a transmission, and can follow-up with you after it has been reviewed.    We will be checking your implanted device from home (remotely) every three months unless otherwise instructed. We will need to see you in the clinic at least once a year. You may need to be seen in the clinic sooner depending on the results of your check.    Please be aware:    The follow-up schedule is like a Physician prescription.    Your remote monitor is paired to your specific implanted device.      Sincerely,    [facility]

## 2021-06-22 NOTE — PROGRESS NOTES
ASSESSMENT and plan   1. Pneumonia of right middle lobe due to infectious organism (H)  Patient's been seen and treated twice for pneumonia first at a walk-in clinic with a dose of azithromycin and he was seen at [place] [facility] and put on Levaquin he just finished that medication.  He says that he does still feel tired although he has not been febrile his appetite still very poor.  I reviewed the notes and the chest film from [facility].  He will call me within 48 hours to see if his symptoms have resolved otherwise I would consider doing another x-ray.  He has not noted a fever or chills in the last 6 days.    2. Cough    Patient continues to have a cough which bothers him when he lies down he is needs to sit up in a chair his speaking is difficult.  Put him on a course of prednisone side effects have been discussed and put him on a course of cetirizine.          There are no Patient Instructions on file for this visit.    No orders of the defined types were placed in this encounter.    There are no discontinued medications.    No Follow-up on file.    CHIEF COMPLAINT:  Chief Complaint   Patient presents with     Follow-up     ED [facility] 11/18/18.  Pneumonia.        HISTORY OF PRESENT ILLNESS:  Keanu is a 68 y.o. male     Who is here for a follow-up for pneumonia.  He has been feeling ill and then went to walk-in clinic several weeks ago he was diagnosed with right lobe pneumonia.  He took the azithromycin and did not feel any better and still felt fatigued and then went to [facility] there was discovered that his pneumonia had not resolved and an x-ray was done he was put on Levaquin and has completed that medication but feels only slightly better.  He still says that he is not developed his appetite and still feels tired.  He states that he is coughing throughout the day and has been difficult to sleep for the last 2 nights.  REVIEW OF SYSTEMS:     General positive for fatigue  "negative for fever chills  Pulmonary positive for cough negative shortness of breath  10 point review of  All other systems are negative.    PFSH:      Now retired    TOBACCO USE:  Social History     Tobacco Use   Smoking Status Former Smoker     Packs/day: 0.20     Years: 40.00     Pack years: 8.00     Start date:      Last attempt to quit: 2016     Years since quittin.0   Smokeless Tobacco Never Used       VITALS:  Vitals:    18 1436   BP: 122/74   Patient Site: Left Arm   Patient Position: Sitting   Cuff Size: Adult Regular   Pulse: (!) 107   Temp: 97.7  F (36.5  C)   TempSrc: Oral   SpO2: 98%   Weight: 217 lb 14.4 oz (98.8 kg)   Height: 5' 10.75\" (1.797 m)     Wt Readings from Last 3 Encounters:   18 217 lb 14.4 oz (98.8 kg)   18 214 lb (97.1 kg)   18 219 lb 6.4 oz (99.5 kg)       PHYSICAL EXAM:  Interactive elderly -American gentleman sitting in exam room in no acute distress  HEENT neck supple mucous membranes moist, there is no lymph enlargement noted in the neck  Respiratory system clear to auscultation at the apices he does have expiratory rhonchi present at the right base  CVS regular rate and rhythm no murmurs no rubs no gallops no pedal edema noted  CNS cranial nerves II to XII intact gait is normal reflexes are brisk    DATA REVIEWED:  Additional History from Old Records Summarized (2): Reviewed notes from Dr. Khalil from the walk-in clinic and notes from [facility] emergency room he was diagnosed with pneumonia on 2 occasions.  Treatment was started with azithromycin and Levaquin respectively.  Decision to Obtain Records (1): 0  Radiology Tests Summarized or Ordered (1): 1 chest x-ray showed right middle lobe pneumonia  Labs Reviewed or Ordered (1): 0  Medicine Test Summarized or Ordered (1): 0  Independent Review of EKG or X-RAY(2 each): Independent review of the right chest x-ray shows infiltrate in the right middle lobe    The visit lasted a total " of 20 minutes face to face with the patient. Over 50% of the time was spent counseling and educating the patient about   Pneumonia and cough.    MEDICATIONS:  Current Outpatient Medications   Medication Sig Dispense Refill     ACCU-CHEK COMPACT TEST Strp Test daily before all meals/snacks and once before bedtime. 60 strip 12     albuterol (PROAIR HFA;PROVENTIL HFA;VENTOLIN HFA) 90 mcg/actuation inhaler Inhale 2 puffs every 4 (four) hours as needed for wheezing or shortness of breath. 1 each 0     albuterol (PROVENTIL HFA;VENTOLIN HFA) 90 mcg/actuation inhaler Inhale 2 puffs every 6 (six) hours as needed for wheezing. 8.5 g 11     amLODIPine (NORVASC) 5 MG tablet TAKE ONE TABLET BY MOUTH ONCE DAILY 90 tablet 2     aspirin 81 MG EC tablet Take 1 tablet (81 mg total) by mouth every evening. 90 tablet 3     atorvastatin (LIPITOR) 40 MG tablet TAKE ONE TABLET BY MOUTH DAILY AT BEDTIME 90 tablet 1     blood glucose test (ACCU-CHEK SMARTVIEW TEST STRIP) strips Dispense brand per patient's insurance at pharmacy discretion. Testing once a day only 90 strip 3     blood-glucose meter, drum-type (ACCU-CHEK COMPACT PLUS CARE) Kit Test daily before all meals/snacks and once before bedtime. 1 kit 0     clopidogrel (PLAVIX) 75 mg tablet TAKE ONE TABLET BY MOUTH ONCE DAILY 90 tablet 1     furosemide (LASIX) 20 MG tablet Take 1 tablet by mouth daily.  3     generic lancets (ACCU-CHEK MULTICLIX LANCET) Test daily before all meals/snacks and once before bedtime. 3 each 0     glipiZIDE (GLUCOTROL XL) 5 MG 24 hr tablet TAKE ONE TABLET BY MOUTH ONCE DAILY 90 tablet 5     hydrALAZINE (APRESOLINE) 25 MG tablet TAKE ONE TABLET BY MOUTH EVERY EIGHT HOURS  270 tablet 2     hydroCHLOROthiazide (HYDRODIURIL) 25 MG tablet TAKE ONE TABLET BY MOUTH ONCE DAILY 90 tablet 3     isosorbide mononitrate (IMDUR) 30 MG 24 hr tablet Take 1 tablet (30 mg total) by mouth 2 (two) times a day. 180 tablet 3     lisinopril (PRINIVIL,ZESTRIL) 20 MG tablet TAKE ONE  TABLET BY MOUTH TWICE DAILY 180 tablet 3     metFORMIN (GLUCOPHAGE) 850 MG tablet Take 1 tablet (850 mg total) by mouth 2 (two) times a day with meals. 180 tablet 0     metoprolol succinate (TOPROL-XL) 200 MG 24 hr tablet TAKE ONE TABLET BY MOUTH ONCE DAILY 90 tablet 2     multivitamin with minerals (THERA-M) 9 mg iron-400 mcg Tab tablet Take 1 tablet by mouth daily.       azithromycin (ZITHROMAX) 250 MG tablet Take 500 mg (2 x 250 mg tablets) on day 1 followed by 250 mg (1 tablet) on days 2-5.. 6 tablet 0     cetirizine (ZYRTEC) 10 MG tablet Take 1 tablet (10 mg total) by mouth daily. 30 tablet 2     predniSONE (DELTASONE) 20 MG tablet Take 20 mg by mouth 2 (two) times a day. 10 tablet 0     No current facility-administered medications for this visit.      Data points 4      Please note that this clinical encounter uses voice recognition software, there may be typographical errors present

## 2021-06-22 NOTE — PROGRESS NOTES
ASSESSMENT and plan  1. Ischemic cardiomyopathy  No chest pain noted patient was noted to be tachycardic in ER visit in the walk-in clinic visit and take his medications faithfully.    2. Primary insomnia    Patient is at significant problems with sleep he had disturbed sleep for over a year quality never sleeping more than 6 hours per night weighted long discussion about this.  He is tried Tylenol PM, he is tried melatonin.  Prescribing trazodone side effects discussed in detail follow-up in 6 weeks recommended.    3. Hypertension    Blood pressure well controlled no dizziness noted.    4. Bronchitis    Seen in the walk-in clinic and the ED diagnosis bronchitis given prednisone cough is now improved.  X-ray results reviewed with patient he does not feel feverish appetite is reported to be normal.      There are no Patient Instructions on file for this visit.    No orders of the defined types were placed in this encounter.    There are no discontinued medications.    No Follow-up on file.    CHIEF COMPLAINT:  Chief Complaint   Patient presents with     Follow-up       HISTORY OF PRESENT ILLNESS:  Keanu is a 68 y.o. male is here for a follow-up.  He had a incessant cough that was causing him to lose sleep was seen again because of shortness of breath and cough in the emergency room at [facility] and diagnosed with bronchitis.  Was placed on prednisone.  He then developed back pain and went back  To the walk-in clinic.  He was seen by Dr. shawote x-rays were done no infiltrate was noted but because of the back pain was treated for presumptive pneumonia with Levaquin.  He was also placed on Robaxin he no longer has any chest wall pain or cough present.  He has difficulty sleeping and would like to discuss that he says he sleeps approximately 2-3 hours per night and that is broken sleep he goes to bed at 10 and wakes up at 12 and cannot go back to sleep again she is on a good 90 can go back to sleep for  "only 2 hours.  REVIEW OF SYSTEMS:   Neuro positive for insomnia  10 point review of  All other systems are negative.    PFSH:    Retired lives with his wife    TOBACCO USE:  Social History     Tobacco Use   Smoking Status Former Smoker     Packs/day: 0.20     Years: 40.00     Pack years: 8.00     Start date:      Last attempt to quit: 2016     Years since quittin.0   Smokeless Tobacco Never Used       VITALS:  Vitals:    18 0909   BP: 92/64   Pulse: 92   Resp: 12   Temp: 98  F (36.7  C)   TempSrc: Oral   SpO2: 98%   Weight: 213 lb 6.4 oz (96.8 kg)   Height: 5' 11\" (1.803 m)     Wt Readings from Last 3 Encounters:   18 213 lb 6.4 oz (96.8 kg)   18 218 lb 4.8 oz (99 kg)   18 214 lb (97.1 kg)       PHYSICAL EXAM:  Interactive male sitting comfortably exam room no acute distress  HEENT neck supple mucous members moist  Respiratory system clear to auscultation equal breath sounds no wheezes no crackles  CVS regular rate and rhythm no murmurs no rubs no gallops no pedal edema  Psych oriented x3 not agitated  Neuro cranial nerves II to XII intact gait is normal reflexes are brisk    DATA REVIEWED:  Additional History from Old Records Summarized (2): 2  Reviewed notes from visit from [facility] ED on 2018 where he was diagnosed with bronchitis, and visit walk-in clinic on 2018.  Was diagnosed with pneumonia.  Decision to Obtain Records (1): 0  Radiology Tests Summarized or Ordered (1): 1  X-ray results reviewed from 2018 and 2018 no acute infiltrate noted  Labs Reviewed or Ordered (1): 0  Medicine Test Summarized or Ordered (1): 0  Independent Review of EKG or X-RAY(2 each): 0    The visit lasted a total of 25 minutes face to face with the patient. Over 50% of the time was spent counseling and educating the patient about   Insomnia, cough, pneumonia, bronchitis.    MEDICATIONS:  Current Outpatient Medications   Medication Sig Dispense Refill     ACCU-CHEK COMPACT " TEST Strp Test daily before all meals/snacks and once before bedtime. 60 strip 12     albuterol (PROAIR HFA;PROVENTIL HFA;VENTOLIN HFA) 90 mcg/actuation inhaler Inhale 2 puffs every 4 (four) hours as needed for wheezing or shortness of breath. 1 each 0     albuterol (PROVENTIL HFA;VENTOLIN HFA) 90 mcg/actuation inhaler Inhale 2 puffs every 6 (six) hours as needed for wheezing. 8.5 g 11     amLODIPine (NORVASC) 5 MG tablet TAKE ONE TABLET BY MOUTH ONCE DAILY 90 tablet 2     aspirin 81 MG EC tablet Take 1 tablet (81 mg total) by mouth every evening. 90 tablet 3     atorvastatin (LIPITOR) 40 MG tablet TAKE ONE TABLET BY MOUTH DAILY AT BEDTIME 90 tablet 1     azithromycin (ZITHROMAX) 250 MG tablet Take 500 mg (2 x 250 mg tablets) on day 1 followed by 250 mg (1 tablet) on days 2-5.. 6 tablet 0     blood glucose test (ACCU-CHEK SMARTVIEW TEST STRIP) strips Dispense brand per patient's insurance at pharmacy discretion. Testing once a day only 90 strip 3     blood-glucose meter, drum-type (ACCU-CHEK COMPACT PLUS CARE) Kit Test daily before all meals/snacks and once before bedtime. 1 kit 0     cetirizine (ZYRTEC) 10 MG tablet Take 1 tablet (10 mg total) by mouth daily. 30 tablet 2     clopidogrel (PLAVIX) 75 mg tablet TAKE ONE TABLET BY MOUTH ONCE DAILY 90 tablet 1     codeine-guaiFENesin (GUAIFENESIN AC)  mg/5 mL liquid Take 5 mL by mouth 3 (three) times a day as needed for cough. 120 mL 0     furosemide (LASIX) 20 MG tablet Take 1 tablet by mouth daily.  3     generic lancets (ACCU-CHEK MULTICLIX LANCET) Test daily before all meals/snacks and once before bedtime. 3 each 0     glipiZIDE (GLUCOTROL XL) 5 MG 24 hr tablet TAKE ONE TABLET BY MOUTH ONCE DAILY 90 tablet 5     hydrALAZINE (APRESOLINE) 25 MG tablet TAKE ONE TABLET BY MOUTH EVERY EIGHT HOURS  270 tablet 2     hydroCHLOROthiazide (HYDRODIURIL) 25 MG tablet TAKE ONE TABLET BY MOUTH ONCE DAILY 90 tablet 3     isosorbide mononitrate (IMDUR) 30 MG 24 hr tablet Take  1 tablet (30 mg total) by mouth 2 (two) times a day. 180 tablet 3     levoFLOXacin (LEVAQUIN) 750 MG tablet Take 1 tablet (750 mg total) by mouth daily for 7 days. 7 tablet 0     lisinopril (PRINIVIL,ZESTRIL) 20 MG tablet TAKE ONE TABLET BY MOUTH TWICE DAILY 180 tablet 3     metFORMIN (GLUCOPHAGE) 850 MG tablet Take 1 tablet (850 mg total) by mouth 2 (two) times a day with meals. 180 tablet 0     methocarbamol (ROBAXIN) 500 MG tablet Take 1 tablet (500 mg total) by mouth 4 (four) times a day as needed. 15 tablet 0     metoprolol succinate (TOPROL-XL) 200 MG 24 hr tablet TAKE ONE TABLET BY MOUTH ONCE DAILY 90 tablet 2     multivitamin with minerals (THERA-M) 9 mg iron-400 mcg Tab tablet Take 1 tablet by mouth daily.       traZODone (DESYREL) 50 MG tablet Take 1 tablet (50 mg total) by mouth at bedtime. 30 tablet 1     No current facility-administered medications for this visit.         Data points3    Please note that this clinical encounter uses voice recognition software, there may be typographical errors present

## 2021-06-23 NOTE — ANESTHESIA PREPROCEDURE EVALUATION
Anesthesia Evaluation      Patient summary reviewed   No history of anesthetic complications     Airway   Mallampati: II   Pulmonary - normal exam   (+) COPD, shortness of breath, sleep apnea,                          Cardiovascular - normal exam  Exercise tolerance: < 4 METS  (+) hypertension, CAD, angina, CHF, cardiomyopathy,     Rhythm: regular  Rate: normal,      ROS comment: TTE  Moderate biatrial chamber enlargement  Left ventricle ejection fraction is severely decreased. The calculated left ventricular ejection fraction is 22%. There is thinning and near akinesis of the inferior and inferolateral walls. Remainder of the segments are severely hypokinetic. There is flattening of the interventricular septum consistent with right ventricular pressure and volume overload.  Grade 3 diastolic dysfunction  Mildly dilated right ventricle with severe decrease in systolic function  Mild to moderate mitral regurgitation  Mild to moderate tricuspid regurgitation with mild elevation in pulmonary artery pressures  When compared to the previous study dated 2/27/2017, left ventricular systolic function has decreased further. The degree of mitral and tricuspid insufficiency is also increased.       Neuro/Psych - negative ROS     Endo/Other    (+) diabetes mellitus, obesity,      GI/Hepatic/Renal - negative ROS      Other findings:   Results for ALESHA MCKENNA (MRN [id number]) as of 1/30/2019 08:38    1/30/2019 05:56  Sodium: 141  Potassium: 4.0  Chloride: 105  CO2: 24  Anion Gap, Calculation: 12  BUN: 62 (H)  Creatinine: 1.83 (H)  GFR MDRD Af Amer: 45 (L)  GFR MDRD Non Af Amer: 37 (L)  Calcium: 9.9  Glucose: 163 (H)  INR: 1.77 (H)  Platelets: 280        Dental    (+) upper dentures                       Anesthesia Plan  Planned anesthetic: total IV anesthesia and general mask  Short GA for cardioversion  Gentle IV induction (EF=22%)  ASA 4   Induction: intravenous   Anesthetic plan and risks discussed with:  patient    Post-op plan: routine recovery

## 2021-06-23 NOTE — TELEPHONE ENCOUNTER
Can covering provider please see message below as PCP not back until Tuesday of next week.  Thanks.

## 2021-06-23 NOTE — PATIENT INSTRUCTIONS - HE
INR 2.3 spoke with pt. 2.5 mg daily.    Peripheral Block  Performed by: Reilly Hilton MD  Authorized by: Reilly Hilton MD       General Information and Staff    Start Time:  2/18/2020 7:29 AM  End Time:  2/18/2020 7:34 AM  Anesthesiologist:  Reilly Hilton MD  Performed by:   Anesthesiologist  [place]

## 2021-06-23 NOTE — PATIENT INSTRUCTIONS - HE
Keanu Spence,    It was a pleasure to see you today at the [facility].     My recommendations after this visit include:  - Increase lasix (furosemide) to 80 mg daily.    - Please call the clinic if you have any increased lightheadedness or dizziness with this medication change ([phone]).   - Elo will call about your INR tomorrow  - You will be called with the results of your labs.  - Limit salt in your diet.   - Elevate your legs to decrease swelling.   - Follow up with me in 1 week.  - See Dr. Skinner in March    Yi Hernandez CNP    What is the [facility]?     The [facility] is a heart failure specialty clinic within [facility].  You will work with your cardiologist, nurse practitioner, and nurses to carefully adjust medications and learn how to live with heart failure.  The Heart Failure Program will help you:      Better understand your chronic heart condition    Feel better and avoid hospital stays    Monitoring for Symptoms      Call the Heart Failure Phone Line ([phone]) if you have any of these symptoms:     Increased shortness of breath/shortness of breath at rest    Waking up at night with difficulty breathing    Unable to lie down for sleep due to symptoms or needing to sit upright for sleep    Weight gain of 2 pounds a day for 2 days in a row OR 5 pounds in 1 week    Increased swelling in your ankles or legs    Dizziness or lightheadedness    Medications       Take your medications as prescribed    Bring all your medications in their original bottles to every appointment    Avoid non-steroidal anti-inflammatory medications (Advil, Aleve, Ibuprofen, Naprosyn, Naproxen, Celebrex)    Do not stop taking your medications or begin taking over-the-counter or herbal medications without first talking to your doctor or nurse practitioner    Diet and Lifestyle       Limit sodium/salt to 2000 mg daily   o Read food labels for  sodium content  o Do not add salt when cooking or add salt at the table    Weigh yourself every day and record in your daily weight log   o Call if you gain 2 pounds a day for 2 days in a row OR 5 pounds in 1 week  o Bring daily weight log to every appointment    Stay active, pace yourself, listen to your body, and rest when tired    Elevate your legs if they are swollen. Ask about using compression/support stockings    Stop smoking    Lose weight if you are overweight    Avoid drinking alcohol or limit amount    Stay updated on your immunizations including flu and pneumonia vaccines

## 2021-06-23 NOTE — ANESTHESIA CARE TRANSFER NOTE
Last vitals:   Vitals:    01/30/19 1329   BP: 91/69   Pulse: (!) 58   Resp: 14   Temp:    SpO2: 100%     Patient's level of consciousness is drowsy  Spontaneous respirations: yes  Maintains airway independently: yes  Dentition unchanged: yes  Oropharynx: oropharynx clear of all foreign objects    QCDR Measures:  ASA# 20 - Surgical No Data Recorded  PQRS# 430 - Adult PONV Prevention: NA - Not adult patient, not GA or 3 or more risk factors NOT present  ASA# 8 - Peds PONV Prevention: NA - Not pediatric patient, not GA or 2 or more risk factors NOT present  PQRS# 424 - Emmie-op Temp Management: NA - MAC anesthesia or case < 60 minutes  PQRS# 426 - PACU Transfer Protocol: - Transfer of care checklist used  ASA# 14 - Acute Post-op Pain: ASA14B - Patient did NOT experience pain >= 7 out of 10

## 2021-06-23 NOTE — TELEPHONE ENCOUNTER
Medication Question or Clarification  Who is calling: Shefali & patient   What medication are you calling about?: Lisinopril 20 mg tab, HCTZ 25 mg tab, & Metformin 850 mg tab  Who prescribed the medication?: Gaurang Duncan MD  What is your question/concern?: Patient wants Gaurang Duncan MD to look over the d/c medications from ED 1/27/19-1/31/19 and verify the changes. Patient does not want to d/c the medications and has been taking them since discharge of ED yesterday.   Note: patient did d/c the Plavix  Pharmacy: [facility]   Okay to leave a detailed message?: Yes  Site CMT - Please call the pharmacy to obtain any additional needed information.

## 2021-06-23 NOTE — PROGRESS NOTES
"TCM DISCHARGE FOLLOW UP CALL    Discharge Date:  1/31/2019  Reason for hospital stay (discharge diagnosis)::  Dyspnea, CHF & COPD exacerbation, Atrial flutter  Are you feeling better, the same or worse since your discharge?:  Patient is feeling the same (Still coughing up a lot of phlegm. \"Significant\" edema in LEs to the knee, not worse but hasn't improved. Monitoring wts, wt stable at 213# since home. Wearing ace wraps on legs, but not daily, fall down & hard to keep on.  today, gradually better. )  Do you feel like you have a plan in the event of a health emergency?: Yes (Call clinic, )    As part of your discharge plan, were  home care services ordered for you?: No    Did you receive any new medications, or was there a change to your medications?: Yes    Are you taking those medications, or do you have any established regiment?:  RN reviewed discharge medications w/pt.  Pt states he completed doxycycline but still taking Prednisone taper for COPD.  He is taking Warfarin 2.5 mg daily as prescribed, & furosemide 40 mg daily (increased from 20 mg daily).  Confirmed he stopped taking Plavix, HCTZ, lisinopril, & metformin as instructed; however, concerned about stopping the HCTZ, lisinopril, & metformin as he's not sure why they were stopped.  Pt called clinic 2/1/19 asking about resuming discontinued medications, but hasn't heard back yet.  Pt states he understands why the Plavix was discontinued as it was replaced with warfarin and ASA, but not sure why the other medications were stopped.  RN explained HCTZ, lisinopril, & metformin were stopped d/t DANITA, and PCP may resume if appropriate, based on f/u labs at INF appt 2/6/19 w/PCP.  Pt verbalized understanding & agrees to continue holding those meds for now, and will discuss w/PCP at f/u appt.  Do you have any follow up visits scheduled with your PCP or Specialist?:  Yes, with PCP and Yes, with Specialist (Dr. Duncan 2/6/19)  (RN) Is PCP appt scheduled soon " enough (within 14 days of discharge date)?: Yes    Who are you seeing and when is it scheduled?:  Dr. Hernandez (Pulmonology) 2/20/19  RN NOTES::  - RN advised pt use his Albuterol inhaler as directed, for cough & help open airways.  Continue Mucinex for cough/mucus.  Warm fluids, plain hot water to help calm & relax airways during coughing spasms.  - Advised asking PCP about compression socks instead of ace wraps for edema.  In the meantime, continue ace wraps and leg elevation to help w/LE edema.  Low sodium diet, keeping sodium to 2,000 mg or less a day.  Continue daily wts.  - Reminded pt to call [facility] to schedule f/u appts w/HF Clinic & A-Fib Clinic, and when calling for appts, ask to speak w/RN for further recommendations on persisting LE edema that has not changed since home.    - RN introduced MyHealth Tracker program for CHF tele-monitoring and offered enrollment.  Pt requested information on program to review, along w/a MyChart activation code, and if interested, will call RN for enrollment.  RN mailed introduction letter, MHT brochure, & MyChart activation code to patient; MyChart activation code was also emailed to pt per request.         María Elena Lopez RN Care Manager, Population Health

## 2021-06-23 NOTE — ANESTHESIA POSTPROCEDURE EVALUATION
Patient: Keanu Spence  * No procedures listed *  Anesthesia type: general    Patient location: Telemetry/Step Down Unit  Last vitals:   Vitals:    01/30/19 1440   BP:    Pulse: 76   Resp:    Temp:    SpO2:      Post vital signs: stable  Level of consciousness: awake, alert and oriented  Post-anesthesia pain: pain controlled  Post-anesthesia nausea and vomiting: no  Pulmonary: unassisted, return to baseline  Cardiovascular: stable and blood pressure at baseline  Hydration: adequate  Anesthetic events: no    QCDR Measures:  ASA# 11 - Emmie-op Cardiac Arrest: ASA11B - Patient did NOT experience unanticipated cardiac arrest  ASA# 12 - Emmie-op Mortality Rate: ASA12B - Patient did NOT die  ASA# 13 - PACU Re-Intubation Rate: NA - No ETT / LMA used for case  ASA# 10 - Composite Anes Safety: ASA10A - No serious adverse event    Additional Notes:

## 2021-06-23 NOTE — PROGRESS NOTES
ASSESSMENT AND PLAN:  1. Cough  Patient has had a consistent cough for more than 6 weeks.  Is been worse over the last 2 weeks his symptoms resolved and then restarted.  Today he has shortness of breath with coughing.  However I did do a hemogram today which was unremarkable except for his hemoglobin which dropped below 11-10.8.  White cell count is normal these results were shared with him.  Chest x-ray was unchanged from his previous x-ray which I personally interpreted as no evidence of atelectasis.    I have asked him to take Mucinex for the cough.  I will not give him empiric antibiotics normally give him prednisone.  I will have him see pulmonology because of the long history of cough.  No new use of any medication she has been on ACE inhibitors for more than 20 years.  Have also ordered a CT of the chest.  - HM1(CBC and Differential)  - XR Chest 2 Views  - HM1 (CBC with Diff)  - Ambulatory referral to Pulmonology  - CT Chest With Contrast; Future      *      Orders Placed This Encounter   Procedures     XR Chest 2 Views     Order Specific Question:   Reason for Exam (Describe Symptoms):     Answer:   cough     Order Specific Question:   Can the procedure be changed per Radiologist protocol?     Answer:   Yes     CT Chest With Contrast     Standing Status:   Future     Standing Expiration Date:   1/15/2020     Order Specific Question:   Can the procedure be changed per Radiologist protocol?     Answer:   Yes     Order Specific Question:   If this is a diagnostic procedure, have the patient's age and recent imaging history been considered?     Answer:   Yes     HM1 (CBC with Diff)     Ambulatory referral to Pulmonology     Referral Priority:   Routine     Referral Type:   Consultation     Referral Reason:   Evaluation and Treatment     Number of Visits Requested:   1     There are no discontinued medications.    No Follow-up on file.    CHIEF COMPLAINT:  Chief Complaint   Patient presents with     Cough      "coughing up things, Difficulty sleeping     Referral     PULMONOLOGIST       HISTORY OF PRESENT ILLNESS:  Keanu is a 68 y.o. male with diabetes mellitus type 2, hypertension, CAD, history of CABG, hyperlipidemia, NIKKI, and tobacco dependence, who presents to the clinic for follow up on cough. I last saw him on 2018 at which time his cough had been improving following prednisone. His cough since then has worsened, especially in the last two days. It is productive with sputum. His cough is interfering with sleep. His significant other notes that the patient is unable to get comfortable at night, and she can hear him all night long. Talking will trigger his cough. There has been no associated emesis or fever. Of note, he was initially diagnosed with pneumonia of the right lower lobe on 2018, now status post antibiotic and two oral steroid courses. Keanu has been taking codeine-guaifenesin for his cough which sometimes helps his cough. His significant other notes that he has been having some low blood sugar readings. He has had poor appetite so he has not been eating as much.    REVIEW OF SYSTEMS:   General: Positive for poor sleep exacerbated by cough and poor appetite. Negative for fever.  Respiratory: Positive for cough with sputum.   GI: Negative for emesis.  Endocrine: Positive for some low blood sugars.  All other systems are negative.    PFSH:  Reviewed as below.     TOBACCO USE:  Social History     Tobacco Use   Smoking Status Former Smoker     Packs/day: 0.20     Years: 40.00     Pack years: 8.00     Start date:      Last attempt to quit: 2016     Years since quittin.1   Smokeless Tobacco Never Used       VITALS:  Vitals:    01/15/19 1429   BP: 100/62   Pulse: (!) 108   Resp: 20   Temp: 97.9  F (36.6  C)   TempSrc: Oral   SpO2: 94%   Weight: 218 lb 3 oz (99 kg)   Height: 5' 11\" (1.803 m)     Wt Readings from Last 3 Encounters:   01/15/19 218 lb 3 oz (99 kg)   18 213 lb 6.4 oz " (96.8 kg)   12/23/18 218 lb 4.8 oz (99 kg)     Body mass index is 30.43 kg/m .    PHYSICAL EXAM:  General: Alert, cooperative, no distress, appears stated age  Head: Normocephalic, without obvious abnormality, atraumatic  Lungs: Poor inspiratory effort, otherwise clear to auscultation bilaterally  Heart: Regular rate and rhythm, S1 and S2 normal, no murmur, rub, or gallop  Neurologic:  A & O x 3.  No tremor, no focal findings.   Normal gait.     DATA REVIEWED:  Additional History from Old Records Summarized (2): none  Decision to Obtain Records (1): none  Radiology Tests Summarized or Ordered (1): Chest x-rays today showed no airspace opacity or pneumothorax though small bilateral pleural effusions were noted; otherwise clear. Chest CT ordered.   Labs Reviewed or Ordered (1): CBC today showed normal WBC of 7.0 and low hemoglobin of 10.8, otherwise fairly unremarkable.  Medicine Test Summarized or Ordered (1): none  Independent Review of EKG or X-RAY(2 each): On personal review of chest x-rays, no opacities appreciated; cardiomegaly and bilateral pleural effusions appear unchanged from previous.    I, Carolyn Sharma, am scribing for and in the presence of, Dr. Duncan.    I, Dr. Duncan, personally performed the services described in this documentation, as scribed by Carolyn Sharma in my presence, and it is both accurate and complete.     MEDICATIONS:  Current Outpatient Medications   Medication Sig Dispense Refill     ACCU-CHEK COMPACT TEST Strp Test daily before all meals/snacks and once before bedtime. 60 strip 12     albuterol (PROAIR HFA;PROVENTIL HFA;VENTOLIN HFA) 90 mcg/actuation inhaler Inhale 2 puffs every 4 (four) hours as needed for wheezing or shortness of breath. 1 each 0     albuterol (PROVENTIL HFA;VENTOLIN HFA) 90 mcg/actuation inhaler Inhale 2 puffs every 6 (six) hours as needed for wheezing. 8.5 g 11     amLODIPine (NORVASC) 5 MG tablet TAKE ONE TABLET BY MOUTH ONCE DAILY 90 tablet 2     aspirin 81 MG EC  tablet Take 1 tablet (81 mg total) by mouth every evening. 90 tablet 3     atorvastatin (LIPITOR) 40 MG tablet TAKE ONE TABLET BY MOUTH DAILY AT BEDTIME 90 tablet 1     azithromycin (ZITHROMAX) 250 MG tablet Take 500 mg (2 x 250 mg tablets) on day 1 followed by 250 mg (1 tablet) on days 2-5.. 6 tablet 0     blood glucose test (ACCU-CHEK SMARTVIEW TEST STRIP) strips Dispense brand per patient's insurance at pharmacy discretion. Testing once a day only 90 strip 3     blood-glucose meter, drum-type (ACCU-CHEK COMPACT PLUS CARE) Kit Test daily before all meals/snacks and once before bedtime. 1 kit 0     cetirizine (ZYRTEC) 10 MG tablet Take 1 tablet (10 mg total) by mouth daily. 30 tablet 2     clopidogrel (PLAVIX) 75 mg tablet TAKE ONE TABLET BY MOUTH ONCE DAILY 90 tablet 1     codeine-guaiFENesin (GUAIFENESIN AC)  mg/5 mL liquid Take 5 mL by mouth 3 (three) times a day as needed for cough. 120 mL 0     furosemide (LASIX) 20 MG tablet Take 1 tablet by mouth daily.  3     generic lancets (ACCU-CHEK MULTICLIX LANCET) Test daily before all meals/snacks and once before bedtime. 3 each 0     glipiZIDE (GLUCOTROL XL) 5 MG 24 hr tablet TAKE ONE TABLET BY MOUTH ONCE DAILY 90 tablet 5     hydrALAZINE (APRESOLINE) 25 MG tablet TAKE ONE TABLET BY MOUTH EVERY EIGHT HOURS  270 tablet 2     hydroCHLOROthiazide (HYDRODIURIL) 25 MG tablet TAKE ONE TABLET BY MOUTH ONCE DAILY 90 tablet 3     isosorbide mononitrate (IMDUR) 30 MG 24 hr tablet Take 1 tablet (30 mg total) by mouth 2 (two) times a day. 180 tablet 1     lisinopril (PRINIVIL,ZESTRIL) 20 MG tablet TAKE ONE TABLET BY MOUTH TWICE DAILY 180 tablet 3     metFORMIN (GLUCOPHAGE) 850 MG tablet Take 1 tablet (850 mg total) by mouth 2 (two) times a day with meals. 180 tablet 0     methocarbamol (ROBAXIN) 500 MG tablet Take 1 tablet (500 mg total) by mouth 4 (four) times a day as needed. 15 tablet 0     metoprolol succinate (TOPROL-XL) 200 MG 24 hr tablet TAKE ONE TABLET BY MOUTH  ONCE DAILY 90 tablet 2     multivitamin with minerals (THERA-M) 9 mg iron-400 mcg Tab tablet Take 1 tablet by mouth daily.       traZODone (DESYREL) 50 MG tablet Take 1 tablet (50 mg total) by mouth at bedtime. 30 tablet 1     No current facility-administered medications for this visit.        Total Data Points: 4    Please note that this clinical encounter uses voice recognition software, there may be typographical errors present

## 2021-06-23 NOTE — TELEPHONE ENCOUNTER
Dr Duncan, in refilling his Metformin his LFT's and Microalbumin failed.  Not done in last year.  I understand why it is probably not necessary to do them due to his diabetes.  Per Protocol it does mean that the RNs can only give a 90 day supply.  Marisela Ospina, RN, BAN, [facility] RN Triage, 11p-7a

## 2021-06-23 NOTE — PROGRESS NOTES
INR 2.3 pt started on Warfarin in hospital. First INR with Cardiology appointment. Will continue on 2.5 mg daily and retest on 2/18. After talking with pt and discussing history of greens/salads and medication change. Pt will  continue  with current diet and dosing of Warfarin.  Continue with moderation of Vit K and green leafy vegetables. Cautioned to call with increase bruising or bleeding. Reminded to call with medication change especially antibiotic. Call with any questions or concerns or any up coming procedures. Cautioned about using Herbal medication.

## 2021-06-23 NOTE — PROGRESS NOTES
ASSESSMENT AND PLAN:        1. Type 2 diabetes mellitus without complication, without long-term current use of insulin (H) BMP reviewed from yesterday.  Restarting metformin.  Follow-up in 1 week.  He will check his blood sugars regularly.    2. Essential hypertension with goal blood pressure less than 140/90 restarted hydrochlorothiazide.  He is taking his other medications faithfully BMP checked from yesterday.    3. Atherosclerosis of coronary artery bypass graft of native heart without angina pectoris no chest pain noted.    4. Cardiomyopathy, unspecified type (H) he is aware of his recent echo results decrease in EF to 22% we will restart his lisinopril because his renal function showed normal values.  Denies chest pain exercise tolerance is well preserved.    5. Acute on chronic systolic congestive heart failure (H) Lasix dose has been modified with passive stockings soon supporting documentation which may include a prior Auth letter has insurance does not cover    Stockings.  No significant weight loss been    6. COPD exacerbation (H) taking prednisone no cough noted    7. Acute renal insufficiency values reviewed being allowed to take his lisinopril metformin and hydrochlorothiazide.    8. Insomnia due to medical condition had difficulty sleeping did not do well with the trazodone.  The melatonin did not work starting on mirtazapine side effects discussed.              No orders of the defined types were placed in this encounter.    There are no discontinued medications.    CHIEF COMPLAINT:  Chief Complaint   Patient presents with     Follow-up     [facility] discharge 01/31       HISTORY OF PRESENT ILLNESS:  Keanu is a 68 y.o. male presenting for inpatient follow up status post admission from 1/27/19-1/31/19 for shortness of breath. Since leaving the hospital he has been feeling better, but still has some lower extremity edema. He states the wrapping and elevation of the legs doesn't help. Shortness of breath has  "improved. His lasix was increased from 20mg to 80mg daily after discharge. He complains of difficulty sleeping, but denies coughing or needing to urinate as the reason. He denies feeling anxious or stressed. No chest pain, abdominal pain, or leg pain. No dizziness or difficulty with balance or walking. He is no longer taking Plavix. Recently started warfarin.    REVIEW OF SYSTEMS:   See HPI   All other 10 point review of systems are negative.    PFSH:  Pertinent past, family, social and medical history reviewed.     TOBACCO USE:  Social History     Tobacco Use   Smoking Status Former Smoker     Packs/day: 0.20     Years: 40.00     Pack years: 8.00     Start date:      Last attempt to quit: 2016     Years since quittin.1   Smokeless Tobacco Never Used       VITALS:  Vitals:    19 0920   BP: 90/62   Pulse: 71   Resp: 18   Temp: 98.1  F (36.7  C)   TempSrc: Oral   SpO2: 98%   Weight: 221 lb 4 oz (100.4 kg)   Height: 5' 11\" (1.803 m)     Wt Readings from Last 3 Encounters:   19 221 lb 4 oz (100.4 kg)   19 218 lb (98.9 kg)   19 213 lb 3.2 oz (96.7 kg)     Body mass index is 30.86 kg/m .    PHYSICAL EXAM:  General: Alert, cooperative, no distress, appears stated age  Head: Normocephalic, without obvious abnormality, atraumatic  Eyes: PERRL, conjunctiva/cornea clear, EOM's intact, fundi benign, both eyes  Ears: Normal TM's and external ear canals, both ears  Nose: Nares normal, septum midline, mucosa normal, no drainage or sinus tenderness  Throat: Lips, mucosa, and tongue normal; teeth and gums normal  Musculoskeletal: Back symmetric, no curvature, ROM normal, no CVA tenderness.  Lungs: Clear to auscultation bilaterally, respirations unlabored  Chest wall: No tenderness or deformity  Heart: Regular rate and rhythm, S1 and S2 normal, no murmur, rub, or gallop  CVS: No edema noted.   Abdomen: Soft, non tender, bowel sounds active all four quadrants, no masses, no " organomegaly.  Neurologic: No tremor, no focal findings.   DTRs are normal and symmetric both proximally and distally BUE and BLE.  Strength testing is normal and symetric both proximally and distally BUE and BLE.  Toes downgoing bilaterally.  Normal gait.   Psych: Oriented x3. Affect normal.     DATA REVIEWED:  Additional History from Old Records Summarized (2): Reviewed discharge summary from [facility]. Patient was admitted for shortness of breath and diagnosed with acute exacerbation of COPD, acute renal injury, and acute exacerbation of CHF.  Decision to Obtain Records (1): None  Radiology Tests Summarized or Ordered (1): Reviewed CT of the chest which showed no significant changes. Labs Reviewed or Ordered (1): Hospital labs showed acute renal injury on BMP and elevated BNP.  Medicine Test Summarized or Ordered (1): Echocardiogram done 1/28/19 showed EF of 22%, worse compared to prior which showed EF 45%.  Independent Review of EKG or X-RAY(2 each): None    The visit lasted a total of 33 minutes face to face with the patient. Over 50% of the time was spent counseling and educating the patient about management of chronic illnesses.    Jamel CHRISTOPHER, am scribing for and in the presence of, Dr. Duncan.     IDr. Duncan, personally performed the services described in this documentation, as scribed by Jamel White in my presence, and it is both accurate and complete.    MEDICATIONS:  Current Outpatient Medications   Medication Sig Dispense Refill     furosemide (LASIX) 40 MG tablet Take 2 tablets (80 mg total) by mouth daily. 180 tablet 3     warfarin (COUMADIN/JANTOVEN) 1 MG tablet Take 2.5 tablets (2.5 mg) by mouth daily. Adjust dose based on INR results as directed. 90 tablet 0     ACCU-CHEK COMPACT TEST Strp Test daily before all meals/snacks and once before bedtime. 60 strip 12     acetaminophen (TYLENOL) 325 MG tablet Take 2 tablets (650 mg total) by mouth every 4 (four) hours as needed.  0      albuterol (PROAIR HFA;PROVENTIL HFA;VENTOLIN HFA) 90 mcg/actuation inhaler Inhale 2 puffs every 4 (four) hours as needed for wheezing or shortness of breath. 1 each 0     amLODIPine (NORVASC) 5 MG tablet TAKE ONE TABLET BY MOUTH ONCE DAILY 90 tablet 2     aspirin 81 MG EC tablet Take 1 tablet (81 mg total) by mouth every evening. 90 tablet 3     atorvastatin (LIPITOR) 40 MG tablet TAKE ONE TABLET BY MOUTH DAILY AT BEDTIME 90 tablet 1     blood glucose test (ACCU-CHEK SMARTVIEW TEST STRIP) strips Dispense brand per patient's insurance at pharmacy discretion. Testing once a day only 90 strip 3     blood-glucose meter, drum-type (ACCU-CHEK COMPACT PLUS CARE) Kit Test daily before all meals/snacks and once before bedtime. 1 kit 0     generic lancets (ACCU-CHEK MULTICLIX LANCET) Test daily before all meals/snacks and once before bedtime. 3 each 0     glipiZIDE (GLUCOTROL XL) 5 MG 24 hr tablet TAKE ONE TABLET BY MOUTH ONCE DAILY 90 tablet 5     guaiFENesin ER (MUCINEX) 600 mg 12 hr tablet Take 1 tablet (600 mg total) by mouth 2 (two) times a day. 60 tablet 2     hydrALAZINE (APRESOLINE) 25 MG tablet TAKE ONE TABLET BY MOUTH EVERY EIGHT HOURS  270 tablet 2     isosorbide mononitrate (IMDUR) 30 MG 24 hr tablet Take 1 tablet (30 mg total) by mouth 2 (two) times a day. 180 tablet 1     metoprolol succinate (TOPROL-XL) 200 MG 24 hr tablet TAKE ONE TABLET BY MOUTH ONCE DAILY 90 tablet 2     multivitamin with minerals (THERA-M) 9 mg iron-400 mcg Tab tablet Take 1 tablet by mouth daily.       predniSONE (DELTASONE) 5 MG tablet Take prednisone 20 mg po once a day for 1 day (2/1) then take prednisone 15 mg once a day for 3 days then 10 mg once a day for 3 days then 5 mg once a day for 3 days. 22 tablet 0     No current facility-administered medications for this visit.        Total Data Points: 4    Please note that this clinical encounter uses voice recognition software, there may be typographical errors present

## 2021-06-23 NOTE — TELEPHONE ENCOUNTER
RN cannot approve Refill Request    RN can NOT refill this medication PCP messaged that patient is overdue for Labs and Protocol failed and RN gave three month refill.   Last office visit: 12/26/2018 Gaurang Duncan MD Last Physical: 2/27/2018 Last MTM visit: Visit date not found Last visit same specialty: 12/26/2018 Gaurang Duncan MD.  Next visit within 3 mo: Visit date not found  Next physical within 3 mo: Visit date not found  Last OV 12/26/2018    Marisela Ospina, [facility] Triage/Med Refill 1/15/2019    Requested Prescriptions   Pending Prescriptions Disp Refills     metFORMIN (GLUCOPHAGE) 850 MG tablet [Pharmacy Med Name: MetFORMIN HCl Oral Tablet 850 MG] 180 tablet 0     Sig: TAKE ONE TABLET BY MOUTH TWICE DAILY WITH MEALS    Metformin Refill Protocol Failed - 1/14/2019 10:40 AM       Failed - LFT or AST or ALT in last 12 months    Albumin   Date Value Ref Range Status   08/16/2017 3.6 3.5 - 5.0 g/dL Final     Bilirubin, Total   Date Value Ref Range Status   08/16/2017 0.5 0.0 - 1.0 mg/dL Final     Bilirubin, Direct   Date Value Ref Range Status   08/16/2017 0.2 <=0.5 mg/dL Final     Alkaline Phosphatase   Date Value Ref Range Status   08/16/2017 87 45 - 120 U/L Final     AST   Date Value Ref Range Status   08/16/2017 16 0 - 40 U/L Final     ALT   Date Value Ref Range Status   08/16/2017 12 0 - 45 U/L Final     Protein, Total   Date Value Ref Range Status   08/16/2017 7.2 6.0 - 8.0 g/dL Final               Failed - Microalbumin in last year     Microalbumin, Random Urine   Date Value Ref Range Status   06/28/2016 11.42 (H) 0.00 - 1.99 mg/dL Final                 Passed - Blood pressure in last 12 months    BP Readings from Last 1 Encounters:   12/26/18 92/64            Passed - GFR or Serum Creatinine in last 6 months    GFR MDRD Non Af Amer   Date Value Ref Range Status   08/28/2018 60 (L) >60 mL/min/1.73m2 Final     GFR MDRD Af Amer   Date Value Ref Range Status   08/28/2018 >60 >60 mL/min/1.73m2 Final             Passed - Visit with PCP or prescribing provider visit in last 6 months or next 3 months    Last office visit with prescriber/PCP: 12/26/2018 OR same dept: 12/26/2018 Gaurang Duncan MD OR same specialty: 12/26/2018 Gaurang Duncan MD Last physical: Visit date not found Last MTM visit: Visit date not found         Next appt within 3 mo: Visit date not found  Next physical within 3 mo: Visit date not found  Prescriber OR PCP: Gaurang Duncan MD  Last diagnosis associated with med order: 1. Diabetes (H)  - metFORMIN (GLUCOPHAGE) 850 MG tablet [Pharmacy Med Name: MetFORMIN HCl Oral Tablet 850 MG]; TAKE ONE TABLET BY MOUTH TWICE DAILY WITH MEALS  Dispense: 180 tablet; Refill: 0     If protocol passes may refill for 12 months if within 3 months of last provider visit (or a total of 15 months).          Passed - A1C in last 6 months    Hemoglobin A1c   Date Value Ref Range Status   08/28/2018 7.0 (H) 3.5 - 6.0 % Final

## 2021-06-23 NOTE — TELEPHONE ENCOUNTER
Called [facility] pharmacy who stated the Mirtazipine ordered is not covering as disintegrating tablets.  Ok for plain?      Spoke to PCP who okayed plain Mirtazipine for pt.  Called [facility] back to relay. Thanks.

## 2021-06-23 NOTE — PROGRESS NOTES
Patient seen in clinic for HF education s/p recent hospital discharge 1/31/19.  Reviewed HF Binder that includes the  HF Sx Awareness & Action plan  handout and  A [employer]er Pump  booklet and Weight log booklet highlighting :  __X_patient s type of heart failure _X__Na management in diet  __X_importance of daily wts  _X__Fluid Guidelines, if applicable  __X_medication review and importance of compliance     Instructed patient in signs and sx of heart failure, reiterated when to call clinic - reviewed HF hotline # [phone] and after hours call # [phone].  Majority of time was spent reviewing: Low sodium diet.   Patient verbalized understanding of HF discussion.  Plan for f/u with continued HF education reviewed.

## 2021-06-24 NOTE — PROGRESS NOTES
Assessment and Plan:Keanu Spence is a 68 y.o. with a past medical history significant for prior CABG, severe heart failure, and appeared resumptive diagnosis of CHF who presents to clinic today for follow-up of hospitalization last month for shortness of breath, during which she was treated for bronchitis, COPD exacerbation.  During that hospitalization a chest CT showed small right greater than left bilateral effusions, and a thoracic adenopathy that was thought to be most likely reactive with a 3-month follow-up recommended.  Upon discharge his Lasix was increased from 20-40 mg p.o. daily.  He states that his cough and shortness of breath have completely resolved for 2 weeks now.  He currently has no respiratory complaints.  He has a as needed albuterol inhaler, however has not needed to use it for the last 1-2 weeks.  He has not smoked since 2017, however has a greater than 30-pack-year history before then.  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    1/20/2019 PFTs: No evidence of obstructive lung disease.  The patient however did have restriction with a TLC of 61% of predicted and a decreased diffusing capacity at 79% of predicted when corrected for hemoglobin.  No prior PFTs for comparison.    I suspect that his mild restrictive lung disease is due to his cardiomegaly, history of thoracic surgery, and small bilateral effusions likely related to heart failure.    In April:  - repeat CT scan to follow-up on mild upper paratracheal and precarinal adenopathy adenopathy seen on 1/22/2019 chest CT  - Repeat spirometry with DLCO next visit to evaluate for stability    If all looks well, then annual:  - lung cancer screening CTs  - Pulmonary Functioning Testing to evaluate for stability in restriction      CCx: Follow-up hospitalization for shortness of breath    HPI: The patient states that the shortness of breath and cough for which she  was hospitalized last month has completely resolved over the past 2 weeks.  He has not needed to use his as needed albuterol inhaler for the past 1-2 weeks, and is on no other inhalers.  His lower extremity swelling has greatly improved since a increased on his Lasix ultimately from 20-40 mg daily.  He denies any shortness of breath or chest pain and has no current respiratory complaints.  He is primarily interested in following up on his CT scan which showed enlarged lymph nodes.    PMH:  Past Medical History:   Diagnosis Date     Cardiomyopathy (H)      CHF (congestive heart failure) (H)      Chronic bronchitis (H)      Coronary artery disease      Diabetes mellitus (H)      HLD (hyperlipidemia)      Hypertension      Obesity      Peripheral vascular disease (H)      Sleep apnea     no cpap       PSH:  Past Surgical History:   Procedure Laterality Date     CARDIAC CATHETERIZATION  2016     CARDIAC CATHETERIZATION  10/11/2016     to lad/om1 in stent occlusion     CORONARY ANGIOPLASTY       CORONARY ARTERY BYPASS GRAFT  10/98    [facility]     coronary stent  10/11/2016    bebe to left main and om1     coronary stents       FEMORAL BYPASS      left. [facility]       SH:  Social History     Socioeconomic History     Marital status: Single     Spouse name: Not on file     Number of children: Not on file     Years of education: Not on file     Highest education level: Not on file   Occupational History     Not on file   Social Needs     Financial resource strain: Not on file     Food insecurity:     Worry: Not on file     Inability: Not on file     Transportation needs:     Medical: Not on file     Non-medical: Not on file   Tobacco Use     Smoking status: Former Smoker     Packs/day: 0.20     Years: 40.00     Pack years: 8.00     Start date:      Last attempt to quit: 2016     Years since quittin.2     Smokeless tobacco: Never Used   Substance and Sexual Activity     Alcohol use: Yes      Comment: rare     Drug use: No     Sexual activity: Not on file   Lifestyle     Physical activity:     Days per week: Not on file     Minutes per session: Not on file     Stress: Not on file   Relationships     Social connections:     Talks on phone: Not on file     Gets together: Not on file     Attends Alevism service: Not on file     Active member of club or organization: Not on file     Attends meetings of clubs or organizations: Not on file     Relationship status: Not on file     Intimate partner violence:     Fear of current or ex partner: Not on file     Emotionally abused: Not on file     Physically abused: Not on file     Forced sexual activity: Not on file   Other Topics Concern     Not on file   Social History Narrative     Not on file   Patient quit smoking 2 years ago, had a prior greater than 30-pack-year history.  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  He had no known exhaust or other inhalational exposures there.  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  No recent travel or sick contacts    Family history:  Family History   Problem Relation Age of Onset     Diabetes Mother      Diabetes Father      Heart disease Father      Diabetes Sister      Hypertension Sister      Diabetes Brother      Hypertension Brother    No known family history of lung cancer or other lung disease.  No known family history.    ROS:  A review of 12 organ systems was performed with pertinent positives and negatives noted in the HPI.    Current Meds:  Current Outpatient Medications   Medication Sig     ACCU-CHEK COMPACT TEST Strp Test daily before all meals/snacks and once before bedtime.     acetaminophen (TYLENOL) 325 MG tablet Take 2 tablets (650 mg total) by mouth every 4 (four) hours as needed.     albuterol (PROAIR HFA;PROVENTIL HFA;VENTOLIN HFA) 90 mcg/actuation inhaler Inhale 2 puffs every 4  (four) hours as needed for wheezing or shortness of breath.     amLODIPine (NORVASC) 5 MG tablet Take 1 tablet (5 mg total) by mouth daily.     aspirin 81 MG EC tablet Take 1 tablet (81 mg total) by mouth every evening.     atorvastatin (LIPITOR) 40 MG tablet TAKE ONE TABLET BY MOUTH DAILY AT BEDTIME     blood glucose test (ACCU-CHEK SMARTVIEW TEST STRIP) strips Dispense brand per patient's insurance at pharmacy discretion. Testing once a day only     blood-glucose meter, drum-type (ACCU-CHEK COMPACT PLUS CARE) Kit Test daily before all meals/snacks and once before bedtime.     furosemide (LASIX) 40 MG tablet Take 1 tablet (40 mg total) by mouth daily.     generic lancets (ACCU-CHEK MULTICLIX LANCET) Test daily before all meals/snacks and once before bedtime.     glipiZIDE (GLUCOTROL XL) 5 MG 24 hr tablet TAKE ONE TABLET BY MOUTH ONCE DAILY     hydrALAZINE (APRESOLINE) 25 MG tablet TAKE ONE TABLET BY MOUTH EVERY EIGHT HOURS     isosorbide mononitrate (IMDUR) 30 MG 24 hr tablet Take 1 tablet (30 mg total) by mouth 2 (two) times a day.     metoprolol succinate (TOPROL-XL) 200 MG 24 hr tablet Take 1 tablet (200 mg total) by mouth daily.     mirtazapine (REMERON) 15 MG tablet Take 1 tablet (15 mg total) by mouth at bedtime.     multivitamin with minerals (THERA-M) 9 mg iron-400 mcg Tab tablet Take 1 tablet by mouth daily.     warfarin (COUMADIN/JANTOVEN) 2.5 MG tablet Take 5 mg M,W,F and 2.5 mg all other days.       Labs:  Recent Results (from the past 72 hour(s))   POCT INR   Result Value Ref Range    INR 1.2        I have personally reviewed all imaging and PFT data available pertinent to this visit.    Imaging studies:  Xr Chest 1 View Portable    Result Date: 1/28/2019  XR CHEST 1 VIEW PORTABLE 1/27/2019 11:28 PM INDICATION: Dyspnea COMPARISON: 01/15/2019 FINDINGS: Cardiomegaly is unchanged. Pulmonary vascularity normal. Prior median sternotomy and CABG. The lungs are clear. Small effusions seen within the  costophrenic angles on the previous study have improved.      Physical Exam:  BP 94/60   Pulse 65   Resp 18   Wt 203 lb (92.1 kg)   SpO2 97% Comment: RA  BMI 28.31 kg/m    General - Well nourished  Ears/Mouth - OP pink moist, no thrush  Neck - no cervical lymphadenopathy  Lungs - Clear to ausculation bilaterally, no crackles or wheezes appreciated  CVS - regular rhythm with no murmurs, rubs or gallups  Abdomen - soft, NT, ND, NABS  Ext - no cyanosis or clubbing. Trace LE edema bilat   Skin - no rash  Psychology - alert and oriented, answers appropriate        Electronically signed by:    Damian Hernandez MD  [facility]

## 2021-06-24 NOTE — TELEPHONE ENCOUNTER
Medication Question or Clarification  Who is calling: Pharmacy: [facility]  What medication are you calling about?: glipizide (GLUCOTROL XL)   What dose do you take?: 5 mg  How often are you taking the medication?: 1 tablet daily  Who prescribed the medication?: Gaurang Duncan MD  What is your question/concern?: [facility] is calling to ask about patient's prescription. Below prescription was sent to [facility] on [address] instead of [facility] on [address]. Gave verbal order for below prescription, no further concerns.  Pharmacy: [facility] [address]  Okay to leave a detailed message?: Yes [phone]  Site CMT - Please call the pharmacy to obtain any additional needed information.     Order Providers   Prescribing Provider Encounter Provider   Gaurang Duncan MD Kumar, Anil, MD   Medication Detail    Disp Refills Start End    glipiZIDE (GLUCOTROL XL) 5 MG 24 hr tablet 90 tablet 5 2/13/2018     Sig: TAKE ONE TABLET BY MOUTH ONCE DAILY    Sent to pharmacy as: glipiZIDE (GLUCOTROL XL) 5 MG 24 hr tablet    E-Prescribing Status: Receipt confirmed by pharmacy (2/13/2018  7:47 AM CST)    Associated Diagnoses   Diabetes (H)       Pharmacy   [id number] - [place], [place] - [address]

## 2021-06-24 NOTE — PROGRESS NOTES
ASSESSMENT AND PLAN:    1. Diabetes mellitus type 2  Very well controlled with diet and metformin with glipizide.  He reports FBS less than 110.  Will follow on current medication.     2. Coronary artery disease involving coronary bypass graft of native heart with angina pectoris (H)  Has ischemic cardiomyopathy, probably aggravated with SVT - atypical aflutter - now post cardioversion with regular pulse in 70's.  No change in current medications.  Is followed by Dr. Skinner.     3. Chronic anticoagulation  Has INR monitoring with cardiology.     4. Atypical atrial flutter (H)  Recently hospitalization with atypical atrial flutter, and acute on chronic systolic heart failure.  Now post cardioversion, he is compensated.      Follow up in  3 months.     CHIEF COMPLAINT:  Chief Complaint   Patient presents with     Establish Care     HISTORY OF PRESENT ILLNESS:  Keanu Spence is a 68 y.o. male presents to [facility].  Recent hospitalization with SVT - atypical atrial flutter, with acute on chronic systolic heart failure.  CABS in 1998.  He had cardioversion and has been placed on warfarin.  Doing well since, without return of sleep to normal.  No angina.  DM is well controlled with FBS less than 110.  Active and tolerates activity reasonably well.     REVIEW OF SYSTEMS:   See HPI, all other systems on review are negative.    Past Medical History:   Diagnosis Date     Cardiomyopathy (H)      CHF (congestive heart failure) (H)      Chronic anticoagulation 2/21/2019     Chronic bronchitis (H)      Coronary artery disease      Diabetes mellitus (H)      HLD (hyperlipidemia)      Hypertension      Obesity      Peripheral vascular disease (H)      Sleep apnea     no cpap     Social History     Socioeconomic History     Marital status: Single     Spouse name: Not on file     Number of children: Not on file     Years of education: Not on file     Highest education level: Not on file   Occupational History     Not on file    Social Needs     Financial resource strain: Not on file     Food insecurity:     Worry: Not on file     Inability: Not on file     Transportation needs:     Medical: Not on file     Non-medical: Not on file   Tobacco Use     Smoking status: Former Smoker     Packs/day: 0.20     Years: 40.00     Pack years: 8.00     Start date:      Last attempt to quit: 2016     Years since quittin.2     Smokeless tobacco: Never Used   Substance and Sexual Activity     Alcohol use: Yes     Comment: rare     Drug use: No     Sexual activity: Not on file   Lifestyle     Physical activity:     Days per week: Not on file     Minutes per session: Not on file     Stress: Not on file   Relationships     Social connections:     Talks on phone: Not on file     Gets together: Not on file     Attends Shinto service: Not on file     Active member of club or organization: Not on file     Attends meetings of clubs or organizations: Not on file     Relationship status: Not on file     Intimate partner violence:     Fear of current or ex partner: Not on file     Emotionally abused: Not on file     Physically abused: Not on file     Forced sexual activity: Not on file   Other Topics Concern     Not on file   Social History Narrative    Retired from [employer], and [employer].       Family History   Problem Relation Age of Onset     Diabetes Mother      Diabetes Father      Heart disease Father      Diabetes Sister      Hypertension Sister      Diabetes Brother      Hypertension Brother      Past Surgical History:   Procedure Laterality Date     CARDIAC CATHETERIZATION  2016     CARDIAC CATHETERIZATION  10/11/2016     to lad/om1 in stent occlusion     CORONARY ANGIOPLASTY       CORONARY ARTERY BYPASS GRAFT  10/98    [facility]     coronary stent  10/11/2016    bebe to left main and om1     coronary stents       FEMORAL BYPASS  2002    left. [facility]     VITALS:  Vitals:    19 0836   BP: 100/64  "  Patient Site: Left Arm   Patient Position: Sitting   Cuff Size: Adult Large   Pulse: (!) 52   SpO2: 99%   Weight: 203 lb 8 oz (92.3 kg)   Height: 5' 11\" (1.803 m)     Wt Readings from Last 3 Encounters:   02/21/19 203 lb 8 oz (92.3 kg)   02/20/19 203 lb (92.1 kg)   02/13/19 201 lb 8 oz (91.4 kg)     PHYSICAL EXAM:  Constitutional:  In NAD, alert and oriented  Neck:  Supple, no significant adenopathy.  Cardiac:  Regular rhythm  Lungs: Clear to auscultation  Abdomen:   Soft, flat and non-tender, without guarding, rebound, or mass.    Extremities: no joint effusion or inflammation, distal pulses diminished, no significant edema  Neurologic:  Speech clear, motor intact, gait normal, no focal findings       DECISION TO OBTAIN OLD RECORDS AND/OR OBTAIN HISTORY FROM SOMEONE OTHER THAN PATIENT, AND/OR ACCESSING CARE EVERYWHERE):  1       REVIEW AND SUMMARIZATION OF OLD RECORDS, AND/OR OBTAINING HISTORY FROM SOMEONE OTHER THAN PATIENT, AND/OR DISCUSSION OF CASE WITH ANOTHER HEALTH CARE PROVIDER:  2 reviewed care records for hospitalization    REVIEW AND/OR ORDER OF OF CLINICAL LAB TESTS: 1  0.    REVIEW AND/OR ORDER OF RADIOLOGY TESTS: 1 0.    REVIEW AND/OR ORDER OF MEDICAL TESTS (EKG/ECHO/COLONOSCOPY/EGD): 1  0    INDEPENDENT  VISUALIZATION OF IMAGE, TRACING, OR SPECIMEN ITSELF (2 EACH):  0     TOTAL: 2    Current Outpatient Medications   Medication Sig Dispense Refill     ACCU-CHEK COMPACT TEST Strp Test daily before all meals/snacks and once before bedtime. 60 strip 12     acetaminophen (TYLENOL) 325 MG tablet Take 2 tablets (650 mg total) by mouth every 4 (four) hours as needed.  0     albuterol (PROAIR HFA;PROVENTIL HFA;VENTOLIN HFA) 90 mcg/actuation inhaler Inhale 2 puffs every 4 (four) hours as needed for wheezing or shortness of breath. 1 each 0     amLODIPine (NORVASC) 5 MG tablet Take 1 tablet (5 mg total) by mouth daily. 90 tablet 2     aspirin 81 MG EC tablet Take 1 tablet (81 mg total) by mouth every evening. " 90 tablet 3     atorvastatin (LIPITOR) 40 MG tablet TAKE ONE TABLET BY MOUTH DAILY AT BEDTIME 90 tablet 3     blood glucose test (ACCU-CHEK SMARTVIEW TEST STRIP) strips Dispense brand per patient's insurance at pharmacy discretion. Testing once a day only 90 strip 3     blood-glucose meter, drum-type (ACCU-CHEK COMPACT PLUS CARE) Kit Test daily before all meals/snacks and once before bedtime. 1 kit 0     furosemide (LASIX) 40 MG tablet Take 1 tablet (40 mg total) by mouth daily. 180 tablet 3     generic lancets (ACCU-CHEK MULTICLIX LANCET) Test daily before all meals/snacks and once before bedtime. 3 each 0     glipiZIDE (GLUCOTROL XL) 5 MG 24 hr tablet TAKE ONE TABLET BY MOUTH ONCE DAILY 90 tablet 5     hydrALAZINE (APRESOLINE) 25 MG tablet TAKE ONE TABLET BY MOUTH EVERY EIGHT HOURS 270 tablet 2     isosorbide mononitrate (IMDUR) 30 MG 24 hr tablet Take 1 tablet (30 mg total) by mouth 2 (two) times a day. 180 tablet 1     metoprolol succinate (TOPROL-XL) 200 MG 24 hr tablet Take 1 tablet (200 mg total) by mouth daily. 90 tablet 2     multivitamin with minerals (THERA-M) 9 mg iron-400 mcg Tab tablet Take 1 tablet by mouth daily.       warfarin (COUMADIN/JANTOVEN) 2.5 MG tablet Take 5 mg M,W,F and 2.5 mg all other days. 110 tablet 0     No current facility-administered medications for this visit.      Ken Keen MD  Internal Medicine  [facility]

## 2021-06-24 NOTE — PROGRESS NOTES
ASSESSMENT AND PLAN:      1. Atherosclerosis of coronary artery bypass graft of native heart without angina pectoris patient notes no chest pain no shortness of breath.  Taking medications faithfully    2. Simple chronic bronchitis (H) he is no longer coughing.  I have asked him to stop the Tessalon Perles. Basic Metabolic Panel   3. Acute on chronic systolic congestive heart failure (H) weight is diminished by another 13 pounds.  He still taking 80 mg of Lasix a day checking a BMP today.  Based on those results I will titrate his dose of furosemide. Basic Metabolic Panel   4. Essential hypertension with goal blood pressure less than 140/90 blood pressure the same the last visit.              Orders Placed This Encounter   Procedures     Basic Metabolic Panel     There are no discontinued medications.    No Follow-up on file.    CHIEF COMPLAINT:  Chief Complaint   Patient presents with     Follow-up     CHF       HISTORY OF PRESENT ILLNESS:  Keanu is a 68 y.o. male with acute on chronic systolic congestive heart failure, diabetes mellitus type 2, hypertension, atherosclerosis of coronary artery bypass graft, cardiomyopathy, who presents to the clinic today for follow up on congestive heart failure. He has lost about 20 pounds since his last visit on 02/06/2019, and his lower extremity edema has vastly improved. This is the best the patient has felt in the past two months. He has not been wearing compression stockings, but has been keeping his legs elevated. Keanu continues on Lasix 80 mg daily. He denies any cough, chest pain, abdominal pain, or dizziness. His energy level has been good overall. His urination has not been excessive.    The patient has been taking Mucinex twice a day, and is wondering if he should continue taking this. He does not have a cough.     REVIEW OF SYSTEMS:   General: Positive for weight loss. Negative for fatigue.  Respiratory: Negative for cough.  CV: Negative for chest pain.   GI:  "Negative for abdominal pain.  Neuro: Negative for dizziness.  All other systems are negative.    PFSH:  Reviewed as below.     TOBACCO USE:  Social History     Tobacco Use   Smoking Status Former Smoker     Packs/day: 0.20     Years: 40.00     Pack years: 8.00     Start date:      Last attempt to quit: 2016     Years since quittin.1   Smokeless Tobacco Never Used       VITALS:  Vitals:    19 0834   BP: 94/52   Pulse: 67   Resp: 12   Temp: 97.6  F (36.4  C)   TempSrc: Oral   SpO2: 96%   Weight: 201 lb 8 oz (91.4 kg)   Height: 5' 11\" (1.803 m)     Wt Readings from Last 3 Encounters:   19 201 lb 8 oz (91.4 kg)   19 221 lb 4 oz (100.4 kg)   19 218 lb (98.9 kg)     Body mass index is 28.1 kg/m .    PHYSICAL EXAM:  General: Alert, cooperative, no distress, appears stated age  HEENT: Normocephalic, without obvious abnormality, atraumatic, moist mucous membranes  Eyes: PERRL, conjunctiva/cornea clear  Lungs: Clear to auscultation bilaterally, respirations unlabored  Heart: Regular rate and rhythm, S1 and S2 normal, no murmur, rub, or gallop  Extremities: No lower extremity edema  Neurologic:  A & O x 3.  No tremor, no focal findings.  Normal gait.     DATA REVIEWED:  Additional History from Old Records Summarized (2): none  Decision to Obtain Records (1): none  Radiology Tests Summarized or Ordered (1): none  Labs Reviewed or Ordered (1): BMP ordered today.  Medicine Test Summarized or Ordered (1): none  Independent Review of EKG or X-RAY(2 each): none    ICarolyn, am scribing for and in the presence of, Dr. Duncan.    IDr. Duncan, personally performed the services described in this documentation, as scribed by Carolyn Sharma in my presence, and it is both accurate and complete.      MEDICATIONS:  Current Outpatient Medications   Medication Sig Dispense Refill     ACCU-CHEK COMPACT TEST Strp Test daily before all meals/snacks and once before bedtime. 60 strip 12     acetaminophen " (TYLENOL) 325 MG tablet Take 2 tablets (650 mg total) by mouth every 4 (four) hours as needed.  0     albuterol (PROAIR HFA;PROVENTIL HFA;VENTOLIN HFA) 90 mcg/actuation inhaler Inhale 2 puffs every 4 (four) hours as needed for wheezing or shortness of breath. 1 each 0     amLODIPine (NORVASC) 5 MG tablet TAKE ONE TABLET BY MOUTH ONCE DAILY 90 tablet 2     aspirin 81 MG EC tablet Take 1 tablet (81 mg total) by mouth every evening. 90 tablet 3     atorvastatin (LIPITOR) 40 MG tablet TAKE ONE TABLET BY MOUTH DAILY AT BEDTIME 90 tablet 1     blood glucose test (ACCU-CHEK SMARTVIEW TEST STRIP) strips Dispense brand per patient's insurance at pharmacy discretion. Testing once a day only 90 strip 3     blood-glucose meter, drum-type (ACCU-CHEK COMPACT PLUS CARE) Kit Test daily before all meals/snacks and once before bedtime. 1 kit 0     furosemide (LASIX) 40 MG tablet Take 2 tablets (80 mg total) by mouth daily. 180 tablet 3     generic lancets (ACCU-CHEK MULTICLIX LANCET) Test daily before all meals/snacks and once before bedtime. 3 each 0     glipiZIDE (GLUCOTROL XL) 5 MG 24 hr tablet TAKE ONE TABLET BY MOUTH ONCE DAILY 90 tablet 5     guaiFENesin ER (MUCINEX) 600 mg 12 hr tablet Take 1 tablet (600 mg total) by mouth 2 (two) times a day. 60 tablet 2     hydrALAZINE (APRESOLINE) 25 MG tablet TAKE ONE TABLET BY MOUTH EVERY EIGHT HOURS  270 tablet 2     isosorbide mononitrate (IMDUR) 30 MG 24 hr tablet Take 1 tablet (30 mg total) by mouth 2 (two) times a day. 180 tablet 1     metoprolol succinate (TOPROL-XL) 200 MG 24 hr tablet TAKE ONE TABLET BY MOUTH ONCE DAILY 90 tablet 2     mirtazapine (REMERON) 15 MG tablet Take 1 tablet (15 mg total) by mouth at bedtime. 30 tablet 2     multivitamin with minerals (THERA-M) 9 mg iron-400 mcg Tab tablet Take 1 tablet by mouth daily.       predniSONE (DELTASONE) 5 MG tablet Take prednisone 20 mg po once a day for 1 day (2/1) then take prednisone 15 mg once a day for 3 days then 10 mg  once a day for 3 days then 5 mg once a day for 3 days. 22 tablet 0     warfarin (COUMADIN/JANTOVEN) 1 MG tablet Take 2.5 tablets (2.5 mg) by mouth daily. Adjust dose based on INR results as directed. 90 tablet 0     No current facility-administered medications for this visit.    Total amount of time spent today's visit was 25 minutes.  Greater than 50% of this time spent coordinating care and discussing his CHF, coronary artery disease, hypertension and bronchitis.    Total Data Points: 1    Please note that this clinical encounter uses voice recognition software, there may be typographical errors present

## 2021-06-24 NOTE — PROGRESS NOTES
INR 2.4 spoke with pt to continue current dose of Warfarin and retest in 2 weeks. Continue current management dosing of Warfarin. Continue  diet of moderate Vitamin K intake. Discussed with pt the need to call with questions or concerns or any change in medication especially herbal medication or OTC. Call with increased bleeding or bruising or any upcoming procedures.

## 2021-06-24 NOTE — PROGRESS NOTES
INR 1.6 will increase dose to 2.5 mg Tue and thur and 5 mg all other days. Retest in one week. After talking with pt and discussing history of greens/salads and medication change. Pt will  continue  with current diet and dosing of Warfarin.  Continue with moderation of Vit K and green leafy vegetables. Cautioned to call with increase bruising or bleeding. Reminded to call with medication change especially antibiotic. Call with any questions or concerns or any up coming procedures. Cautioned about using Herbal medication.

## 2021-06-24 NOTE — PATIENT INSTRUCTIONS - HE
Keanu Spence,    It was a pleasure to see you today at the [facility].     My recommendations after this visit include:  - No changes to your medications.    - Continue to monitor for signs of retaining fluid (increasing weights, shortness of breath, swelling) and call with any concerns. [phone]    Yi Hernandez, CNP

## 2021-06-24 NOTE — PROGRESS NOTES
INR 1.2 pt is taking his meds. Will increase dose to 5 mg M,W,F and 2.5 mg all other days. After talking with pt and discussing history of greens/salads and medication change. Pt will  continue  with current diet and dosing of Warfarin.  Continue with moderation of Vit K and green leafy vegetables. Cautioned to call with increase bruising or bleeding. Reminded to call with medication change especially antibiotic. Call with any questions or concerns or any up coming procedures. Cautioned about using Herbal medication.

## 2021-06-24 NOTE — PATIENT INSTRUCTIONS - HE
In April:  - repeat CT scan to follow-up on enlarged lymph nodes  - spirometry (abbreviated pulmonary function test)    If all looks well, then annual:  - lung cancer screening CTs  - Pulmonary Functioning Testing

## 2021-06-24 NOTE — TELEPHONE ENCOUNTER
----- Message from Hilaria Shea sent at 2/15/2019  2:30 PM CST -----  Contact: Patient  Patient has already contacted their pharmacy. The medication or refill issue is below:    Primary Cardiologist:     Medication: Amlodpine, Metoprolol,  HydrALAZINE      Concern: Patient is out of these medication, third request please call when RX are sent.  Preferred Pharmacy:[id number] - [place], [place] - [address] [place] Phone Number for Patient: [phone]

## 2021-06-24 NOTE — TELEPHONE ENCOUNTER
Received refill request. Called pt and informed him that medications were refilled to preferred pharmacy. Pt verbalized understanding, no further questions/concerns.

## 2021-06-24 NOTE — TELEPHONE ENCOUNTER
Refill Approved    Rx renewed per Medication Renewal Policy. Medication was last renewed on 8/13/18.    Elo Carvalho, [facility] Triage/Med Refill 2/14/2019     Requested Prescriptions   Pending Prescriptions Disp Refills     atorvastatin (LIPITOR) 40 MG tablet [Pharmacy Med Name: Atorvastatin Calcium Oral Tablet 40 MG] 90 tablet 0     Sig: TAKE ONE TABLET BY MOUTH DAILY AT BEDTIME    Statins Refill Protocol (Hmg CoA Reductase Inhibitors) Passed - 2/12/2019  1:16 PM       Passed - PCP or prescribing provider visit in past 12 months     Last office visit with prescriber/PCP: 2/6/2019 Gaurang Duncan MD OR same dept: 2/6/2019 Gaurang Duncan MD OR same specialty: 2/6/2019 Gaurang Duncan MD  Last physical: 2/27/2018 Last MTM visit: Visit date not found   Next visit within 3 mo: 2/13/2019 Gaurang Duncan MD  Next physical within 3 mo: Visit date not found  Prescriber OR PCP: Gaurang Duncan MD  Last diagnosis associated with med order: 1. Hyperlipidemia LDL goal < 70  - atorvastatin (LIPITOR) 40 MG tablet [Pharmacy Med Name: Atorvastatin Calcium Oral Tablet 40 MG]; TAKE ONE TABLET BY MOUTH DAILY AT BEDTIME  Dispense: 90 tablet; Refill: 0    If protocol passes may refill for 12 months if within 3 months of last provider visit (or a total of 15 months).

## 2021-06-24 NOTE — PROGRESS NOTES
Informed patient of recent BMP including his creatinine.  I am having him decrease his Lasix from 80-40 mg she understood this.  He will monitor his weight daily.

## 2021-06-25 NOTE — PROGRESS NOTES
Progress Notes by Julia Skinner MD at 5/30/2017  7:50 AM     Author: Julia Skinner MD Service: -- Author Type: Physician    Filed: 5/30/2017  8:23 AM Encounter Date: 5/30/2017 Status: Signed    : Julia Skinner MD (Physician)           Click to link to [facility]     [facility] HEART CARE NOTE     Assessment/Plan:   1.  Chronic systolic congestive heart failure, ischemic cardiomyopathy, LVEF of 45 %: The patient's symptoms were resolved.  He has no complaints of shortness of breath, leg edema or orthopnea.  He is compensated well well at this time.  No signs of fluid retention.  Continue Lasix 20 mg daily.    Continue lisinopril 20 mg twice a day, metoprolol  mg daily.  His left ventricular ejection fraction was improved to 45%.  No indication of ICD placement for primary prevention.  BMP is ordered today for monitoring renal function and electrolytes.    2.  Coronary artery disease s/p CABG and stent placement as mentioned below: No chest pain.  Continue current medications including isosorbide mononitrate 30 mg twice a day, aspirin, Plavix, Lipitor, metoprolol    3.  Essential hypertension: His blood pressure has been controlled with his current medications.    4.  Hyperlipidemia: Continue Lipitor 40 mg daily.  Last LDL was 62.    Thank you Dr. Duncan for the opportunity to be involved in the care of Keanu Spence. If you have any questions, please feel free to contact me.  I will see the patient again in 3 months.    Total 40 minutes were spent in this visit for face to face visit, physical exam, review of current labs/imaging studies, plan for ongoing treatment.    Much or all of the text in this note was generated through the use of Dragon Dictate voice-to-text software. Errors in spelling or words which seem out of context are unintentional.   Sound alike errors, in particular, may have escaped editing.       History of Present Illness:   It is my pleasure to see Keanu Spence at the  [facility] clinic for routine cardiology follow-up post hospital discharge.  Keanu Spence is a 67y.o. male with a medical history of significant coronary artery disease status post 4 V CABG in 1998, patent LIMA to LAD, occluded 3 SVG, SIRI to LM and LCX/OM1 10/2016, ischemic cardiomyopathy, chronic systolic congestive heart failure, LVEF improved to 45 % per ECHO on February 27 2017, essential hypertension, hyperlipidemia, DM II and peripheral vascular disease.    The patient was admitted to hospital in February 2017 due to acute on chronic congestive heart failure.  The patient was diuresed and his symptoms were improved.  Since then, he has been doing quite well.  He has no chest pain, shortness of breath, palpitations, dizziness, orthopnea PND or leg swelling.  His dyspnea on exertion has been improved.  He monitor his weight which has been stable.  He has been on low-salt diet.  His blood pressure and heart rate are in normal range.  The patient has no side effects from his current medications.     Past Medical History:     Patient Active Problem List   Diagnosis   ? Overweight   ? 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 Hypertension   ? Coronary Artery Disease   ? Peripheral Vascular Disease   ? Tobacco dependence   ? Hyperlipidemia LDL goal < 70   ? Ischemic cardiomyopathy   ? Coronary artery disease involving coronary bypass graft of native heart with unspecified angina pectoris   ? Coronary artery chronic total occlusion   ? Essential hypertension with goal blood pressure less than 140/90   ? Dyslipidemia, goal LDL below 70   ? NIKKI (obstructive sleep apnea)       Past Surgical History:     Past Surgical History:   Procedure Laterality Date   ? CARDIAC CATHETERIZATION  09/19/2016   ? CARDIAC CATHETERIZATION  10/11/2016     to lad/om1 in stent occlusion   ? CORONARY ANGIOPLASTY     ?  CORONARY ARTERY BYPASS GRAFT  10/98    lima   ? coronary stent  10/11/2016    bebe to left main and om1   ? coronary stents     ? FEMORAL BYPASS  2002    left. [facility]       Family History:     Family History   Problem Relation Age of Onset   ? Diabetes Mother    ? Diabetes Father    ? Heart disease Father    ? Diabetes Sister    ? Hypertension Sister    ? Diabetes Brother    ? Hypertension Brother         Social History:    reports that he quit smoking about 5 months ago. He started smoking about 46 years ago. He has a 8.00 pack-year smoking history. He has quit using smokeless tobacco. He reports that he drinks alcohol. He reports that he does not use illicit drugs.    Review of Systems:   General: WNL  Eyes: WNL  Ears/Nose/Throat: WNL  Lungs: WNL  Heart: WNL  Stomach: WNL  Bladder: WNL  Muscle/Joints: WNL  Skin: WNL  Nervous System: WNL  Mental Health: WNL     Blood: WNL    Meds:     Current Outpatient Prescriptions:   ?  ACCU-CHEK COMPACT TEST Strp, Test daily before all meals/snacks and once before bedtime., Disp: 60 strip, Rfl: 12  ?  albuterol (PROVENTIL HFA;VENTOLIN HFA) 90 mcg/actuation inhaler, Inhale 2 puffs every 6 (six) hours as needed for wheezing., Disp: 8.5 g, Rfl: 11  ?  amLODIPine (NORVASC) 5 MG tablet, TAKE ONE TABLET BY MOUTH EVERY DAY, Disp: 90 tablet, Rfl: 3  ?  aspirin 81 MG EC tablet, Take 1 tablet (81 mg total) by mouth every evening., Disp: 90 tablet, Rfl: 3  ?  atorvastatin (LIPITOR) 40 MG tablet, Take 1 tablet (40 mg total) by mouth bedtime., Disp: 90 tablet, Rfl: 3  ?  blood-glucose meter, drum-type (ACCU-CHEK COMPACT PLUS CARE) Kit, Test daily before all meals/snacks and once before bedtime., Disp: 1 kit, Rfl: 0  ?  clopidogrel (PLAVIX) 75 mg tablet, Take 1 tablet (75 mg total) by mouth daily., Disp: 90 tablet, Rfl: 3  ?  furosemide (LASIX) 20 MG tablet, Take 1 tablet by mouth daily., Disp: , Rfl: 3  ?  generic lancets (ACCU-CHEK MULTICLIX LANCET), Test daily before all  "meals/snacks and once before bedtime., Disp: 3 each, Rfl: 0  ?  glipiZIDE (GLUCOTROL) 5 MG 24 hr tablet, Take one daily, Disp: 90 tablet, Rfl: 6  ?  hydrALAZINE (APRESOLINE) 25 MG tablet, Take 1 tablet (25 mg total) by mouth every 8 (eight) hours., Disp: 270 tablet, Rfl: 3  ?  hydroCHLOROthiazide (HYDRODIURIL) 25 MG tablet, Take 1 tablet (25 mg total) by mouth daily., Disp: 90 tablet, Rfl: 2  ?  isosorbide mononitrate (IMDUR) 30 MG 24 hr tablet, Take 1 tablet (30 mg total) by mouth 2 (two) times a day., Disp: 180 tablet, Rfl: 3  ?  lisinopril (PRINIVIL,ZESTRIL) 20 MG tablet, Take 1 tablet (20 mg total) by mouth 2 (two) times a day., Disp: 180 tablet, Rfl: 3  ?  metFORMIN (GLUCOPHAGE) 850 MG tablet, TAKE ONE TABLET BY MOUTH TWO TIMES A DAY WITH MEALS ., Disp: 180 tablet, Rfl: 2  ?  metoprolol succinate (TOPROL-XL) 200 MG 24 hr tablet, Take 1 tablet (200 mg total) by mouth daily., Disp: 90 tablet, Rfl: 3  ?  multivitamin with minerals (THERA-M) 9 mg iron-400 mcg Tab tablet, Take 1 tablet by mouth daily., Disp: , Rfl:     Allergies:   Review of patient's allergies indicates no known allergies.      Objective:      Physical Exam  220 lb (99.8 kg)  5' 11\" (1.803 m)  Body mass index is 30.68 kg/(m^2).  /64 (Patient Site: Left Arm, Patient Position: Sitting, Cuff Size: Adult Large)  Pulse 60  Resp 16  Ht 5' 11\" (1.803 m)  Wt 220 lb (99.8 kg)  BMI 30.68 kg/m2    General Appearance:   Awake, Alert, No acute distress.   HEENT:  Pupil equal and reactive to light. No scleral icterus; the mucous membranes were moist.   Neck: No cervical bruits. No JVD. No thyromegaly.     Chest: The spine was straight. The chest was symmetric.   Lungs:   Respirations unlabored; Lungs are clear to auscultation. No crackles. No wheezing.   Cardiovascular:   Regular rhythm and rate, normal first and second heart sounds with no murmurs. No rubs or gallops.    Abdomen:  Soft. No tenderness. Non-distended. Bowels sounds are present "   Extremities: Equal tibial pulses. No leg edema.   Skin: No rashes or ulcers. Warm, Dry.   Musculoskeletal: No tenderness. No deformity.   Neurologic: Mood and affect are appropriate. No focal deficits.         EKG: Personally reviewed  Sinus rhythm with occasional Premature ventricular complexes and Possible Premature atrial complexes with Aberrant conduction   Possible Left atrial enlargement   Rightward axis   Prolonged QT   Abnormal ECG   When compared with ECG of 11-OCT-2016 11:01,   Premature ventricular complexes are now Present   Aberrant conduction is now Present   PA interval has decreased   Nonspecific T wave abnormality now evident in Inferior leads    Cardiac Imaging Studies  Coronary angiogram with PCI on 12-:  Known CAD s/p prior CABG  Occluded mid LAD, prox RCA and mid Cx  Large OM1 arising proximal to mid cx occlusion, has patent  stents proximally but a 75% stenosis in the mid portion  Patent LIMA to LAD  3 occluded SVGs    Coronary angiogram with PCI on 10-:  LM 80% distal stenosis  LAD occluded mid; patent LIMA to LAD  LCx 70-80% prox OM1, occluded mid OM1 with  and filling of  distal OM1 via collaterals  RCA known to be occluded      Occluded SVGs to OM, Diag and RCA  Patent ALLRED to LAD      Underwent successful PCI of OM1  with antegrade wire  escalation, followed by laser atherectomy, PTCA and 2.5  Synergy SIRI, post dilated to 2.75mm at high cristina.  Prox OM1 lesion treated successfully with 2.75 Synergy SIRI  Also underwent successful PCI of distal LM into LCx with 3.5  Synergy SIRI, post dilated to 4mm      Lesion seen in distal OM after PCI of prox ; attempted to  stent this lesion but unable to cross mid OM stents; infact  the stent to be delivered sheared off the balloon at the LM;  successfully retrieved with a Goose neck snare; followed by  PTCA of distal OM with 2.5 balloon.      0% residuals, JAKOB 3 flow post, 0-1 pre    Nuclear stress test on  12-:  CONCLUSION:   1. Lexiscan stress is subjectively negative.   2. Lexiscan stress ECG is negative.   3. Lexiscan stress nuclear study is positive for inducible myocardial ischemia in  apex, distal inferior wall, distal inferolateral segment and distal lateral segment  in a medium size.   4. There is mild global hypokinesis with severe hypokinesis in apex, distal  inferior segment and distal inferolateral and lateral segments. The quantitative  left ventricular ejection fraction is 34%.     Stress cardiac MRI on 8-:  IMPRESSIONS:       1. Lexiscan stress cardiac MRI is positive for inducible myocardial ischemia diffusely indicating multiple vessel disease.   2. Lexiscan stress ECG is negative for inducible myocardial ischemia.  3. Previous nontransmural myocardial infarction in anterior wall, distal anteroseptal wall, apex, lateral wall from mid to distal segment and inferior and inferolateral walls. Endomyocardial scar is about 40%-50% of wall thickness indicating moderately reduced viability.  4. Moderately enlarged left ventricular size with several hypokinesis in anterior wall and inferior/inferolateral and lateral walls. The calculated left ventricular ejection fraction is 28%.  5. Normal right ventricular size and function.  6. Mild mitral and tricuspid valve regurgitation.    Cardiac MRI on 1-:  IMPRESSIONS:    1.  Left ventricle is moderately enlarged with severe systolic dysfunction.  The quantified left ventricular ejection fraction is 33.9%.   There is mild global hypokinesis with severe hypokinesis to akinesis of the mid to distal septal, mid to apical anterior wall, apical lateral and mid inferiolateral wall.   2.  Non-transmural myocardial infarction involving mid to apical anterior wall, mid to distal anteroseptal wall, basal to mid inferolateral, distal septum, basal to distal inferior wall and basal to mid inferolateral walls which involves 50% of wall thickness indicating  moderate possible viability of myocardial segments.  There is non-transmural infarction of the mid to distal anterolateral wall involving 25% of myocardial thickness representing viable myocardium.   3. Transmural infarction of the apical lateral wall.   4. Normal right ventricular size and function function.   5. Mild aortic insufficiency  6. Mild to moderate tricuspid regurgitation.  7. Mild mitral regurgitation.  8. Moderate left atrial enlargement. Mild right atrial enlargement     Compared to prior study on 8/15/2016, there is minimal improvement in left ventricular systolic function.    ECHO on 2-:  1. The left ventricle is normal in size. Left ventricular systolic performance is mildly reduced. The ejection fraction is estimated to be 45%.  2. There is mild mid lateral and more moderate distal On selected views, the mid posterior segment appears hypokinetic.  3. No significant valvular heart disease is identified on this study.   4. There is mild left atrial enlargement.      When compared to the prior real-time echocardiogram dated 8 February 2010, the ejection fraction appears slightly high.    Lab Review   Lab Results   Component Value Date     04/03/2017    K 4.0 04/03/2017     (H) 04/03/2017    CO2 23 04/03/2017    BUN 29 (H) 04/03/2017    CREATININE 1.37 (H) 04/03/2017    CALCIUM 9.7 04/03/2017     Lab Results   Component Value Date    WBC 8.7 02/27/2017    HGB 11.4 (L) 02/27/2017    HCT 36.9 (L) 02/27/2017    MCV 87 02/27/2017     02/27/2017     Lab Results   Component Value Date    CHOL 117 06/28/2016    CHOL 128 07/15/2015    CHOL 133 12/31/2014     Lab Results   Component Value Date    HDL 40 06/28/2016    HDL 41 07/15/2015    HDL 33 (L) 12/31/2014     Lab Results   Component Value Date    LDLCALC 62 06/28/2016    LDLCALC 70 07/15/2015    LDLCALC 48 12/31/2014     Lab Results   Component Value Date    TRIG 74 06/28/2016    TRIG 83 07/15/2015    TRIG 261 (H) 12/31/2014      Lab Results   Component Value Date    TROPONINI 0.04 02/27/2017     Lab Results   Component Value Date     (H) 03/03/2017     Lab Results   Component Value Date    TSH 1.15 07/20/2012

## 2021-06-25 NOTE — PROGRESS NOTES
Progress Notes by Julia Skinner MD at 8/16/2017  7:50 AM     Author: Julia Skinner MD Service: -- Author Type: Physician    Filed: 8/16/2017  8:20 AM Encounter Date: 8/16/2017 Status: Signed    : Julia Skinner MD (Physician)           Click to link to [facility] NOTE       Assessment/Plan:   1.  Chronic systolic congestive heart failure class II, ischemic cardiomyopathy, LVEF of 45 %: The patient has no complaints of shortness of breath, leg edema or orthopnea.  He is compensated well well at this time.  No signs of fluid retention.  Continue Lasix 20 mg daily.    Continue lisinopril 20 mg twice a day, metoprolol  mg daily.     Routine CHF labs today.    2.  Coronary artery disease s/p CABG and stent placement as mentioned below: No chest pain.  Continue current medications including isosorbide mononitrate 30 mg twice a day, aspirin, Plavix, Lipitor, metoprolol    3.  Essential hypertension: His blood pressure has been controlled with his current medications.    4.  Hyperlipidemia: Continue Lipitor 40 mg daily.  Check Lipid profile and LFT today.    Thank you Dr. Duncan for the opportunity to be involved in the care of Keanu Spence. If you have any questions, please feel free to contact me.  I will see the patient again in 6 months.    Total 40 minutes were spent in this visit for face to face visit, physical exam, review of current labs/imaging studies, plan for ongoing treatment.    Much or all of the text in this note was generated through the use of Dragon Dictate voice-to-text software. Errors in spelling or words which seem out of context are unintentional.   Sound alike errors, in particular, may have escaped editing.       History of Present Illness:   It is my pleasure to see Keanu Spence at the [facility] clinic for routine cardiology follow-up post hospital discharge.  Keanu Specne is a 67y.o. male with a medical history of significant  coronary artery disease status post 4 V CABG in 1998, patent LIMA to LAD, occluded 3 SVG, BEBE to LM and LCX/OM1 10/2016, ischemic cardiomyopathy, chronic systolic congestive heart failure, LVEF improved to 45 % per ECHO on February 27 2017, essential hypertension, hyperlipidemia, DM II and peripheral vascular disease.    The patient was admitted to hospital in February 2017 due to acute on chronic congestive heart failure.  The patient was diuresed and his symptoms were improved.  Since then, he has been doing quite well.  He has no chest pain, shortness of breath, palpitations, dizziness, orthopnea PND or leg swelling.  His dyspnea on exertion has been improved.  He monitor his weight which has been stable.  He has been on low-salt diet.  His blood pressure and heart rate are in normal range.  The patient has no side effects from his current medications.    Past Medical History:     Patient Active Problem List   Diagnosis   ? Overweight   ? 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 Hypertension   ? Coronary Artery Disease   ? Peripheral Vascular Disease   ? Tobacco dependence   ? Hyperlipidemia LDL goal < 70   ? Ischemic cardiomyopathy   ? Coronary artery disease involving coronary bypass graft of native heart with unspecified angina pectoris   ? Coronary artery chronic total occlusion   ? Essential hypertension with goal blood pressure less than 140/90   ? Dyslipidemia, goal LDL below 70   ? NIKKI (obstructive sleep apnea)       Past Surgical History:     Past Surgical History:   Procedure Laterality Date   ? CARDIAC CATHETERIZATION  09/19/2016   ? CARDIAC CATHETERIZATION  10/11/2016     to lad/om1 in stent occlusion   ? CORONARY ANGIOPLASTY     ? CORONARY ARTERY BYPASS GRAFT  10/98    lima   ? coronary stent  10/11/2016    bebe to left main and om1   ? coronary stents     ? FEMORAL BYPASS  2002    left.  [facility]       Family History:     Family History   Problem Relation Age of Onset   ? Diabetes Mother    ? Diabetes Father    ? Heart disease Father    ? Diabetes Sister    ? Hypertension Sister    ? Diabetes Brother    ? Hypertension Brother         Social History:    reports that he quit smoking about 8 months ago. He started smoking about 46 years ago. He has a 8.00 pack-year smoking history. He has never used smokeless tobacco. He reports that he drinks alcohol. He reports that he does not use illicit drugs.    Review of Systems:   General: WNL  Eyes: WNL  Ears/Nose/Throat: WNL  Lungs: WNL  Heart: WNL  Stomach: WNL  Bladder: WNL  Muscle/Joints: WNL  Skin: WNL  Nervous System: WNL  Mental Health: WNL     Blood: WNL    Meds:     Current Outpatient Prescriptions:   ?  ACCU-CHEK COMPACT TEST Strp, Test daily before all meals/snacks and once before bedtime., Disp: 60 strip, Rfl: 12  ?  albuterol (PROVENTIL HFA;VENTOLIN HFA) 90 mcg/actuation inhaler, Inhale 2 puffs every 6 (six) hours as needed for wheezing., Disp: 8.5 g, Rfl: 11  ?  amLODIPine (NORVASC) 5 MG tablet, TAKE ONE TABLET BY MOUTH EVERY DAY, Disp: 90 tablet, Rfl: 3  ?  aspirin 81 MG EC tablet, Take 1 tablet (81 mg total) by mouth every evening., Disp: 90 tablet, Rfl: 3  ?  atorvastatin (LIPITOR) 40 MG tablet, Take 1 tablet (40 mg total) by mouth at bedtime., Disp: 90 tablet, Rfl: 1  ?  blood-glucose meter, drum-type (ACCU-CHEK COMPACT PLUS CARE) Kit, Test daily before all meals/snacks and once before bedtime., Disp: 1 kit, Rfl: 0  ?  CHANTIX 1 mg tablet, Take 1 tablet by mouth 2 (two) times a day., Disp: , Rfl: 4  ?  clopidogrel (PLAVIX) 75 mg tablet, Take 1 tablet (75 mg total) by mouth daily., Disp: 90 tablet, Rfl: 3  ?  furosemide (LASIX) 20 MG tablet, Take 1 tablet by mouth daily., Disp: , Rfl: 3  ?  generic lancets (ACCU-CHEK MULTICLIX LANCET), Test daily before all meals/snacks and once before bedtime., Disp: 3 each, Rfl: 0  ?  glipiZIDE  "(GLUCOTROL) 5 MG 24 hr tablet, Take one daily, Disp: 90 tablet, Rfl: 6  ?  hydrALAZINE (APRESOLINE) 25 MG tablet, Take 1 tablet (25 mg total) by mouth every 8 (eight) hours., Disp: 270 tablet, Rfl: 3  ?  hydroCHLOROthiazide (HYDRODIURIL) 25 MG tablet, Take 1 tablet (25 mg total) by mouth daily., Disp: 90 tablet, Rfl: 2  ?  isosorbide mononitrate (IMDUR) 30 MG 24 hr tablet, TAKE ONE TABLET BY MOUTH TWICE DAILY , Disp: 180 tablet, Rfl: 3  ?  lisinopril (PRINIVIL,ZESTRIL) 20 MG tablet, Take 1 tablet (20 mg total) by mouth 2 (two) times a day., Disp: 180 tablet, Rfl: 3  ?  metFORMIN (GLUCOPHAGE) 850 MG tablet, TAKE ONE TABLET BY MOUTH TWO TIMES A DAY WITH MEALS ., Disp: 180 tablet, Rfl: 2  ?  metoprolol succinate (TOPROL-XL) 200 MG 24 hr tablet, Take 1 tablet (200 mg total) by mouth daily., Disp: 90 tablet, Rfl: 3  ?  multivitamin with minerals (THERA-M) 9 mg iron-400 mcg Tab tablet, Take 1 tablet by mouth daily., Disp: , Rfl:   ?  isosorbide mononitrate (IMDUR) 30 MG 24 hr tablet, Take 1 tablet (30 mg total) by mouth 2 (two) times a day., Disp: 180 tablet, Rfl: 3    Allergies:   Review of patient's allergies indicates no known allergies.      Objective:      Physical Exam  222 lb (100.7 kg)  5' 11\" (1.803 m)  Body mass index is 30.96 kg/(m^2).  /74 (Patient Site: Left Arm, Patient Position: Sitting, Cuff Size: Adult Large)  Pulse 64  Resp 16  Ht 5' 11\" (1.803 m)  Wt 222 lb (100.7 kg)  BMI 30.96 kg/m2    General Appearance:   Awake, Alert, No acute distress.   HEENT:  Pupil equal and reactive to light. No scleral icterus; the mucous membranes were moist.   Neck: No cervical bruits. No JVD. No thyromegaly.     Chest: The spine was straight. The chest was symmetric.   Lungs:   Respirations unlabored; Lungs are clear to auscultation. No crackles. No wheezing.   Cardiovascular:   Regular rhythm and rate, normal first and second heart sounds with no murmurs. No rubs or gallops.    Abdomen:  Soft. No tenderness. " Non-distended. Bowels sounds are present   Extremities: Equal tibial pulses. No leg edema.   Skin: No rashes or ulcers. Warm, Dry.   Musculoskeletal: No tenderness. No deformity.   Neurologic: Mood and affect are appropriate. No focal deficits.         EKG: Personally reviewed  Sinus rhythm with occasional Premature ventricular complexes and Possible Premature atrial complexes with Aberrant conduction   Possible Left atrial enlargement   Rightward axis   Prolonged QT   Abnormal ECG   When compared with ECG of 11-OCT-2016 11:01,   Premature ventricular complexes are now Present   Aberrant conduction is now Present   OR interval has decreased   Nonspecific T wave abnormality now evident in Inferior leads    Cardiac Imaging Studies  Coronary angiogram with PCI on 12-:  Known CAD s/p prior CABG  Occluded mid LAD, prox RCA and mid Cx  Large OM1 arising proximal to mid cx occlusion, has patent  stents proximally but a 75% stenosis in the mid portion  Patent LIMA to LAD  3 occluded SVGs    Coronary angiogram with PCI on 10-:  LM 80% distal stenosis  LAD occluded mid; patent LIMA to LAD  LCx 70-80% prox OM1, occluded mid OM1 with  and filling of  distal OM1 via collaterals  RCA known to be occluded      Occluded SVGs to OM, Diag and RCA  Patent ALLRED to LAD      Underwent successful PCI of OM1  with antegrade wire  escalation, followed by laser atherectomy, PTCA and 2.5  Synergy SIRI, post dilated to 2.75mm at high cristina.  Prox OM1 lesion treated successfully with 2.75 Synergy SIRI  Also underwent successful PCI of distal LM into LCx with 3.5  Synergy SIRI, post dilated to 4mm      Lesion seen in distal OM after PCI of prox ; attempted to  stent this lesion but unable to cross mid OM stents; infact  the stent to be delivered sheared off the balloon at the LM;  successfully retrieved with a Goose neck snare; followed by  PTCA of distal OM with 2.5 balloon.      0% residuals, JAKOB 3 flow post, 0-1  pre    Nuclear stress test on 12-:  CONCLUSION:   1. Lexiscan stress is subjectively negative.   2. Lexiscan stress ECG is negative.   3. Lexiscan stress nuclear study is positive for inducible myocardial ischemia in  apex, distal inferior wall, distal inferolateral segment and distal lateral segment  in a medium size.   4. There is mild global hypokinesis with severe hypokinesis in apex, distal  inferior segment and distal inferolateral and lateral segments. The quantitative  left ventricular ejection fraction is 34%.     Stress cardiac MRI on 8-:  IMPRESSIONS:       1. Lexiscan stress cardiac MRI is positive for inducible myocardial ischemia diffusely indicating multiple vessel disease.   2. Lexiscan stress ECG is negative for inducible myocardial ischemia.  3. Previous nontransmural myocardial infarction in anterior wall, distal anteroseptal wall, apex, lateral wall from mid to distal segment and inferior and inferolateral walls. Endomyocardial scar is about 40%-50% of wall thickness indicating moderately reduced viability.  4. Moderately enlarged left ventricular size with several hypokinesis in anterior wall and inferior/inferolateral and lateral walls. The calculated left ventricular ejection fraction is 28%.  5. Normal right ventricular size and function.  6. Mild mitral and tricuspid valve regurgitation.    Cardiac MRI on 1-:  IMPRESSIONS:    1.  Left ventricle is moderately enlarged with severe systolic dysfunction.  The quantified left ventricular ejection fraction is 33.9%.   There is mild global hypokinesis with severe hypokinesis to akinesis of the mid to distal septal, mid to apical anterior wall, apical lateral and mid inferiolateral wall.   2.  Non-transmural myocardial infarction involving mid to apical anterior wall, mid to distal anteroseptal wall, basal to mid inferolateral, distal septum, basal to distal inferior wall and basal to mid inferolateral walls which involves 50%  of wall thickness indicating moderate possible viability of myocardial segments.  There is non-transmural infarction of the mid to distal anterolateral wall involving 25% of myocardial thickness representing viable myocardium.   3. Transmural infarction of the apical lateral wall.   4. Normal right ventricular size and function function.   5. Mild aortic insufficiency  6. Mild to moderate tricuspid regurgitation.  7. Mild mitral regurgitation.  8. Moderate left atrial enlargement. Mild right atrial enlargement     Compared to prior study on 8/15/2016, there is minimal improvement in left ventricular systolic function.    ECHO on 2-:  1. The left ventricle is normal in size. Left ventricular systolic performance is mildly reduced. The ejection fraction is estimated to be 45%.  2. There is mild mid lateral and more moderate distal On selected views, the mid posterior segment appears hypokinetic.  3. No significant valvular heart disease is identified on this study.   4. There is mild left atrial enlargement.      When compared to the prior real-time echocardiogram dated 8 February 2010, the ejection fraction appears slightly high.    Lab Review   Lab Results   Component Value Date     05/30/2017    K 3.9 05/30/2017     (H) 05/30/2017    CO2 22 05/30/2017    BUN 24 (H) 05/30/2017    CREATININE 1.32 (H) 05/30/2017    CALCIUM 9.7 05/30/2017     Lab Results   Component Value Date    WBC 8.7 02/27/2017    HGB 11.4 (L) 02/27/2017    HCT 36.9 (L) 02/27/2017    MCV 87 02/27/2017     02/27/2017     Lab Results   Component Value Date    CHOL 117 06/28/2016    CHOL 128 07/15/2015    CHOL 133 12/31/2014     Lab Results   Component Value Date    HDL 40 06/28/2016    HDL 41 07/15/2015    HDL 33 (L) 12/31/2014     Lab Results   Component Value Date    LDLCALC 62 06/28/2016    LDLCALC 70 07/15/2015    LDLCALC 48 12/31/2014     Lab Results   Component Value Date    TRIG 74 06/28/2016    TRIG 83 07/15/2015     TRIG 261 (H) 12/31/2014     No components found for: CHOLHDL  Lab Results   Component Value Date    TROPONINI 0.04 02/27/2017     Lab Results   Component Value Date     (H) 03/03/2017

## 2021-06-25 NOTE — PROGRESS NOTES
Progress Notes by Julia Skinner MD at 1/12/2017  8:10 AM     Author: Julia Skinner MD Service: -- Author Type: Physician    Filed: 1/12/2017  8:50 AM Encounter Date: 1/12/2017 Status: Signed    : Julia Skinner MD (Physician)           Click to link to [facility]     [facility] HEART CARE NOTE       Assessment/Plan:   1.  Chronic systolic congestive heart failure, ischemic cardiomyopathy, LVEF of 28 %: The patient's angina symptoms were resolved after he had PCI to distal left main and OM1.   He is compensated well well at this time.  No signs of fluid retention.  His blood test was reviewed.  BUN and creatinine were slightly high in November.  Continue Lasix 20 mg daily.  Discontinue hydrochlorothiazide 25 mg daily.    Continue lisinopril 20 mg twice a day, metoprolol 100 mg twice a day.  Cardiac MRI is requested for evaluation of left ventricular ejection fraction.  Hopefully his left ventricular ejection fraction will be improved more than 35% after stent placement.  If left ventricular ejection fraction is still less than 35%, and then we will discuss ICD placement for primary prevention.    2.  Coronary artery disease s/p CABG and stent placement as mentioned below: Again, his angina was resolved after PCI in October 2016.  Continue current medications including isosorbide mononitrate 30 mg twice a day, aspirin, Plavix, Lipitor, metoprolol    3.  Essential hypertension: His blood pressure has been controlled with his current medications.    4.  Hyperlipidemia: Continue Lipitor 20 mg daily.  Last LDL was 62.    Thank you Dr. Duncan for the opportunity to be involved in the care of Keanu Spence. If you have any questions, please feel free to contact me.  I will see the patient again in 6 months.    Much or all of the text in this note was generated through the use of Dragon Dictate voice-to-text software. Errors in spelling or words which seem out of context are unintentional.   Sound alike errors,  in particular, may have escaped editing.       History of Present Illness:   It is my pleasure to see Keanu Spence at the [facility] [place] [facility] clinic for routine cardiology follow-up.  Keanu Spence is a 66 y.o. male with a medical history of significant coronary artery disease status post 4 V CABG in 1998, patent LIMA to LAD, occluded 3 SVG, BEBE to LM and LCX/OM1 10/2016, ischemic cardiomyopathy, chronic systolic congestive heart failure, LVEF 28%, essential hypertension, hyperlipidemia, DM II and peripheral vascular disease.    The patient states that the his chest pain was resolved after he had distended to distal left main and the OM1 in October 2016.  So far, he has been doing better.  He can finish his routine activities of with out any problem.  He has mild stable dyspnea on exertion which has been stable.  He has no palpitations, dizziness, orthopnea or PND.  He has no leg edema.  His blood pressure and heart rate are controlled well.    Past Medical History:     Patient Active Problem List   Diagnosis   ? Overweight   ? Diabetes mellitus   ? Hypertension   ? Coronary Artery Disease   ? Peripheral Vascular Disease   ? Chronic Bronchitis With Acute Exacerbation   ? Tobacco dependence   ? Hyperlipidemia LDL goal < 70   ? Ischemic cardiomyopathy   ? Coronary artery disease involving coronary bypass graft of native heart with unspecified angina pectoris   ? Chronic systolic CHF (congestive heart failure), NYHA class 2   ? Coronary artery chronic total occlusion       Past Surgical History:     Past Surgical History   Procedure Laterality Date   ? Coronary angioplasty     ? Coronary stents     ? Femoral bypass  2002     left. [facility]   ? Cardiac catheterization  09/19/2016   ? Cardiac catheterization  10/11/2016      to lad/om1 in stent occlusion   ? Coronary artery bypass graft  10/98     [facility]   ? Coronary stent  10/11/2016     bebe to left main and om1       Family History:     Family  History   Problem Relation Age of Onset   ? Diabetes Mother    ? Diabetes Father    ? Heart disease Father    ? Diabetes Sister    ? Hypertension Sister    ? Diabetes Brother    ? Hypertension Brother        Social History:    reports that he quit smoking about 5 weeks ago. He started smoking about 46 years ago. He has a 8.00 pack-year smoking history. He has quit using smokeless tobacco. He reports that he drinks alcohol. He reports that he does not use illicit drugs.    Review of Systems:   General: WNL  Eyes: WNL  Ears/Nose/Throat: WNL  Lungs: WNL  Heart: WNL  Stomach: WNL  Bladder: WNL  Muscle/Joints: WNL  Skin: WNL  Nervous System: WNL  Mental Health: WNL     Blood: WNL    Meds:     Current Outpatient Prescriptions:   ?  ACCU-CHEK COMPACT TEST Strp, Test daily before all meals/snacks and once before bedtime., Disp: 60 strip, Rfl: 12  ?  amLODIPine (NORVASC) 10 MG tablet, TAKE ONE TABLET BY MOUTH EVERY DAY, Disp: 90 tablet, Rfl: 2  ?  aspirin 81 MG EC tablet, Take 1 tablet (81 mg total) by mouth every evening., Disp: 90 tablet, Rfl: 3  ?  atorvastatin (LIPITOR) 40 MG tablet, Take 1 tablet (40 mg total) by mouth bedtime., Disp: 90 tablet, Rfl: 3  ?  blood-glucose meter, drum-type (ACCU-CHEK COMPACT PLUS CARE) Kit, Test daily before all meals/snacks and once before bedtime., Disp: 1 kit, Rfl: 0  ?  clopidogrel (PLAVIX) 75 mg tablet, Take 1 tablet (75 mg total) by mouth daily., Disp: 90 tablet, Rfl: 1  ?  furosemide (LASIX) 20 MG tablet, Take 1 tablet (20 mg total) by mouth daily., Disp: 90 tablet, Rfl: 3  ?  generic lancets (ACCU-CHEK MULTICLIX LANCET), Test daily before all meals/snacks and once before bedtime., Disp: 3 each, Rfl: 0  ?  glipiZIDE (GLUCOTROL) 5 MG 24 hr tablet, Take one daily, Disp: 90 tablet, Rfl: 6  ?  isosorbide mononitrate (IMDUR) 30 MG 24 hr tablet, Take 1 tablet (30 mg total) by mouth 2 (two) times a day., Disp: 180 tablet, Rfl: 3  ?  lisinopril (PRINIVIL,ZESTRIL) 20 MG tablet, Take 1 tablet  "(20 mg total) by mouth 2 (two) times a day., Disp: 180 tablet, Rfl: 3  ?  metFORMIN (GLUCOPHAGE) 850 MG tablet, TAKE ONE TABLET BY MOUTH TWO TIMES A DAY WITH MEALS ., Disp: 180 tablet, Rfl: 2  ?  metoprolol tartrate (LOPRESSOR) 100 MG tablet, TAKE ONE TABLET BY MOUTH TWO TIMES A DAY, Disp: 180 tablet, Rfl: 2  ?  varenicline (CHANTIX STARTING MONTH BOX) 0.5 mg (11)- 1 mg (42) tablet, Give with meals and with a full glass of water., Disp: 53 tablet, Rfl: 0  ?  varenicline (CHANTIX) 1 mg tablet, Take 1 tablet (1 mg total) by mouth 2 (two) times a day. Take with full glass of water., Disp: 60 tablet, Rfl: 2    Allergies:   Review of patient's allergies indicates no known allergies.      Objective:      Physical Exam  222 lb (100.7 kg)  5' 11\" (1.803 m)  Body mass index is 30.96 kg/(m^2).  Visit Vitals   ? /62   ? Pulse (!) 54   ? Resp 14   ? Ht 5' 11\" (1.803 m)   ? Wt 222 lb (100.7 kg)   ? BMI 30.96 kg/m2       General Appearance:   Awake, Alert, No acute distress.   HEENT:  Pupil equal and reactive to light. No scleral icterus; the mucous membranes were moist.   Neck: No cervical bruits. No JVD. No thyromegaly.     Chest: The spine was straight. The chest was symmetric.   Lungs:   Respirations unlabored; Lungs are clear to auscultation. No crackles. No wheezing.   Cardiovascular:   Regular rhythm and rate, normal first and second heart sounds with no murmurs. No rubs or gallops.    Abdomen:  Soft. No tenderness. Non-distended. Bowels sounds are present   Extremities: Equal tibial pulses. No leg edema.   Skin: No rashes or ulcers. Warm, Dry.   Musculoskeletal: No tenderness. No deformity.   Neurologic: Mood and affect are appropriate. No focal deficits.           Cardiac Imaging Studies  Coronary angiogram with PCI on 12-:  Known CAD s/p prior CABG  Occluded mid LAD, prox RCA and mid Cx  Large OM1 arising proximal to mid cx occlusion, has patent  stents proximally but a 75% stenosis in the mid " portion  Patent LIMA to LAD  3 occluded SVGs    Coronary angiogram with PCI on 10-:  LM 80% distal stenosis  LAD occluded mid; patent LIMA to LAD  LCx 70-80% prox OM1, occluded mid OM1 with  and filling of  distal OM1 via collaterals  RCA known to be occluded      Occluded SVGs to OM, Diag and RCA  Patent ALLRED to LAD      Underwent successful PCI of OM1  with antegrade wire  escalation, followed by laser atherectomy, PTCA and 2.5  Synergy SIRI, post dilated to 2.75mm at high cristina.  Prox OM1 lesion treated successfully with 2.75 Synergy SIRI  Also underwent successful PCI of distal LM into LCx with 3.5  Synergy SIRI, post dilated to 4mm      Lesion seen in distal OM after PCI of prox ; attempted to  stent this lesion but unable to cross mid OM stents; infact  the stent to be delivered sheared off the balloon at the LM;  successfully retrieved with a Goose neck snare; followed by  PTCA of distal OM with 2.5 balloon.      0% residuals, JAKOB 3 flow post, 0-1 pre    Nuclear stress test on 12-:  CONCLUSION:   1. Lexiscan stress is subjectively negative.   2. Lexiscan stress ECG is negative.   3. Lexiscan stress nuclear study is positive for inducible myocardial ischemia in  apex, distal inferior wall, distal inferolateral segment and distal lateral segment  in a medium size.   4. There is mild global hypokinesis with severe hypokinesis in apex, distal  inferior segment and distal inferolateral and lateral segments. The quantitative  left ventricular ejection fraction is 34%.     Stress cardiac MRI on 8-:  IMPRESSIONS:       1. Lexiscan stress cardiac MRI is positive for inducible myocardial ischemia diffusely indicating multiple vessel disease.   2. Lexiscan stress ECG is negative for inducible myocardial ischemia.  3. Previous nontransmural myocardial infarction in anterior wall, distal anteroseptal wall, apex, lateral wall from mid to distal segment and inferior and inferolateral walls.  Endomyocardial scar is about 40%-50% of wall thickness indicating moderately reduced viability.  4. Moderately enlarged left ventricular size with several hypokinesis in anterior wall and inferior/inferolateral and lateral walls. The calculated left ventricular ejection fraction is 28%.  5. Normal right ventricular size and function.  6. Mild mitral and tricuspid valve regurgitation.    Lab Review   Lab Results   Component Value Date     11/02/2016    K 4.3 11/02/2016     (H) 11/02/2016    CO2 20 (L) 11/02/2016    BUN 32 (H) 11/02/2016    CREATININE 1.67 (H) 11/02/2016    CALCIUM 10.4 11/02/2016     Lab Results   Component Value Date    WBC 6.2 10/11/2016    HGB 12.8 (L) 10/11/2016    HCT 40.1 10/11/2016    MCV 86 10/11/2016     10/11/2016     Lab Results   Component Value Date    CHOL 117 06/28/2016    CHOL 128 07/15/2015    CHOL 133 12/31/2014     Lab Results   Component Value Date    HDL 40 06/28/2016    HDL 41 07/15/2015    HDL 33 (L) 12/31/2014     Lab Results   Component Value Date    LDLCALC 62 06/28/2016    LDLCALC 70 07/15/2015    LDLCALC 48 12/31/2014     Lab Results   Component Value Date    TRIG 74 06/28/2016    TRIG 83 07/15/2015    TRIG 261 (H) 12/31/2014     Lab Results   Component Value Date    TROPONINI 0.02 07/01/2016     Lab Results   Component Value Date     (H) 07/25/2016

## 2021-06-26 NOTE — PROGRESS NOTES
Progress Notes by Yuri Ro DO at 11/11/2018  9:20 AM     Author: Yuri Ro DO Service: -- Author Type: Physician    Filed: 11/12/2018  6:29 AM Encounter Date: 11/11/2018 Status: Signed    : Yuri Ro DO (Physician)       Chief Complaint   Patient presents with   ? Cough     5 days        History of Present Illness: Rooming staff notes reviewed.  Chief concern of patient is a persistent cough for the past 5 days.  No recent fever or chills.  His illness started out with some  cold symptoms.  Over-the-counter medication has not given sufficient relief of his coughing.  He has an albuterol inhaler to use at home if needed.  He has recently been seen by his cardiologist for management of his coronary artery disease.  No recent chest discomfort.  He does not have any shortness of breath at time of exam associated with his coughing.    Review of systems: See history of present illness, otherwise negative.     Current Outpatient Medications   Medication Sig Dispense Refill   ? ACCU-CHEK COMPACT TEST Strp Test daily before all meals/snacks and once before bedtime. 60 strip 12   ? albuterol (PROVENTIL HFA;VENTOLIN HFA) 90 mcg/actuation inhaler Inhale 2 puffs every 6 (six) hours as needed for wheezing. 8.5 g 11   ? amLODIPine (NORVASC) 5 MG tablet TAKE ONE TABLET BY MOUTH ONCE DAILY 90 tablet 2   ? aspirin 81 MG EC tablet Take 1 tablet (81 mg total) by mouth every evening. 90 tablet 3   ? atorvastatin (LIPITOR) 40 MG tablet TAKE ONE TABLET BY MOUTH DAILY AT BEDTIME 90 tablet 1   ? blood glucose test (ACCU-CHEK SMARTVIEW TEST STRIP) strips Dispense brand per patient's insurance at pharmacy discretion. Testing once a day only 90 strip 3   ? blood-glucose meter, drum-type (ACCU-CHEK COMPACT PLUS CARE) Kit Test daily before all meals/snacks and once before bedtime. 1 kit 0   ? clopidogrel (PLAVIX) 75 mg tablet TAKE ONE TABLET BY MOUTH ONCE DAILY 90 tablet 1   ? furosemide (LASIX) 20 MG tablet Take 1  tablet by mouth daily.  3   ? generic lancets (ACCU-CHEK MULTICLIX LANCET) Test daily before all meals/snacks and once before bedtime. 3 each 0   ? glipiZIDE (GLUCOTROL XL) 5 MG 24 hr tablet TAKE ONE TABLET BY MOUTH ONCE DAILY 90 tablet 5   ? hydrALAZINE (APRESOLINE) 25 MG tablet TAKE ONE TABLET BY MOUTH EVERY EIGHT HOURS  270 tablet 2   ? hydroCHLOROthiazide (HYDRODIURIL) 25 MG tablet TAKE ONE TABLET BY MOUTH ONCE DAILY 90 tablet 3   ? isosorbide mononitrate (IMDUR) 30 MG 24 hr tablet Take 1 tablet (30 mg total) by mouth 2 (two) times a day. 180 tablet 3   ? lisinopril (PRINIVIL,ZESTRIL) 20 MG tablet TAKE ONE TABLET BY MOUTH TWICE DAILY 180 tablet 3   ? metFORMIN (GLUCOPHAGE) 850 MG tablet Take 1 tablet (850 mg total) by mouth 2 (two) times a day with meals. 180 tablet 0   ? metoprolol succinate (TOPROL-XL) 200 MG 24 hr tablet TAKE ONE TABLET BY MOUTH ONCE DAILY 90 tablet 2   ? multivitamin with minerals (THERA-M) 9 mg iron-400 mcg Tab tablet Take 1 tablet by mouth daily.     ? albuterol (PROAIR HFA;PROVENTIL HFA;VENTOLIN HFA) 90 mcg/actuation inhaler Inhale 2 puffs every 4 (four) hours as needed for wheezing or shortness of breath. 1 each 0   ? azithromycin (ZITHROMAX) 250 MG tablet Take 500 mg (2 x 250 mg tablets) on day 1 followed by 250 mg (1 tablet) on days 2-5.. 6 tablet 0   ? CHANTIX 1 mg tablet Take 1 tablet by mouth 2 (two) times a day.  4   ? CHANTIX 1 mg tablet TAKE ONE TABLET BY MOUTH TWICE DAILY with a full glass of water 56 tablet 1   ? CHANTIX 1 mg tablet TAKE ONE TABLET BY MOUTH TWICE DAILY WITH A FULL GLASS OF WATER 56 tablet 0   ? codeine-guaiFENesin (CODEINE-GUAIFENESIN)  mg/5 mL liquid Take 5 mL by mouth every 4 (four) hours as needed for cough. 120 mL 0     No current facility-administered medications for this visit.      Past Medical History:   Diagnosis Date   ? Cardiomyopathy (H)    ? CHF (congestive heart failure) (H)    ? Chronic bronchitis (H)    ? Coronary artery disease    ?  Diabetes mellitus (H)    ? Hypertension    ? Obesity    ? Peripheral vascular disease (H)    ? Sleep apnea     no cpap      Past Surgical History:   Procedure Laterality Date   ? CARDIAC CATHETERIZATION  2016   ? CARDIAC CATHETERIZATION  10/11/2016     to lad/om1 in stent occlusion   ? CORONARY ANGIOPLASTY     ? CORONARY ARTERY BYPASS GRAFT  10/98    lima   ? coronary stent  10/11/2016    bebe to left main and om1   ? coronary stents     ? FEMORAL BYPASS      left. [facility]      Social History     Tobacco Use   ? Smoking status: Former Smoker     Packs/day: 0.20     Years: 40.00     Pack years: 8.00     Start date:      Last attempt to quit: 2016     Years since quittin.9   ? Smokeless tobacco: Never Used   ? Tobacco comment: Patient stated he is trying to quit.    Substance Use Topics   ? Alcohol use: Yes     Comment: rare   ? Drug use: No        Family History   Problem Relation Age of Onset   ? Diabetes Mother    ? Diabetes Father    ? Heart disease Father    ? Diabetes Sister    ? Hypertension Sister    ? Diabetes Brother    ? Hypertension Brother        Vitals:    18 0927   BP: 110/60   Patient Site: Right Arm   Patient Position: Sitting   Cuff Size: Adult Large   Pulse: (!) 107   Temp: 98  F (36.7  C)   TempSrc: Oral   SpO2: 95%   Weight: 219 lb 6.4 oz (99.5 kg)       EXAM:   General: Vital signs reviewed, with some tachycardia noted. Patient is in some distress due to frequent coughing. Breathing is non labored appearing. Patient is alert and oriented x 3.   ENT: Ear exam shows bilateral tympanic membranes to be clear without injection, nasal turbinates show no injection or edema, no pharyngeal injection or exudate.  Neck: supple with no adenoapthy.  Heart:  Heart rate is normal, regular rhythm without murmur.  Lungs:  Lung sounds are clear to auscultation with good airflow except for slight crackles noted in right lower lobe.  Skin: Skin is warm and dry without any  rash noted.    Assessment/Plan   1. Pneumonia of right lower lobe due to infectious organism (H)  azithromycin (ZITHROMAX) 250 MG tablet    albuterol (PROAIR HFA;PROVENTIL HFA;VENTOLIN HFA) 90 mcg/actuation inhaler    codeine-guaiFENesin (CODEINE-GUAIFENESIN)  mg/5 mL liquid       Patient Instructions     Also see info below. Be seen again or call clinic in 3 days if symptoms are not better, sooner if feeling any worse. Caution that the codeine may cause drowsiness and constipation, and can lead to psychological addiction in some people. There is also acetaminophen in this medication, so do not take other products containing this when using the prescription.        Patient Education     When You Have Pneumonia  You have been diagnosed with pneumonia. This is a serious lung infection. Most cases of pneumonia are caused by bacteria. Pneumonia most often occurs in older adults, young children, and people with chronic health problems.  Home care    Take your medicine exactly as directed. Dont skip doses. Continue taking your antibiotics as until they are all gone, even if you start to feel better. This will prevent the pneumonia from coming back.    Drink at least 8 glasses of water daily, unless directed otherwise. This helps to loosen and thin secretions so that you can cough them up.    Use a cool-mist humidifier in your bedroom. Be sure to clean the humidifier daily.    Dont use medicines to suppress your cough unless your cough is dry, painful, or interferes with your sleep. Coughing up mucus is normal. You may use an expectorant if your healthcare provider says its okay.    You can use warm compresses or a heating pad on the lowest setting to relieve chest discomfort. Use several times a day for 15-20 minutes at a time. To prevent injury to your skin, set the temperature to warm, not hot. Dont put the compress or pad directly on your skin. Make certain it has a cover or wrap it in a towel. This is to prevent  skin burns.    Get plenty of rest until your fever, shortness of breath, and chest pain go away.    Plan to get a flu shot every year. The flu is a common cause of pneumonia. Getting a flu shot every year can help prevent both the flu and pneumonia.  Getting the pneumococcal vaccine  Talk with your healthcare provider about getting the pneumococcal vaccine. Pneumococcal pneumonia is caused by bacteria that spread from person to person. It can cause minor problems, such as ear infections. But it can also turn into life-threatening illnesses of the lungs (pneumonia), the covering of the brain and spinal cord (meningitis), and the blood (bacteremia).  Children under 2 years of age, adults over age 65, people with certain health conditions, and smokers are at the highest risk of pneumococcal disease. This vaccine can help prevent pneumococcal disease in both adults and children. Some people should not have the vaccine. Make sure to ask your healthcare provider if you should have the vaccine.   Follow-up care  Make a follow-up appointment as directed by our staff.  When to call your healthcare provider  Call your healthcare provider right away if you have any of the following:    Fever of 100.4 F (38 C) or higher, or as directed by your healthcare provider    Mucus from the lungs (sputum) thats yellow, green, bloody, or smells bad    A large amount of sputum    Vomiting    Symptoms that get worse  Call 911  Call 911 right away if you have any of the following:    Chest pain    Trouble breathing    Blue lips or fingernails   Date Last Reviewed: 11/1/2016 2000-2017 The "[employer]. [address] [place] [address]. All rights reserved. This information is not intended as a substitute for professional medical care. Always follow your healthcare professional's instructions.              Yuri Ro,

## 2021-06-26 NOTE — PROGRESS NOTES
Progress Notes by Julia Skinner MD at 3/8/2018  2:10 PM     Author: Julia Skinner MD Service: -- Author Type: Physician    Filed: 3/8/2018  2:24 PM Encounter Date: 3/8/2018 Status: Signed    : Julia Skinner MD (Physician)           Click to link to [facility] HE[facility] NOTE       Assessment/Plan:   1.  Chronic systolic congestive heart failure class II, ischemic cardiomyopathy, LVEF of 45 %: The patient has no complaints of shortness of breath, leg edema or orthopnea.  He is compensated well well at this time.  No signs of fluid retention.  Continue Lasix 20 mg daily.    Continue lisinopril 20 mg twice a day, metoprolol  mg daily.  His BMP from last month is reviewed stable.    2.  Coronary artery disease s/p CABG and stent placement as mentioned below: No chest pain.  Continue current medications including isosorbide mononitrate 30 mg twice a day, aspirin, Plavix, Lipitor, metoprolol    3.  Essential hypertension: His blood pressure has been controlled with his current medications including hydralazine 25 mg tid, lisinopril 20 mg bid, HCTZ 25 mg daily and metoprolol  mg daily..    4.  Hyperlipidemia: Continue Lipitor 40 mg daily.  Last LDL was 52.    Thank you Dr. Duncan for the opportunity to be involved in the care of Keanu Spence. If you have any questions, please feel free to contact me.  I will see the patient again in 6 months.    Much or all of the text in this note was generated through the use of Dragon Dictate voice-to-text software. Errors in spelling or words which seem out of context are unintentional.   Sound alike errors, in particular, may have escaped editing.       History of Present Illness:   It is my pleasure to see Keanu Spence at the [facility] for routine cardiology follow-up.  Keanu Spence is a 67 y.o. male with a medical history of significant coronary artery disease status post 4 V CABG in 1998, patent LIMA to LAD,  occluded 3 SVG, BEBE to LM and LCX/OM1 10/2016, ischemic cardiomyopathy, chronic systolic congestive heart failure, LVEF improved to 45 % per ECHO on February 27 2017, essential hypertension, hyperlipidemia, DM II and peripheral vascular disease.    The patient states that he has been doing quite well over last 6 months.  He has no chest pain, shortness of breath, palpitations, dizziness, orthopnea PND or leg swelling.  His mild dyspnea on exertion has been stable.  He monitor his weight which has been stable.  He has been on low-salt diet.  His blood pressure and heart rate are in normal range.  The patient has no side effects from his current medications.    Past Medical History:     Patient Active Problem List   Diagnosis   ? Overweight   ? 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 Hypertension   ? Coronary Artery Disease   ? Peripheral Vascular Disease   ? Tobacco dependence   ? Hyperlipidemia LDL goal < 70   ? Ischemic cardiomyopathy   ? Coronary artery disease involving coronary bypass graft of native heart with unspecified angina pectoris   ? Coronary artery chronic total occlusion   ? Essential hypertension with goal blood pressure less than 140/90   ? Dyslipidemia, goal LDL below 70   ? NIKKI (obstructive sleep apnea)       Past Surgical History:     Past Surgical History:   Procedure Laterality Date   ? CARDIAC CATHETERIZATION  09/19/2016   ? CARDIAC CATHETERIZATION  10/11/2016     to lad/om1 in stent occlusion   ? CORONARY ANGIOPLASTY     ? CORONARY ARTERY BYPASS GRAFT  10/98    lima   ? coronary stent  10/11/2016    bebe to left main and om1   ? coronary stents     ? FEMORAL BYPASS  2002    left. [facility]       Family History:     Family History   Problem Relation Age of Onset   ? Diabetes Mother    ? Diabetes Father    ? Heart disease Father    ? Diabetes Sister    ? Hypertension Sister     ? Diabetes Brother    ? Hypertension Brother         Social History:    reports that he quit smoking about 15 months ago. He started smoking about 47 years ago. He has a 8.00 pack-year smoking history. He has never used smokeless tobacco. He reports that he drinks alcohol. He reports that he does not use illicit drugs.    Review of Systems:   General: WNL  Eyes: WNL  Ears/Nose/Throat: WNL  Lungs: WNL  Heart: WNL  Stomach: WNL  Bladder: WNL  Muscle/Joints: WNL  Skin: WNL  Nervous System: WNL  Mental Health: WNL     Blood: WNL    Meds:     Current Outpatient Prescriptions:   ?  ACCU-CHEK COMPACT TEST Strp, Test daily before all meals/snacks and once before bedtime., Disp: 60 strip, Rfl: 12  ?  albuterol (PROVENTIL HFA;VENTOLIN HFA) 90 mcg/actuation inhaler, Inhale 2 puffs every 6 (six) hours as needed for wheezing., Disp: 8.5 g, Rfl: 11  ?  amLODIPine (NORVASC) 5 MG tablet, TAKE ONE TABLET BY MOUTH ONCE DAILY, Disp: 90 tablet, Rfl: 0  ?  aspirin 81 MG EC tablet, Take 1 tablet (81 mg total) by mouth every evening., Disp: 90 tablet, Rfl: 3  ?  atorvastatin (LIPITOR) 40 MG tablet, TAKE ONE TABLET BY MOUTH DAILY AT BEDTIME, Disp: 90 tablet, Rfl: 2  ?  blood glucose test (ACCU-CHEK SMARTVIEW TEST STRIP) strips, Dispense brand per patient's insurance at pharmacy discretion. Testing once a day only, Disp: 90 strip, Rfl: 3  ?  blood-glucose meter, drum-type (ACCU-CHEK COMPACT PLUS CARE) Kit, Test daily before all meals/snacks and once before bedtime., Disp: 1 kit, Rfl: 0  ?  CHANTIX 1 mg tablet, Take 1 tablet by mouth 2 (two) times a day., Disp: , Rfl: 4  ?  CHANTIX 1 mg tablet, TAKE ONE TABLET BY MOUTH TWICE DAILY with a full glass of water, Disp: 56 tablet, Rfl: 1  ?  CHANTIX 1 mg tablet, TAKE ONE TABLET BY MOUTH TWICE DAILY WITH A FULL GLASS OF WATER, Disp: 56 tablet, Rfl: 0  ?  clopidogrel (PLAVIX) 75 mg tablet, TAKE ONE TABLET BY MOUTH DAILY, Disp: 90 tablet, Rfl: 1  ?  furosemide (LASIX) 20 MG tablet, Take 1 tablet  "by mouth daily., Disp: , Rfl: 3  ?  furosemide (LASIX) 20 MG tablet, TAKE ONE TABLET BY MOUTH ONCE DAILY, Disp: 90 tablet, Rfl: 3  ?  generic lancets (ACCU-CHEK MULTICLIX LANCET), Test daily before all meals/snacks and once before bedtime., Disp: 3 each, Rfl: 0  ?  glipiZIDE (GLUCOTROL XL) 5 MG 24 hr tablet, TAKE ONE TABLET BY MOUTH ONCE DAILY, Disp: 90 tablet, Rfl: 5  ?  hydrALAZINE (APRESOLINE) 25 MG tablet, TAKE ONE TABLET BY MOUTH EVERY EIGHT HOURS , Disp: 270 tablet, Rfl: 0  ?  hydroCHLOROthiazide (HYDRODIURIL) 25 MG tablet, TAKE ONE TABLET BY MOUTH ONCE DAILY. START 1 WEEK FROM 3/3/17., Disp: 90 tablet, Rfl: 2  ?  isosorbide mononitrate (IMDUR) 30 MG 24 hr tablet, Take 1 tablet (30 mg total) by mouth 2 (two) times a day., Disp: 180 tablet, Rfl: 3  ?  isosorbide mononitrate (IMDUR) 30 MG 24 hr tablet, TAKE ONE TABLET BY MOUTH TWICE DAILY , Disp: 180 tablet, Rfl: 3  ?  lisinopril (PRINIVIL,ZESTRIL) 20 MG tablet, TAKE ONE TABLET BY MOUTH TWICE DAILY , Disp: 180 tablet, Rfl: 2  ?  metFORMIN (GLUCOPHAGE) 850 MG tablet, Take 1 tablet (850 mg total) by mouth 2 (two) times a day with meals., Disp: 180 tablet, Rfl: 1  ?  metoprolol succinate (TOPROL-XL) 200 MG 24 hr tablet, TAKE ONE TABLET BY MOUTH ONCE DAILY, Disp: 90 tablet, Rfl: 0  ?  multivitamin with minerals (THERA-M) 9 mg iron-400 mcg Tab tablet, Take 1 tablet by mouth daily., Disp: , Rfl:     Allergies:   Review of patient's allergies indicates no known allergies.      Objective:      Physical Exam  219 lb 3.2 oz (99.4 kg)  6' 0.5\" (1.842 m)  Body mass index is 29.32 kg/(m^2).  /62 (Patient Site: Left Arm, Patient Position: Sitting, Cuff Size: Adult Large)  Pulse 60  Resp 20  Ht 6' 0.5\" (1.842 m) Comment: shoes on  Wt 219 lb 3.2 oz (99.4 kg) Comment: shoes on  BMI 29.32 kg/m2    General Appearance:   Awake, Alert, No acute distress.   HEENT:  Pupil equal and reactive to light. No scleral icterus; the mucous membranes were moist.   Neck: No cervical " bruits. No JVD. No thyromegaly.     Chest: The spine was straight. The chest was symmetric.   Lungs:   Respirations unlabored; Lungs are clear to auscultation. No crackles. No wheezing.   Cardiovascular:   Regular rhythm and rate, normal first and second heart sounds with no murmurs. No rubs or gallops.    Abdomen:  Soft. No tenderness. Non-distended. Bowels sounds are present   Extremities: Equal tibial pulses. No leg edema.   Skin: No rashes or ulcers. Warm, Dry.   Musculoskeletal: No tenderness. No deformity.   Neurologic: Mood and affect are appropriate. No focal deficits.         EKG: Personally reviewed  Sinus rhythm with occasional Premature ventricular complexes and Possible Premature atrial complexes with Aberrant conduction   Possible Left atrial enlargement   Rightward axis   Prolonged QT   Abnormal ECG   When compared with ECG of 11-OCT-2016 11:01,   Premature ventricular complexes are now Present   Aberrant conduction is now Present   TX interval has decreased   Nonspecific T wave abnormality now evident in Inferior leads    Cardiac Imaging Studies  Coronary angiogram with PCI on 12-:  Known CAD s/p prior CABG  Occluded mid LAD, prox RCA and mid Cx  Large OM1 arising proximal to mid cx occlusion, has patent  stents proximally but a 75% stenosis in the mid portion  Patent LIMA to LAD  3 occluded SVGs    Coronary angiogram with PCI on 10-:  LM 80% distal stenosis  LAD occluded mid; patent LIMA to LAD  LCx 70-80% prox OM1, occluded mid OM1 with  and filling of  distal OM1 via collaterals  RCA known to be occluded      Occluded SVGs to OM, Diag and RCA  Patent ALLRED to LAD      Underwent successful PCI of OM1  with antegrade wire  escalation, followed by laser atherectomy, PTCA and 2.5  Synergy SIRI, post dilated to 2.75mm at high cristina.  Prox OM1 lesion treated successfully with 2.75 Synergy SIRI  Also underwent successful PCI of distal LM into LCx with 3.5  Synergy SIRI, post dilated to  4mm      Lesion seen in distal OM after PCI of prox ; attempted to  stent this lesion but unable to cross mid OM stents; infact  the stent to be delivered sheared off the balloon at the LM;  successfully retrieved with a Goose neck snare; followed by  PTCA of distal OM with 2.5 balloon.      0% residuals, JAKOB 3 flow post, 0-1 pre    Nuclear stress test on 12-:  CONCLUSION:   1. Lexiscan stress is subjectively negative.   2. Lexiscan stress ECG is negative.   3. Lexiscan stress nuclear study is positive for inducible myocardial ischemia in  apex, distal inferior wall, distal inferolateral segment and distal lateral segment  in a medium size.   4. There is mild global hypokinesis with severe hypokinesis in apex, distal  inferior segment and distal inferolateral and lateral segments. The quantitative  left ventricular ejection fraction is 34%.     Stress cardiac MRI on 8-:  IMPRESSIONS:       1. Lexiscan stress cardiac MRI is positive for inducible myocardial ischemia diffusely indicating multiple vessel disease.   2. Lexiscan stress ECG is negative for inducible myocardial ischemia.  3. Previous nontransmural myocardial infarction in anterior wall, distal anteroseptal wall, apex, lateral wall from mid to distal segment and inferior and inferolateral walls. Endomyocardial scar is about 40%-50% of wall thickness indicating moderately reduced viability.  4. Moderately enlarged left ventricular size with several hypokinesis in anterior wall and inferior/inferolateral and lateral walls. The calculated left ventricular ejection fraction is 28%.  5. Normal right ventricular size and function.  6. Mild mitral and tricuspid valve regurgitation.    Cardiac MRI on 1-:  IMPRESSIONS:    1.  Left ventricle is moderately enlarged with severe systolic dysfunction.  The quantified left ventricular ejection fraction is 33.9%.   There is mild global hypokinesis with severe hypokinesis to akinesis of the mid to  distal septal, mid to apical anterior wall, apical lateral and mid inferiolateral wall.   2.  Non-transmural myocardial infarction involving mid to apical anterior wall, mid to distal anteroseptal wall, basal to mid inferolateral, distal septum, basal to distal inferior wall and basal to mid inferolateral walls which involves 50% of wall thickness indicating moderate possible viability of myocardial segments.  There is non-transmural infarction of the mid to distal anterolateral wall involving 25% of myocardial thickness representing viable myocardium.   3. Transmural infarction of the apical lateral wall.   4. Normal right ventricular size and function function.   5. Mild aortic insufficiency  6. Mild to moderate tricuspid regurgitation.  7. Mild mitral regurgitation.  8. Moderate left atrial enlargement. Mild right atrial enlargement     Compared to prior study on 8/15/2016, there is minimal improvement in left ventricular systolic function.    ECHO on 2-:  1. The left ventricle is normal in size. Left ventricular systolic performance is mildly reduced. The ejection fraction is estimated to be 45%.  2. There is mild mid lateral and more moderate distal On selected views, the mid posterior segment appears hypokinetic.  3. No significant valvular heart disease is identified on this study.   4. There is mild left atrial enlargement.      When compared to the prior real-time echocardiogram dated 8 February 2010, the ejection fraction appears slightly high.    Lab Review   Lab Results   Component Value Date     02/27/2018    K 4.2 02/27/2018     (H) 02/27/2018    CO2 21 (L) 02/27/2018    BUN 28 (H) 02/27/2018    CREATININE 1.32 (H) 02/27/2018    CALCIUM 9.9 02/27/2018     Lab Results   Component Value Date    WBC 5.5 02/27/2018    HGB 12.8 (L) 02/27/2018    HCT 38.5 (L) 02/27/2018    MCV 81 02/27/2018     02/27/2018     Lab Results   Component Value Date    CHOL 103 08/16/2017    CHOL 117  06/28/2016    CHOL 128 07/15/2015     Lab Results   Component Value Date    HDL 31 (L) 08/16/2017    HDL 40 06/28/2016    HDL 41 07/15/2015     Lab Results   Component Value Date    LDLCALC 52 08/16/2017    LDLCALC 62 06/28/2016    LDLCALC 70 07/15/2015     Lab Results   Component Value Date    TRIG 99 08/16/2017    TRIG 74 06/28/2016    TRIG 83 07/15/2015     No components found for: CHOLHDL  Lab Results   Component Value Date    TROPONINI 0.04 02/27/2017     Lab Results   Component Value Date     (H) 08/16/2017     Lab Results   Component Value Date    TSH 1.15 07/20/2012

## 2021-06-26 NOTE — PROGRESS NOTES
Progress Notes by Julia Skinner MD at 9/13/2018  8:30 AM     Author: Julia Skinner MD Service: -- Author Type: Physician    Filed: 9/13/2018  8:54 AM Encounter Date: 9/13/2018 Status: Signed    : Julia Skinner MD (Physician)           Click to link to [facility] HEART [facility] NOTE       Assessment/Plan:   1.  Chronic systolic congestive heart failure class II, ischemic cardiomyopathy, LVEF of 45 %: The patient has no complaints of shortness of breath, leg edema or orthopnea.  He is compensated well well at this time.  No signs of fluid retention.  Continue Lasix 20 mg daily.    Continue lisinopril 20 mg twice a day, metoprolol  mg daily.  His BMP was normal on 8-.    2.  Coronary artery disease s/p CABG and stent placement as mentioned below: No chest pain.  Continue current medications including isosorbide mononitrate 30 mg twice a day, aspirin, Plavix, Lipitor, metoprolol    3.  Essential hypertension: His blood pressure has been controlled with his current medications including hydralazine 25 mg tid, lisinopril 20 mg bid, HCTZ 25 mg daily and metoprolol  mg daily..    4.  Hyperlipidemia: Continue Lipitor 40 mg daily.  Last LDL was 53.    Thank you Dr. Duncan for the opportunity to be involved in the care of Keanu Spence. If you have any questions, please feel free to contact me.  I will see the patient again in 12 months.    Much or all of the text in this note was generated through the use of Dragon Dictate voice-to-text software. Errors in spelling or words which seem out of context are unintentional.   Sound alike errors, in particular, may have escaped editing.       History of Present Illness:   It is my pleasure to see Keanu Spence at the [facility] for routine cardiology follow-up.  Keanu Spence is a 68 y.o. male with a medical history of significant coronary artery disease status post 4 V CABG in 1998, patent LIMA to LAD, occluded 3  ELEANORG, BEBE to LM and LCX/OM1 10/2016, ischemic cardiomyopathy, chronic systolic congestive heart failure, LVEF improved to 45 % per ECHO on February 27 2017, essential hypertension, hyperlipidemia, DM II and peripheral vascular disease.    The patient states that he has been doing quite well over last 6 months.  He had no chest pain, shortness of breath, palpitations, dizziness, orthopnea PND or leg swelling.  His mild dyspnea on exertion has been stable.  His weight has been stable.  He has been on low-salt diet.  His blood pressure and heart rate are in normal range.  The patient has no side effects from his current medications.    Past Medical History:     Patient Active Problem List   Diagnosis   ? Overweight   ? Diabetes mellitus due to underlying condition with diabetic chronic kidney disease, unspecified CKD stage, unspecified long term insulin use status (H)   ? Hypertension   ? Coronary Artery Disease   ? Peripheral Vascular Disease   ? Tobacco dependence   ? Hyperlipidemia LDL goal < 70   ? Ischemic cardiomyopathy   ? Coronary artery disease involving coronary bypass graft of native heart with unspecified angina pectoris   ? Coronary artery chronic total occlusion   ? Essential hypertension with goal blood pressure less than 140/90   ? Dyslipidemia, goal LDL below 70   ? NIKKI (obstructive sleep apnea)       Past Surgical History:     Past Surgical History:   Procedure Laterality Date   ? CARDIAC CATHETERIZATION  09/19/2016   ? CARDIAC CATHETERIZATION  10/11/2016     to lad/om1 in stent occlusion   ? CORONARY ANGIOPLASTY     ? CORONARY ARTERY BYPASS GRAFT  10/98    [facility]a   ? coronary stent  10/11/2016    bebe to left main and om1   ? coronary stents     ? FEMORAL BYPASS  2002    left. [facility]       Family History:     Family History   Problem Relation Age of Onset   ? Diabetes Mother    ? Diabetes Father    ? Heart disease Father    ? Diabetes Sister    ? Hypertension Sister    ? Diabetes Brother     ? Hypertension Brother         Social History:    reports that he quit smoking about 21 months ago. He started smoking about 47 years ago. He has a 8.00 pack-year smoking history. He has never used smokeless tobacco. He reports that he drinks alcohol. He reports that he does not use illicit drugs.    Review of Systems:   General: WNL  Eyes: WNL  Ears/Nose/Throat: WNL  Lungs: WNL  Heart: WNL  Stomach: WNL  Bladder: WNL  Muscle/Joints: WNL  Skin: WNL  Nervous System: WNL  Mental Health: WNL     Blood: WNL    Meds:     Current Outpatient Prescriptions:   ?  ACCU-CHEK COMPACT TEST Strp, Test daily before all meals/snacks and once before bedtime., Disp: 60 strip, Rfl: 12  ?  albuterol (PROVENTIL HFA;VENTOLIN HFA) 90 mcg/actuation inhaler, Inhale 2 puffs every 6 (six) hours as needed for wheezing., Disp: 8.5 g, Rfl: 11  ?  amLODIPine (NORVASC) 5 MG tablet, TAKE ONE TABLET BY MOUTH ONCE DAILY, Disp: 90 tablet, Rfl: 2  ?  aspirin 81 MG EC tablet, Take 1 tablet (81 mg total) by mouth every evening., Disp: 90 tablet, Rfl: 3  ?  atorvastatin (LIPITOR) 40 MG tablet, TAKE ONE TABLET BY MOUTH DAILY AT BEDTIME, Disp: 90 tablet, Rfl: 1  ?  blood glucose test (ACCU-CHEK SMARTVIEW TEST STRIP) strips, Dispense brand per patient's insurance at pharmacy discretion. Testing once a day only, Disp: 90 strip, Rfl: 3  ?  blood-glucose meter, drum-type (ACCU-CHEK COMPACT PLUS CARE) Kit, Test daily before all meals/snacks and once before bedtime., Disp: 1 kit, Rfl: 0  ?  clopidogrel (PLAVIX) 75 mg tablet, TAKE ONE TABLET BY MOUTH DAILY, Disp: 90 tablet, Rfl: 0  ?  furosemide (LASIX) 20 MG tablet, Take 1 tablet by mouth daily., Disp: , Rfl: 3  ?  generic lancets (ACCU-CHEK MULTICLIX LANCET), Test daily before all meals/snacks and once before bedtime., Disp: 3 each, Rfl: 0  ?  glipiZIDE (GLUCOTROL XL) 5 MG 24 hr tablet, TAKE ONE TABLET BY MOUTH ONCE DAILY, Disp: 90 tablet, Rfl: 5  ?  hydrALAZINE (APRESOLINE) 25 MG tablet, TAKE ONE TABLET BY  "MOUTH EVERY EIGHT HOURS , Disp: 270 tablet, Rfl: 2  ?  hydroCHLOROthiazide (HYDRODIURIL) 25 MG tablet, TAKE ONE TABLET BY MOUTH ONCE DAILY. START 1 WEEK FROM 3/3/17., Disp: 90 tablet, Rfl: 2  ?  isosorbide mononitrate (IMDUR) 30 MG 24 hr tablet, Take 1 tablet (30 mg total) by mouth 2 (two) times a day., Disp: 180 tablet, Rfl: 3  ?  lisinopril (PRINIVIL,ZESTRIL) 20 MG tablet, TAKE ONE TABLET BY MOUTH TWICE DAILY , Disp: 180 tablet, Rfl: 2  ?  metFORMIN (GLUCOPHAGE) 850 MG tablet, TAKE ONE TABLET BY MOUTH TWICE DAILY WITH MEALS, Disp: 180 tablet, Rfl: 0  ?  metoprolol succinate (TOPROL-XL) 200 MG 24 hr tablet, TAKE ONE TABLET BY MOUTH ONCE DAILY, Disp: 90 tablet, Rfl: 2  ?  multivitamin with minerals (THERA-M) 9 mg iron-400 mcg Tab tablet, Take 1 tablet by mouth daily., Disp: , Rfl:   ?  CHANTIX 1 mg tablet, Take 1 tablet by mouth 2 (two) times a day., Disp: , Rfl: 4  ?  CHANTIX 1 mg tablet, TAKE ONE TABLET BY MOUTH TWICE DAILY with a full glass of water, Disp: 56 tablet, Rfl: 1  ?  CHANTIX 1 mg tablet, TAKE ONE TABLET BY MOUTH TWICE DAILY WITH A FULL GLASS OF WATER, Disp: 56 tablet, Rfl: 0    Allergies:   Review of patient's allergies indicates no known allergies.      Objective:      Physical Exam  219 lb (99.3 kg)  6' 0.5\" (1.842 m)  Body mass index is 29.29 kg/(m^2).  /62 (Patient Site: Left Arm, Patient Position: Sitting, Cuff Size: Adult Large)  Pulse 80  Resp 16  Ht 6' 0.5\" (1.842 m)  Wt 219 lb (99.3 kg)  BMI 29.29 kg/m2    General Appearance:   Awake, Alert, No acute distress.   HEENT:  Pupil equal and reactive to light. No scleral icterus; the mucous membranes were moist.   Neck: No cervical bruits. No JVD. No thyromegaly.     Chest: The spine was straight. The chest was symmetric.   Lungs:   Respirations unlabored; Lungs are clear to auscultation. No crackles. No wheezing.   Cardiovascular:   Regular rhythm and rate, normal first and second heart sounds with no murmurs. No rubs or gallops.  "   Abdomen:  Soft. No tenderness. Non-distended. Bowels sounds are present   Extremities: Equal tibial pulses. No leg edema.   Skin: No rashes or ulcers. Warm, Dry.   Musculoskeletal: No tenderness. No deformity.   Neurologic: Mood and affect are appropriate. No focal deficits.         EKG: Personally reviewed  Sinus rhythm with occasional Premature ventricular complexes and Possible Premature atrial complexes with Aberrant conduction   Possible Left atrial enlargement   Rightward axis   Prolonged QT   Abnormal ECG   When compared with ECG of 11-OCT-2016 11:01,   Premature ventricular complexes are now Present   Aberrant conduction is now Present   DC interval has decreased   Nonspecific T wave abnormality now evident in Inferior leads    Cardiac Imaging Studies  Coronary angiogram with PCI on 12-:  Known CAD s/p prior CABG  Occluded mid LAD, prox RCA and mid Cx  Large OM1 arising proximal to mid cx occlusion, has patent  stents proximally but a 75% stenosis in the mid portion  Patent LIMA to LAD  3 occluded SVGs    Coronary angiogram with PCI on 10-:  LM 80% distal stenosis  LAD occluded mid; patent LIMA to LAD  LCx 70-80% prox OM1, occluded mid OM1 with  and filling of  distal OM1 via collaterals  RCA known to be occluded      Occluded SVGs to OM, Diag and RCA  Patent ALLRED to LAD      Underwent successful PCI of OM1  with antegrade wire  escalation, followed by laser atherectomy, PTCA and 2.5  Synergy SIRI, post dilated to 2.75mm at high cristina.  Prox OM1 lesion treated successfully with 2.75 Synergy SIRI  Also underwent successful PCI of distal LM into LCx with 3.5  Synergy SIRI, post dilated to 4mm      Lesion seen in distal OM after PCI of prox ; attempted to  stent this lesion but unable to cross mid OM stents; infact  the stent to be delivered sheared off the balloon at the LM;  successfully retrieved with a Goose neck snare; followed by  PTCA of distal OM with 2.5 balloon.      0% residuals,  JAKOB 3 flow post, 0-1 pre    Nuclear stress test on 12-:  CONCLUSION:   1. Lexiscan stress is subjectively negative.   2. Lexiscan stress ECG is negative.   3. Lexiscan stress nuclear study is positive for inducible myocardial ischemia in  apex, distal inferior wall, distal inferolateral segment and distal lateral segment  in a medium size.   4. There is mild global hypokinesis with severe hypokinesis in apex, distal  inferior segment and distal inferolateral and lateral segments. The quantitative  left ventricular ejection fraction is 34%.     Stress cardiac MRI on 8-:  IMPRESSIONS:       1. Lexiscan stress cardiac MRI is positive for inducible myocardial ischemia diffusely indicating multiple vessel disease.   2. Lexiscan stress ECG is negative for inducible myocardial ischemia.  3. Previous nontransmural myocardial infarction in anterior wall, distal anteroseptal wall, apex, lateral wall from mid to distal segment and inferior and inferolateral walls. Endomyocardial scar is about 40%-50% of wall thickness indicating moderately reduced viability.  4. Moderately enlarged left ventricular size with several hypokinesis in anterior wall and inferior/inferolateral and lateral walls. The calculated left ventricular ejection fraction is 28%.  5. Normal right ventricular size and function.  6. Mild mitral and tricuspid valve regurgitation.    Cardiac MRI on 1-:  IMPRESSIONS:    1.  Left ventricle is moderately enlarged with severe systolic dysfunction.  The quantified left ventricular ejection fraction is 33.9%.   There is mild global hypokinesis with severe hypokinesis to akinesis of the mid to distal septal, mid to apical anterior wall, apical lateral and mid inferiolateral wall.   2.  Non-transmural myocardial infarction involving mid to apical anterior wall, mid to distal anteroseptal wall, basal to mid inferolateral, distal septum, basal to distal inferior wall and basal to mid inferolateral  walls which involves 50% of wall thickness indicating moderate possible viability of myocardial segments.  There is non-transmural infarction of the mid to distal anterolateral wall involving 25% of myocardial thickness representing viable myocardium.   3. Transmural infarction of the apical lateral wall.   4. Normal right ventricular size and function function.   5. Mild aortic insufficiency  6. Mild to moderate tricuspid regurgitation.  7. Mild mitral regurgitation.  8. Moderate left atrial enlargement. Mild right atrial enlargement     Compared to prior study on 8/15/2016, there is minimal improvement in left ventricular systolic function.    ECHO on 2-:  1. The left ventricle is normal in size. Left ventricular systolic performance is mildly reduced. The ejection fraction is estimated to be 45%.  2. There is mild mid lateral and more moderate distal On selected views, the mid posterior segment appears hypokinetic.  3. No significant valvular heart disease is identified on this study.   4. There is mild left atrial enlargement.      When compared to the prior real-time echocardiogram dated 8 February 2010, the ejection fraction appears slightly high.    Lab Review   Lab Results   Component Value Date     08/28/2018    K 4.3 08/28/2018     (H) 08/28/2018    CO2 26 08/28/2018    BUN 20 08/28/2018    CREATININE 1.20 08/28/2018    CALCIUM 10.5 08/28/2018     Lab Results   Component Value Date    WBC 5.5 02/27/2018    HGB 12.8 (L) 02/27/2018    HCT 38.5 (L) 02/27/2018    MCV 81 02/27/2018     02/27/2018     Lab Results   Component Value Date    CHOL 109 08/28/2018    CHOL 103 08/16/2017    CHOL 117 06/28/2016     Lab Results   Component Value Date    HDL 39 (L) 08/28/2018    HDL 31 (L) 08/16/2017    HDL 40 06/28/2016     Lab Results   Component Value Date    LDLCALC 53 08/28/2018    LDLCALC 52 08/16/2017    LDLCALC 62 06/28/2016     Lab Results   Component Value Date    TRIG 87 08/28/2018     TRIG 99 08/16/2017    TRIG 74 06/28/2016     No components found for: CHOLHDL  Lab Results   Component Value Date    TROPONINI 0.04 02/27/2017     Lab Results   Component Value Date     (H) 08/16/2017     Lab Results   Component Value Date    TSH 1.15 07/20/2012

## 2021-06-27 NOTE — PROGRESS NOTES
Progress Notes by Yi Hernandez CNP at 3/5/2019  8:30 AM     Author: Yi Hernandez CNP Service: -- Author Type: Nurse Practitioner    Filed: 3/5/2019  9:07 AM Encounter Date: 3/5/2019 Status: Signed    : Yi Hernandez CNP (Nurse Practitioner)           Click to link to [facility] HEART CARE NOTE      Assessment/Recommendations   Assessment:    1.  Heart failure with reduced ejection fraction, LVEF 22%, severely decreased right ventricular systolic function, grade 3 diastolic dysfunction: This was felt to be related to atrial flutter and he has a history of coronary artery disease.  He has no symptoms of acute heart failure.  His weight has decreased 16 pounds in the past 3 weeks and is now stable.    2.  Atrial flutter: He had a successful cardioversion during hospitalization in January.  He continues to take warfarin and had an INR checked today.    Plan:  1.   Heart failure medications:  - Beta blocker therapy with metroprolol succinate 200 mg daily  - ACEI therapy with lisinopril 20 mg twice daily (Keanu is not sure about dose but believes he is on 20 mg twice a day, he will notify clinic if this is incorrect)  - Diuretic therapy with furosemide 40 mg daily  2.  Continue current medications  3.  Continue low-sodium diet and daily weights    Keanu Spence will follow up with Dr. Skinner on April 3.  We discussed rechecking echocardiogram in late April.     History of Present Illness    Mr. Keanu Spence is a 68 y.o. male seen at [facility] heart failure clinic today for continued follow-up.  He was hospitalized January 27 - January 31 with shortness of breath.  He was diagnosed with atrial flutter and had successful cardioversion.  Echocardiogram on January 28, 2019 showed an ejection fraction of 22%, grade 3 diastolic dysfunction, severely decreased right ventricular systolic function, mild to moderate mitral regurgitation, and mild to  moderate tricuspid regurgitation.  He has a history of hypertension, coronary artery disease, coronary artery bypass surgery in 1998, coronary stent in 2016, peripheral vascular disease, dyslipidemia, chronic bronchitis, obstructive sleep apnea (untreated), chronic kidney disease, and diabetes.    His lisinopril, hydrochlorothiazide, and metformin were all restarted by primary care provider on February 6.  Over the past 3 weeks, he has noticed significant improvement in his symptoms.  His weight has decreased about 16 pounds since his clinic appointment on February 5.  Home weight is now stable between 197-199 pounds over the past 1-2 weeks.  He has no symptoms of acute heart failure.  He has occasional dizziness but denies any worsening.  He denies any syncope or near syncope.  He denies shortness of breath, dyspnea on exertion, orthopnea, chest pain and lower extremity edema.      ECHO:   Results for orders placed during the hospital encounter of 01/27/19   Echo Transesophageal [ECH01] 01/30/2019    Narrative   Moderate biatrial enlargement. No evidence of left atrial appendage   thrombus.    Left ventricle ejection fraction is severely decreased. The estimated   left ventricular ejection fraction is 25% with global hypokinesis.    No PFO identified by bubble study    Mild mitral and tricuspid regurgitation.    Aortic valve sclerosis           Physical Examination Review of Systems   Vitals:    03/05/19 0826   BP: 104/58   Pulse: (!) 54   Resp: 16     Body mass index is 28.17 kg/m .  Wt Readings from Last 3 Encounters:   03/05/19 202 lb (91.6 kg)   02/21/19 203 lb 8 oz (92.3 kg)   02/20/19 203 lb (92.1 kg)       General Appearance:     Alert, cooperative and in no acute distress.   ENT/Mouth: membranes moist, no oral lesions or bleeding gums.      EYES:  no scleral icterus, normal conjunctivae   Chest/Lungs:   lungs are clear to auscultation, no rales or wheezing, respirations unlabored   Cardiovascular:    Regular. Normal first and second heart sounds, no edema bilateral lower extremities    Abdomen:  Soft, nontender, nondistended, bowel sounds present   Extremities: no cyanosis or clubbing   Skin: warm, dry.    Neurologic: mood and affect are appropriate, alert and oriented x3      General: WNL  Eyes: WNL  Ears/Nose/Throat: WNL  Lungs: WNL  Heart: WNL  Stomach: WNL  Bladder: WNL  Muscle/Joints: WNL  Skin: WNL  Nervous System: WNL  Mental Health: WNL     Blood: WNL     Medical History  Surgical History Family History Social History   Past Medical History:   Diagnosis Date   ? Cardiomyopathy (H)    ? CHF (congestive heart failure) (H)    ? Chronic anticoagulation 2/21/2019   ? Chronic bronchitis (H)    ? Coronary artery disease    ? Diabetes mellitus (H)    ? HLD (hyperlipidemia)    ? Hypertension    ? Obesity    ? Peripheral vascular disease (H)    ? Sleep apnea     no cpap    Past Surgical History:   Procedure Laterality Date   ? CARDIAC CATHETERIZATION  09/19/2016   ? CARDIAC CATHETERIZATION  10/11/2016     to lad/om1 in stent occlusion   ? CORONARY ANGIOPLASTY     ? CORONARY ARTERY BYPASS GRAFT  10/98    [facility]   ? coronary stent  10/11/2016    bebe to left main and om1   ? coronary stents     ? FEMORAL BYPASS  2002    left. [facility]    Family History   Problem Relation Age of Onset   ? Diabetes Mother    ? Diabetes Father    ? Heart disease Father    ? Diabetes Sister    ? Hypertension Sister    ? Diabetes Brother    ? Hypertension Brother     Social History     Socioeconomic History   ? Marital status: Single     Spouse name: Not on file   ? Number of children: Not on file   ? Years of education: Not on file   ? Highest education level: Not on file   Occupational History   ? Not on file   Social Needs   ? Financial resource strain: Not on file   ? Food insecurity:     Worry: Not on file     Inability: Not on file   ? Transportation needs:     Medical: Not on file     Non-medical: Not on file   Tobacco  Use   ? Smoking status: Former Smoker     Packs/day: 0.20     Years: 40.00     Pack years: 8.00     Start date:      Last attempt to quit: 2016     Years since quittin.2   ? Smokeless tobacco: Never Used   Substance and Sexual Activity   ? Alcohol use: Yes     Comment: rare   ? Drug use: No   ? Sexual activity: Not on file   Lifestyle   ? Physical activity:     Days per week: Not on file     Minutes per session: Not on file   ? Stress: Not on file   Relationships   ? Social connections:     Talks on phone: Not on file     Gets together: Not on file     Attends Rastafari service: Not on file     Active member of club or organization: Not on file     Attends meetings of clubs or organizations: Not on file     Relationship status: Not on file   ? Intimate partner violence:     Fear of current or ex partner: Not on file     Emotionally abused: Not on file     Physically abused: Not on file     Forced sexual activity: Not on file   Other Topics Concern   ? Not on file   Social History Narrative    Retired from at [employer], and [employer].            Medications  Allergies   Current Outpatient Medications   Medication Sig Dispense Refill   ? ACCU-CHEK COMPACT TEST Strp Test daily before all meals/snacks and once before bedtime. 60 strip 12   ? acetaminophen (TYLENOL) 325 MG tablet Take 2 tablets (650 mg total) by mouth every 4 (four) hours as needed.  0   ? albuterol (PROAIR HFA;PROVENTIL HFA;VENTOLIN HFA) 90 mcg/actuation inhaler Inhale 2 puffs every 4 (four) hours as needed for wheezing or shortness of breath. 1 each 0   ? amLODIPine (NORVASC) 5 MG tablet Take 1 tablet (5 mg total) by mouth daily. 90 tablet 2   ? aspirin 81 MG EC tablet Take 1 tablet (81 mg total) by mouth every evening. 90 tablet 3   ? atorvastatin (LIPITOR) 40 MG tablet TAKE ONE TABLET BY MOUTH DAILY AT BEDTIME 90 tablet 3   ? blood glucose test (ACCU-CHEK SMARTVIEW TEST STRIP) strips Dispense brand per patient's insurance  at pharmacy discretion. Testing once a day only 90 strip 3   ? blood-glucose meter, drum-type (ACCU-CHEK COMPACT PLUS CARE) Kit Test daily before all meals/snacks and once before bedtime. 1 kit 0   ? furosemide (LASIX) 40 MG tablet Take 1 tablet (40 mg total) by mouth daily. 180 tablet 3   ? generic lancets (ACCU-CHEK MULTICLIX LANCET) Test daily before all meals/snacks and once before bedtime. 3 each 0   ? glipiZIDE (GLUCOTROL XL) 5 MG 24 hr tablet TAKE ONE TABLET BY MOUTH ONCE DAILY 90 tablet 5   ? hydrALAZINE (APRESOLINE) 25 MG tablet TAKE ONE TABLET BY MOUTH EVERY EIGHT HOURS 270 tablet 2   ? hydroCHLOROthiazide (HYDRODIURIL) 25 MG tablet Take 25 mg by mouth daily.     ? isosorbide mononitrate (IMDUR) 30 MG 24 hr tablet Take 1 tablet (30 mg total) by mouth 2 (two) times a day. 180 tablet 1   ? lisinopril (PRINIVIL,ZESTRIL) 20 MG tablet Take 20 mg by mouth 2 (two) times a day.     ? metFORMIN (GLUCOPHAGE) 850 MG tablet Take 850 mg by mouth 2 (two) times a day with meals.     ? metoprolol succinate (TOPROL-XL) 200 MG 24 hr tablet Take 1 tablet (200 mg total) by mouth daily. 90 tablet 2   ? multivitamin with minerals (THERA-M) 9 mg iron-400 mcg Tab tablet Take 1 tablet by mouth daily.     ? warfarin (COUMADIN/JANTOVEN) 2.5 MG tablet Take 5 mg M,W,F and 2.5 mg all other days. 110 tablet 0     No current facility-administered medications for this visit.       No Known Allergies      Lab Results    Chemistry CBC BNP   Lab Results   Component Value Date    CREATININE 1.40 (H) 02/13/2019    BUN 21 02/13/2019     02/13/2019    K 4.2 02/13/2019     02/13/2019    CO2 30 02/13/2019     Creatinine (mg/dL)   Date Value   02/13/2019 1.40 (H)   02/05/2019 1.26   01/31/2019 1.85 (H)   01/30/2019 1.83 (H)    Lab Results   Component Value Date    WBC 9.4 01/29/2019    HGB 11.0 (L) 01/31/2019    HCT 35.9 (L) 01/29/2019    MCV 84 01/29/2019     01/31/2019    Lab Results   Component Value Date    BNP 1,195 (H)  01/27/2019     BNP (pg/mL)   Date Value   01/27/2019 1,195 (H)   08/16/2017 457 (H)   03/03/2017 360 (H)            Yi Hernandez [facility]   Heart Failure Clinic

## 2021-06-27 NOTE — PROGRESS NOTES
Progress Notes by Yuri Ro DO at 12/23/2018  9:50 AM     Author: Yuri Ro DO Service: -- Author Type: Physician    Filed: 12/26/2018  8:41 AM Encounter Date: 12/23/2018 Status: Signed    : Yuri Ro DO (Physician)       Chief Complaint   Patient presents with   ? Back Pain     Lower back pains worseining x 2 days, no injury known         History of Present Illness: Rooming staff notes reviewed. Patient woke up with left middle back pain. He was treated for bronchitis in the ER earlier this month. No recent cough, shortness of breath, or fever at time of exam. Patient has a 40 PYH, having quit smoking about 2.5 years ago.  No complaint of chest discomfort.  Patient states that when he has had similar back discomfort in the past, use of a muscle relaxant has helped a lot.  No reported recent back injury.    Review of systems: See history of present illness, all others negative.     Current Outpatient Medications   Medication Sig Dispense Refill   ? ACCU-CHEK COMPACT TEST Strp Test daily before all meals/snacks and once before bedtime. 60 strip 12   ? albuterol (PROAIR HFA;PROVENTIL HFA;VENTOLIN HFA) 90 mcg/actuation inhaler Inhale 2 puffs every 4 (four) hours as needed for wheezing or shortness of breath. 1 each 0   ? albuterol (PROVENTIL HFA;VENTOLIN HFA) 90 mcg/actuation inhaler Inhale 2 puffs every 6 (six) hours as needed for wheezing. 8.5 g 11   ? amLODIPine (NORVASC) 5 MG tablet TAKE ONE TABLET BY MOUTH ONCE DAILY 90 tablet 2   ? aspirin 81 MG EC tablet Take 1 tablet (81 mg total) by mouth every evening. 90 tablet 3   ? atorvastatin (LIPITOR) 40 MG tablet TAKE ONE TABLET BY MOUTH DAILY AT BEDTIME 90 tablet 1   ? blood glucose test (ACCU-CHEK SMARTVIEW TEST STRIP) strips Dispense brand per patient's insurance at pharmacy discretion. Testing once a day only 90 strip 3   ? blood-glucose meter, drum-type (ACCU-CHEK COMPACT PLUS CARE) Kit Test daily before all meals/snacks and once before  bedtime. 1 kit 0   ? cetirizine (ZYRTEC) 10 MG tablet Take 1 tablet (10 mg total) by mouth daily. 30 tablet 2   ? clopidogrel (PLAVIX) 75 mg tablet TAKE ONE TABLET BY MOUTH ONCE DAILY 90 tablet 1   ? furosemide (LASIX) 20 MG tablet Take 1 tablet by mouth daily.  3   ? generic lancets (ACCU-CHEK MULTICLIX LANCET) Test daily before all meals/snacks and once before bedtime. 3 each 0   ? glipiZIDE (GLUCOTROL XL) 5 MG 24 hr tablet TAKE ONE TABLET BY MOUTH ONCE DAILY 90 tablet 5   ? hydrALAZINE (APRESOLINE) 25 MG tablet TAKE ONE TABLET BY MOUTH EVERY EIGHT HOURS  270 tablet 2   ? hydroCHLOROthiazide (HYDRODIURIL) 25 MG tablet TAKE ONE TABLET BY MOUTH ONCE DAILY 90 tablet 3   ? isosorbide mononitrate (IMDUR) 30 MG 24 hr tablet Take 1 tablet (30 mg total) by mouth 2 (two) times a day. 180 tablet 3   ? lisinopril (PRINIVIL,ZESTRIL) 20 MG tablet TAKE ONE TABLET BY MOUTH TWICE DAILY 180 tablet 3   ? metFORMIN (GLUCOPHAGE) 850 MG tablet Take 1 tablet (850 mg total) by mouth 2 (two) times a day with meals. 180 tablet 0   ? metoprolol succinate (TOPROL-XL) 200 MG 24 hr tablet TAKE ONE TABLET BY MOUTH ONCE DAILY 90 tablet 2   ? multivitamin with minerals (THERA-M) 9 mg iron-400 mcg Tab tablet Take 1 tablet by mouth daily.     ? azithromycin (ZITHROMAX) 250 MG tablet Take 500 mg (2 x 250 mg tablets) on day 1 followed by 250 mg (1 tablet) on days 2-5.. 6 tablet 0   ? codeine-guaiFENesin (GUAIFENESIN AC)  mg/5 mL liquid Take 5 mL by mouth 3 (three) times a day as needed for cough. 120 mL 0   ? levoFLOXacin (LEVAQUIN) 750 MG tablet Take 1 tablet (750 mg total) by mouth daily for 7 days. 7 tablet 0   ? methocarbamol (ROBAXIN) 500 MG tablet Take 1 tablet (500 mg total) by mouth 4 (four) times a day as needed. 15 tablet 0     No current facility-administered medications for this visit.      Past Medical History:   Diagnosis Date   ? Cardiomyopathy (H)    ? CHF (congestive heart failure) (H)    ? Chronic bronchitis (H)    ? Coronary  artery disease    ? Diabetes mellitus (H)    ? Hypertension    ? Obesity    ? Peripheral vascular disease (H)    ? Sleep apnea     no cpap      Past Surgical History:   Procedure Laterality Date   ? CARDIAC CATHETERIZATION  2016   ? CARDIAC CATHETERIZATION  10/11/2016     to lad/om1 in stent occlusion   ? CORONARY ANGIOPLASTY     ? CORONARY ARTERY BYPASS GRAFT  10/98    [facility]   ? coronary stent  10/11/2016    [facility] to left main and om1   ? coronary stents     ? FEMORAL BYPASS      left. [facility]      Social History     Tobacco Use   ? Smoking status: Former Smoker     Packs/day: 0.20     Years: 40.00     Pack years: 8.00     Start date:      Last attempt to quit: 2016     Years since quittin.0   ? Smokeless tobacco: Never Used   Substance Use Topics   ? Alcohol use: Yes     Comment: rare   ? Drug use: No        Family History   Problem Relation Age of Onset   ? Diabetes Mother    ? Diabetes Father    ? Heart disease Father    ? Diabetes Sister    ? Hypertension Sister    ? Diabetes Brother    ? Hypertension Brother        Vitals:    18 0958   BP: 104/70   Pulse: (!) 104   Resp: 12   SpO2: 96%   Weight: 218 lb 4.8 oz (99 kg)     Wt Readings from Last 3 Encounters:   18 218 lb 4.8 oz (99 kg)   18 214 lb (97.1 kg)   18 217 lb 14.4 oz (98.8 kg)     Temp Readings from Last 3 Encounters:   18 98  F (36.7  C) (Oral)   18 97.7  F (36.5  C) (Oral)   18 97.5  F (36.4  C) (Oral)     BP Readings from Last 3 Encounters:   18 104/70   18 106/75   18 122/74     Pulse Readings from Last 3 Encounters:   18 (!) 104   18 (!) 107   18 (!) 107     EXAM:   General: Vital signs reviewed.  Patient is in some distress due to back discomfort.  Breathing appears nonlabored.  Patient is alert and oriented ×3.  Back exam shows patient is tender over his bilateral paravertebral area from about T8-T12. No rash noted in this area.    Heart:  Heart rate is normal, regular rhythm without murmur.  Lungs:  Lung sounds are clear to auscultation with good airflow except for crackles noted in right lower lobe.  Skin: Skin is warm and dry.  I reviewed the chest x-ray with patient at time of exam.  I did not note any infiltrates suggestive of pneumonia.  I also reviewed the radiologist report at time of exam, which also did not note any pneumonia.  I recommended to patient that we treat him for pneumonia based on his back discomfort, and my exam findings.  He was agreeable to this idea.  After patient was discharged, I was notified that his prescription for methocarbamol was not covered by his insurance.  I sent the prescription in for cyclobenzaprine.  We were later notified by the pharmacy that patient paid for the methocarbamol in cash.  I will keep the new prescription for cyclobenzaprine active in case he needs it, which hopefully will be covered by his insurance.  Assessment/Plan   1. Lung field abnormal finding on examination  XR Chest 2 Views    levoFLOXacin (LEVAQUIN) 750 MG tablet   2. Spasm of back muscles  methocarbamol (ROBAXIN) 500 MG tablet    DISCONTINUED: methocarbamol (ROBAXIN) 500 MG tablet       Patient Instructions   I think your back spasms are related to a pneumonia condition in right lower lung. Start treatment with levofloxacin today. Caution that the muscle relaxant may cause drowsiness. You can also use ibuprofen or naproxen for pain relief. Keep your appointment in 3 days with your primary provider. This would make a good follow up. Your heart rate should be slower hopefully by then.       Patient Education     Back Spasm (No Trauma)    Spasm of the back muscles can occur after a sudden forceful twisting or bending force (such as in a car accident), after a simple awkward movement, or after lifting something heavy with poor body positioning. In any case, muscle spasm adds to the pain. Sleeping in an awkward position or on a  poor quality mattress can also cause this. Some people respond to emotional stress by tensing the muscles of their back.  Pain that continues may need further evaluation or other types of treatment such as physical therapy.  You don't always need X-rays for the initial evaluation of back pain, unless you had a physical injury such as from a car accident or fall. If your pain continues and doesn't respond to medical treatment, X-rays and other tests may then be done.   Home care    As soon as possible, start sitting or walking again to avoid problems from prolonged bed rest (muscle weakness, worsening back stiffness and pain, blood clots in the legs).    When in bed, try to find a position of comfort. A firm mattress is best. Try lying flat on your back with pillows under your knees. You can also try lying on your side with your knees bent up toward your chest and a pillow between your knees.    Avoid prolonged sitting, long car rides, or travel. This puts more stress on the lower back than standing or walking.     During the first 24 to 72 hours after an injury or flare-up, apply an ice pack to the painful area for 20 minutes, then remove it for 20 minutes. Do this over a period of 60 to 90 minutes or several times a day. This will reduce swelling and pain. Always wrap ice packs in a thin towel.    You can start with ice, then switch to heat. Heat (hot shower, hot bath, or heating pad) reduces pain, and works well for muscle spasms. Apply heat to the painful area for 20 minutes, then remove it for 20 minutes. Do this over a period of 60 to 90 minutes or several times a day. Do not sleep on a heating pad as it can burn or damage skin.    Alternate ice and heat therapies.    Be aware of safe lifting methods and do not lift anything over 15 pounds until all the pain is gone.  Gentle stretching will help your back heal faster. Do this simple routine 2 to 3 times a day until your back is feeling better.    Lie on your  back with your knees bent and both feet on the ground    Slowly raise your left knee to your chest as you flatten your lower back against the floor. Hold for 20 to 30 seconds.    Relax and repeat the exercise with your right knee.    Do 2 to 3 of these exercises for each leg.    Repeat, hugging both knees to your chest at the same time.    Do not bounce, but use a gentle pull.  Medicines  Talk to your doctor before using medicine, especially if you have other medical problems or are taking other medicines.  You may use acetaminophen or ibuprofen to control pain, unless your healthcare provider prescribed another pain medicine. If you have a chronic condition such as diabetes, liver or kidney disease, stomach ulcer, or gastrointestinal bleeding, or are taking blood thinners, talk with your healthcare provider before taking any medicines.  Be careful if you are given prescription pain medicine, narcotics, or medicine for muscle spasm. They can cause drowsiness, affect your coordination, reflexes, or judgment. Do not drive or operate heavy machinery when taking these medicines. Take pain medicine only as prescribed by your healthcare provider.  Follow-up care  Follow up with your doctor, or as advised. Physical therapy or further tests may be needed.  If X-rays were taken, they may be reviewed by a radiologist. You will be notified of any new findings that may affect your care.  Call 911  Call 911 if any of these occur:    Trouble breathing    Confusion    Drowsiness or trouble awakening    Fainting or loss of consciousness    Rapid or very slow heart rate    Loss of bowel or bladder control  When to seek medical advice  Call your healthcare provider right away if any of these occur:    Pain becomes worse or spreads to your legs    Weakness or numbness in one or both legs    Numbness in the groin or genital area    Fever of 100.4 F (38 C) or higher, or as directed by your healthcare provider    Burning or pain when  passing urine  Date Last Reviewed: 6/1/2016 2000-2017 [employer]. [address] [place] [address], [place] [address]. All rights reserved. This information is not intended as a substitute for professional medical care. Always follow your healthcare professional's instructions.           Patient Education     Pneumonia (Adult)  Pneumonia is an infection deep within the lungs. It is in the small air sacs (alveoli). Pneumonia may be caused by a virus or bacteria. Pneumonia caused by bacteria is usually treated with an antibiotic. Severe cases may need to be treated in the hospital. Milder cases can be treated at home. Symptoms usually start to get better during the first 2 days of treatment.    Home care  Follow these guidelines when caring for yourself at home:    Rest at home for the first 2 to 3 days, or until you feel stronger. Dont let yourself get overly tired when you go back to your activities.    Stay away from cigarette smoke - yours or other peoples.    You may use acetaminophen or ibuprofen to control fever or pain, unless another medicine was prescribed. If you have chronic liver or kidney disease, talk with your healthcare provider before using these medicines. Also talk with your provider if youve had a stomach ulcer or gastrointestinal bleeding. Dont give aspirin to anyone younger than 18 years of age who is ill with a fever. It may cause severe liver damage.    Your appetite may be poor, so a light diet is fine.    Drink 6 to 8 glasses of fluids every day to make sure you are getting enough fluids. Beverages can include water, sport drinks, sodas without caffeine, juices, tea, or soup. Fluids will help loosen secretions in the lung. This will make it easier for you to cough up the phlegm (sputum). If you also have heart or kidney disease, check with your healthcare provider before you drink extra fluids.    Take antibiotic medicine prescribed until it is all gone, even if you are feeling  better after a few days.  Follow-up care  Follow up with your healthcare provider in the next 2 to 3 days, or as advised. This is to be sure the medicine is helping you get better.  If you are 65 or older, you should get a pneumococcal vaccine and a yearly flu (influenza) shot. You should also get these vaccines if you have chronic lung disease like asthma, emphysema, or COPD. Recently, a second type of pneumonia vaccine has become available for everyone over 65 years old. This is in addition to the previous vaccine. Ask your provider about this.  When to seek medical advice  Call your healthcare provider right away if any of these occur:    You dont get better within the first 48 hours of treatment    Shortness of breath gets worse    Rapid breathing (more than 25 breaths per minute)    Coughing up blood    Chest pain gets worse with breathing    Fever of 100.4 F (38 C) or higher that doesnt get better with fever medicine    Weakness, dizziness, or fainting that gets worse    Thirst or dry mouth that gets worse    Sinus pain, headache, or a stiff neck    Chest pain not caused by coughing  Date Last Reviewed: 1/1/2017 2000-2017 [employer]. [address] [place] [id number]. All rights reserved. This information is not intended as a substitute for professional medical care. Always follow your healthcare professional's instructions.              Yuri Ro,

## 2021-06-27 NOTE — PROGRESS NOTES
Progress Notes by Julia Skinner MD at 4/3/2019  8:50 AM     Author: Julia Skinner MD Service: -- Author Type: Physician    Filed: 4/3/2019  9:22 AM Encounter Date: 4/3/2019 Status: Signed    : Julia Skinner MD (Physician)           Click to link to [facility]     [facility] NOTE       Assessment/Plan:   1.  Chronic systolic congestive heart failure class III, ischemic cardiomyopathy, LVEF 22%: The patient has no complaints of shortness of breath, leg edema or orthopnea.  He is compensated well at this time.  Continue Lasix 40 mg daily.      Continue lisinopril 20 mg twice a day, metoprolol  mg daily.  His BMP and CBC today.  Repeat echocardiogram to reassess left ventricular ejection fraction in 4 weeks.  If his left ventricular ejection fraction is improved more than 35%, continue medical treatment, otherwise may discuss ICD for primary prevention.  Patient agreed with the plan.    2.  Coronary artery disease s/p CABG and stent placement as mentioned below: No chest pain.  Continue current medications including isosorbide mononitrate, aspirin, Lipitor, metoprolol    3.  Essential hypertension: His blood pressure has been controlled with his current medications including hydralazine 25 mg tid, lisinopril 40 mg daily, amlodipine 5 mg daily, HCTZ 25 mg daily and metoprolol  mg daily.    4.  Hyperlipidemia: Continue Lipitor 40 mg daily.  Last LDL was 53.    5.  Atrial flutter s/p successful cardioversion: The patient has been maintained in normal sinus rhythm.  Continue metoprolol succinate 200 mg daily.  Continue warfarin for chronic anticoagulation.    Thank you Dr. Duncan for the opportunity to be involved in the care of Keanu Spence. If you have any questions, please feel free to contact me.  I will see the patient again in 3 months.    Much or all of the text in this note was generated through the use of Dragon Dictate voice-to-text software. Errors in spelling or words which  seem out of context are unintentional.   Sound alike errors, in particular, may have escaped editing.       History of Present Illness:   It is my pleasure to see Keanu Spence at the [facility] clinic for routine cardiology follow-up.  Keanu Spence is a 68 y.o. male with a medical history of significant coronary artery disease status post 4 V CABG in 1998, patent LIMA to LAD, occluded 3 SVG, SIRI to LM and LCX/OM1 10/2016, ischemic cardiomyopathy, chronic systolic congestive heart failure, LVEF improved to 45 % per ECHO on February 27 2017, down to 22% per echo in 1-2019, atrial flutter s/p successful cardioversion, essential hypertension, hyperlipidemia, DM II and peripheral vascular disease.    The patient states that he got viral infection and pneumonia in January, leading to hospitalization.  During hospitalization, he developed rapid heartbeat atrial fibrillation/flutter with RVR.  The patient had successful cardioversion guided by COLTEN.  The patient states that since hospital discharge, he has been doing pretty well.  He had no palpitations.  He had no chest pain, shortness of breath, dizziness, orthopnea PND or leg swelling.  His mild dyspnea on exertion has been stable.  His weight has been stable.  He has been on low-salt diet.  His blood pressure and heart rate are in normal range.  The patient has no side effects from his current medications.    Past Medical History:     Patient Active Problem List   Diagnosis   ? 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 Hypertension   ? Coronary Artery Disease   ? Peripheral Vascular Disease   ? Tobacco dependence   ? Hyperlipidemia LDL goal < 70   ? Coronary artery disease involving coronary bypass graft of native heart with unspecified angina pectoris   ? Coronary artery chronic total occlusion   ? Essential hypertension with goal blood  pressure less than 140/90   ? NIKKI (obstructive sleep apnea)   ? Type 2 diabetes mellitus without complication, without long-term current use of insulin (H)   ? COPD (chronic obstructive pulmonary disease) (H)   ? Atypical atrial flutter (H)   ? Cardiomyopathy (H)   ? Chronic anticoagulation   ? Heart failure with reduced ejection fraction, NYHA class I (H)       Past Surgical History:     Past Surgical History:   Procedure Laterality Date   ? CARDIAC CATHETERIZATION  09/19/2016   ? CARDIAC CATHETERIZATION  10/11/2016     to lad/om1 in stent occlusion   ? CORONARY ANGIOPLASTY     ? CORONARY ARTERY BYPASS GRAFT  10/98    lima   ? coronary stent  10/11/2016    bebe to left main and om1   ? coronary stents     ? FEMORAL BYPASS  2002    left. [facility]       Family History:     Family History   Problem Relation Age of Onset   ? Diabetes Mother    ? Diabetes Father    ? Heart disease Father    ? Diabetes Sister    ? Hypertension Sister    ? Diabetes Brother    ? Hypertension Brother         Social History:    reports that he quit smoking about 2 years ago. He started smoking about 48 years ago. He has a 8.00 pack-year smoking history. he has never used smokeless tobacco. He reports that he drinks alcohol. He reports that he does not use drugs.    Review of Systems:   General: WNL  Eyes: WNL  Ears/Nose/Throat: WNL  Lungs: WNL  Heart: WNL  Stomach: WNL  Bladder: WNL  Muscle/Joints: WNL  Skin: WNL  Nervous System: WNL  Mental Health: WNL     Blood: WNL    Meds:     Current Outpatient Medications:   ?  ACCU-CHEK COMPACT TEST Strp, Test daily before all meals/snacks and once before bedtime., Disp: 60 strip, Rfl: 12  ?  acetaminophen (TYLENOL) 325 MG tablet, Take 2 tablets (650 mg total) by mouth every 4 (four) hours as needed., Disp: , Rfl: 0  ?  albuterol (PROAIR HFA;PROVENTIL HFA;VENTOLIN HFA) 90 mcg/actuation inhaler, Inhale 2 puffs every 4 (four) hours as needed for wheezing or shortness of breath., Disp: 1  each, Rfl: 0  ?  amLODIPine (NORVASC) 5 MG tablet, Take 1 tablet (5 mg total) by mouth daily., Disp: 90 tablet, Rfl: 2  ?  aspirin 81 MG EC tablet, Take 1 tablet (81 mg total) by mouth every evening., Disp: 90 tablet, Rfl: 3  ?  atorvastatin (LIPITOR) 40 MG tablet, TAKE ONE TABLET BY MOUTH DAILY AT BEDTIME, Disp: 90 tablet, Rfl: 3  ?  blood glucose test (ACCU-CHEK SMARTVIEW TEST STRIP) strips, Dispense brand per patient's insurance at pharmacy discretion. Testing once a day only, Disp: 90 strip, Rfl: 3  ?  blood-glucose meter, drum-type (ACCU-CHEK COMPACT PLUS CARE) Kit, Test daily before all meals/snacks and once before bedtime., Disp: 1 kit, Rfl: 0  ?  furosemide (LASIX) 40 MG tablet, Take 1 tablet (40 mg total) by mouth daily., Disp: 180 tablet, Rfl: 3  ?  generic lancets (ACCU-CHEK MULTICLIX LANCET), Test daily before all meals/snacks and once before bedtime., Disp: 3 each, Rfl: 0  ?  glipiZIDE (GLUCOTROL XL) 5 MG 24 hr tablet, TAKE ONE TABLET BY MOUTH ONCE DAILY, Disp: 90 tablet, Rfl: 5  ?  hydrALAZINE (APRESOLINE) 25 MG tablet, TAKE ONE TABLET BY MOUTH EVERY EIGHT HOURS, Disp: 270 tablet, Rfl: 2  ?  hydroCHLOROthiazide (HYDRODIURIL) 25 MG tablet, Take 25 mg by mouth daily., Disp: , Rfl:   ?  isosorbide mononitrate (IMDUR) 30 MG 24 hr tablet, Take 1 tablet (30 mg total) by mouth 2 (two) times a day., Disp: 180 tablet, Rfl: 1  ?  lisinopril (PRINIVIL,ZESTRIL) 20 MG tablet, Take 20 mg by mouth 2 (two) times a day., Disp: , Rfl:   ?  metFORMIN (GLUCOPHAGE) 850 MG tablet, Take 850 mg by mouth 2 (two) times a day with meals., Disp: , Rfl:   ?  metoprolol succinate (TOPROL-XL) 200 MG 24 hr tablet, Take 1 tablet (200 mg total) by mouth daily., Disp: 90 tablet, Rfl: 2  ?  multivitamin with minerals (THERA-M) 9 mg iron-400 mcg Tab tablet, Take 1 tablet by mouth daily., Disp: , Rfl:   ?  warfarin (COUMADIN/JANTOVEN) 2.5 MG tablet, Take 5 mg M,W,F and 2.5 mg all other days., Disp: 110 tablet, Rfl: 0    Allergies:  "  Patient has no known allergies.      Objective:      Physical Exam  207 lb (93.9 kg)  5' 11\" (1.803 m)  Body mass index is 28.87 kg/m .  /56 (Patient Site: Right Arm, Patient Position: Sitting, Cuff Size: Adult Large)   Pulse 70   Resp 18   Ht 5' 11\" (1.803 m)   Wt 207 lb (93.9 kg)   BMI 28.87 kg/m      General Appearance:   Awake, Alert, No acute distress.   HEENT:  Pupil equal and reactive to light. No scleral icterus; the mucous membranes were moist.   Neck: No cervical bruits. No JVD. No thyromegaly.     Chest: The spine was straight. The chest was symmetric.   Lungs:   Respirations unlabored; Lungs are clear to auscultation. No crackles. No wheezing.   Cardiovascular:   Regular rhythm and rate, normal first and second heart sounds with no murmurs. No rubs or gallops.    Abdomen:  Soft. No tenderness. Non-distended. Bowels sounds are present   Extremities: Equal tibial pulses. No leg edema.   Skin: No rashes or ulcers. Warm, Dry.   Musculoskeletal: No tenderness. No deformity.   Neurologic: Mood and affect are appropriate. No focal deficits.         EKG: Personally reviewed  Sinus rhythm with occasional Premature ventricular complexes and Possible Premature atrial complexes with Aberrant conduction   Possible Left atrial enlargement   Rightward axis   Prolonged QT   Abnormal ECG   When compared with ECG of 11-OCT-2016 11:01,   Premature ventricular complexes are now Present   Aberrant conduction is now Present   NV interval has decreased   Nonspecific T wave abnormality now evident in Inferior leads    Cardiac Imaging Studies  Coronary angiogram with PCI on 12-:  Known CAD s/p prior CABG  Occluded mid LAD, prox RCA and mid Cx  Large OM1 arising proximal to mid cx occlusion, has patent  stents proximally but a 75% stenosis in the mid portion  Patent LIMA to LAD  3 occluded SVGs    Coronary angiogram with PCI on 10-:  LM 80% distal stenosis  LAD occluded mid; patent LIMA to LAD  LCx " 70-80% prox OM1, occluded mid OM1 with  and filling of  distal OM1 via collaterals  RCA known to be occluded      Occluded SVGs to OM, Diag and RCA  Patent ALLRED to LAD      Underwent successful PCI of OM1  with antegrade wire  escalation, followed by laser atherectomy, PTCA and 2.5  Synergy SIRI, post dilated to 2.75mm at high cristina.  Prox OM1 lesion treated successfully with 2.75 Synergy SIRI  Also underwent successful PCI of distal LM into LCx with 3.5  Synergy SIRI, post dilated to 4mm      Lesion seen in distal OM after PCI of prox ; attempted to  stent this lesion but unable to cross mid OM stents; infact  the stent to be delivered sheared off the balloon at the LM;  successfully retrieved with a Goose neck snare; followed by  PTCA of distal OM with 2.5 balloon.      0% residuals, JAKOB 3 flow post, 0-1 pre    Nuclear stress test on 12-:  CONCLUSION:   1. Lexiscan stress is subjectively negative.   2. Lexiscan stress ECG is negative.   3. Lexiscan stress nuclear study is positive for inducible myocardial ischemia in  apex, distal inferior wall, distal inferolateral segment and distal lateral segment  in a medium size.   4. There is mild global hypokinesis with severe hypokinesis in apex, distal  inferior segment and distal inferolateral and lateral segments. The quantitative  left ventricular ejection fraction is 34%.     Stress cardiac MRI on 8-:  IMPRESSIONS:       1. Lexiscan stress cardiac MRI is positive for inducible myocardial ischemia diffusely indicating multiple vessel disease.   2. Lexiscan stress ECG is negative for inducible myocardial ischemia.  3. Previous nontransmural myocardial infarction in anterior wall, distal anteroseptal wall, apex, lateral wall from mid to distal segment and inferior and inferolateral walls. Endomyocardial scar is about 40%-50% of wall thickness indicating moderately reduced viability.  4. Moderately enlarged left ventricular size with several  hypokinesis in anterior wall and inferior/inferolateral and lateral walls. The calculated left ventricular ejection fraction is 28%.  5. Normal right ventricular size and function.  6. Mild mitral and tricuspid valve regurgitation.    Cardiac MRI on 1-:  IMPRESSIONS:    1.  Left ventricle is moderately enlarged with severe systolic dysfunction.  The quantified left ventricular ejection fraction is 33.9%.   There is mild global hypokinesis with severe hypokinesis to akinesis of the mid to distal septal, mid to apical anterior wall, apical lateral and mid inferiolateral wall.   2.  Non-transmural myocardial infarction involving mid to apical anterior wall, mid to distal anteroseptal wall, basal to mid inferolateral, distal septum, basal to distal inferior wall and basal to mid inferolateral walls which involves 50% of wall thickness indicating moderate possible viability of myocardial segments.  There is non-transmural infarction of the mid to distal anterolateral wall involving 25% of myocardial thickness representing viable myocardium.   3. Transmural infarction of the apical lateral wall.   4. Normal right ventricular size and function function.   5. Mild aortic insufficiency  6. Mild to moderate tricuspid regurgitation.  7. Mild mitral regurgitation.  8. Moderate left atrial enlargement. Mild right atrial enlargement     Compared to prior study on 8/15/2016, there is minimal improvement in left ventricular systolic function.    ECHO on 2-:  1. The left ventricle is normal in size. Left ventricular systolic performance is mildly reduced. The ejection fraction is estimated to be 45%.  2. There is mild mid lateral and more moderate distal On selected views, the mid posterior segment appears hypokinetic.  3. No significant valvular heart disease is identified on this study.   4. There is mild left atrial enlargement.      When compared to the prior real-time echocardiogram dated 8 February 2010, the  ejection fraction appears slightly high.    ECHO on 1-:    Moderate biatrial chamber enlargement    Left ventricle ejection fraction is severely decreased. The calculated left ventricular ejection fraction is 22%. There is thinning and near akinesis of the inferior and inferolateral walls. Remainder of the segments are severely hypokinetic. There is flattening of the interventricular septum consistent with right ventricular pressure and volume overload.    Grade 3 diastolic dysfunction    Mildly dilated right ventricle with severe decrease in systolic function    Mild to moderate mitral regurgitation    Mild to moderate tricuspid regurgitation with mild elevation in pulmonary artery pressures    When compared to the previous study dated 2/27/2017, left ventricular systolic function has decreased further. The degree of mitral and tricuspid insufficiency is also increased.    COLTEN on 1-:    Moderate biatrial enlargement. No evidence of left atrial appendage thrombus.    Left ventricle ejection fraction is severely decreased. The estimated left ventricular ejection fraction is 25% with global hypokinesis.    No PFO identified by bubble study    Mild mitral and tricuspid regurgitation.    Aortic valve sclerosis    Lab Review   Lab Results   Component Value Date     02/13/2019    K 4.2 02/13/2019     02/13/2019    CO2 30 02/13/2019    BUN 21 02/13/2019    CREATININE 1.40 (H) 02/13/2019    CALCIUM 10.0 02/13/2019     Lab Results   Component Value Date    WBC 9.4 01/29/2019    HGB 11.0 (L) 01/31/2019    HCT 35.9 (L) 01/29/2019    MCV 84 01/29/2019     01/31/2019     Lab Results   Component Value Date    CHOL 109 08/28/2018    CHOL 103 08/16/2017    CHOL 117 06/28/2016     Lab Results   Component Value Date    HDL 39 (L) 08/28/2018    HDL 31 (L) 08/16/2017    HDL 40 06/28/2016     Lab Results   Component Value Date    LDLCALC 53 08/28/2018    LDLCALC 52 08/16/2017    LDLCALC 62 06/28/2016      Lab Results   Component Value Date    TRIG 87 08/28/2018    TRIG 99 08/16/2017    TRIG 74 06/28/2016     No components found for: CHOLHDL  Lab Results   Component Value Date    TROPONINI 0.06 01/27/2019     Lab Results   Component Value Date    BNP 1,195 (H) 01/27/2019     Lab Results   Component Value Date    TSH 1.15 07/20/2012

## 2021-06-27 NOTE — PROGRESS NOTES
Progress Notes by Yi Hernandez CNP at 2/5/2019  2:50 PM     Author: Yi Hernandez CNP Service: -- Author Type: Nurse Practitioner    Filed: 2/5/2019  3:43 PM Encounter Date: 2/5/2019 Status: Signed    : Yi Hernandez CNP (Nurse Practitioner)           Click to link to [facility] HEART CARE NOTE      Assessment/Recommendations   Assessment:    1.  Heart failure with reduced ejection fraction, LVEF 22%, severely decreased right ventricular systolic function, grade 3 diastolic dysfunction: This is felt to be related to atrial flutter but also ischemia. We reviewed heart failure diagnosis, medications, treatment plan, low sodium diet, weight monitoring, and symptom monitoring.  He met with a heart failure nurse clinician to further discuss.  He continues to have significant lower extremity edema which has not improved since hospital discharge last week.    2.  Atrial flutter: Successful cardioversion during recent hospitalization.  He was started on warfarin.  INR checked today, and he will follow-up with cardiology INR nurse.    3.  Obstructive sleep apnea: He was diagnosed about 1 year ago.  He did not tolerate CPAP so no longer has his machine.  We discussed referral back to sleep medicine and he will think about this.    Plan:  1.   Heart failure medications:  - Beta blocker therapy with metroprolol succinate 200 mg daily  - ACEI therapy with lisinopril currently on hold due to acute kidney injury during hospitalization  - Diuretic therapy with furosemide increased to 80 mg daily  2.  BMP pending  3.  Low-sodium diet  4.  Daily weights    Keanu Spence will follow up with his primary care provider tomorrow.  Follow-up in the heart failure clinic in 1 week.  He has follow-up with pulmonology on February 20.     History of Present Illness    Mr. Keanu Spence is a 68 y.o. male seen at [facility] heart failure clinic today for [facility]  follow-up.  He was hospitalized January 27 - January 31 with shortness of breath related to COPD and heart failure.  He was diagnosed with atrial flutter and had successful cardioversion.  Echocardiogram on January 28, 2019 showed an ejection fraction of 22%, grade 3 diastolic dysfunction, severely decreased right ventricular systolic function, mild to moderate mitral regurgitation, and mild to moderate tricuspid regurgitation.  He had acute kidney injury during hospitalization and hydrochlorothiazide, lisinopril, and metformin were held.  He has a history of hypertension, coronary artery disease, coronary artery bypass surgery in 1998, coronary stent in 2016, peripheral vascular disease, dyslipidemia, chronic bronchitis, obstructive sleep apnea (untreated), chronic kidney disease, and diabetes.    He denies any shortness of breath walking into clinic today.  He has a chronic cough and does get short of breath with coughing.  He continues to have lower extremity edema with no improvement since discharge.  He denies fatigue, lightheadedness, orthopnea, chest pain and abdominal fullness/bloating.      His home weight is stable between 212-213 pounds.  He is working on following a low-sodium diet but does struggle with this.    ECHO:   Results for orders placed during the hospital encounter of 01/27/19   Echo Transesophageal [ECH01] 01/30/2019    Narrative   Moderate biatrial enlargement. No evidence of left atrial appendage   thrombus.    Left ventricle ejection fraction is severely decreased. The estimated   left ventricular ejection fraction is 25% with global hypokinesis.    No PFO identified by bubble study    Mild mitral and tricuspid regurgitation.    Aortic valve sclerosis           Physical Examination Review of Systems   Vitals:    02/05/19 1449   BP: 112/72   Pulse: 80   Resp: 16     Body mass index is 30.4 kg/m .  Wt Readings from Last 3 Encounters:   02/05/19 218 lb (98.9 kg)   01/31/19 213 lb 3.2 oz (96.7  kg)   01/15/19 218 lb 3 oz (99 kg)       General Appearance:     Alert, cooperative and in no acute distress.   ENT/Mouth: membranes moist, no oral lesions or bleeding gums.      EYES:  no scleral icterus, normal conjunctivae   Chest/Lungs:   lungs are clear to auscultation, no rales or wheezing, respirations unlabored   Cardiovascular:   Regular. Normal first and second heart sounds, 3+ edema bilateral lower extremities (left greater than right)   Abdomen:  Soft, nontender, nondistended, bowel sounds present   Extremities: no cyanosis or clubbing   Skin: warm, dry.    Neurologic: mood and affect are appropriate, alert and oriented x3      General: WNL  Eyes: WNL  Ears/Nose/Throat: WNL  Lungs: Cough, Snoring  Heart: Shortness of Breath with activity, Irregular Heartbeat, Leg Swelling  Stomach: WNL  Bladder: WNL  Muscle/Joints: WNL  Skin: WNL  Nervous System: Daytime Sleepiness  Mental Health: WNL     Blood: Easy Bruising, Easy Bleeding     Medical History  Surgical History Family History Social History   Past Medical History:   Diagnosis Date   ? Cardiomyopathy (H)    ? CHF (congestive heart failure) (H)    ? Chronic bronchitis (H)    ? Coronary artery disease    ? Diabetes mellitus (H)    ? HLD (hyperlipidemia)    ? Hypertension    ? Obesity    ? Peripheral vascular disease (H)    ? Sleep apnea     no cpap    Past Surgical History:   Procedure Laterality Date   ? CARDIAC CATHETERIZATION  09/19/2016   ? CARDIAC CATHETERIZATION  10/11/2016     to lad/om1 in stent occlusion   ? CORONARY ANGIOPLASTY     ? CORONARY ARTERY BYPASS GRAFT  10/98    [facility]   ? coronary stent  10/11/2016    [facility]be to left main and om1   ? coronary stents     ? FEMORAL BYPASS  2002    left. [facility]    Family History   Problem Relation Age of Onset   ? Diabetes Mother    ? Diabetes Father    ? Heart disease Father    ? Diabetes Sister    ? Hypertension Sister    ? Diabetes Brother    ? Hypertension Brother     Social History      Socioeconomic History   ? Marital status: Single     Spouse name: Not on file   ? Number of children: Not on file   ? Years of education: Not on file   ? Highest education level: Not on file   Social Needs   ? Financial resource strain: Not on file   ? Food insecurity - worry: Not on file   ? Food insecurity - inability: Not on file   ? Transportation needs - medical: Not on file   ? Transportation needs - non-medical: Not on file   Occupational History   ? Not on file   Tobacco Use   ? Smoking status: Former Smoker     Packs/day: 0.20     Years: 40.00     Pack years: 8.00     Start date:      Last attempt to quit: 2016     Years since quittin.1   ? Smokeless tobacco: Never Used   Substance and Sexual Activity   ? Alcohol use: Yes     Comment: rare   ? Drug use: No   ? Sexual activity: Not on file   Other Topics Concern   ? Not on file   Social History Narrative   ? Not on file          Medications  Allergies   Current Outpatient Medications   Medication Sig Dispense Refill   ? ACCU-CHEK COMPACT TEST Strp Test daily before all meals/snacks and once before bedtime. 60 strip 12   ? acetaminophen (TYLENOL) 325 MG tablet Take 2 tablets (650 mg total) by mouth every 4 (four) hours as needed.  0   ? albuterol (PROAIR HFA;PROVENTIL HFA;VENTOLIN HFA) 90 mcg/actuation inhaler Inhale 2 puffs every 4 (four) hours as needed for wheezing or shortness of breath. 1 each 0   ? amLODIPine (NORVASC) 5 MG tablet TAKE ONE TABLET BY MOUTH ONCE DAILY 90 tablet 2   ? aspirin 81 MG EC tablet Take 1 tablet (81 mg total) by mouth every evening. 90 tablet 3   ? atorvastatin (LIPITOR) 40 MG tablet TAKE ONE TABLET BY MOUTH DAILY AT BEDTIME 90 tablet 1   ? blood glucose test (ACCU-CHEK SMARTVIEW TEST STRIP) strips Dispense brand per patient's insurance at pharmacy discretion. Testing once a day only 90 strip 3   ? blood-glucose meter, drum-type (ACCU-CHEK COMPACT PLUS CARE) Kit Test daily before all meals/snacks and once before  bedtime. 1 kit 0   ? furosemide (LASIX) 40 MG tablet Take 2 tablets (80 mg total) by mouth daily. 180 tablet 3   ? generic lancets (ACCU-CHEK MULTICLIX LANCET) Test daily before all meals/snacks and once before bedtime. 3 each 0   ? glipiZIDE (GLUCOTROL XL) 5 MG 24 hr tablet TAKE ONE TABLET BY MOUTH ONCE DAILY 90 tablet 5   ? guaiFENesin ER (MUCINEX) 600 mg 12 hr tablet Take 1 tablet (600 mg total) by mouth 2 (two) times a day. 60 tablet 2   ? hydrALAZINE (APRESOLINE) 25 MG tablet TAKE ONE TABLET BY MOUTH EVERY EIGHT HOURS  270 tablet 2   ? isosorbide mononitrate (IMDUR) 30 MG 24 hr tablet Take 1 tablet (30 mg total) by mouth 2 (two) times a day. 180 tablet 1   ? metoprolol succinate (TOPROL-XL) 200 MG 24 hr tablet TAKE ONE TABLET BY MOUTH ONCE DAILY 90 tablet 2   ? multivitamin with minerals (THERA-M) 9 mg iron-400 mcg Tab tablet Take 1 tablet by mouth daily.     ? predniSONE (DELTASONE) 5 MG tablet Take prednisone 20 mg po once a day for 1 day (2/1) then take prednisone 15 mg once a day for 3 days then 10 mg once a day for 3 days then 5 mg once a day for 3 days. 22 tablet 0   ? warfarin (COUMADIN/JANTOVEN) 1 MG tablet Take 2.5 tablets (2.5 mg) by mouth daily. Adjust dose based on INR results as directed. 90 tablet 0     No current facility-administered medications for this visit.       No Known Allergies      Lab Results    Chemistry CBC BNP   Lab Results   Component Value Date    CREATININE 1.85 (H) 01/31/2019    BUN 66 (H) 01/31/2019     01/31/2019    K 4.1 01/31/2019     01/31/2019    CO2 22 01/31/2019     Creatinine (mg/dL)   Date Value   01/31/2019 1.85 (H)   01/30/2019 1.83 (H)   01/29/2019 2.06 (H)   01/28/2019 1.84 (H)    Lab Results   Component Value Date    WBC 9.4 01/29/2019    HGB 11.0 (L) 01/31/2019    HCT 35.9 (L) 01/29/2019    MCV 84 01/29/2019     01/31/2019    Lab Results   Component Value Date    BNP 1,195 (H) 01/27/2019     BNP (pg/mL)   Date Value   01/27/2019 1,195 (H)    08/16/2017 457 (H)   03/03/2017 360 (H)              Yi Hernandez, [facility]   Heart Failure Clinic

## 2021-06-27 NOTE — PROGRESS NOTES
Progress Notes by Julia Skinner MD at 7/29/2019  7:50 AM     Author: Julia Skinner MD Service: -- Author Type: Physician    Filed: 7/29/2019  8:13 AM Encounter Date: 7/29/2019 Status: Signed    : Julia Skinner MD (Physician)           Click to link to [facility]     [facility] HEART CARE NOTE       Assessment/Plan:   1.  Chronic systolic congestive heart failure class III, ischemic cardiomyopathy, LVEF 33%: The patient has no complaints of shortness of breath, leg edema or orthopnea.  He is compensated well at this time.  Continue Lasix 40 mg daily.      Continue lisinopril 20 mg twice a day, metoprolol  mg daily.  Routine labs including CBC, magnesium, CMP, lipid profile.  The patient had a repeat echocardiogram on May 6, 2019 which was reported improved left ventricular ejection fraction from 22% to 33%.  We discussed ICD for primary prevention.  Since the patient's left ventricular ejection fraction is improving from 22% to 33%, clinically he has been doing well, the patient does not want to have ICD placement for primary prevention at this time.  He would like to wait for 6 months and assess his heart function and structure by cardiac MRI.  If his heart function is still less than 35% at that time, he will consider ICD for primary prevention.  His medication has been optimized.    2.  Coronary artery disease s/p CABG and stent placement as mentioned below: No chest pain.  Continue current medications including isosorbide mononitrate, aspirin, Lipitor, metoprolol    3.  Essential hypertension: His blood pressure has been controlled with his current medications including hydralazine 25 mg tid, lisinopril 40 mg daily, amlodipine 5 mg daily, HCTZ 25 mg daily and metoprolol  mg daily.    4.  Hyperlipidemia: Continue Lipitor 40 mg daily.  Last LDL was 53.    5.  Atrial flutter s/p successful cardioversion: The patient has been maintained in normal sinus rhythm.  Continue metoprolol succinate 200  mg daily.  Continue warfarin for chronic anticoagulation.    Thank you Dr. Duncan for the opportunity to be involved in the care of Keanu Spence. If you have any questions, please feel free to contact me.  I will see the patient again in 6 months and as needed.    Much or all of the text in this note was generated through the use of Dragon Dictate voice-to-text software. Errors in spelling or words which seem out of context are unintentional.   Sound alike errors, in particular, may have escaped editing.       History of Present Illness:   It is my pleasure to see Keanu Spence at the [facility] clinic for routine cardiology follow-up.  Keanu Spence is a 69 y.o. male with a medical history of significant coronary artery disease status post 4 V CABG in 1998, patent LIMA to LAD, occluded 3 SVG, SIRI to LM and LCX/OM1 10/2016, ischemic cardiomyopathy, chronic systolic congestive heart failure, LVEF 33% per echo on 5-6-2019, atrial flutter s/p successful cardioversion, essential hypertension, hyperlipidemia, DM II and peripheral vascular disease.    The patient states that he has done pretty well over last 3 months.  He had no chest pain, shortness of breath, palpitations, dizziness, orthopnea, PND or leg edema.  He has mild dyspnea on exertion which has been stable.  He complains of leg cramps recently.  He gained 5 pounds.  His blood pressure and heart rate are controlled well.  The patient has no side effects from his current medications.    Past Medical History:     Patient Active Problem List   Diagnosis   ? Type 2 diabetes mellitus, without long-term current use of insulin (H)   ? Hypertension   ? Coronary Artery Disease   ? Peripheral Vascular Disease   ? Tobacco dependence   ? Hyperlipidemia LDL goal < 70   ? Coronary artery disease involving coronary bypass graft of native heart with unspecified angina pectoris   ? Coronary artery chronic total occlusion   ? NIKKI (obstructive sleep apnea)   ?  COPD (chronic obstructive pulmonary disease) (H)   ? Atypical atrial flutter (H)   ? Cardiomyopathy (H)   ? Chronic anticoagulation   ? Heart failure with reduced ejection fraction, NYHA class I (H)   ? Screening for prostate cancer       Past Surgical History:     Past Surgical History:   Procedure Laterality Date   ? CARDIAC CATHETERIZATION  09/19/2016   ? CARDIAC CATHETERIZATION  10/11/2016     to lad/om1 in stent occlusion   ? CORONARY ANGIOPLASTY     ? CORONARY ARTERY BYPASS GRAFT  10/98    lima   ? coronary stent  10/11/2016    bebe to left main and om1   ? coronary stents     ? FEMORAL BYPASS  2002    left. [facility]       Family History:     Family History   Problem Relation Age of Onset   ? Diabetes Mother    ? Diabetes Father    ? Heart disease Father    ? Diabetes Sister    ? Hypertension Sister    ? Diabetes Brother    ? Hypertension Brother         Social History:    reports that he quit smoking about 2 years ago. He started smoking about 48 years ago. He has a 8.00 pack-year smoking history. He has never used smokeless tobacco. He reports that he drinks alcohol. He reports that he does not use drugs.    Review of Systems:   General: WNL  Eyes: WNL  Ears/Nose/Throat: WNL  Lungs: WNL  Heart: WNL  Stomach: WNL  Bladder: WNL  Muscle/Joints: WNL  Skin: WNL  Nervous System: WNL  Mental Health: WNL     Blood: WNL    Meds:     Current Outpatient Medications:   ?  ACCU-CHEK COMPACT TEST Strp, Test daily before all meals/snacks and once before bedtime., Disp: 60 strip, Rfl: 12  ?  amLODIPine (NORVASC) 5 MG tablet, Take 1 tablet (5 mg total) by mouth daily., Disp: 90 tablet, Rfl: 2  ?  aspirin 81 MG EC tablet, Take 1 tablet (81 mg total) by mouth every evening., Disp: 90 tablet, Rfl: 3  ?  atorvastatin (LIPITOR) 40 MG tablet, TAKE ONE TABLET BY MOUTH DAILY AT BEDTIME, Disp: 90 tablet, Rfl: 3  ?  blood glucose test (ACCU-CHEK SMARTVIEW TEST STRIP) strips, Dispense brand per patient's insurance at  "pharmacy discretion. Testing once a day only, Disp: 90 strip, Rfl: 3  ?  blood-glucose meter, drum-type (ACCU-CHEK COMPACT PLUS CARE) Kit, Test daily before all meals/snacks and once before bedtime., Disp: 1 kit, Rfl: 0  ?  furosemide (LASIX) 40 MG tablet, Take 1 tablet (40 mg total) by mouth daily., Disp: 180 tablet, Rfl: 3  ?  generic lancets (ACCU-CHEK MULTICLIX LANCET), Test daily before all meals/snacks and once before bedtime., Disp: 3 each, Rfl: 0  ?  glipiZIDE (GLUCOTROL XL) 5 MG 24 hr tablet, TAKE ONE TABLET BY MOUTH ONCE DAILY, Disp: 90 tablet, Rfl: 5  ?  hydrALAZINE (APRESOLINE) 25 MG tablet, TAKE ONE TABLET BY MOUTH EVERY EIGHT HOURS, Disp: 270 tablet, Rfl: 2  ?  hydroCHLOROthiazide (HYDRODIURIL) 25 MG tablet, Take 25 mg by mouth daily., Disp: , Rfl:   ?  isosorbide mononitrate (IMDUR) 30 MG 24 hr tablet, TAKE ONE TABLET BY MOUTH TWICE DAILY , Disp: 180 tablet, Rfl: 0  ?  lisinopril (PRINIVIL,ZESTRIL) 20 MG tablet, Take 20 mg by mouth 2 (two) times a day., Disp: , Rfl:   ?  metFORMIN (GLUCOPHAGE) 850 MG tablet, Take 850 mg by mouth 2 (two) times a day with meals., Disp: , Rfl:   ?  metoprolol succinate (TOPROL-XL) 200 MG 24 hr tablet, Take 1 tablet (200 mg total) by mouth daily., Disp: 90 tablet, Rfl: 2  ?  multivitamin with minerals (THERA-M) 9 mg iron-400 mcg Tab tablet, Take 1 tablet by mouth daily., Disp: , Rfl:   ?  warfarin (COUMADIN/JANTOVEN) 2.5 MG tablet, Take 5 mg M,W,F and 2.5 mg all other days., Disp: 110 tablet, Rfl: 0  ?  acetaminophen (TYLENOL) 325 MG tablet, Take 2 tablets (650 mg total) by mouth every 4 (four) hours as needed., Disp: , Rfl: 0  ?  albuterol (PROAIR HFA;PROVENTIL HFA;VENTOLIN HFA) 90 mcg/actuation inhaler, Inhale 2 puffs every 4 (four) hours as needed for wheezing or shortness of breath., Disp: 1 each, Rfl: 0    Allergies:   Patient has no known allergies.      Objective:      Physical Exam  212 lb (96.2 kg)  5' 11\" (1.803 m)  Body mass index is 29.57 kg/m .  /60 " "(Patient Site: Right Arm, Patient Position: Sitting, Cuff Size: Adult Large)   Pulse 60   Resp 16   Ht 5' 11\" (1.803 m)   Wt 212 lb (96.2 kg)   BMI 29.57 kg/m      General Appearance:   Awake, Alert, No acute distress.   HEENT:  Pupil equal and reactive to light. No scleral icterus; the mucous membranes were moist.   Neck: No cervical bruits. No JVD. No thyromegaly.     Chest: The spine was straight. The chest was symmetric.   Lungs:   Respirations unlabored; Lungs are clear to auscultation. No crackles. No wheezing.   Cardiovascular:   Regular rhythm and rate, normal first and second heart sounds with no murmurs. No rubs or gallops.    Abdomen:  Soft. No tenderness. Non-distended. Bowels sounds are present   Extremities: Equal tibial pulses. No leg edema.   Skin: No rashes or ulcers. Warm, Dry.   Musculoskeletal: No tenderness. No deformity.   Neurologic: Mood and affect are appropriate. No focal deficits.         EKG: Personally reviewed  Sinus rhythm with occasional Premature ventricular complexes and Possible Premature atrial complexes with Aberrant conduction   Possible Left atrial enlargement   Rightward axis   Prolonged QT   Abnormal ECG   When compared with ECG of 11-OCT-2016 11:01,   Premature ventricular complexes are now Present   Aberrant conduction is now Present   OK interval has decreased   Nonspecific T wave abnormality now evident in Inferior leads    Cardiac Imaging Studies  Coronary angiogram with PCI on 12-:  Known CAD s/p prior CABG  Occluded mid LAD, prox RCA and mid Cx  Large OM1 arising proximal to mid cx occlusion, has patent  stents proximally but a 75% stenosis in the mid portion  Patent LIMA to LAD  3 occluded SVGs    Coronary angiogram with PCI on 10-:  LM 80% distal stenosis  LAD occluded mid; patent LIMA to LAD  LCx 70-80% prox OM1, occluded mid OM1 with  and filling of  distal OM1 via collaterals  RCA known to be occluded      Occluded SVGs to OM, Diag and " RCA  Patent ALLRED to LAD      Underwent successful PCI of OM1  with antegrade wire  escalation, followed by laser atherectomy, PTCA and 2.5  Synergy SIRI, post dilated to 2.75mm at high cristina.  Prox OM1 lesion treated successfully with 2.75 Synergy SIRI  Also underwent successful PCI of distal LM into LCx with 3.5  Synergy SIRI, post dilated to 4mm      Lesion seen in distal OM after PCI of prox ; attempted to  stent this lesion but unable to cross mid OM stents; infact  the stent to be delivered sheared off the balloon at the LM;  successfully retrieved with a Goose neck snare; followed by  PTCA of distal OM with 2.5 balloon.      0% residuals, JAKOB 3 flow post, 0-1 pre    Nuclear stress test on 12-:  CONCLUSION:   1. Lexiscan stress is subjectively negative.   2. Lexiscan stress ECG is negative.   3. Lexiscan stress nuclear study is positive for inducible myocardial ischemia in  apex, distal inferior wall, distal inferolateral segment and distal lateral segment  in a medium size.   4. There is mild global hypokinesis with severe hypokinesis in apex, distal  inferior segment and distal inferolateral and lateral segments. The quantitative  left ventricular ejection fraction is 34%.     Stress cardiac MRI on 8-:  IMPRESSIONS:       1. Lexiscan stress cardiac MRI is positive for inducible myocardial ischemia diffusely indicating multiple vessel disease.   2. Lexiscan stress ECG is negative for inducible myocardial ischemia.  3. Previous nontransmural myocardial infarction in anterior wall, distal anteroseptal wall, apex, lateral wall from mid to distal segment and inferior and inferolateral walls. Endomyocardial scar is about 40%-50% of wall thickness indicating moderately reduced viability.  4. Moderately enlarged left ventricular size with several hypokinesis in anterior wall and inferior/inferolateral and lateral walls. The calculated left ventricular ejection fraction is 28%.  5. Normal right  ventricular size and function.  6. Mild mitral and tricuspid valve regurgitation.    Cardiac MRI on 1-:  IMPRESSIONS:    1.  Left ventricle is moderately enlarged with severe systolic dysfunction.  The quantified left ventricular ejection fraction is 33.9%.   There is mild global hypokinesis with severe hypokinesis to akinesis of the mid to distal septal, mid to apical anterior wall, apical lateral and mid inferiolateral wall.   2.  Non-transmural myocardial infarction involving mid to apical anterior wall, mid to distal anteroseptal wall, basal to mid inferolateral, distal septum, basal to distal inferior wall and basal to mid inferolateral walls which involves 50% of wall thickness indicating moderate possible viability of myocardial segments.  There is non-transmural infarction of the mid to distal anterolateral wall involving 25% of myocardial thickness representing viable myocardium.   3. Transmural infarction of the apical lateral wall.   4. Normal right ventricular size and function function.   5. Mild aortic insufficiency  6. Mild to moderate tricuspid regurgitation.  7. Mild mitral regurgitation.  8. Moderate left atrial enlargement. Mild right atrial enlargement     Compared to prior study on 8/15/2016, there is minimal improvement in left ventricular systolic function.    ECHO on 2-:  1. The left ventricle is normal in size. Left ventricular systolic performance is mildly reduced. The ejection fraction is estimated to be 45%.  2. There is mild mid lateral and more moderate distal On selected views, the mid posterior segment appears hypokinetic.  3. No significant valvular heart disease is identified on this study.   4. There is mild left atrial enlargement.      When compared to the prior real-time echocardiogram dated 8 February 2010, the ejection fraction appears slightly high.    ECHO on 1-:    Moderate biatrial chamber enlargement    Left ventricle ejection fraction is severely  decreased. The calculated left ventricular ejection fraction is 22%. There is thinning and near akinesis of the inferior and inferolateral walls. Remainder of the segments are severely hypokinetic. There is flattening of the interventricular septum consistent with right ventricular pressure and volume overload.    Grade 3 diastolic dysfunction    Mildly dilated right ventricle with severe decrease in systolic function    Mild to moderate mitral regurgitation    Mild to moderate tricuspid regurgitation with mild elevation in pulmonary artery pressures    When compared to the previous study dated 2/27/2017, left ventricular systolic function has decreased further. The degree of mitral and tricuspid insufficiency is also increased.    COLTEN on 1-:    Moderate biatrial enlargement. No evidence of left atrial appendage thrombus.    Left ventricle ejection fraction is severely decreased. The estimated left ventricular ejection fraction is 25% with global hypokinesis.    No PFO identified by bubble study    Mild mitral and tricuspid regurgitation.    Aortic valve sclerosis    ECHO on 5-6-2019:    Left ventricle ejection fraction is moderately decreased. The calculated left ventricular ejection fraction is 33%.    The following segments are akinetic: apical anterior, apical septal, apical inferior and apex. The following segments are hypokinetic: basal inferolateral, mid inferolateral and apical lateral. All other segments are normal.    Left atrial volume is severely increased.    Normal right ventricular size and systolic function.    Severely elevated central venous pressure    When compared to the previous study dated 1/30/2019, LVEF is higher    Lab Review   Lab Results   Component Value Date     05/07/2019    K 4.1 05/07/2019     (H) 05/07/2019    CO2 25 05/07/2019    BUN 30 (H) 05/07/2019    CREATININE 1.32 (H) 05/07/2019    CALCIUM 10.6 (H) 05/07/2019     Lab Results   Component Value Date    WBC  6.1 04/03/2019    HGB 11.7 (L) 04/03/2019    HCT 38.6 (L) 04/03/2019    MCV 83 04/03/2019     04/03/2019     Lab Results   Component Value Date    CHOL 109 08/28/2018    CHOL 103 08/16/2017    CHOL 117 06/28/2016     Lab Results   Component Value Date    HDL 39 (L) 08/28/2018    HDL 31 (L) 08/16/2017    HDL 40 06/28/2016     Lab Results   Component Value Date    LDLCALC 53 08/28/2018    LDLCALC 52 08/16/2017    LDLCALC 62 06/28/2016     Lab Results   Component Value Date    TRIG 87 08/28/2018    TRIG 99 08/16/2017    TRIG 74 06/28/2016     No components found for: CHOLHDL  Lab Results   Component Value Date    TROPONINI 0.06 01/27/2019     Lab Results   Component Value Date    BNP 1,195 (H) 01/27/2019     Lab Results   Component Value Date    TSH 1.15 07/20/2012

## 2021-06-28 NOTE — PROGRESS NOTES
Progress Notes by Shira Vaca CNP at 10/14/2019  7:50 AM     Author: Shira Vaca CNP Service: -- Author Type: Nurse Practitioner    Filed: 10/14/2019  9:38 AM Encounter Date: 10/14/2019 Status: Signed    : Shira Vaca CNP (Nurse Practitioner)         Thank you, Dr. Keen, for asking the [facility] Electrophysiology team to see Mr. Keanu Spence to evaluate atrial fibrillation.      Assessment/Recommendations   Assessment/Plan:    1.  Persistent atypical atrial Flutter:  Episodes of atrial flutter, initially with rapid ventricular response into the VT zone for which he received ATP, but no ICD shocks.  He has had only one episode of atrial flutter since starting diltiazem and ventricular rates were well controlled.  He was asymptomatic with both episodes.  We had a lengthy discussion of the physiology and natural progression of atrial flutter and treatment options including rate control versus rhythm control depending upon the presence of symptoms.  He was instructed to keep a symptom log to compare to his device arrhythmia log for symptom clarification, though he appears to be asymptomatic.  Use of diltiazem is not ideal with his cardiomyopathy, however benefits outweigh risks at this time due to risk for acute heart failure and inappropriate shocks with RVR.  Antiarrhythmic options are limited to amiodarone, so recommend rate control unless he has significant symptoms with arrhythmia.  Continue metoprolol succinate 200 mg daily.  Continue diltiazem 240 mg daily.    He was reassured that atrial fibrillation is not life-threatening, but carries an increased risk for stroke.  He has a [id number] score of 5 for age 65-74, CHF, hypertension, diabetes, and vascular disease.  Due to chronic kidney disease, he is on warfarin for stroke prophylaxis.  Recent INR therapeutic.  Continue warfarin as managed through the anticoagulation clinic.    2.  Sinus node dysfunction with  cardiomyopathy, narrow QRS status post ICD implant:  The ICD was interrogated and is functioning appropriately.  The battery shows estimated longevity of 13 years.  Atrial and ventricular lead sensing, impedance, and pacing thresholds are stable and within normal limits.  Patient received ATP for atrial flutter with RVR.  Cannot absolutely rule out brief episode of VT after ATP.  Rhythm identification turned on, template obtained.    3.  Chronic heart failure with reduced ejection fraction, NYHA clas II:  He appears well compensated with no signs or symptoms of fluid retention.  Continue metoprolol succinate, lisinopril, and furosemide.    4.  Coronary artery disease status post CABG and subsequent PCI:  Denies anginal symptoms.  Continue aspirin, statin, and isosorbide.    5.  Hypertension:  Blood pressure at target today.  Continue hydrochlorothiazide, hydralazine, lisinopril, metoprolol, and furosemide.    Follow up with Dr. Skinner on 12/5/2019.  Follow up with Dr. Montenegro in 6 months.     History of Present Illness/Subjective    Mr. Keanu Spence is a 69 y.o. male who comes in today for EP consultation of atrial flutter.  He has a history of symptomatic bradycardia and sinus arrest with both junctional bradycardia and sinus bradycardia status post ICD implant on 9/20/2019, atrial flutter with rapid ventricular response diagnosed in January 2019 for which he underwent cardioversion, coronary artery disease status post CABG in 1998 with subsequent PCI, ischemic cardiomyopathy with chronic heart failure with reduced ejection fraction, chronic kidney disease, hypertension, hyperlipidemia, type 2 diabetes, peripheral vascular disease and anemia.  He is scheduled for peripheral vascular surgery with Dr. Chakraborty in November.  He is on long-term oral anticoagulation with warfarin.    We received a device alert received for what appears to be atrial flutter with rapid ventricular response with 1: 1 conduction going into  the VT zone and receiving ATP x4.  The rate then dropped below detection, so no shock was delivered.  However, after the second ATP but there appears to be a brief morphology change on the RV EGM/shock EGM, possible VT, then resumption of RVR.  He had no awareness of arrhythmia, but states that his blood sugar was only 34.  Rhythm identification ID to be turned on today and template attained.    Keanu states that he has been feeling well.  He has not had any awareness of arrhythmia.  He denies chest discomfort, palpitations, abdominal fullness/bloating or peripheral edema, shortness of breath, paroxysmal nocturnal dyspnea, orthopnea, lightheadedness, dizziness, pre-syncope, or syncope.    Cardiographics (personally reviewed):  EKG done 1/30/2019 shows sinus rhythm at 72 bpm with occasional PVCs, QT/QTc interval measures 422/462 ms  EKG done 1/29/2019 shows atrial flutter with rapid ventricular response at 105 bpm    ICD Interrogation (BSCI, MRI compatible dual coil, dual-chamber):  Pacing mode: DDDR   Presenting rhythm: AP-VS at 60 bpm  Underlying rhythm: SB 49 bpm  A-paced: 41%.  V-paced 7%.  Battery: estimated longevity 13 years.  Atrial and Ventricular leads: Stable with good sensing, impedance, and pacing thresholds.  Arrhythmias: 18K AT/AF, EGMs consistent with AFl.  Since medication change 10 AFl with CVR on 10/11/19.  AF Cassel: 23% (previous 1%)  Histograms: good rate distribution    Echo done 9/19/2019:    Definity contrast utilized    Moderate left ventricular enlargement.    Left ventricle ejection fraction is severely reduced. The calculated left ventricular ejection fraction is 27%.    The following segments are akinetic: basal inferior, mid anteroseptal, mid anterolateral, apical anterior, apical septal, apical inferior, apical lateral and apex. The following segments are hypokinetic: basal anterior, mid anterior, mid inferior and mid inferolateral    . E/e' > 15, suggesting elevated LV filling  pressures    Right ventricle not optimally visualized    Moderate biatrial enlargement    Mild to moderate mitral regurgitation.    Mild to moderate tricuspid regurgitation estimate of RV systolic pressure 36 mmHg plus right atrial pressure    When compared to the previous study dated 5/6/2019, ejection fraction appears lower on the current study. The lateral wall motion abnormality is more pronounced.    MR myocardium done 1/19/2017:  1.  Left ventricle is moderately enlarged with severe systolic dysfunction.  The quantified left ventricular ejection fraction is 33.9%.   There is mild global hypokinesis with severe hypokinesis to akinesis of the mid to distal septal, mid to apical anterior wall, apical lateral and mid inferiolateral wall.   2.  Non-transmural myocardial infarction involving mid to apical anterior wall, mid to distal anteroseptal wall, basal to mid inferolateral, distal septum, basal to distal inferior wall and basal to mid inferolateral walls which involves 50% of wall thickness indicating moderate possible viability of myocardial segments.  There is non-transmural infarction of the mid to distal anterolateral wall involving 25% of myocardial thickness representing viable myocardium.   3. Transmural infarction of the apical lateral wall.   4. Normal right ventricular size and function function.   5. Mild aortic insufficiency  6. Mild to moderate tricuspid regurgitation.  7. Mild mitral regurgitation.  8. Moderate left atrial enlargement. Mild right atrial enlargement     Compared to prior study on 8/15/2016, there is minimal improvement in left ventricular systolic function.         Physical Examination Review of Systems   Vitals:    10/14/19 0722   BP: 106/60   Pulse: 60   Resp: 16     Body mass index is 29.78 kg/m .  Wt Readings from Last 3 Encounters:   10/14/19 213 lb 6.4 oz (96.8 kg)   09/27/19 209 lb (94.8 kg)   09/25/19 210 lb 7 oz (95.5 kg)     General Appearance:   Alert, well-appearing  and in no acute distress.   HEENT: Atraumatic, normocephalic.  No scleral icterus, normal conjunctivae, EOMs intact.  Mucous membranes pink and moist.     Chest/Lungs:   Chest symmetric, spine straight.  Respirations unlabored.  Lungs are clear to auscultation.  Left subclavian ICD site with no sign of infection.   Cardiovascular:   Regular rate and rhythm.  Normal first and second heart sounds with no murmurs, rubs, or gallops; radial and posterior tibial pulses are intact, No JVD, no edema.   Abdomen:  Soft, nontender, nondistended, bowel sounds present.   Extremities: No cyanosis or clubbing.   Musculoskeletal: Moves all extremities.  Gait steady.   Skin: Warm, dry, intact.    Neurologic: Mood and affect are appropriate.  Alert and oriented to person, place, time, and situation.    General: WNL  Eyes: WNL  Ears/Nose/Throat: WNL  Lungs: WNL  Heart: WNL  Stomach: WNL  Bladder: WNL  Muscle/Joints: WNL  Skin: WNL  Nervous System: WNL  Mental Health: WNL     Blood: WNL       Medical History  Surgical History Family History Social History   Past Medical History:   Diagnosis Date   ? Cardiac pacemaker in situ 9/25/2019   ? Chronic anticoagulation 2/21/2019   ? Chronic systolic heart failure (H)    ? Coronary artery disease    ? Diabetes mellitus (H)    ? Dyslipidemia, goal LDL below 70    ? Essential hypertension    ? Ischemic cardiomyopathy    ? Peripheral vascular disease (H)    ? Screening for prostate cancer 5/7/2019   ? Sleep apnea     no cpap    Past Surgical History:   Procedure Laterality Date   ? CARDIAC CATHETERIZATION  09/19/2016   ? CARDIAC CATHETERIZATION  10/11/2016     to lad/om1 in stent occlusion   ? CATARACT EXTRACTION, BILATERAL     ? CORONARY ANGIOPLASTY     ? CORONARY ARTERY BYPASS GRAFT  10/98    [facility]   ? CORONARY STENT PLACEMENT  10/11/2016    bebe to left main and om1   ? EP ICD INSERT N/A 9/20/2019    Procedure: EP ICD Insert;  Surgeon: Colton Montenegro MD;  Location: [facility] Cath Lab;   Service: Cardiology   ? FEMORAL BYPASS Left     Dr. Mylene Sepulveda   ? IR EXTREMITY ANGIOGRAM BILATERAL  2019    Family History   Problem Relation Age of Onset   ? Diabetes Mother    ? Diabetes Father    ? Heart disease Father    ? Diabetes Sister    ? Hypertension Sister    ? Diabetes Brother    ? Hypertension Brother    ? Diabetes type I Son     Social History     Tobacco Use   ? Smoking status: Former Smoker     Packs/day: 0.20     Years: 40.00     Pack years: 8.00     Types: Cigarettes     Start date:      Last attempt to quit: 2016     Years since quittin.8   ? Smokeless tobacco: Never Used   Substance Use Topics   ? Alcohol use: Yes     Comment: rare   ? Drug use: No            Medications  Allergies   Current Outpatient Medications   Medication Sig Dispense Refill   ? ACCU-CHEK COMPACT TEST Strp Test daily before all meals/snacks and once before bedtime. 60 strip 12   ? acetaminophen (TYLENOL) 325 MG tablet Take 2 tablets (650 mg total) by mouth every 4 (four) hours as needed.  0   ? albuterol (PROAIR HFA;PROVENTIL HFA;VENTOLIN HFA) 90 mcg/actuation inhaler Inhale 2 puffs every 4 (four) hours as needed for wheezing or shortness of breath. 1 each 0   ? aspirin 81 MG EC tablet Take 1 tablet (81 mg total) by mouth every evening. 90 tablet 3   ? atorvastatin (LIPITOR) 40 MG tablet TAKE ONE TABLET BY MOUTH DAILY AT BEDTIME 90 tablet 3   ? blood glucose test (ACCU-CHEK SMARTVIEW TEST STRIP) strips Dispense brand per patient's insurance at pharmacy discretion. Testing once a day only 90 strip 3   ? blood-glucose meter, drum-type (ACCU-CHEK COMPACT PLUS CARE) Kit Test daily before all meals/snacks and once before bedtime. 1 kit 0   ? diltiazem (CARDIZEM CD) 240 MG 24 hr capsule Take 1 capsule (240 mg total) by mouth daily. 90 capsule 3   ? furosemide (LASIX) 40 MG tablet Take 1 tablet (40 mg total) by mouth daily. 180 tablet 3   ? generic lancets (ACCU-CHEK MULTICLIX LANCET) Test daily  before all meals/snacks and once before bedtime. 3 each 0   ? hydrALAZINE (APRESOLINE) 25 MG tablet TAKE ONE TABLET BY MOUTH EVERY EIGHT HOURS 270 tablet 2   ? hydroCHLOROthiazide (HYDRODIURIL) 25 MG tablet Take 25 mg by mouth daily.     ? isosorbide mononitrate (IMDUR) 30 MG 24 hr tablet TAKE ONE TABLET BY MOUTH TWICE DAILY  180 tablet 1   ? lisinopril (PRINIVIL,ZESTRIL) 20 MG tablet Take 20 mg by mouth 2 (two) times a day.     ? metFORMIN (GLUCOPHAGE) 850 MG tablet Take 1 tablet (850 mg total) by mouth 2 (two) times a day with meals. Restart Monday 9/23  0   ? metoprolol succinate (TOPROL-XL) 200 MG 24 hr tablet Take 1 tablet (200 mg total) by mouth daily. 90 tablet 2   ? multivitamin with minerals (THERA-M) 9 mg iron-400 mcg Tab tablet Take 1 tablet by mouth daily.     ? warfarin (COUMADIN/JANTOVEN) 2.5 MG tablet TAKE 1 TABLET BY MOUTH DAILY ON TUESDAY AND THURSDAY AND 2 TABLETS BY MOUTH DAILY ON ALL OTHER DAYS 180 tablet 1   ? zolpidem (AMBIEN) 5 MG tablet TAKE ONE TABLET BY MOUTH AT BEDTIME AS NEEDED FOR SLEEP 30 tablet 0   ? glipiZIDE (GLUCOTROL XL) 5 MG 24 hr tablet TAKE ONE TABLET BY MOUTH ONCE DAILY 90 tablet 5     No current facility-administered medications for this visit.     No Known Allergies      Lab Results    Chemistry/lipid CBC Cardiac Enzymes/BNP/TSH/INR   Lab Results   Component Value Date    CREATININE 1.67 (H) 09/19/2019    BUN 31 (H) 09/19/2019     09/19/2019    K 4.4 09/21/2019     09/19/2019    CO2 26 09/19/2019     Creatinine (mg/dL)   Date Value   09/19/2019 1.67 (H)   09/13/2019 1.46 (H)   09/05/2019 1.55 (H)   07/29/2019 1.71 (H)    Lab Results   Component Value Date    WBC 7.0 09/19/2019    HGB 10.4 (L) 09/19/2019    HCT 34.6 (L) 09/19/2019    MCV 83 09/19/2019     09/19/2019    Lab Results   Component Value Date    CKMB 4 02/04/2010    TROPONINI 0.06 01/27/2019    BNP 1,195 (H) 01/27/2019    TSH 1.13 09/20/2019    INR 2.50 (H) 10/03/2019          This note has been  dictated using voice recognition software. Any grammatical, typographical, or context distortions are unintentional and inherent to the software.

## 2021-06-28 NOTE — PROGRESS NOTES
Progress Notes by uJlia Skinner MD at 12/5/2019 10:50 AM     Author: Julia Skinner MD Service: -- Author Type: Physician    Filed: 12/5/2019 11:23 AM Encounter Date: 12/5/2019 Status: Signed    : Julia Skinner MD (Physician)             Click to link to [facility]     [facility] NOTE       Assessment/Plan:   1.  Chronic systolic congestive heart failure class III, ischemic cardiomyopathy, LVEF 30 %: He is compensated well at this time.  Continue Lasix 40 mg daily.      Continue lisinopril 20 mg twice a day, metoprolol  mg daily, hydralazine 25 mg 3 times daily, isosorbide mononitrate 30 mg daily.  May consider spironolactone if his blood pressure is tolerable in the future.  His laboratory test on November 16 is reviewed, renal function stable, electrolytes are in normal range.    2.  S/p dual-chamber ICD pacemaker: Device is interrogated in clinic today.  Normal device function, one episode of atrial tachycardia, 5 seconds.  Continue to follow-up with device clinic.    3.  Coronary artery disease s/p CABG and stent placement as mentioned below: No chest pain.  Continue current medications including isosorbide mononitrate, aspirin, Lipitor, metoprolol    4.  Essential hypertension: His blood pressure is low this morning.  Continue current medication hydralazine 25 mg tid, lisinopril 40 mg daily, HCTZ 25 mg daily and metoprolol  mg daily, reduced diltiazem from 240 mg daily to 180 mg daily.    5.  Hyperlipidemia: Continue Lipitor 40 mg daily.  Last LDL was 53.    6.  Paroxysmal atrial flutter: The patient had episodes of atrial flutter with rapid ventricular response, the patient was already on metoprolol succinate 200 mg daily.  He got ATP treatment.  Diltiazem 240 mg was added into his regimen.  He did not have rapid atrial flutter, but blood pressure is at a low side.  Reduced diltiazem to 180 mg daily.  Continue metoprolol succinate 200 mg daily.  Continue warfarin for chronic  anticoagulation.    7.  Peripheral vascular disease s/p L femoropopliteal bypass surgery: Follow-up with vascular clinic.    Thank you Dr. Duncan for the opportunity to be involved in the care of Keanu Spence. If you have any questions, please feel free to contact me.  I will see the patient again in 6 months and as needed.    Much or all of the text in this note was generated through the use of Dragon Dictate voice-to-text software. Errors in spelling or words which seem out of context are unintentional.   Sound alike errors, in particular, may have escaped editing.       History of Present Illness:   It is my pleasure to see Keanu Spence at the [facility] for routine cardiology follow-up and device clinic.  Keanu Spence is a 69 y.o. male with a medical history of significant coronary artery disease status post 4 V CABG in 1998, patent LIMA to LAD, occluded 3 SVG, SIRI to LM and LCX/OM1 10/2016, ischemic cardiomyopathy, chronic systolic congestive heart failure, LVEF 30%, paroxysmal atrial flutter, essential hypertension, hyperlipidemia, DM II and peripheral vascular disease s/p L fem-pop bypass 3 weeks ago.    The patient states that he has done pretty well from cardiology standpoint.  He had no chest pain, shortness of breath, palpitations, dizziness, orthopnea, PND.  He has mild dyspnea on exertion which has been stable.  His weight has been stable.  His right leg has no edema, left leg has edema post femoropopliteal bypass a few weeks ago.  His blood pressure is low this morning.  His heart rate are controlled well.  The patient has no side effects from his current medications.    Past Medical History:     Patient Active Problem List   Diagnosis   ? Type 2 diabetes mellitus, without long-term current use of insulin (H)   ? Essential hypertension   ? Coronary Artery Disease   ? Peripheral vascular disease (H)   ? NIKKI (obstructive sleep apnea)   ? COPD (chronic obstructive pulmonary  disease) (H)   ? Atypical atrial flutter (H)   ? Chronic anticoagulation   ? Heart failure with reduced ejection fraction, NYHA class II (H)   ? Sleep disorder   ? Anemia   ? Sinus node dysfunction (H)   ? Dyslipidemia, goal LDL below 70   ? Ischemic cardiomyopathy   ? Chronic kidney disease, stage III (moderate) (H)   ? ICD (implantable cardioverter-defibrillator) in place, dual chamber   ? Rest pain of left lower extremity concurrent with and due to atherosclerosis of bypass graft (H)   ? Type II diabetes mellitus with peripheral circulatory disorder (H)   ? Chronic atrial fibrillation   ? Physical deconditioning       Past Surgical History:     Past Surgical History:   Procedure Laterality Date   ? CARDIAC CATHETERIZATION  09/19/2016   ? CARDIAC CATHETERIZATION  10/11/2016     to lad/om1 in stent occlusion   ? CATARACT EXTRACTION, BILATERAL     ? CORONARY ANGIOPLASTY     ? CORONARY ARTERY BYPASS GRAFT  10/98    lima   ? CORONARY STENT PLACEMENT  10/11/2016    bebe to left main and om1   ? EP ICD INSERT N/A 9/20/2019    Procedure: EP ICD Insert;  Surgeon: Colton Montenegro MD;  Location: [facility];  Service: Cardiology   ? FEMORAL ARTERY - POPLITEAL ARTERY BYPASS GRAFT Left 11/12/2019    Procedure: LEFT FEMORAL POPLITEAL BYPASS, ARTERIOGRAM;  Surgeon: Ken Chakraborty MD;  Location: [facility] OR;  Service: General   ? [facility]   ? IR EXTREMITY ANGIOGRAM BILATERAL  9/13/2019       Family History:     Family History   Problem Relation Age of Onset   ? Diabetes Mother    ? Diabetes Father    ? Heart disease Father    ? Diabetes Sister    ? Hypertension Sister    ? Diabetes Brother    ? Hypertension Brother    ? Diabetes type I Son         Social History:    reports that he quit smoking about 3 years ago. His smoking use included cigarettes. He started smoking about 48 years ago. He has a 8.00 pack-year smoking history. He has never used smokeless  tobacco. He reports current alcohol use. He reports that he does not use drugs.    Review of Systems:   General: WNL  Eyes: WNL  Ears/Nose/Throat: WNL  Lungs: WNL  Heart: WNL  Stomach: WNL  Bladder: WNL  Muscle/Joints: WNL  Skin: WNL  Nervous System: WNL  Mental Health: WNL     Blood: WNL    Meds:     Current Outpatient Medications:   ?  ACCU-CHEK COMPACT TEST Strp, Test daily before all meals/snacks and once before bedtime., Disp: 60 strip, Rfl: 12  ?  albuterol (PROAIR HFA;PROVENTIL HFA;VENTOLIN HFA) 90 mcg/actuation inhaler, Inhale 2 puffs every 4 (four) hours as needed for wheezing or shortness of breath., Disp: 1 each, Rfl: 0  ?  aspirin 81 MG EC tablet, Take 1 tablet (81 mg total) by mouth every evening., Disp: 90 tablet, Rfl: 3  ?  atorvastatin (LIPITOR) 40 MG tablet, TAKE ONE TABLET BY MOUTH DAILY AT BEDTIME, Disp: 90 tablet, Rfl: 3  ?  blood glucose test (ACCU-CHEK SMARTVIEW TEST STRIP) strips, Dispense brand per patient's insurance at pharmacy discretion. Testing once a day only, Disp: 90 strip, Rfl: 3  ?  blood-glucose meter, drum-type (ACCU-CHEK COMPACT PLUS CARE) Kit, Test daily before all meals/snacks and once before bedtime., Disp: 1 kit, Rfl: 0  ?  diltiazem (CARDIZEM CD) 180 MG 24 hr capsule, Take 1 capsule (180 mg total) by mouth daily., Disp: 90 capsule, Rfl: 3  ?  furosemide (LASIX) 40 MG tablet, Take 1 tablet (40 mg total) by mouth daily., Disp: 180 tablet, Rfl: 3  ?  generic lancets (ACCU-CHEK MULTICLIX LANCET), Test daily before all meals/snacks and once before bedtime., Disp: 3 each, Rfl: 0  ?  glipiZIDE (GLUCOTROL XL) 5 MG 24 hr tablet, TAKE ONE TABLET BY MOUTH ONCE DAILY, Disp: 90 tablet, Rfl: 5  ?  hydrALAZINE (APRESOLINE) 25 MG tablet, TAKE ONE TABLET BY MOUTH EVERY EIGHT HOURS , Disp: 270 tablet, Rfl: 1  ?  hydroCHLOROthiazide (HYDRODIURIL) 25 MG tablet, TAKE ONE TABLET BY MOUTH ONCE DAILY , Disp: 90 tablet, Rfl: 2  ?  isosorbide mononitrate (IMDUR) 30 MG 24 hr tablet, TAKE ONE TABLET BY  "MOUTH TWICE DAILY , Disp: 180 tablet, Rfl: 1  ?  lisinopril (PRINIVIL,ZESTRIL) 20 MG tablet, TAKE ONE TABLET BY MOUTH TWICE DAILY , Disp: 180 tablet, Rfl: 2  ?  metFORMIN (GLUCOPHAGE) 500 MG tablet, Take 1 tablet (500 mg total) by mouth 2 (two) times a day with meals., Disp: 180 tablet, Rfl: 3  ?  metoprolol succinate (TOPROL-XL) 200 MG 24 hr tablet, TAKE ONE TABLET BY MOUTH ONE TIME DAILY , Disp: 90 tablet, Rfl: 1  ?  multivitamin with minerals (THERA-M) 9 mg iron-400 mcg Tab tablet, Take 1 tablet by mouth daily., Disp: , Rfl:   ?  warfarin (COUMADIN/JANTOVEN) 2.5 MG tablet, TAKE 1 TABLET BY MOUTH DAILY ON TUESDAY AND THURSDAY AND 2 TABLETS BY MOUTH DAILY ON ALL OTHER DAYS, Disp: 180 tablet, Rfl: 1  ?  zolpidem (AMBIEN) 5 MG tablet, TAKE ONE TABLET BY MOUTH AT BEDTIME AS NEEDED FOR SLEEP, Disp: 30 tablet, Rfl: 0  ?  acetaminophen (TYLENOL) 325 MG tablet, Take 2 tablets (650 mg total) by mouth every 4 (four) hours as needed., Disp: , Rfl: 0  ?  metFORMIN (GLUCOPHAGE) 850 MG tablet, Take 1 tablet (850 mg total) by mouth 2 (two) times a day with meals. Restart Monday 9/23, Disp: , Rfl: 0  ?  oxyCODONE (ROXICODONE) 5 MG immediate release tablet, Take 1 tablet (5 mg total) by mouth every 4 (four) hours as needed for pain., Disp: 13 tablet, Rfl: 0    Allergies:   Patient has no known allergies.      Objective:      Physical Exam  212 lb (96.2 kg)  5' 11\" (1.803 m)  Body mass index is 29.57 kg/m .  BP 98/52 (Patient Site: Left Arm, Patient Position: Sitting, Cuff Size: Adult Large)   Pulse 72   Resp 16   Ht 5' 11\" (1.803 m)   Wt 212 lb (96.2 kg)   BMI 29.57 kg/m      General Appearance:   Awake, Alert, No acute distress.   HEENT:  Pupil equal and reactive to light. No scleral icterus; the mucous membranes were moist.   Neck: No cervical bruits. No JVD. No thyromegaly.     Chest: The spine was straight. The chest was symmetric.   Lungs:   Respirations unlabored; Lungs are clear to auscultation. No crackles. No " wheezing.   Cardiovascular:   Regular rhythm and rate, normal first and second heart sounds with no murmurs. No rubs or gallops.    Abdomen:  Soft. No tenderness. Non-distended. Bowels sounds are present   Extremities: Equal tibial pulses. Left leg edema post fem-pop bypass.   Skin: No rashes or ulcers. Warm, Dry.   Musculoskeletal: No tenderness. No deformity.   Neurologic: Mood and affect are appropriate. No focal deficits.         EKG: Personally reviewed  Sinus rhythm with occasional Premature ventricular complexes and Possible Premature atrial complexes with Aberrant conduction   Possible Left atrial enlargement   Rightward axis   Prolonged QT   Abnormal ECG   When compared with ECG of 11-OCT-2016 11:01,   Premature ventricular complexes are now Present   Aberrant conduction is now Present   SC interval has decreased   Nonspecific T wave abnormality now evident in Inferior leads    Cardiac Imaging Studies  Coronary angiogram with PCI on 12-:  Known CAD s/p prior CABG  Occluded mid LAD, prox RCA and mid Cx  Large OM1 arising proximal to mid cx occlusion, has patent  stents proximally but a 75% stenosis in the mid portion  Patent LIMA to LAD  3 occluded SVGs    Coronary angiogram with PCI on 10-:  LM 80% distal stenosis  LAD occluded mid; patent LIMA to LAD  LCx 70-80% prox OM1, occluded mid OM1 with  and filling of  distal OM1 via collaterals  RCA known to be occluded      Occluded SVGs to OM, Diag and RCA  Patent ALLRED to LAD      Underwent successful PCI of OM1  with antegrade wire  escalation, followed by laser atherectomy, PTCA and 2.5  Synergy SIRI, post dilated to 2.75mm at high cristina.  Prox OM1 lesion treated successfully with 2.75 Synergy SIRI  Also underwent successful PCI of distal LM into LCx with 3.5  Synergy SIRI, post dilated to 4mm      Lesion seen in distal OM after PCI of prox ; attempted to  stent this lesion but unable to cross mid OM stents; infact  the stent to be delivered  sheared off the balloon at the LM;  successfully retrieved with a Goose neck snare; followed by  PTCA of distal OM with 2.5 balloon.      0% residuals, JAKOB 3 flow post, 0-1 pre    Nuclear stress test on 12-:  CONCLUSION:   1. Lexiscan stress is subjectively negative.   2. Lexiscan stress ECG is negative.   3. Lexiscan stress nuclear study is positive for inducible myocardial ischemia in  apex, distal inferior wall, distal inferolateral segment and distal lateral segment  in a medium size.   4. There is mild global hypokinesis with severe hypokinesis in apex, distal  inferior segment and distal inferolateral and lateral segments. The quantitative  left ventricular ejection fraction is 34%.     Stress cardiac MRI on 8-:  IMPRESSIONS:       1. Lexiscan stress cardiac MRI is positive for inducible myocardial ischemia diffusely indicating multiple vessel disease.   2. Lexiscan stress ECG is negative for inducible myocardial ischemia.  3. Previous nontransmural myocardial infarction in anterior wall, distal anteroseptal wall, apex, lateral wall from mid to distal segment and inferior and inferolateral walls. Endomyocardial scar is about 40%-50% of wall thickness indicating moderately reduced viability.  4. Moderately enlarged left ventricular size with several hypokinesis in anterior wall and inferior/inferolateral and lateral walls. The calculated left ventricular ejection fraction is 28%.  5. Normal right ventricular size and function.  6. Mild mitral and tricuspid valve regurgitation.    Cardiac MRI on 1-:  IMPRESSIONS:    1.  Left ventricle is moderately enlarged with severe systolic dysfunction.  The quantified left ventricular ejection fraction is 33.9%.   There is mild global hypokinesis with severe hypokinesis to akinesis of the mid to distal septal, mid to apical anterior wall, apical lateral and mid inferiolateral wall.   2.  Non-transmural myocardial infarction involving mid to apical  anterior wall, mid to distal anteroseptal wall, basal to mid inferolateral, distal septum, basal to distal inferior wall and basal to mid inferolateral walls which involves 50% of wall thickness indicating moderate possible viability of myocardial segments.  There is non-transmural infarction of the mid to distal anterolateral wall involving 25% of myocardial thickness representing viable myocardium.   3. Transmural infarction of the apical lateral wall.   4. Normal right ventricular size and function function.   5. Mild aortic insufficiency  6. Mild to moderate tricuspid regurgitation.  7. Mild mitral regurgitation.  8. Moderate left atrial enlargement. Mild right atrial enlargement     Compared to prior study on 8/15/2016, there is minimal improvement in left ventricular systolic function.    ECHO on 2-:  1. The left ventricle is normal in size. Left ventricular systolic performance is mildly reduced. The ejection fraction is estimated to be 45%.  2. There is mild mid lateral and more moderate distal On selected views, the mid posterior segment appears hypokinetic.  3. No significant valvular heart disease is identified on this study.   4. There is mild left atrial enlargement.      When compared to the prior real-time echocardiogram dated 8 February 2010, the ejection fraction appears slightly high.    ECHO on 1-:    Moderate biatrial chamber enlargement    Left ventricle ejection fraction is severely decreased. The calculated left ventricular ejection fraction is 22%. There is thinning and near akinesis of the inferior and inferolateral walls. Remainder of the segments are severely hypokinetic. There is flattening of the interventricular septum consistent with right ventricular pressure and volume overload.    Grade 3 diastolic dysfunction    Mildly dilated right ventricle with severe decrease in systolic function    Mild to moderate mitral regurgitation    Mild to moderate tricuspid regurgitation  with mild elevation in pulmonary artery pressures    When compared to the previous study dated 2/27/2017, left ventricular systolic function has decreased further. The degree of mitral and tricuspid insufficiency is also increased.    COLTEN on 1-:    Moderate biatrial enlargement. No evidence of left atrial appendage thrombus.    Left ventricle ejection fraction is severely decreased. The estimated left ventricular ejection fraction is 25% with global hypokinesis.    No PFO identified by bubble study    Mild mitral and tricuspid regurgitation.    Aortic valve sclerosis    ECHO on 5-6-2019:    Left ventricle ejection fraction is moderately decreased. The calculated left ventricular ejection fraction is 33%.    The following segments are akinetic: apical anterior, apical septal, apical inferior and apex. The following segments are hypokinetic: basal inferolateral, mid inferolateral and apical lateral. All other segments are normal.    Left atrial volume is severely increased.    Normal right ventricular size and systolic function.    Severely elevated central venous pressure    When compared to the previous study dated 1/30/2019, LVEF is higher    ECHO on 9-:    Definity contrast utilized    Moderate left ventricular enlargement.    Left ventricle ejection fraction is severely reduced. The calculated left ventricular ejection fraction is 27%.    The following segments are akinetic: basal inferior, mid anteroseptal, mid anterolateral, apical anterior, apical septal, apical inferior, apical lateral and apex. The following segments are hypokinetic: basal anterior, mid anterior, mid inferior and mid inferolateral    . E/e' > 15, suggesting elevated LV filling pressures    Right ventricle not optimally visualized    Moderate biatrial enlargement    Mild to moderate mitral regurgitation.    Mild to moderate tricuspid regurgitation estimate of RV systolic pressure 36 mmHg plus right atrial pressure    When  compared to the previous study dated 5/6/2019, ejection fraction appears lower on the current study. The lateral wall motion abnormality is more pronounced.    Lab Review   Lab Results   Component Value Date     11/16/2019    K 3.9 11/16/2019     11/16/2019    CO2 22 11/16/2019    BUN 66 (H) 11/16/2019    CREATININE 2.35 (H) 11/16/2019    CALCIUM 9.6 11/16/2019     Lab Results   Component Value Date    WBC 8.3 11/12/2019    HGB 8.9 (L) 11/16/2019    HCT 34.7 (L) 11/12/2019    MCV 82 11/12/2019     11/16/2019     Lab Results   Component Value Date    CHOL 99 07/29/2019    CHOL 109 08/28/2018    CHOL 103 08/16/2017     Lab Results   Component Value Date    HDL 33 (L) 07/29/2019    HDL 39 (L) 08/28/2018    HDL 31 (L) 08/16/2017     Lab Results   Component Value Date    LDLCALC 49 07/29/2019    LDLCALC 53 08/28/2018    LDLCALC 52 08/16/2017     Lab Results   Component Value Date    TRIG 87 07/29/2019    TRIG 87 08/28/2018    TRIG 99 08/16/2017     No components found for: CHOLHDL  Lab Results   Component Value Date    TROPONINI 0.06 01/27/2019     Lab Results   Component Value Date    BNP 1,195 (H) 01/27/2019     Lab Results   Component Value Date    TSH 1.13 09/20/2019

## 2021-06-29 NOTE — PROGRESS NOTES
Progress Notes by Isatu Cullen CNP at 8/4/2020 10:30 AM     Author: Isatu Cullen CNP Service: -- Author Type: Nurse Practitioner    Filed: 8/4/2020 11:14 AM Encounter Date: 8/4/2020 Status: Signed    : Isatu Cullen CNP (Nurse Practitioner)         Thank you, Dr. Keen, for asking the [facility] team to see Mr. Keanu Spence.      Assessment/Recommendations   Assessment:    1.  Paroxysmal atrial flutter: Diagnosed in 2019.  Most recent onset 6/17/2020.  Underwent successful cardioversion on 7/21/2020.  Patient is feeling well, so we will continue the diltiazem to 40 mg daily.  If any recurrence, patient will need to be started on amiodarone as he is highly symptomatic.  Due to heart failure and CAD he is not a candidate for 1C antiarrhythmics.  Not a candidate for Tikosyn or sotalol given renal function.  2.  ICD in situ: Device is functioning appropriately.  Patient is now APVP  3.   Peripheral vascular disease: Patient has history of femoropopliteal x2 on left lower extremity, he has had no issues since November 2019.  This is a recent issue post cardioversion.  Would suggest to continue anticoagulation if possible.  However, he has procedure on Friday, 8/7/2020 with Dr. Chakraborty  4.  Chronic systolic heart failure, New York Heart Association class III: Managed by primary cardiologist.  5.  Shortness of breath: Symptomatic improvement post cardioversion.  However, recent CT of his chest also reviewed which does show COPD.      [id number] score of 5 and on warfarin.  Follow up with Dr. Montenegro as scheduled.    Plan:  Continue current medication regimen, will send a note to [facility] to discuss anticoagulation.     History of Present Illness/Subjective    Mr. Keanu Spence is a 70 y.o. male with a history of peripheral vascular diseases/p fempop, obstructive sleep apnea, coronary artery disease status post CABG, CKD stage III, CHF status post ICD implant,  hypertension.  He underwent cardioversion for atrial flutter in 2019.  He had recurrence documented by Dr. Montenegro, patient went into atrial flutter on 6/17/2020.  His heart rates remained well controlled, however he is more tired and short of breath.  On 7/21/2020 Keanu underwent cardioversion with Dr. Montenegro. The rhythm was not amenable to attempts at burst pacing. His [employer] dual chamber ICD was used to discharge a 41J shock to restore NSR.     Today, he tells me that he is having left lower extremity pain.  He underwent imaging as a follow-up to his vascular disease last week, and is scheduled to undergo IR intervention with Dr. Ken Chakraborty on Friday, 8/7/2020.  He was instructed to stop his anticoagulation, which he stopped as of yesterday.  His INR today is 2.7.  Prior INR was on day of cardioversion at 2.9.    He  denies chest pain, palpitations, shortness of breath, paroxysmal nocturnal dyspnea, orthopnea, lightheadedness, dizziness, pre-syncope, or syncope.    ECG: Personally reviewed 7/21/2020. APVP @ 70 bpm.    Remote device check 8/3/2020  Type: Routine ICD remote transmission.  Presenting: APVS 60 bpm.  Lead/Battery Status: Stable.  Atrial Arrhythmias: Since 6/20/2020; No mode switches detected.  Vent Arrhythmias: Since 6/20/2020; no high ventricular arrhythmias detected.  Anticoagulant: Warfarin.  Comments: Normal device function. [facility]     Physical Examination Review of Systems   Vitals:    08/04/20 1028   BP: 104/52   Pulse: 62   SpO2: 96%     Body mass index is 29.09 kg/m .  Wt Readings from Last 3 Encounters:   08/04/20 208 lb 9.6 oz (94.6 kg)   07/21/20 208 lb (94.3 kg)   05/04/20 208 lb (94.3 kg)       General Appearance:   No acute distress, overweight   ENT/Mouth: membranes moist, no oral lesions or bleeding gums.      EYES:  no scleral icterus, normal conjunctivae   Neck: no carotid bruits or thyromegaly   Chest/Lungs:   lungs are clear to auscultation, no rales or wheezing,     Cardiovascular:   Regular Rhythm. Normal first and second heart sounds with 2/5 murmur. radial and posterior tibial pulses are intact, although diminished, no JVD, NP edema to LLE    Abdomen:  no organomegaly, masses, bruits, or tenderness; bowel sounds are present   Extremities: no cyanosis or clubbing   Skin: Dry, warm, intact.    Neurologic: normal  bilateral, no tremors     Psychiatric: alert and oriented x3, calm     General: WNL  Eyes: WNL  Ears/Nose/Throat: WNL  Lungs: WNL  Heart: Irregular Heartbeat  Stomach: WNL  Bladder: WNL  Muscle/Joints: WNL  Skin: WNL  Nervous System: WNL  Mental Health: WNL     Blood: WNL     Medical History  Surgical History Family History Social History   Past Medical History:   Diagnosis Date   ? Anemia    ? Atypical atrial flutter (H)    ? Cardiac pacemaker in situ 9/25/2019   ? Chronic anticoagulation 2/21/2019   ? Chronic renal failure, stage 3 (moderate) (H)    ? Chronic systolic heart failure (H)    ? COPD (chronic obstructive pulmonary disease) (H)    ? Coronary artery disease    ? Diabetes mellitus (H)    ? Dyslipidemia, goal LDL below 70    ? Essential hypertension    ? ICD (implantable cardioverter-defibrillator), dual, in situ    ? Ischemic cardiomyopathy    ? Peripheral vascular disease (H)    ? Screening for prostate cancer 5/7/2019   ? Sleep apnea     no cpap    Past Surgical History:   Procedure Laterality Date   ? CARDIAC CATHETERIZATION  09/19/2016   ? CARDIAC CATHETERIZATION  10/11/2016     to lad/om1 in stent occlusion   ? CATARACT EXTRACTION, BILATERAL     ? CORONARY ANGIOPLASTY     ? CORONARY ARTERY BYPASS GRAFT  10/98    [facility]   ? CORONARY STENT PLACEMENT  10/11/2016    bebe to left main and om1   ? EP ICD INSERT N/A 9/20/2019    Procedure: EP ICD Insert;  Surgeon: Colton Montenegro MD;  Location: [facility] Cath Lab;  Service: Cardiology   ? FEMORAL ARTERY - POPLITEAL ARTERY BYPASS GRAFT Left 11/12/2019    Procedure: LEFT FEMORAL POPLITEAL BYPASS,  ARTERIOGRAM;  Surgeon: Ken Chakraborty MD;  Location: [facility] OR;  Service: General   ? FEMORAL BYPASS Left 2002     [facility]   ? IR EXTREMITY ANGIOGRAM BILATERAL  9/13/2019    Family History   Problem Relation Age of Onset   ? Diabetes Mother    ? Diabetes Father    ? Heart disease Father    ? Diabetes Sister    ? Hypertension Sister    ? Diabetes Brother    ? Hypertension Brother    ? Diabetes type I Son     Social History     Socioeconomic History   ? Marital status: Domestic Partner     Spouse name: Shefali POWELL)   ? Number of children: 1   ? Years of education: Not on file   ? Highest education level: Not on file   Occupational History     Employer: RETIRED   Social Needs   ? Financial resource strain: Not on file   ? Food insecurity     Worry: Not on file     Inability: Not on file   ? Transportation needs     Medical: Not on file     Non-medical: Not on file   Tobacco Use   ? Smoking status: Former Smoker     Packs/day: 0.20     Years: 40.00     Pack years: 8.00     Types: Cigarettes     Start date: 1971     Last attempt to quit: 12/5/2016     Years since quitting: 3.6   ? Smokeless tobacco: Never Used   Substance and Sexual Activity   ? Alcohol use: Yes     Comment: rare   ? Drug use: No   ? Sexual activity: Not on file   Lifestyle   ? Physical activity     Days per week: Not on file     Minutes per session: Not on file   ? Stress: Not on file   Relationships   ? Social connections     Talks on phone: Not on file     Gets together: Not on file     Attends Sabianism service: Not on file     Active member of club or organization: Not on file     Attends meetings of clubs or organizations: Not on file     Relationship status: Not on file   ? Intimate partner violence     Fear of current or ex partner: Not on file     Emotionally abused: Not on file     Physically abused: Not on file     Forced sexual activity: Not on file   Other Topics Concern   ? 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  He is from [place], and came to [place] on a scholarship to [employer] in 1969. His son, Jamel, lives with him. He has a cat.          Medications  Allergies   Current Outpatient Medications   Medication Sig Dispense Refill   ? ACCU-CHEK COMPACT TEST Strp Test daily before all meals/snacks and once before bedtime. 60 strip 12   ? acetaminophen (TYLENOL) 325 MG tablet Take 2 tablets (650 mg total) by mouth every 4 (four) hours as needed.  0   ? albuterol (PROAIR HFA;PROVENTIL HFA;VENTOLIN HFA) 90 mcg/actuation inhaler Inhale 2 puffs every 4 (four) hours as needed for wheezing or shortness of breath. 1 each 0   ? aspirin 81 MG EC tablet Take 1 tablet (81 mg total) by mouth every evening. 90 tablet 3   ? atorvastatin (LIPITOR) 40 MG tablet Take 1 tablet (40 mg total) by mouth at bedtime. 90 tablet 3   ? blood glucose test (ACCU-CHEK SMARTVIEW TEST STRIP) strips Dispense brand per patient's insurance at pharmacy discretion. Testing once a day only 90 strip 3   ? blood-glucose meter, drum-type (ACCU-CHEK COMPACT PLUS CARE) Kit Test daily before all meals/snacks and once before bedtime. 1 kit 0   ? diltiazem (CARDIZEM CD) 240 MG 24 hr capsule Take 1 capsule (240 mg total) by mouth daily. 90 capsule 5   ? fluticasone propion-salmeteroL (ADVAIR DISKUS) 250-50 mcg/dose DISKUS Inhale 1 puff 2 (two) times a day. 1 each 10   ? furosemide (LASIX) 40 MG tablet TAKE ONE TABLET BY MOUTH ONE TIME DAILY  180 tablet 2   ? generic lancets (ACCU-CHEK MULTICLIX LANCET) Test daily before all meals/snacks and once before bedtime. 3 each 0   ? glipiZIDE (GLUCOTROL XL) 5 MG 24 hr tablet TAKE ONE TABLET BY MOUTH ONE TIME DAILY  90 tablet 0   ? hydrALAZINE (APRESOLINE) 25 MG tablet Take 1 tablet (25 mg total) by mouth every 8 (eight) hours. 270 tablet 1   ? hydroCHLOROthiazide (HYDRODIURIL) 25 MG tablet TAKE ONE TABLET BY MOUTH ONCE DAILY  90 tablet 2   ?  HYDROcodone-acetaminophen 5-325 mg per tablet Take 1 tablet by mouth every 8 (eight) hours as needed for pain. 60 tablet 0   ? hydrocortisone with aloe 1 % Crea cream Apply to affected area 2 times daily 30 g 1   ? isosorbide mononitrate (IMDUR) 30 MG 24 hr tablet TAKE ONE TABLET BY MOUTH TWICE DAILY 180 tablet 1   ? lisinopril (PRINIVIL,ZESTRIL) 20 MG tablet TAKE ONE TABLET BY MOUTH TWICE DAILY  180 tablet 2   ? metFORMIN (GLUCOPHAGE) 500 MG tablet Take 1 tablet (500 mg total) by mouth 2 (two) times a day with meals. 180 tablet 3   ? metoprolol succinate (TOPROL-XL) 200 MG 24 hr tablet Take 1 tablet (200 mg total) by mouth daily. 90 tablet 1   ? multivitamin with minerals (THERA-M) 9 mg iron-400 mcg Tab tablet Take 1 tablet by mouth daily.     ? oxyCODONE-acetaminophen (PERCOCET/ENDOCET) 5-325 mg per tablet Take 1 tablet by mouth every 6 (six) hours as needed for pain. 30 tablet 0   ? pantoprazole (PROTONIX) 40 MG tablet Take 1 tablet (40 mg total) by mouth daily. 30 tablet 5   ? temazepam (RESTORIL) 15 mg capsule Take 1 capsule (15 mg total) by mouth at bedtime as needed for sleep. 30 capsule 0   ? warfarin ANTICOAGULANT (COUMADIN/JANTOVEN) 2.5 MG tablet Take 2.5 mg by mouth See Admin Instructions. Monday Friday and Saturday 5mg and 2.5 mg Sunday, Tues, Wednesday and Thursday. Adjust dose based on INR results as directed.       No current facility-administered medications for this visit.     No Known Allergies      Lab Results    Chemistry/lipid CBC Cardiac Enzymes/BNP/TSH/INR   Lab Results   Component Value Date    CHOL 99 07/29/2019    HDL 33 (L) 07/29/2019    LDLCALC 49 07/29/2019    TRIG 87 07/29/2019    CREATININE 2.22 (H) 06/16/2020    BUN 51 (H) 06/16/2020    K 3.9 07/21/2020     06/16/2020     06/16/2020    CO2 29 06/16/2020    Lab Results   Component Value Date    WBC 7.9 06/16/2020    HGB 9.7 (L) 06/16/2020    HCT 30.1 (L) 06/16/2020    MCV 76 (L) 06/16/2020     06/16/2020    Lab  Results   Component Value Date    CKMB 4 02/04/2010    TROPONINI 0.06 01/27/2019    BNP 1,195 (H) 01/27/2019    TSH 1.13 09/20/2019    INR 2.70 (!) 08/04/2020

## 2021-06-29 NOTE — PROGRESS NOTES
"Progress Notes by Isatu Cullen CNP at 7/17/2020  9:10 AM     Author: Isatu Cullen CNP Service: -- Author Type: Nurse Practitioner    Filed: 7/17/2020  4:21 PM Encounter Date: 7/17/2020 Status: Signed    : Isatu Cullen CNP (Nurse Practitioner)           The patient has been notified of following:     \"This telephone visit will be conducted via a call between you and your physician/provider. We have found that certain health care needs can be provided without the need for a physical exam.  This service lets us provide the care you need with a phone conversation.  If a prescription is necessary we can send it directly to your pharmacy.  If lab work is needed we can place an order for that and you can then stop by our lab to have the test done at a later time. If during the course of the call the physician/provider feels a telephone visit is not appropriate, you will not be charged for this service.\" Verbal consent has been obtained for this service by care team member:         HEART CARE PHONE ENCOUNTER        The patient has chosen to have the visit conducted as a telephone visit, to reduce risk of exposure given the current status of Coronavirus in our community. This telephone visit is being conducted via a call between the patient and physician/provider. Health care needs are being provided without a physical exam.     Assessment/Recommendations   Assessment:    1.  Paroxysmal atrial flutter: Diagnosed in 2019.  Most recent onset 6/17/2020.  Reviewed INR protocol pre-cardioversion.  INR therapeutic since 5/5/2020.  Most recent INR 2.5 on 7/14.  We will proceed with scheduling cardioversion, if we can get him in with Dr. Montenegro next week we will do so.  Otherwise, [employer] representative can assist with overdrive pacing attempt  2.  ICD in situ: Device is functioning appropriately.  3.  Chronic systolic heart failure, New York Heart Association class III: Managed by " "primary cardiologist.  4.  Shortness of breath: Likely secondary to atrial flutter.  Will reevaluate for need of ischemic work-up after cardioversion.    Plan:  1. Proceed with cardioversion next week as planned.    Follow Up Plan: Follow up in 4 weeks with Dr. Montenegro  I have reviewed the note as documented.  This accurately captures the substance of my conversation with the patient.    Total time of call between patient and provider was 14 minutes   Start Time:910   Stop Time:924       History of Present Illness/Subjective    Keanu Spence is a 70 y.o. male who is being evaluated via a billable telephone visit.  Today will serve as an H&P for cardioversion.  He has a history of peripheral vascular diseases/p fempop, obstructive sleep apnea, coronary artery disease status post CABG, CKD stage III, CHF status post ICD implant, hypertension.  He underwent cardioversion for atrial flutter in 2019.    As noted by Dr. Montenegro, patient went into atrial flutter on 6/17/2020.  His heart rates are pretty well controlled, however he is more tired and short of breath.  Unfortunately, patient was quite aggravated at the delay in his cardioversion.  Because of this, he did not give me a lot of subjective information.  He does repetitively state that he feels like \"crap.\"    Remote device check 7/16/20  Type: remote ICD transmission done prior to virtual appointment with Isatu COMBS CNP.  Presenting rhythm: appears atrial flutter with 2:1 conduction, VS rate 92 bpm. Rare PVCs.  Battery/lead status: stable  Arrhythmias: since 6/20/20, AF is persistent since 6/17/20, burden 100%, V-rates average in the 80's per trend. No VT/VF detected.  Anticoagulant: warfarin  Comments: normal ICD function.  Device/lead alerts: none. prd  I have reviewed and updated the patient's Past Medical History, Social History, Family History and Medication List.     Physical Examination not performed given phone encounter Review of Systems             "                                    Medical History  Surgical History Family History Social History   Past Medical History:   Diagnosis Date   ? Anemia    ? Atypical atrial flutter (H)    ? Cardiac pacemaker in situ 9/25/2019   ? Chronic anticoagulation 2/21/2019   ? Chronic renal failure, stage 3 (moderate) (H)    ? Chronic systolic heart failure (H)    ? COPD (chronic obstructive pulmonary disease) (H)    ? Coronary artery disease    ? Diabetes mellitus (H)    ? Dyslipidemia, goal LDL below 70    ? Essential hypertension    ? ICD (implantable cardioverter-defibrillator), dual, in situ    ? Ischemic cardiomyopathy    ? Peripheral vascular disease (H)    ? Screening for prostate cancer 5/7/2019   ? Sleep apnea     no cpap    Past Surgical History:   Procedure Laterality Date   ? CARDIAC CATHETERIZATION  09/19/2016   ? CARDIAC CATHETERIZATION  10/11/2016     to lad/om1 in stent occlusion   ? CATARACT EXTRACTION, BILATERAL     ? CORONARY ANGIOPLASTY     ? CORONARY ARTERY BYPASS GRAFT  10/98    [facility]   ? CORONARY STENT PLACEMENT  10/11/2016    bebe to left main and om1   ? EP ICD INSERT N/A 9/20/2019    Procedure: EP ICD Insert;  Surgeon: Colton Montenegro MD;  Location: [facility] Cath Lab;  Service: Cardiology   ? FEMORAL ARTERY - POPLITEAL ARTERY BYPASS GRAFT Left 11/12/2019    Procedure: LEFT FEMORAL POPLITEAL BYPASS, ARTERIOGRAM;  Surgeon: Ken Chakraborty MD;  Location: [facility] OR;  Service: General   ? FEMORAL BYPASS Left 2002    [facility]   ? IR EXTREMITY ANGIOGRAM BILATERAL  9/13/2019    Family History   Problem Relation Age of Onset   ? Diabetes Mother    ? Diabetes Father    ? Heart disease Father    ? Diabetes Sister    ? Hypertension Sister    ? Diabetes Brother    ? Hypertension Brother    ? Diabetes type I Son     Social History     Socioeconomic History   ? Marital status: Domestic Partner     Spouse name: Shefali Sosa (VICTOR MANUEL)   ? Number of children: 1   ? Years of education:  Not on file   ? Highest education level: Not on file   Occupational History     Employer: RETIRED   Social Needs   ? Financial resource strain: Not on file   ? Food insecurity     Worry: Not on file     Inability: Not on file   ? Transportation needs     Medical: Not on file     Non-medical: Not on file   Tobacco Use   ? Smoking status: Former Smoker     Packs/day: 0.20     Years: 40.00     Pack years: 8.00     Types: Cigarettes     Start date: 1971     Last attempt to quit: 12/5/2016     Years since quitting: 3.6   ? Smokeless tobacco: Never Used   Substance and Sexual Activity   ? Alcohol use: Yes     Comment: rare   ? Drug use: No   ? Sexual activity: Not on file   Lifestyle   ? Physical activity     Days per week: Not on file     Minutes per session: Not on file   ? Stress: Not on file   Relationships   ? Social connections     Talks on phone: Not on file     Gets together: Not on file     Attends Confucianism service: Not on file     Active member of club or organization: Not on file     Attends meetings of clubs or organizations: Not on file     Relationship status: Not on file   ? Intimate partner violence     Fear of current or ex partner: Not on file     Emotionally abused: Not on file     Physically abused: Not on file     Forced sexual activity: Not on file   Other Topics Concern   ? Not on file   Social History Narrative    Retired from at [employer], and [employer].  He is from [place], and came to [place] on a scholarship to [employer] in 1969. His son, Jamel, lives with him. He has a cat.          Medications  Allergies   Current Outpatient Medications   Medication Sig Dispense Refill   ? ACCU-CHEK COMPACT TEST Strp Test daily before all meals/snacks and once before bedtime. 60 strip 12   ? acetaminophen (TYLENOL) 325 MG tablet Take 2 tablets (650 mg total) by mouth every 4 (four) hours as needed.  0   ? albuterol (PROAIR HFA;PROVENTIL HFA;VENTOLIN HFA) 90 mcg/actuation inhaler Inhale  2 puffs every 4 (four) hours as needed for wheezing or shortness of breath. 1 each 0   ? aspirin 81 MG EC tablet Take 1 tablet (81 mg total) by mouth every evening. 90 tablet 3   ? atorvastatin (LIPITOR) 40 MG tablet Take 1 tablet (40 mg total) by mouth at bedtime. 90 tablet 3   ? blood glucose test (ACCU-CHEK SMARTVIEW TEST STRIP) strips Dispense brand per patient's insurance at pharmacy discretion. Testing once a day only 90 strip 3   ? blood-glucose meter, drum-type (ACCU-CHEK COMPACT PLUS CARE) Kit Test daily before all meals/snacks and once before bedtime. 1 kit 0   ? diltiazem (CARDIZEM CD) 240 MG 24 hr capsule Take 1 capsule (240 mg total) by mouth daily. 90 capsule 5   ? fluticasone propion-salmeteroL (ADVAIR DISKUS) 250-50 mcg/dose DISKUS Inhale 1 puff 2 (two) times a day. 1 each 10   ? furosemide (LASIX) 40 MG tablet TAKE ONE TABLET BY MOUTH ONE TIME DAILY  180 tablet 2   ? generic lancets (ACCU-CHEK MULTICLIX LANCET) Test daily before all meals/snacks and once before bedtime. 3 each 0   ? glipiZIDE (GLUCOTROL XL) 5 MG 24 hr tablet TAKE ONE TABLET BY MOUTH ONE TIME DAILY  90 tablet 0   ? hydrALAZINE (APRESOLINE) 25 MG tablet Take 1 tablet (25 mg total) by mouth every 8 (eight) hours. 270 tablet 1   ? hydroCHLOROthiazide (HYDRODIURIL) 25 MG tablet TAKE ONE TABLET BY MOUTH ONCE DAILY  90 tablet 2   ? HYDROcodone-acetaminophen 5-325 mg per tablet Take 1 tablet by mouth every 8 (eight) hours as needed for pain. 60 tablet 0   ? hydrocortisone with aloe 1 % Crea cream Apply to affected area 2 times daily 30 g 1   ? isosorbide mononitrate (IMDUR) 30 MG 24 hr tablet TAKE ONE TABLET BY MOUTH TWICE DAILY 180 tablet 1   ? lisinopril (PRINIVIL,ZESTRIL) 20 MG tablet TAKE ONE TABLET BY MOUTH TWICE DAILY  180 tablet 2   ? metFORMIN (GLUCOPHAGE) 500 MG tablet Take 1 tablet (500 mg total) by mouth 2 (two) times a day with meals. 180 tablet 3   ? metoprolol succinate (TOPROL-XL) 200 MG 24 hr tablet Take 1 tablet (200 mg  total) by mouth daily. 90 tablet 1   ? multivitamin with minerals (THERA-M) 9 mg iron-400 mcg Tab tablet Take 1 tablet by mouth daily.     ? pantoprazole (PROTONIX) 40 MG tablet Take 1 tablet (40 mg total) by mouth daily. 30 tablet 5   ? warfarin ANTICOAGULANT (COUMADIN/JANTOVEN) 2.5 MG tablet TAKE 1 TABLET BY MOUTH DAILY ON TUESDAY AND THURSDAY AND 2 TABLETS BY MOUTH DAILY ON ALL OTHER DAYS 150 tablet 1   ? QUEtiapine (SEROQUEL) 50 MG tablet Take 1 tablet (50 mg total) by mouth at bedtime. 30 tablet 6   ? temazepam (RESTORIL) 15 mg capsule Take 1 capsule (15 mg total) by mouth at bedtime as needed for sleep. 30 capsule 0     No current facility-administered medications for this visit.     No Known Allergies      Lab Results    Chemistry/lipid CBC Cardiac Enzymes/BNP/TSH/INR   Lab Results   Component Value Date    CHOL 99 07/29/2019    HDL 33 (L) 07/29/2019    LDLCALC 49 07/29/2019    TRIG 87 07/29/2019    CREATININE 2.22 (H) 06/16/2020    BUN 51 (H) 06/16/2020    K 4.1 06/16/2020     06/16/2020     06/16/2020    CO2 29 06/16/2020    Lab Results   Component Value Date    WBC 7.9 06/16/2020    HGB 9.7 (L) 06/16/2020    HCT 30.1 (L) 06/16/2020    MCV 76 (L) 06/16/2020     06/16/2020    Lab Results   Component Value Date    CKMB 4 02/04/2010    TROPONINI 0.06 01/27/2019    BNP 1,195 (H) 01/27/2019    TSH 1.13 09/20/2019    INR 2.50 (H) 07/14/2020        Seth Cullen

## 2021-06-29 NOTE — PROGRESS NOTES
Progress Notes by Colton Montenegro MD at 8/4/2020 10:30 AM     Author: Colton Montenegro MD Service: -- Author Type: Physician    Filed: 8/6/2020 11:21 AM Encounter Date: 8/4/2020 Status: Signed    : Colton Montenegro MD (Physician)       Isatu Cullen, CNP  Colton Montenegro MD               Patient is APVP on remote 8/3 s/p CV 7/21/20. You have apmt with patient 8/17, just wanted to confirm you want to keep that apmt. He has in clinic device check also. Lower HR is still at 70 bpm.      Reschedule to see e in 6 months

## 2021-06-29 NOTE — PROGRESS NOTES
"Progress Notes by Colton oMntenegro MD at 5/4/2020  7:50 AM     Author: Colton Montenegro MD Service: -- Author Type: Physician    Filed: 5/4/2020  8:18 AM Encounter Date: 5/4/2020 Status: Signed    : Colton Montenegro MD (Physician)           The patient has been notified of following:     \"This telephone visit will be conducted via a call between you and your physician/provider. We have found that certain health care needs can be provided without the need for a physical exam.  This service lets us provide the care you need with a phone conversation.  If a prescription is necessary we can send it directly to your pharmacy.  If lab work is needed we can place an order for that and you can then stop by our lab to have the test done at a later time. If during the course of the call the physician/provider feels a telephone visit is not appropriate, you will not be charged for this service.\" Verbal consent has been obtained for this service by care team member:         HEART CARE PHONE ENCOUNTER        The patient has chosen to have the visit conducted as a telephone visit, to reduce risk of exposure given the current status of Coronavirus in our community. This telephone visit is being conducted via a call between the patient and physician/provider. Health care needs are being provided without a physical exam.     Assessment/Recommendations   Assessment:    1.  Symptomatic sinus node dysfunction, sinus bradycardia and junctional bradycardia  2.  Paroxysmal atrial flutter with cardioversion 2019  Coronary artery disease with bypass   surgery 1998 and subsequent intervention    LIMA-LAD; occluded 3 SVG   Chronic anticoagulation with warfarin  Ischemic cardiomyopathy his ejection fraction was 35% and history of congestive heart failure with decompensation  4.  Hypertension  5.Perilpheral  vascular disease and history of femoral b  ypass 2002  Left fem-Pop bypass 11-  DDDR/ICD; BSCI " 9-  CRF  COPD-chronic bronchitis  AODM       Plan:  ICD evaluation is excellent  Battery should last another 13 years  High amount of atrial pacing which is good to prevent slow heart rate  Almost no ventricular pacing-8%  No abnormal heart rhythms identified; none lasting > 1 minute  He had a successful left femoropopliteal bypass November 12, 2019  He was started on diltiazem 240 mg daily this is reduced to 180 mg daily because of relatively low blood pressure  You remain on warfarin and INR assessment has been good  Continue current medications no adjustments were made  Return in 6 months for comprehensive cardiac arrhythmia and device assessment  Your device will be checked frequently and every 3 months a more comprehensive assessment will be done    Follow Up Plan: Follow up in   I have reviewed the note as documented.  This accurately captures the substance of my conversation with the patient.    Total time of call between patient and provider was 21 minutes   Start Time:750  Stop Time:812  Additional 20 minutes spent to review medical records and generate report        History of Present Illness/Subjective    Keanu Spence is a 69 y.o. male who is being evaluated via a billable telephone visit.    Keanu has been doing quite well  Did have trouble with his left leg and had a revascularization done by Dr. Sullivan   November 12, 2019-he had had a previous bypass of the left leg in 2002  Apparently had a nice result from his surgery  Remains active has some shortness of breath on exertion that he attributes to his COPD  Is at risk for respiratory decompensation when he gets flulike symptoms and takes antibiotics/prednisone  Does see his pulmonary specialist on occasions  No angina  Fluid levels seem good occasional swelling-advised to take an extra furosemide 40 mg on occasions of excess fluid buildup  Renal function remains impaired with creatinine about 2  Takes warfarin seems to do well no major  bleeding problems INRs have been well regulated  ICD evaluation shows battery good for another 13 years,  Almost no atrial arrhythmias of 6 episodes none lasting more than a minute  No ventricular tachycardia  84% atrial pacing  8% ventricular pacing  Battery good for another 13 years  Reviewed medications but made no adjustments  Discussed follow-up plans    I have reviewed and updated the patient's Past Medical History, Social History, Family History and Medication List.     Physical Examination not performed given phone encounter Review of Systems           Family History   Problem Relation Age of Onset   ? Diabetes Mother     ? Diabetes Father     ? Heart disease Father     ? Diabetes Sister     ? Hypertension Sister     ? Diabetes Brother     ? Hypertension Brother     ? Diabetes type I Son           Social History:    reports that he quit smoking about 3 years ago. His smoking use included cigarettes. He started smoking about 48 years ago. He has a 8.00 pack-year smoking history. He has never used smokeless tobacco. He reports current alcohol use. He reports that he does not use drugs.                                             Medical History  Surgical History Family History Social History   Past Medical History:   Diagnosis Date   ? Anemia    ? Atypical atrial flutter (H)    ? Cardiac pacemaker in situ 9/25/2019   ? Chronic anticoagulation 2/21/2019   ? Chronic renal failure, stage 3 (moderate) (H)    ? Chronic systolic heart failure (H)    ? COPD (chronic obstructive pulmonary disease) (H)    ? Coronary artery disease    ? Diabetes mellitus (H)    ? Dyslipidemia, goal LDL below 70    ? Essential hypertension    ? ICD (implantable cardioverter-defibrillator), dual, in situ    ? Ischemic cardiomyopathy    ? Peripheral vascular disease (H)    ? Screening for prostate cancer 5/7/2019   ? Sleep apnea     no cpap    Past Surgical History:   Procedure Laterality Date   ? CARDIAC CATHETERIZATION  09/19/2016   ?  CARDIAC CATHETERIZATION  10/11/2016     to lad/om1 in stent occlusion   ? CATARACT EXTRACTION, BILATERAL     ? CORONARY ANGIOPLASTY     ? CORONARY ARTERY BYPASS GRAFT  10/98    lima   ? CORONARY STENT PLACEMENT  10/11/2016    bebe to left main and om1   ? EP ICD INSERT N/A 9/20/2019    Procedure: EP ICD Insert;  Surgeon: Colton Montenegro MD;  Location: [facility];  Service: Cardiology   ? FEMORAL ARTERY - POPLITEAL ARTERY BYPASS GRAFT Left 11/12/2019    Procedure: LEFT FEMORAL POPLITEAL BYPASS, ARTERIOGRAM;  Surgeon: Ken Chakraborty MD;  Location: [facility] Main OR;  Service: General   ? FEMORAL BYPASS Left 2002    [facility],  Dr. Chakraborty   ? IR EXTREMITY ANGIOGRAM BILATERAL  9/13/2019    Family History   Problem Relation Age of Onset   ? Diabetes Mother    ? Diabetes Father    ? Heart disease Father    ? Diabetes Sister    ? Hypertension Sister    ? Diabetes Brother    ? Hypertension Brother    ? Diabetes type I Son     Social History     Socioeconomic History   ? Marital status: Domestic Partner     Spouse name: Shefali POWELL)   ? Number of children: 1   ? Years of education: Not on file   ? Highest education level: Not on file   Occupational History     Employer: RETIRED   Social Needs   ? Financial resource strain: Not on file   ? Food insecurity     Worry: Not on file     Inability: Not on file   ? Transportation needs     Medical: Not on file     Non-medical: Not on file   Tobacco Use   ? Smoking status: Former Smoker     Packs/day: 0.20     Years: 40.00     Pack years: 8.00     Types: Cigarettes     Start date: 1971     Last attempt to quit: 12/5/2016     Years since quitting: 3.4   ? Smokeless tobacco: Never Used   Substance and Sexual Activity   ? Alcohol use: Yes     Comment: rare   ? Drug use: No   ? Sexual activity: Not on file   Lifestyle   ? Physical activity     Days per week: Not on file     Minutes per session: Not on file   ? Stress: Not on file   Relationships   ? Social  connections     Talks on phone: Not on file     Gets together: Not on file     Attends Baptism service: Not on file     Active member of club or organization: Not on file     Attends meetings of clubs or organizations: Not on file     Relationship status: Not on file   ? Intimate partner violence     Fear of current or ex partner: Not on file     Emotionally abused: Not on file     Physically abused: Not on file     Forced sexual activity: Not on file   Other Topics Concern   ? Not on file   Social History Narrative    Retired from at [employer], and [employer].  He is from [place], and came to [place] on a scholarship to [employer] in 1969. His son, Jamel, lives with him. He has a cat.          Medications  Allergies   Current Outpatient Medications   Medication Sig Dispense Refill   ? ACCU-CHEK COMPACT TEST Strp Test daily before all meals/snacks and once before bedtime. 60 strip 12   ? acetaminophen (TYLENOL) 325 MG tablet Take 2 tablets (650 mg total) by mouth every 4 (four) hours as needed.  0   ? albuterol (PROAIR HFA;PROVENTIL HFA;VENTOLIN HFA) 90 mcg/actuation inhaler Inhale 2 puffs every 4 (four) hours as needed for wheezing or shortness of breath. 1 each 0   ? aspirin 81 MG EC tablet Take 1 tablet (81 mg total) by mouth every evening. 90 tablet 3   ? atorvastatin (LIPITOR) 40 MG tablet Take 1 tablet (40 mg total) by mouth at bedtime. 90 tablet 3   ? blood glucose test (ACCU-CHEK SMARTVIEW TEST STRIP) strips Dispense brand per patient's insurance at pharmacy discretion. Testing once a day only 90 strip 3   ? blood-glucose meter, drum-type (ACCU-CHEK COMPACT PLUS CARE) Kit Test daily before all meals/snacks and once before bedtime. 1 kit 0   ? diltiazem (CARDIZEM CD) 180 MG 24 hr capsule Take 1 capsule (180 mg total) by mouth daily. 90 capsule 3   ? furosemide (LASIX) 40 MG tablet Take 1 tablet (40 mg total) by mouth daily. 180 tablet 3   ? generic lancets (ACCU-CHEK MULTICLIX LANCET)  Test daily before all meals/snacks and once before bedtime. 3 each 0   ? glipiZIDE (GLUCOTROL XL) 5 MG 24 hr tablet TAKE ONE TABLET BY MOUTH ONCE DAILY 90 tablet 5   ? hydrALAZINE (APRESOLINE) 25 MG tablet TAKE ONE TABLET BY MOUTH EVERY EIGHT HOURS  270 tablet 1   ? hydroCHLOROthiazide (HYDRODIURIL) 25 MG tablet TAKE ONE TABLET BY MOUTH ONCE DAILY  90 tablet 2   ? hydrocortisone with aloe 1 % Crea cream Apply to affected area 2 times daily 30 g 1   ? isosorbide mononitrate (IMDUR) 30 MG 24 hr tablet TAKE ONE TABLET BY MOUTH TWICE DAILY 180 tablet 1   ? lisinopril (PRINIVIL,ZESTRIL) 20 MG tablet TAKE ONE TABLET BY MOUTH TWICE DAILY  180 tablet 2   ? metFORMIN (GLUCOPHAGE) 500 MG tablet Take 1 tablet (500 mg total) by mouth 2 (two) times a day with meals. 180 tablet 3   ? metoprolol succinate (TOPROL-XL) 200 MG 24 hr tablet TAKE ONE TABLET BY MOUTH ONE TIME DAILY  90 tablet 1   ? multivitamin with minerals (THERA-M) 9 mg iron-400 mcg Tab tablet Take 1 tablet by mouth daily.     ? pantoprazole (PROTONIX) 20 MG tablet Take 1 tablet (20 mg total) by mouth daily. 20 tablet 0   ? QUEtiapine (SEROQUEL) 50 MG tablet Take 1 tablet (50 mg total) by mouth at bedtime. 30 tablet 6   ? temazepam (RESTORIL) 15 mg capsule Take 1 capsule (15 mg total) by mouth at bedtime as needed for sleep. 30 capsule 0   ? warfarin ANTICOAGULANT (COUMADIN/JANTOVEN) 2.5 MG tablet TAKE 1 TABLET BY MOUTH DAILY ON TUESDAY AND THURSDAY AND 2 TABLETS BY MOUTH DAILY ON ALL OTHER DAYS 150 tablet 1     No current facility-administered medications for this visit.     No Known Allergies      Lab Results    Chemistry/lipid CBC Cardiac Enzymes/BNP/TSH/INR   Lab Results   Component Value Date    CHOL 99 07/29/2019    HDL 33 (L) 07/29/2019    LDLCALC 49 07/29/2019    TRIG 87 07/29/2019    CREATININE 1.92 (H) 02/13/2020    BUN 50 (H) 02/13/2020    K 4.3 02/13/2020     (H) 02/13/2020     (H) 02/13/2020    CO2 26 02/13/2020    Lab Results   Component  Value Date    WBC 8.3 11/12/2019    HGB 8.9 (L) 11/16/2019    HCT 34.7 (L) 11/12/2019    MCV 82 11/12/2019     11/16/2019    Lab Results   Component Value Date    CKMB 4 02/04/2010    TROPONINI 0.06 01/27/2019    BNP 1,195 (H) 01/27/2019    TSH 1.13 09/20/2019    INR 1.80 (H) 04/21/2020        Colton Montenegro

## 2021-06-29 NOTE — PROGRESS NOTES
Progress Notes by Greta Domingo RN at 7/17/2020  2:19 PM     Author: Greta Domingo RN Service: -- Author Type: Registered Nurse    Filed: 7/17/2020  2:21 PM Encounter Date: 7/17/2020 Status: Signed    : Greta Domingo RN (Registered Nurse)       Noted.  Orders placed, and scheduling aware.  Greta Lees, RN  Isatu Cullen CNP               Is the pt willing to have CV done with an NP, appears fely said set it up with him   Just want to make sure this gets set up with the right person   Thanks   Vikki    Previous Messages     ----- Message -----   From: Isatu Cullen CNP   Sent: 7/17/2020  11:02 AM CDT   To: [facility]     Patient can be put on for cardioversion next week. Patient is angry that it has taken this long to get him on board. Can you please call and get him on for a date next week? He will need COVID screen and INR check day of.     Please let me know when he is scheduled for. Thanks.   ----- Message -----   From: Shira Vaca CNP   Sent: 7/17/2020  10:27 AM CDT   To: Isatu Cullen CNP     He's been therapeutic for 2 months, so proceed with cardioversion.   ----- Message -----   From: Isatu Cullen CNP   Sent: 7/17/2020   9:26 AM CDT   To: Shira Vaca CNP     Patients INR   4/7 1.8   4/21 1.8   5/5 2.6   5/19 2.3   6/16 2.4   7/14 2.5     Patient went into AF on 6/17, needs CV. Diltiazem increased. Would you wait for therapeutic weekly INR x 3 weeks? Or proceed now with cardioversion?

## 2021-07-03 NOTE — ADDENDUM NOTE
Addendum Note by Mike Hernandez MD at 12/5/2018  2:40 PM     Author: Mike Hernandez MD Service: -- Author Type: Physician    Filed: 12/14/2018  1:12 PM Encounter Date: 12/5/2018 Status: Signed    : Mike Hernandez MD (Physician)    Addended by: MIKE HERNANDEZ on: 12/14/2018 01:12 PM        Modules accepted: Orders

## 2021-07-03 NOTE — ADDENDUM NOTE
Addendum Note by Augusto Denton RN at 6/4/2019  4:02 PM     Author: Augusto Denton RN Service: -- Author Type: Registered Nurse    Filed: 6/4/2019  4:02 PM Encounter Date: 5/21/2019 Status: Signed    : Augusto Denton RN (Registered Nurse)    Addended by: AUGUSTO DENTON on: 6/4/2019 04:02 PM        Modules accepted: Orders

## 2021-07-03 NOTE — ADDENDUM NOTE
Addendum Note by Mike Hernandez MD at 2/13/2019  8:40 AM     Author: Mike Hernandez MD Service: -- Author Type: Physician    Filed: 2/13/2019  5:26 PM Encounter Date: 2/13/2019 Status: Signed    : Mike Hernandez MD (Physician)    Addended by: MIKE HERNANDEZ on: 2/13/2019 05:26 PM        Modules accepted: Orders

## 2021-07-03 NOTE — ADDENDUM NOTE
Addendum Note by Corby Gonzalez CRNA at 11/12/2019  6:24 PM     Author: Corby Gonzalez CRNA Service: -- Author Type: Nurse Anesthetist    Filed: 11/12/2019  6:24 PM Date of Service: 11/12/2019  6:24 PM Status: Signed    : Corby Gonzalez CRNA (Nurse Anesthetist)       Addendum  created 11/12/19 1824 by Corby Gonzalez CRNA    Intraprocedure Meds edited

## 2021-07-14 PROBLEM — I10 ESSENTIAL HYPERTENSION WITH GOAL BLOOD PRESSURE LESS THAN 140/90: Status: RESOLVED | Noted: 2017-01-12 | Resolved: 2019-05-07

## 2021-07-14 PROBLEM — E11.9 TYPE 2 DIABETES MELLITUS WITHOUT COMPLICATION, WITHOUT LONG-TERM CURRENT USE OF INSULIN (H): Status: RESOLVED | Noted: 2019-01-28 | Resolved: 2019-05-07

## 2021-07-14 PROBLEM — I50.9 CHF EXACERBATION (H): Status: RESOLVED | Noted: 2017-02-27 | Resolved: 2017-02-27

## 2021-07-14 PROBLEM — I50.9 ACUTE EXACERBATION OF CHF (CONGESTIVE HEART FAILURE) (H): Status: RESOLVED | Noted: 2017-02-27 | Resolved: 2017-03-13

## 2021-08-03 PROBLEM — I42.9 CARDIOMYOPATHY, UNSPECIFIED TYPE (H): Status: RESOLVED | Noted: 2019-02-05 | Resolved: 2020-01-01

## 2023-07-26 NOTE — PATIENT INSTRUCTIONS - HE
INR 2.7 Continue current management dosing of Warfarin. Continue  diet of moderate Vitamin K intake. Discussed with pt the need to call with questions or concerns or any change in medication especially herbal medication or OTC. Call with increased bleeding or bruising or any upcoming procedures.     Never

## (undated) DEVICE — CATH GUIDING BLUE YELLOW PTFE JR4 6FRX100CM 67008200

## (undated) DEVICE — CATH BALLOON EMERGE 2.0X12MM H7493918912200

## (undated) DEVICE — KIT ACCESSORY INTRO INFLATION SYS 20/30 PRIORITY 1000186-115

## (undated) DEVICE — CATH BALLOON EMERGE 2.5X20MM H7493918920250

## (undated) DEVICE — CATH BALLOON EMERGE 2.5X12MM H7493918912250

## (undated) DEVICE — MANIFOLD KIT ANGIO AUTOMATED 014613

## (undated) DEVICE — GUIDEWIRE VASC 190CM .014IN HI-TRQ

## (undated) DEVICE — PACK HEART LEFT CUSTOM

## (undated) DEVICE — KIT ARTERIAL LINE 2GA 12CM INTRO NDL ASK-04510-HF

## (undated) DEVICE — KIT HAND CONTROL ACIST 014644 AR-P54

## (undated) DEVICE — WIRE GUIDE HI-TRQ PILOT 50 JTIP 0.014"X190CM 1010480-HJ

## (undated) DEVICE — INTRO SHEATH 4FRX10CM PINNACLE RSS402

## (undated) DEVICE — TRAY CATH 8IN 7FR ARROWG+ ARD CDC-45703-1A

## (undated) DEVICE — CATH BALLOON EMERGE 3.5X12MM H7493918912350

## (undated) DEVICE — WIRE GUIDE HI-TRQ ALL STAR 3CM JTIP 0.014"X190CM 1001740J

## (undated) DEVICE — CATH ANGIO INFINITI PIGTAIL 6FRX110CM 6 SH 534650S

## (undated) DEVICE — SLEEVE TR BAND RADIAL COMPRESSION DEVICE 24CM TRB24-REG

## (undated) DEVICE — CATH GUIDING COR LNCHR OD6FR L100CM

## (undated) DEVICE — CATH ANGIO SUPERTORQUE PLUS JL4 6FRX100CM 533620

## (undated) DEVICE — CATH BALLOON EMERGE 2.0X20MM H7493918920200

## (undated) DEVICE — CANNULA ART HLS 17FR ECMO BIOL

## (undated) DEVICE — STENT SYNERGY DRUG ELUTING 2.50X38MM  H7493926038250: Type: IMPLANTABLE DEVICE | Status: NON-FUNCTIONAL

## (undated) DEVICE — CATH ANGIO SUPERTORQUE IM 6FRX100CM 533660

## (undated) DEVICE — INTRO SHEATH MICRO PLATINUM TIP 4FRX40CM 7274

## (undated) DEVICE — INTRO SHEATH 6FRX10CM PINNACLE RSS602

## (undated) DEVICE — SHTH INTRO 0.021IN ID 6FR DIA

## (undated) DEVICE — CATH GUIDING BLUE YELLOW PTFE XB3.5 6FRX100CM 67005400

## (undated) DEVICE — CATH ANGIO SUPERTORQUE PLUS JR4 6FRX100CM 533621

## (undated) DEVICE — VALVE HEMOSTASIS .096" COPILOT MECH 1003331

## (undated) DEVICE — INTRO SHTH INTRO SHTH 8FR X 11

## (undated) DEVICE — CANNULA VENOUS 25FR LONG

## (undated) DEVICE — WIRE GUIDE 0.035"X145CM AMPLATZ XSTIFF J THSCF-35-145-3-AES

## (undated) DEVICE — INTRO SHEATH 6FRX25CM PINNACLE RSS606

## (undated) DEVICE — CATH BALLOON NC EMERGE 4.00X12MM H7493926712400

## (undated) DEVICE — IMPLANTABLE DEVICE: Type: IMPLANTABLE DEVICE | Status: NON-FUNCTIONAL

## (undated) DEVICE — 1.5MM X 12MM TAKERU RX PTCA BALLOON DILATION CATHETER

## (undated) DEVICE — 0.035IN X 260CM, EMERALD DIAGNOSTIC GUIDEWIRE, FIXED-CORE PTFE COATED, STANDARD, 3MM EXCHANGE J-TIP (EA/1)

## (undated) DEVICE — GUIDEWIRE ASAHI SION BLUE 0.014"X180CM J-TIP AHW14R004J

## (undated) DEVICE — INTRO GLIDESHEATH SLENDER 6FR 10X45CM 60-1060

## (undated) RX ORDER — FENTANYL CITRATE 50 UG/ML
INJECTION, SOLUTION INTRAMUSCULAR; INTRAVENOUS
Status: DISPENSED
Start: 2020-01-01

## (undated) RX ORDER — POTASSIUM CHLORIDE 29.8 MG/ML
INJECTION INTRAVENOUS
Status: DISPENSED
Start: 2020-01-01

## (undated) RX ORDER — HEPARIN SODIUM 1000 [USP'U]/ML
INJECTION, SOLUTION INTRAVENOUS; SUBCUTANEOUS
Status: DISPENSED
Start: 2020-01-01

## (undated) RX ORDER — NOREPINEPHRINE BITARTRATE 0.06 MG/ML
INJECTION, SOLUTION INTRAVENOUS
Status: DISPENSED
Start: 2020-01-01

## (undated) RX ORDER — ASPIRIN 81 MG/1
TABLET, CHEWABLE ORAL
Status: DISPENSED
Start: 2020-01-01